# Patient Record
Sex: FEMALE | Race: WHITE | Employment: OTHER | URBAN - METROPOLITAN AREA
[De-identification: names, ages, dates, MRNs, and addresses within clinical notes are randomized per-mention and may not be internally consistent; named-entity substitution may affect disease eponyms.]

---

## 2017-01-06 ENCOUNTER — TRANSFERRED RECORDS (OUTPATIENT)
Dept: HEALTH INFORMATION MANAGEMENT | Facility: CLINIC | Age: 67
End: 2017-01-06
Payer: MEDICARE

## 2019-10-15 ENCOUNTER — TRANSFERRED RECORDS (OUTPATIENT)
Dept: MULTI SPECIALTY CLINIC | Facility: CLINIC | Age: 69
End: 2019-10-15
Payer: MEDICARE

## 2019-12-05 ENCOUNTER — APPOINTMENT (OUTPATIENT)
Dept: LAB | Age: 69
End: 2019-12-05

## 2019-12-12 ENCOUNTER — CONSULT (OUTPATIENT)
Dept: URBAN - METROPOLITAN AREA CLINIC 39 | Facility: CLINIC | Age: 69
End: 2019-12-12

## 2019-12-12 ENCOUNTER — SURGERY/PROCEDURE (OUTPATIENT)
Dept: URBAN - METROPOLITAN AREA SURGERY 14 | Facility: SURGERY | Age: 69
End: 2019-12-12

## 2019-12-12 DIAGNOSIS — H26.493: ICD-10-CM

## 2019-12-12 PROCEDURE — 6682150 YAG CAPSULOTOMY

## 2019-12-12 PROCEDURE — 92004 COMPRE OPH EXAM NEW PT 1/>: CPT

## 2019-12-12 ASSESSMENT — VISUAL ACUITY
OS_BAT: 20/80
OD_BAT: 20/70
OD_SC: 20/40
OS_SC: 20/40

## 2019-12-12 ASSESSMENT — TONOMETRY
OD_IOP_MMHG: 12
OS_IOP_MMHG: 12

## 2020-01-08 ENCOUNTER — TRANSFERRED RECORDS (OUTPATIENT)
Dept: HEALTH INFORMATION MANAGEMENT | Facility: CLINIC | Age: 70
End: 2020-01-08

## 2020-01-28 ENCOUNTER — TRANSFERRED RECORDS (OUTPATIENT)
Dept: HEALTH INFORMATION MANAGEMENT | Facility: CLINIC | Age: 70
End: 2020-01-28

## 2020-01-29 ENCOUNTER — TRANSFERRED RECORDS (OUTPATIENT)
Dept: HEALTH INFORMATION MANAGEMENT | Facility: CLINIC | Age: 70
End: 2020-01-29

## 2020-02-11 ENCOUNTER — TRANSFERRED RECORDS (OUTPATIENT)
Dept: HEALTH INFORMATION MANAGEMENT | Facility: CLINIC | Age: 70
End: 2020-02-11

## 2021-01-26 ENCOUNTER — TRANSFERRED RECORDS (OUTPATIENT)
Dept: HEALTH INFORMATION MANAGEMENT | Facility: CLINIC | Age: 71
End: 2021-01-26

## 2021-02-18 ENCOUNTER — TRANSFERRED RECORDS (OUTPATIENT)
Dept: HEALTH INFORMATION MANAGEMENT | Facility: CLINIC | Age: 71
End: 2021-02-18

## 2021-03-17 ENCOUNTER — TRANSFERRED RECORDS (OUTPATIENT)
Dept: HEALTH INFORMATION MANAGEMENT | Facility: CLINIC | Age: 71
End: 2021-03-17
Payer: MEDICARE

## 2021-04-08 ENCOUNTER — TRANSFERRED RECORDS (OUTPATIENT)
Dept: HEALTH INFORMATION MANAGEMENT | Facility: CLINIC | Age: 71
End: 2021-04-08

## 2021-05-19 ENCOUNTER — TRANSFERRED RECORDS (OUTPATIENT)
Dept: HEALTH INFORMATION MANAGEMENT | Facility: CLINIC | Age: 71
End: 2021-05-19
Payer: MEDICARE

## 2021-07-07 ENCOUNTER — WALK IN (OUTPATIENT)
Dept: URGENT CARE | Age: 71
End: 2021-07-07

## 2021-07-07 VITALS
OXYGEN SATURATION: 98 % | BODY MASS INDEX: 26.09 KG/M2 | HEART RATE: 65 BPM | HEIGHT: 64 IN | DIASTOLIC BLOOD PRESSURE: 74 MMHG | SYSTOLIC BLOOD PRESSURE: 168 MMHG

## 2021-07-07 DIAGNOSIS — T16.2XXA FOREIGN BODY OF LEFT EAR, INITIAL ENCOUNTER: Primary | ICD-10-CM

## 2021-07-07 PROCEDURE — 99212 OFFICE O/P EST SF 10 MIN: CPT | Performed by: EMERGENCY MEDICINE

## 2021-07-07 RX ORDER — METOPROLOL SUCCINATE 25 MG/1
25 TABLET, EXTENDED RELEASE ORAL 2 TIMES DAILY
COMMUNITY

## 2021-09-13 ENCOUNTER — TRANSFERRED RECORDS (OUTPATIENT)
Dept: HEALTH INFORMATION MANAGEMENT | Facility: CLINIC | Age: 71
End: 2021-09-13

## 2021-09-16 ENCOUNTER — TRANSFERRED RECORDS (OUTPATIENT)
Dept: HEALTH INFORMATION MANAGEMENT | Facility: CLINIC | Age: 71
End: 2021-09-16
Payer: MEDICARE

## 2021-09-27 ENCOUNTER — TRANSFERRED RECORDS (OUTPATIENT)
Dept: HEALTH INFORMATION MANAGEMENT | Facility: CLINIC | Age: 71
End: 2021-09-27
Payer: MEDICARE

## 2021-10-14 ENCOUNTER — OFFICE VISIT (OUTPATIENT)
Dept: FAMILY MEDICINE | Facility: CLINIC | Age: 71
End: 2021-10-14
Payer: MEDICARE

## 2021-10-14 VITALS
RESPIRATION RATE: 22 BRPM | OXYGEN SATURATION: 98 % | WEIGHT: 146.8 LBS | DIASTOLIC BLOOD PRESSURE: 74 MMHG | HEIGHT: 62 IN | HEART RATE: 61 BPM | BODY MASS INDEX: 27.02 KG/M2 | TEMPERATURE: 98 F | SYSTOLIC BLOOD PRESSURE: 127 MMHG

## 2021-10-14 DIAGNOSIS — E03.9 HYPOTHYROIDISM, UNSPECIFIED TYPE: ICD-10-CM

## 2021-10-14 DIAGNOSIS — M79.10 MYALGIA DUE TO STATIN: ICD-10-CM

## 2021-10-14 DIAGNOSIS — Z95.2 H/O AORTIC VALVE REPLACEMENT: Primary | ICD-10-CM

## 2021-10-14 DIAGNOSIS — Z98.890 H/O LUMPECTOMY: ICD-10-CM

## 2021-10-14 DIAGNOSIS — I71.20 THORACIC AORTIC ANEURYSM WITHOUT RUPTURE (H): ICD-10-CM

## 2021-10-14 DIAGNOSIS — E78.5 HYPERLIPIDEMIA LDL GOAL <70: ICD-10-CM

## 2021-10-14 DIAGNOSIS — F51.01 PRIMARY INSOMNIA: ICD-10-CM

## 2021-10-14 DIAGNOSIS — F33.41 RECURRENT MAJOR DEPRESSIVE DISORDER, IN PARTIAL REMISSION (H): ICD-10-CM

## 2021-10-14 DIAGNOSIS — Z12.31 ENCOUNTER FOR SCREENING MAMMOGRAM FOR BREAST CANCER: ICD-10-CM

## 2021-10-14 DIAGNOSIS — Z86.0100 HISTORY OF COLONIC POLYPS: ICD-10-CM

## 2021-10-14 DIAGNOSIS — T46.6X5A MYALGIA DUE TO STATIN: ICD-10-CM

## 2021-10-14 DIAGNOSIS — Z87.74 H/O BICUSPID AORTIC VALVE: ICD-10-CM

## 2021-10-14 DIAGNOSIS — Z12.11 SPECIAL SCREENING FOR MALIGNANT NEOPLASMS, COLON: ICD-10-CM

## 2021-10-14 DIAGNOSIS — Z85.3 H/O MALIGNANT NEOPLASM OF BREAST: ICD-10-CM

## 2021-10-14 DIAGNOSIS — E78.5 HYPERLIPIDEMIA LDL GOAL <130: ICD-10-CM

## 2021-10-14 PROCEDURE — 99205 OFFICE O/P NEW HI 60 MIN: CPT | Performed by: FAMILY MEDICINE

## 2021-10-14 RX ORDER — ESCITALOPRAM OXALATE 20 MG/1
20 TABLET ORAL DAILY
Qty: 90 TABLET | Refills: 3 | Status: SHIPPED | OUTPATIENT
Start: 2021-10-14 | End: 2022-01-31

## 2021-10-14 RX ORDER — LORAZEPAM 1 MG/1
1 TABLET ORAL
Qty: 30 TABLET | Refills: 2 | Status: SHIPPED | OUTPATIENT
Start: 2021-10-14 | End: 2022-01-12

## 2021-10-14 RX ORDER — METOPROLOL SUCCINATE 25 MG/1
25 TABLET, EXTENDED RELEASE ORAL DAILY
Qty: 90 TABLET | Refills: 3 | Status: SHIPPED | OUTPATIENT
Start: 2021-10-14 | End: 2021-10-30

## 2021-10-14 RX ORDER — ESCITALOPRAM OXALATE 20 MG/1
TABLET ORAL
COMMUNITY
Start: 2021-07-26 | End: 2021-10-14

## 2021-10-14 RX ORDER — EZETIMIBE 10 MG/1
TABLET ORAL
COMMUNITY
Start: 2021-09-02 | End: 2021-10-14

## 2021-10-14 RX ORDER — LORAZEPAM 1 MG/1
TABLET ORAL
COMMUNITY
Start: 2021-08-05 | End: 2021-10-14

## 2021-10-14 RX ORDER — LEVOTHYROXINE SODIUM 25 UG/1
25 TABLET ORAL DAILY
Qty: 90 TABLET | Refills: 3 | Status: SHIPPED | OUTPATIENT
Start: 2021-10-14 | End: 2022-01-31 | Stop reason: DRUGHIGH

## 2021-10-14 RX ORDER — METOPROLOL TARTRATE 25 MG/1
TABLET, FILM COATED ORAL
COMMUNITY
Start: 2021-09-12 | End: 2021-10-14

## 2021-10-14 RX ORDER — ZINC GLUCONATE 50 MG
50 TABLET ORAL DAILY
COMMUNITY
End: 2023-09-07

## 2021-10-14 RX ORDER — LEVOTHYROXINE SODIUM 25 UG/1
TABLET ORAL
COMMUNITY
Start: 2021-08-22 | End: 2021-10-14

## 2021-10-14 RX ORDER — EZETIMIBE 10 MG/1
10 TABLET ORAL DAILY
Qty: 90 TABLET | Refills: 3 | Status: SHIPPED | OUTPATIENT
Start: 2021-10-14 | End: 2022-01-31

## 2021-10-14 ASSESSMENT — MIFFLIN-ST. JEOR: SCORE: 1143.1

## 2021-10-14 NOTE — PROGRESS NOTES
Assessment & Plan     H/O aortic valve replacement  referal/refills  - Adult Cardiology Eval Referral  - metoprolol succinate ER (TOPROL-XL) 25 MG 24 hr tablet  Dispense: 90 tablet; Refill: 3    H/O bicuspid aortic valve    - metoprolol succinate ER (TOPROL-XL) 25 MG 24 hr tablet  Dispense: 90 tablet; Refill: 3    Thoracic aortic aneurysm without rupture (H)    - metoprolol succinate ER (TOPROL-XL) 25 MG 24 hr tablet  Dispense: 90 tablet; Refill: 3    H/O malignant neoplasm of breast  ordered  - MA Diagnostic Digital Bilateral    H/O lumpectomy    - MA Diagnostic Digital Bilateral    History of colonic polyps  orderd  - Adult Gastro Ref - Procedure Only    Recurrent major depressive disorder, in partial remission (H)  Continue:  - escitalopram (LEXAPRO) 20 MG tablet  Dispense: 90 tablet; Refill: 3    Myalgia due to statin  noted    Hyperlipidemia LDL goal <70      Hyperlipidemia LDL goal <130    - ezetimibe (ZETIA) 10 MG tablet  Dispense: 90 tablet; Refill: 3  - STATIN NOT PRESCRIBED (INTENTIONAL)    Hypothyroidism, unspecified type    - levothyroxine (SYNTHROID/LEVOTHROID) 25 MCG tablet  Dispense: 90 tablet; Refill: 3    Primary insomnia  Discussed trial of trazodone alternating with lorazepam to wean lorazepam due to risks of habituation, decreased effectiveness, falls, hip fracture, death, dementia.   For now no changes.  Follow up three months to continue discussion.     No suspicious activity on [place] rx monitoring database     Reviewed/signed csa today.    Consider utox next visit.     - LORazepam (ATIVAN) 1 MG tablet  Dispense: 30 tablet; Refill: 2    Special screening for malignant neoplasms, colon    - Adult Gastro Ref - Procedure Only    Encounter for screening mammogram for breast cancer    - MA Diagnostic Digital Bilateral    65 minutes spent on the date of the encounter doing chart review, history and exam, negotiating and explaining the plan with the patient, documentation and further activities as  "noted above.                      BMI:   Estimated body mass index is 26.63 kg/m  as calculated from the following:    Height as of this encounter: 1.581 m (5' 2.25\").    Weight as of this encounter: 66.6 kg (146 lb 12.8 oz).           Return in about 3 months (around 1/14/2022) for virtual, in person, sleep/lorazepam.    Joel Daniel Wegener, MD  [facility]    Rita Aguilar is a 71 year old who presents for the following health issues     HPI     Bicuspid aortic valve replacement last march, had aortic aneurysm above valve, s/p pacemaker, needs cardiologist.  Started on metoprolol for \"valve protection.\"  But not high blood pressue.     Left Breast cancer age 39, lumpectomy 6 mo chemo, 6 weeks radiation, yearly mammo, several cosmetic breast surgeries with scar tissue, needs mammo now.  Has been doing yearly.     Colonoscopy needed 6 months from now (h/o polyps) last three years ago    H/o depression pre-dating death of grandchild from cancer but much worse.  Mood doing well but did trial off fluoxetine and felt \"blanket\" of \"downness\" come over about two weeks after stopping so planning to take indefinitely.     Myalgias due to multiple statins so on zetia only.  No ascvd however just the aortic aneurysm thought due to bicuspid aortic valve now fixed.  Was due for repeat imaging, desires cardiology referral.         New Patient / Transfer of Care          Review of Systems         Objective    /74 (Patient Position: Sitting, Cuff Size: Adult Regular)   Pulse 61   Temp 98  F (36.7  C) (Tympanic)   Resp 22   Ht 1.581 m (5' 2.25\")   Wt 66.6 kg (146 lb 12.8 oz)   SpO2 98%   BMI 26.63 kg/m    Body mass index is 26.63 kg/m .  Physical Exam   Clear speech, pleasant                "

## 2021-10-14 NOTE — LETTER
[facility]  10/14/21  Patient: Lauren Nick  YOB: 1950  Medical Record Number: [id number]                                                                                  Non-Opioid Controlled Substance Agreement    This is an agreement between you and your provider regarding safe and appropriate use of controlled substances prescribed by your care team. Controlled substances are?medicines that can cause physical and mental dependence (abuse).     There are strict laws about having and using these medicines. We here at [facility] are  committed to working with you in your efforts to get better. To support you in this work, we'll help you schedule regular office appointments for medicine refills. If we must cancel or change your appointment for any reason, we'll make sure you have enough medicine to last until your next appointment.     As a Provider, I will:     Listen carefully to your concerns while treating you with respect.     Recommend a treatment plan that I believe is in your best interest and may involve therapies other than medicine.      Talk with you often about the possible benefits and the risk of harm of any medicine that we prescribe for you.    Assess the safety of this medicine and check how well it works.      Provide a plan on how to taper (discontinue or go off) using this medicine if the decision is made to stop its use.      ::  As a Patient, I understand controlled substances:       Are prescribed by my care provider to help me function or work and manage my condition(s).?    Are strong medicines and can cause serious side effects.       Need to be taken exactly as prescribed.?Combining controlled substances with certain medicines or chemicals (such as illegal drugs, alcohol, sedatives, sleeping pills, and benzodiazepines) can be dangerous or even fatal.? If I stop taking my medicines suddenly, I may have severe withdrawal symptoms.     The  risks, benefits, and side effects of these medicine(s) were explained to me. I agree that:    1. I will take part in other treatments as advised by my care team. This may be psychiatry or counseling, physical therapy, behavioral therapy, group treatment or a referral to specialist.    2. I will keep all my appointments and understand this is part of the monitoring of controlled substances.?My care team may require an office visit for EVERY controlled substance refill. If I miss appointments or don t follow instructions, my care team may stop my medicine    3. I will take my medicines as prescribed. I will not change the dose or schedule unless my care team tells me to. There will be no refills if I run out early.      4. I may be asked to come to the clinic and complete a urine drug test or complete a pill count. If I don t give a urine sample or participate in a pill count, the care team may stop my medicine.    5. I will only receive controlled substance prescriptions from this clinic. If I am treated by another provider, I will tell them that I am taking controlled substances and that I have a treatment agreement with this provider. I will inform my [facility] care team within one business day if I am given a prescription for any controlled substance by another healthcare provider. My [facility] care team can contact other providers and pharmacists about my use of any medicines.    6. It is up to me to make sure that I don't run out of my medicines on weekends or holidays.?If my care team is willing to refill my prescription without a visit, I must request refills only during office hours. Refills may take up to 3 business days to process. I will use one pharmacy to fill all my controlled substance prescriptions. I will notify the clinic about any changes to my insurance or medicine availability.    7. I am responsible for my prescriptions. If the medicine/prescription is lost, stolen or destroyed,  it will not be replaced.?I also agree not to share controlled substance medicines with anyone.     8. I am aware I should not use any illegal or recreational drugs. I agree not to drink alcohol unless my care team says I can.     9. If I enroll in the [place] Medical Cannabis program, I will tell my care team before my next refill.    10. I will tell my care team right away if I become pregnant, have a new medical problem treated outside of my regular clinic, or have a change in my medicines.     11. I understand that this medicine can affect my thinking, judgment and reaction time.? Alcohol and drugs affect the brain and body, which can affect the safety of my driving. Being under the influence of alcohol or drugs can affect my decision-making, behaviors, personal safety and the safety of others. Driving while impaired (DWI) can occur if a person is driving, operating or in physical control of a car, motorcycle, boat, snowmobile, ATV, motorbike, off-road vehicle or any other motor vehicle ([place] Statute 169A.20). I understand the risk if I choose to drive or operate any vehicle or machinery.    I understand that if I do not follow any of the conditions above, my prescriptions or treatment may be stopped or changed.   I agree that my provider, clinic care team and pharmacy may work with any city, state or federal law enforcement agency that investigates the misuse, sale or other diversion of my controlled medicine. I will allow my provider to discuss my care with, or share a copy of, this agreement with any other treating provider, pharmacy or emergency room where I receive care.     I have read this agreement and have asked questions about anything I did not understand.    ________________________________________________________  Patient Signature - Lauren Nick     ___________________                   Date     ________________________________________________________  Provider Signature - Joel Daniel Wegener,  MD       ___________________                   Date     ________________________________________________________  Witness Signature (required if provider not present while patient signing)          ___________________                   Date

## 2021-10-14 NOTE — PATIENT INSTRUCTIONS
"Bicuspid aortic valve replacement last march, had aortic aneurysm above valve, s/p pacemaker, needs cardiologist    Breast cancer age 39, lumpectomy 6 mo chemo, 6 weeks radiation, yearly mammo, several cosmetic breast surgeries with scar tissue, needs mammo now    Colonoscopy needed 6 months from now (h/o polyps) last three years ago    ASSESSMENT AND PLAN  1. H/O aortic valve replacement    - Adult Cardiology Eval Referral; Future        [facility] S[facility]: [email or web address] , [phone]    E-VISITS CAN BE DONE FOR CARE/PRESCRIPTIONS WHICH MAY NOT NEED AN IN-PERSON ASSESSMENT - click \"on-line care, then request e-visit\".      ONCARE VISIT/PRESCRIPTIONS: [email or web address]  - we treat nearly 50 common conditions with one hour response time     RADIOLOGY SCHEDULING  [facility]:  [phone]   [facility]: [phone]  [facility]: [phone]    Mammogram and Colonoscopy [phone]    Smoking Cessation: www.quitplan.org, 8-282-138-PLAN ([phone])    [facility] Scheduling:  [phone].    CONSUMER PRICE LINE for estimates of test costs:  [phone]       "

## 2021-10-17 ENCOUNTER — HEALTH MAINTENANCE LETTER (OUTPATIENT)
Age: 71
End: 2021-10-17

## 2021-10-25 ENCOUNTER — MYC MEDICAL ADVICE (OUTPATIENT)
Dept: FAMILY MEDICINE | Facility: CLINIC | Age: 71
End: 2021-10-25

## 2021-10-27 ENCOUNTER — ANCILLARY PROCEDURE (OUTPATIENT)
Dept: MAMMOGRAPHY | Facility: CLINIC | Age: 71
End: 2021-10-27
Payer: MEDICARE

## 2021-10-27 DIAGNOSIS — Z12.31 VISIT FOR SCREENING MAMMOGRAM: ICD-10-CM

## 2021-10-27 DIAGNOSIS — Z85.3 H/O MALIGNANT NEOPLASM OF BREAST: ICD-10-CM

## 2021-10-27 DIAGNOSIS — Z98.890 H/O LUMPECTOMY: ICD-10-CM

## 2021-10-27 DIAGNOSIS — Z12.31 ENCOUNTER FOR SCREENING MAMMOGRAM FOR BREAST CANCER: ICD-10-CM

## 2021-10-27 PROCEDURE — 77067 SCR MAMMO BI INCL CAD: CPT | Mod: TC | Performed by: RADIOLOGY

## 2021-10-27 PROCEDURE — 77063 BREAST TOMOSYNTHESIS BI: CPT | Mod: TC | Performed by: RADIOLOGY

## 2021-10-28 ENCOUNTER — MYC MEDICAL ADVICE (OUTPATIENT)
Dept: FAMILY MEDICINE | Facility: CLINIC | Age: 71
End: 2021-10-28

## 2021-10-28 ENCOUNTER — DOCUMENTATION ONLY (OUTPATIENT)
Dept: LAB | Facility: CLINIC | Age: 71
End: 2021-10-28

## 2021-10-28 DIAGNOSIS — Z95.2 H/O AORTIC VALVE REPLACEMENT: ICD-10-CM

## 2021-10-28 DIAGNOSIS — Z87.74 H/O BICUSPID AORTIC VALVE: ICD-10-CM

## 2021-10-28 DIAGNOSIS — E03.9 HYPOTHYROIDISM, UNSPECIFIED TYPE: ICD-10-CM

## 2021-10-28 DIAGNOSIS — F33.41 RECURRENT MAJOR DEPRESSIVE DISORDER, IN PARTIAL REMISSION (H): ICD-10-CM

## 2021-10-28 DIAGNOSIS — I71.20 THORACIC AORTIC ANEURYSM WITHOUT RUPTURE (H): ICD-10-CM

## 2021-10-28 DIAGNOSIS — Z79.899 MEDICATION MANAGEMENT: ICD-10-CM

## 2021-10-28 DIAGNOSIS — Z13.6 CARDIOVASCULAR SCREENING; LDL GOAL LESS THAN 160: ICD-10-CM

## 2021-10-28 DIAGNOSIS — Z95.2 H/O AORTIC VALVE REPLACEMENT: Primary | ICD-10-CM

## 2021-10-28 NOTE — TELEPHONE ENCOUNTER
Writer responded via [employer].    RONAK DuranN, RN  [facility]

## 2021-10-28 NOTE — PROGRESS NOTES
Pt coming in for labs on 11/4/21 ahead of physical w/ Dr. Wegener on 11/11/21. No orders in chart, please place necessary orders.     Alma Munroe on 10/28/2021 at 11:44 AM

## 2021-10-30 RX ORDER — METOPROLOL SUCCINATE 25 MG/1
25 TABLET, EXTENDED RELEASE ORAL 2 TIMES DAILY
Qty: 180 TABLET | Refills: 3 | Status: SHIPPED | OUTPATIENT
Start: 2021-10-30 | End: 2022-01-28

## 2021-11-04 ENCOUNTER — LAB (OUTPATIENT)
Dept: LAB | Facility: CLINIC | Age: 71
End: 2021-11-04
Payer: MEDICARE

## 2021-11-04 ENCOUNTER — MYC MEDICAL ADVICE (OUTPATIENT)
Dept: FAMILY MEDICINE | Facility: CLINIC | Age: 71
End: 2021-11-04

## 2021-11-04 DIAGNOSIS — Z13.6 CARDIOVASCULAR SCREENING; LDL GOAL LESS THAN 160: ICD-10-CM

## 2021-11-04 DIAGNOSIS — E03.9 HYPOTHYROIDISM, UNSPECIFIED TYPE: ICD-10-CM

## 2021-11-04 DIAGNOSIS — Z79.899 MEDICATION MANAGEMENT: ICD-10-CM

## 2021-11-04 LAB
ALBUMIN SERPL-MCNC: 3.5 G/DL (ref 3.4–5)
ALP SERPL-CCNC: 61 U/L (ref 40–150)
ALT SERPL W P-5'-P-CCNC: 24 U/L (ref 0–50)
ANION GAP SERPL CALCULATED.3IONS-SCNC: 5 MMOL/L (ref 3–14)
AST SERPL W P-5'-P-CCNC: 19 U/L (ref 0–45)
BILIRUB SERPL-MCNC: 0.4 MG/DL (ref 0.2–1.3)
BUN SERPL-MCNC: 16 MG/DL (ref 7–30)
CALCIUM SERPL-MCNC: 8.7 MG/DL (ref 8.5–10.1)
CHLORIDE BLD-SCNC: 110 MMOL/L (ref 94–109)
CHOLEST SERPL-MCNC: 221 MG/DL
CO2 SERPL-SCNC: 25 MMOL/L (ref 20–32)
CREAT SERPL-MCNC: 0.88 MG/DL (ref 0.52–1.04)
CREAT UR-MCNC: 225 MG/DL
ERYTHROCYTE [DISTWIDTH] IN BLOOD BY AUTOMATED COUNT: 14.1 % (ref 10–15)
FASTING STATUS PATIENT QL REPORTED: YES
GFR SERPL CREATININE-BSD FRML MDRD: 67 ML/MIN/1.73M2
GLUCOSE BLD-MCNC: 95 MG/DL (ref 70–99)
HCT VFR BLD AUTO: 39.9 % (ref 35–47)
HDLC SERPL-MCNC: 57 MG/DL
HGB BLD-MCNC: 12.4 G/DL (ref 11.7–15.7)
LDLC SERPL CALC-MCNC: 146 MG/DL
MCH RBC QN AUTO: 28.5 PG (ref 26.5–33)
MCHC RBC AUTO-ENTMCNC: 31.1 G/DL (ref 31.5–36.5)
MCV RBC AUTO: 92 FL (ref 78–100)
NONHDLC SERPL-MCNC: 164 MG/DL
PLATELET # BLD AUTO: 181 10E3/UL (ref 150–450)
POTASSIUM BLD-SCNC: 4.2 MMOL/L (ref 3.4–5.3)
PROT SERPL-MCNC: 6.9 G/DL (ref 6.8–8.8)
RBC # BLD AUTO: 4.35 10E6/UL (ref 3.8–5.2)
SODIUM SERPL-SCNC: 140 MMOL/L (ref 133–144)
T4 FREE SERPL-MCNC: 0.91 NG/DL (ref 0.76–1.46)
TRIGL SERPL-MCNC: 91 MG/DL
TSH SERPL DL<=0.005 MIU/L-ACNC: 4.23 MU/L (ref 0.4–4)
WBC # BLD AUTO: 4.9 10E3/UL (ref 4–11)

## 2021-11-04 PROCEDURE — 80061 LIPID PANEL: CPT

## 2021-11-04 PROCEDURE — 84439 ASSAY OF FREE THYROXINE: CPT

## 2021-11-04 PROCEDURE — 80307 DRUG TEST PRSMV CHEM ANLYZR: CPT

## 2021-11-04 PROCEDURE — 85027 COMPLETE CBC AUTOMATED: CPT

## 2021-11-04 PROCEDURE — 80053 COMPREHEN METABOLIC PANEL: CPT

## 2021-11-04 PROCEDURE — 84443 ASSAY THYROID STIM HORMONE: CPT

## 2021-11-04 PROCEDURE — 36415 COLL VENOUS BLD VENIPUNCTURE: CPT

## 2021-11-06 LAB — LORAZEPAM UR QL CFM: PRESENT

## 2021-11-07 ENCOUNTER — MYC MEDICAL ADVICE (OUTPATIENT)
Dept: FAMILY MEDICINE | Facility: CLINIC | Age: 71
End: 2021-11-07
Payer: MEDICARE

## 2021-11-16 ENCOUNTER — E-VISIT (OUTPATIENT)
Dept: FAMILY MEDICINE | Facility: CLINIC | Age: 71
End: 2021-11-16
Payer: MEDICARE

## 2021-11-16 DIAGNOSIS — J01.00 ACUTE NON-RECURRENT MAXILLARY SINUSITIS: ICD-10-CM

## 2021-11-16 DIAGNOSIS — J20.9 ACUTE BRONCHITIS, UNSPECIFIED ORGANISM: ICD-10-CM

## 2021-11-16 PROCEDURE — 99421 OL DIG E/M SVC 5-10 MIN: CPT | Performed by: FAMILY MEDICINE

## 2021-11-16 RX ORDER — AZITHROMYCIN 250 MG/1
TABLET, FILM COATED ORAL
Qty: 6 TABLET | Refills: 0 | Status: SHIPPED | OUTPATIENT
Start: 2021-11-16 | End: 2021-11-30

## 2021-11-16 NOTE — PATIENT INSTRUCTIONS
"    Dear Lauren Nick    After reviewing your responses, I've been able to diagnose you with \"Bronchitis\" which is a common infection of your lungs. While this is most commonly caused by a virus, the symptoms you have given suggest you should be treated with antibiotics.     I have sent azithromycin to your pharmacy to treat this infection.     It is important that you take all of your prescribed medication even if your symptoms are improving after a few doses. Taking all of your medicine helps prevent the symptoms from returning.     If your symptoms worsen, you develop chest pain or shortness of breath, fevers over 101, or are not improving in 5 days, please contact your primary care provider for an appointment or visit any of our convenient Walk-in Care or Urgent Care Centers to be seen which can be found on our website here.    Thanks again for choosing us as your health care partner,    Joel Daniel Wegener, MD    Dear Lauren Nick,    Your symptoms show that you may have coronavirus (COVID-19). This illness can cause fever, cough and trouble breathing. Many people get a mild case and get better on their own. Some people can get very sick.    Will I be tested for COVID-19?  We would like to test you for Covid-19 virus. I have placed orders for this test.     To schedule: go to your [facility] home page and scroll down to the section that says  You have an appointment that needs to be scheduled  and click the large green button that says  Schedule Now  and follow the steps to find the next available openings.    If you are unable to complete these [facility] scheduling steps, please call [phone] to schedule your testing.     Return to work/school/ guidance:  Please let your workplace manager and staffing office know when your quarantine ends     We can t give you an exact date as it depends on the above. You can calculate this on your own or work with your manager/staffing office to calculate " this. (For example if you were exposed on 10/4, you would have to quarantine for 14 full days. That would be through 10/18. You could return on 10/19.)      If you receive a positive COVID-19 test result, follow the guidance of the those who are giving you the results. Usually the return to work is 10 (or in some cases 20 days from symptom onset.) If you work at [facility], you must also be cleared by Employee Occupational Health and Safety to return to work.        If you receive a negative COVID-19 test result and did not have a high risk exposure to someone with a known positive COVID-19 test, you can return to work once you're free of fever for 24 hours without fever-reducing medication and your symptoms are improving or resolved.      If you receive a negative COVID-19 test and If you had a high risk exposure to someone who has tested positive for COVID-19 then you can return to work 14 days after your last contact with the positive individual    Note: If you have ongoing exposure to the covid positive person, this quarantine period may be more than 14 days. (For example, if you are continued to be exposed to your child who tested positive and cannot isolate from them, then the quarantine of 7-14 days can't start until your child is no longer contagious. This is typically 10 days from onset of the child's symptoms. So the total duration may be 17-24 days in this case.)    Sign up for [employer].   We know it's scary to hear that you might have COVID-19. We want to track your symptoms to make sure you're okay over the next 2 weeks. Please look for an email from [employer]--this is a free, online program that we'll use to keep in touch. To sign up, follow the link in the email you will receive. Learn more at [email or web address]    How can I take care of myself?    Get lots of rest. Drink extra fluids (unless a doctor has told you not to)    Take Tylenol (acetaminophen) or ibuprofen for  fever or pain. If you have liver or kidney problems, ask your family doctor if it's okay to take Tylenol o ibuprofen    If you have other health problems (like cancer, heart failure, an organ transplant or severe kidney disease): Call your specialty clinic if you don't feel better in the next 2 days.    Know when to call 911. Emergency warning signs include:  o Trouble breathing or shortness of breath  o Pain or pressure in the chest that doesn't go away  o Feeling confused like you haven't felt before, or not being able to wake up  o Bluish-colored lips or face    Where can I get more information?  [facility] - About COVID-19:   [email or web address]    CDC - What to Do If You're Sick:   www.cdc.gov/coronavirus/2019-ncov/about/steps-when-sick.html

## 2021-11-17 ENCOUNTER — LAB (OUTPATIENT)
Dept: URGENT CARE | Facility: URGENT CARE | Age: 71
End: 2021-11-17
Attending: FAMILY MEDICINE
Payer: MEDICARE

## 2021-11-17 DIAGNOSIS — J20.9 ACUTE BRONCHITIS, UNSPECIFIED ORGANISM: ICD-10-CM

## 2021-11-17 DIAGNOSIS — J01.00 ACUTE NON-RECURRENT MAXILLARY SINUSITIS: ICD-10-CM

## 2021-11-17 LAB
FLUAV AG SPEC QL IA: NEGATIVE
FLUBV AG SPEC QL IA: NEGATIVE

## 2021-11-17 PROCEDURE — 87804 INFLUENZA ASSAY W/OPTIC: CPT | Performed by: FAMILY MEDICINE

## 2021-11-17 PROCEDURE — U0005 INFEC AGEN DETEC AMPLI PROBE: HCPCS

## 2021-11-17 PROCEDURE — U0003 INFECTIOUS AGENT DETECTION BY NUCLEIC ACID (DNA OR RNA); SEVERE ACUTE RESPIRATORY SYNDROME CORONAVIRUS 2 (SARS-COV-2) (CORONAVIRUS DISEASE [COVID-19]), AMPLIFIED PROBE TECHNIQUE, MAKING USE OF HIGH THROUGHPUT TECHNOLOGIES AS DESCRIBED BY CMS-2020-01-R: HCPCS

## 2021-11-18 LAB — SARS-COV-2 RNA RESP QL NAA+PROBE: NEGATIVE

## 2021-11-23 ENCOUNTER — IMMUNIZATION (OUTPATIENT)
Dept: NURSING | Facility: CLINIC | Age: 71
End: 2021-11-23
Payer: MEDICARE

## 2021-11-23 PROCEDURE — 0064A COVID-19,PF,MODERNA (18+ YRS BOOSTER .25ML): CPT

## 2021-11-23 PROCEDURE — 91306 COVID-19,PF,MODERNA (18+ YRS BOOSTER .25ML): CPT

## 2021-11-27 ASSESSMENT — ENCOUNTER SYMPTOMS
COUGH: 1
HEMATOCHEZIA: 0
SHORTNESS OF BREATH: 1
BREAST MASS: 0
PARESTHESIAS: 0
CHILLS: 0
DIZZINESS: 0
HEARTBURN: 0
MYALGIAS: 1
WEAKNESS: 0
NERVOUS/ANXIOUS: 0
ARTHRALGIAS: 1
EYE PAIN: 0
DYSURIA: 0
HEADACHES: 0
FEVER: 0
HEMATURIA: 0
ABDOMINAL PAIN: 0
NAUSEA: 0
FREQUENCY: 0
CONSTIPATION: 0
JOINT SWELLING: 0
SORE THROAT: 0
PALPITATIONS: 0
DIARRHEA: 0

## 2021-11-27 ASSESSMENT — PATIENT HEALTH QUESTIONNAIRE - PHQ9
SUM OF ALL RESPONSES TO PHQ QUESTIONS 1-9: 2
SUM OF ALL RESPONSES TO PHQ QUESTIONS 1-9: 2
10. IF YOU CHECKED OFF ANY PROBLEMS, HOW DIFFICULT HAVE THESE PROBLEMS MADE IT FOR YOU TO DO YOUR WORK, TAKE CARE OF THINGS AT HOME, OR GET ALONG WITH OTHER PEOPLE: NOT DIFFICULT AT ALL

## 2021-11-27 ASSESSMENT — ACTIVITIES OF DAILY LIVING (ADL): CURRENT_FUNCTION: NO ASSISTANCE NEEDED

## 2021-11-29 ASSESSMENT — ENCOUNTER SYMPTOMS
COUGH: 1
HEMATURIA: 0
CONSTIPATION: 0
MYALGIAS: 1
HEMATOCHEZIA: 0
FREQUENCY: 0
CHILLS: 0
HEADACHES: 0
SORE THROAT: 0
EYE PAIN: 0
NERVOUS/ANXIOUS: 0
BREAST MASS: 0
SHORTNESS OF BREATH: 1
NAUSEA: 0
DIZZINESS: 0
DIARRHEA: 0
JOINT SWELLING: 0
HEARTBURN: 0
DYSURIA: 0
ABDOMINAL PAIN: 0
FEVER: 0
PARESTHESIAS: 0
ARTHRALGIAS: 1
WEAKNESS: 0
PALPITATIONS: 0

## 2021-11-29 ASSESSMENT — ACTIVITIES OF DAILY LIVING (ADL): CURRENT_FUNCTION: NO ASSISTANCE NEEDED

## 2021-11-29 NOTE — PROGRESS NOTES
"SUBJECTIVE:   Lauren Nick is a 71 year old female who presents for Preventive Visit.    Patient has been advised of split billing requirements and indicates understanding: Yes   Are you in the first 12 months of your Medicare coverage?  No    Healthy Habits:     In general, how would you rate your overall health?  Good    Frequency of exercise:  2-3 days/week    Duration of exercise:  30-45 minutes    Do you usually eat at least 4 servings of fruit and vegetables a day, include whole grains    & fiber and avoid regularly eating high fat or \"junk\" foods?  No    Taking medications regularly:  Yes    Medication side effects:  Other    Ability to successfully perform activities of daily living:  No assistance needed    Home Safety:  No safety concerns identified    Hearing Impairment:  Need to ask people to speak up or repeat themselves and difficulty understanding soft or whispered speech    In the past 6 months, have you been bothered by leaking of urine?  No    In general, how would you rate your overall mental or emotional health?  Excellent      PHQ-2 Total Score: 0    Additional concerns today:  Yes    Pt still having URI sx's - cough with green mucus. No fevers.  Antibiotics did help substantially but never quite back to normal.     Discuss ezetimibe - not helping sx's, wondering about alternative options.      Review 11/4/21 labs.    Needs referral for dentist.    Spider veins - will medicare pay for these to be removed?  Desires referral.     Do you feel safe in your environment? Yes    Have you ever done Advance Care Planning? (For example, a Health Directive, POLST, or a discussion with a medical provider or your loved ones about your wishes): Yes, patient states has an Advance Care Planning document and will bring a copy to the clinic.       Fall risk  Fallen 2 or more times in the past year?: No  Any fall with injury in the past year?: No    Cognitive Screening   1) Repeat 3 items (Leader, Season, " Table)    2) Clock draw: NORMAL  3) 3 item recall: Recalls 3 objects  Results: 3 items recalled: COGNITIVE IMPAIRMENT LESS LIKELY    Mini-CogTM Copyright S Niranjan. Licensed by the author for use in [facility]; reprinted with permission (jamie@[facility]). All rights reserved.      Do you have sleep apnea, excessive snoring or daytime drowsiness?: no    Reviewed and updated as needed this visit by clinical staff  Tobacco  Allergies  Meds   Med Hx  Surg Hx  Fam Hx  Soc Hx       Reviewed and updated as needed this visit by Provider               Social History     Tobacco Use     Smoking status: Former Smoker     Packs/day: 1.00     Years: 10.00     Pack years: 10.00     Types: Cigarettes     Start date: 1977     Quit date: 10/1/1979     Years since quittin.1     Smokeless tobacco: Never Used   Substance Use Topics     Alcohol use: Yes     If you drink alcohol do you typically have >3 drinks per day or >7 drinks per week? No    No flowsheet data found.            Current providers sharing in care for this patient include:   Patient Care Team:  Wegener, Joel Daniel Irwin, MD as PCP - General (Family Medicine)  Wegener, Joel Daniel Irwin, MD as Assigned PCP    The following health maintenance items are reviewed in Epic and correct as of today:  Health Maintenance Due   Topic Date Due     DEXA  Never done     MICROALBUMIN  Never done     ANNUAL REVIEW OF HM ORDERS  Never done     ADVANCE CARE PLANNING  Never done     DEPRESSION ACTION PLAN  Never done     PHQ-9  Never done     HEPATITIS C SCREENING  Never done     DTAP/TDAP/TD IMMUNIZATION (1 - Tdap) Never done     ZOSTER IMMUNIZATION (1 of 2) Never done     FALL RISK ASSESSMENT  Never done     Pneumococcal Vaccine: 65+ Years (1 of 1 - PPSV23) Never done               Review of Systems   Constitutional: Negative for chills and fever.   HENT: Positive for congestion, ear pain and hearing loss. Negative for sore throat.    Eyes: Negative for pain and  "visual disturbance.   Respiratory: Positive for cough and shortness of breath.    Cardiovascular: Negative for chest pain, palpitations and peripheral edema.   Gastrointestinal: Negative for abdominal pain, constipation, diarrhea, heartburn, hematochezia and nausea.   Breasts:  Negative for tenderness, breast mass and discharge.   Genitourinary: Negative for dysuria, frequency, genital sores, hematuria, pelvic pain, urgency, vaginal bleeding and vaginal discharge.   Musculoskeletal: Positive for arthralgias and myalgias. Negative for joint swelling.   Skin: Negative for rash.   Neurological: Negative for dizziness, weakness, headaches and paresthesias.   Psychiatric/Behavioral: Negative for mood changes. The patient is not nervous/anxious.          OBJECTIVE:   Resp 20   Ht 1.585 m (5' 2.4\")   Wt 66.7 kg (147 lb)   BMI 26.54 kg/m   Estimated body mass index is 26.54 kg/m  as calculated from the following:    Height as of this encounter: 1.585 m (5' 2.4\").    Weight as of this encounter: 66.7 kg (147 lb).  Physical Exam  GENERAL: healthy, alert and no distress  EYES: Eyes grossly normal to inspection, PERRL and conjunctivae and sclerae normal  HENT: ear canals and TM's normal, nose and mouth without ulcers or lesions  NECK: no adenopathy, no asymmetry, masses, or scars and thyroid normal to palpation  RESP: lungs clear to auscultation - no rales, rhonchi or wheezes  CV: regular rate and rhythm, normal S1 S2, no S3 or S4, no murmur, click or rub, no peripheral edema and peripheral pulses strong  ABDOMEN: soft, nontender, no hepatosplenomegaly, no masses and bowel sounds normal  MS: no gross musculoskeletal defects noted, no edema  SKIN: no suspicious lesions or rashes  NEURO: Normal strength and tone, mentation intact and speech normal  PSYCH: mentation appears normal, affect normal/bright        ASSESSMENT / PLAN:   ASSESSMENT AND PLAN  1. Encounter for Medicare annual wellness exam    Reviewed preventive care " (mammogram, colon screening, had dexa scan in past).  Remembers having pneumonia vaccine.  Has not had shingrix vaccine (recommended getting from a pharmacy).      Also knows has not had tdap in last 10 years so will give.     I will review records if/when we get them and let you know if other update to care plan.       2. Spider veins of both lower extremities  Referral requested/given.   - Vascular Surgery Referral; Future    3. Need for tetanus booster    - TDAP VACCINE (Adacel, Boostrix)  [[id number]]    4. Hypothyroidism, unspecified type  Dose has been stable for several years.  Does not wish dose change.  Recommend repeat six months.   - TSH with free T4 reflex; Future    5. Acute non-recurrent maxillary sinusitis  Antibiotics did help substantially but didn't fully clear symptoms including now mild cough and sinus pressure.  Repeat prescription now.   - azithromycin (ZITHROMAX) 250 MG tablet; Take 2 tablets (500 mg) by mouth daily for 1 day, THEN 1 tablet (250 mg) daily for 4 days.  Dispense: 6 tablet; Refill: 0    6. Acute bronchitis, unspecified organism  Same - no wheezes today although has inhaler at home.   - azithromycin (ZITHROMAX) 250 MG tablet; Take 2 tablets (500 mg) by mouth daily for 1 day, THEN 1 tablet (250 mg) daily for 4 days.  Dispense: 6 tablet; Refill: 0    7. Myalgia due to statin  Reviewed several options including no treatment, red  Yeast rice or re-trial statin every other day with coq10.      Elected the latter. Reviewed risk of myopathy in up to date an chose pravastatin which is lower on risk level of myopathy.     Take ever other day to start, re-check lipids 3-6 months.     I recommend discussing with your new cardiologist as well.   - coenzyme Q-10 (CO-Q10) 50 MG capsule; Take 2 capsules (100 mg) by mouth daily    8. Hyperlipidemia LDL goal <70  Low LDL goal due to h/o thoracic aortic aneurysms, aortic valve replacement.     Would also get an opinion on goal from Dr. King your new  "cardiologist.     - pravastatin (PRAVACHOL) 10 MG tablet; Take 1 tablet (10 mg) by mouth every other day  Dispense: 45 tablet; Refill: 3  - Lipid panel reflex to direct LDL Fasting; Future    Follow up one year except labs as above.     Patient has been advised of split billing requirements and indicates understanding: Yes  COUNSELING:  Reviewed preventive health counseling, as reflected in patient instructions       Regular exercise       Healthy diet/nutrition    Estimated body mass index is 26.54 kg/m  as calculated from the following:    Height as of this encounter: 1.585 m (5' 2.4\").    Weight as of this encounter: 66.7 kg (147 lb).        She reports that she quit smoking about 42 years ago. Her smoking use included cigarettes. She started smoking about 44 years ago. She has a 10.00 pack-year smoking history. She has never used smokeless tobacco.      Appropriate preventive services were discussed with this patient, including applicable screening as appropriate for cardiovascular disease, diabetes, osteopenia/osteoporosis, and glaucoma.  As appropriate for age/gender, discussed screening for colorectal cancer, prostate cancer, breast cancer, and cervical cancer. Checklist reviewing preventive services available has been given to the patient.    Reviewed patients plan of care and provided an AVS. The Basic Care Plan (routine screening as documented in Health Maintenance) for Lauren meets the Care Plan requirement. This Care Plan has been established and reviewed with the Patient.    Counseling Resources:  ATP IV Guidelines  Pooled Cohorts Equation Calculator  Breast Cancer Risk Calculator  Breast Cancer: Medication to Reduce Risk  FRAX Risk Assessment  ICSI Preventive Guidelines  Dietary Guidelines for Americans, 2010  [employer]'s MyPlate  ASA Prophylaxis  Lung CA Screening    Joel Daniel Wegener, MD  [facility]    Identified Health Risks:  Answers for HPI/ROS submitted by the patient on " 11/27/2021  If you checked off any problems, how difficult have these problems made it for you to do your work, take care of things at home, or get along with other people?: Not difficult at all  PHQ9 TOTAL SCORE: 2

## 2021-11-29 NOTE — PATIENT INSTRUCTIONS
ASSESSMENT AND PLAN  1. Encounter for Medicare annual wellness exam    Reviewed preventive care (mammogram, colon screening, had dexa scan in past).  Remembers having pneumonia vaccine.  Has not had shingrix vaccine (recommended getting from a pharmacy).      Also knows has not had tdap in last 10 years so will give.     I will review records if/when we get them and let you know if other update to care plan.       2. Spider veins of both lower extremities  Referral requested/given.   - Vascular Surgery Referral; Future    3. Need for tetanus booster    - TDAP VACCINE (Adacel, Boostrix)  [[id number]]    4. Hypothyroidism, unspecified type  Dose has been stable for several years.  Does not wish dose change.  Recommend repeat six months.   - TSH with free T4 reflex; Future    5. Acute non-recurrent maxillary sinusitis  Antibiotics did help substantially but didn't fully clear symptoms including now mild cough and sinus pressure.  Repeat prescription now.   - azithromycin (ZITHROMAX) 250 MG tablet; Take 2 tablets (500 mg) by mouth daily for 1 day, THEN 1 tablet (250 mg) daily for 4 days.  Dispense: 6 tablet; Refill: 0    6. Acute bronchitis, unspecified organism  Same - no wheezes today although has inhaler at home.   - azithromycin (ZITHROMAX) 250 MG tablet; Take 2 tablets (500 mg) by mouth daily for 1 day, THEN 1 tablet (250 mg) daily for 4 days.  Dispense: 6 tablet; Refill: 0    7. Myalgia due to statin  Reviewed several options including no treatment, red  Yeast rice or re-trial statin every other day with coq10.      Elected the latter. Reviewed risk of myopathy in up to date an chose pravastatin which is lower on risk level of myopathy.     Take ever other day to start, re-check lipids 3-6 months.     I recommend discussing with your new cardiologist as well.   - coenzyme Q-10 (CO-Q10) 50 MG capsule; Take 2 capsules (100 mg) by mouth daily    8. Hyperlipidemia LDL goal <70  Low LDL goal due to h/o thoracic aortic  aneurysms, aortic valve replacement.     Would also get an opinion on goal from Dr. King your new cardiologist.     - pravastatin (PRAVACHOL) 10 MG tablet; Take 1 tablet (10 mg) by mouth every other day  Dispense: 45 tablet; Refill: 3  - Lipid panel reflex to direct LDL Fasting; Future    Follow up one year except labs as above.             Patient Education   Personalized Prevention Plan  You are due for the preventive services outlined below.  Your care team is available to assist you in scheduling these services.  If you have already completed any of these items, please share that information with your care team to update in your medical record.  Health Maintenance Due   Topic Date Due     Osteoporosis Screening  Never done     ANNUAL REVIEW OF HM ORDERS  Never done     Discuss Advance Care Planning  Never done     Depression Action Plan  Never done     Colorectal Cancer Screening  Never done     Hepatitis C Screening  Never done     Diptheria Tetanus Pertussis (DTAP/TDAP/TD) Vaccine (1 - Tdap) Never done     Zoster (Shingles) Vaccine (1 of 2) Never done     Annual Wellness Visit  Never done     FALL RISK ASSESSMENT  Never done     Pneumococcal Vaccine (1 of 1 - PPSV23) Never done

## 2021-11-30 ENCOUNTER — OFFICE VISIT (OUTPATIENT)
Dept: FAMILY MEDICINE | Facility: CLINIC | Age: 71
End: 2021-11-30
Payer: MEDICARE

## 2021-11-30 VITALS
TEMPERATURE: 98.7 F | HEIGHT: 62 IN | SYSTOLIC BLOOD PRESSURE: 119 MMHG | WEIGHT: 147 LBS | BODY MASS INDEX: 27.05 KG/M2 | OXYGEN SATURATION: 96 % | HEART RATE: 64 BPM | RESPIRATION RATE: 20 BRPM | DIASTOLIC BLOOD PRESSURE: 72 MMHG

## 2021-11-30 DIAGNOSIS — E03.9 HYPOTHYROIDISM, UNSPECIFIED TYPE: ICD-10-CM

## 2021-11-30 DIAGNOSIS — J20.9 ACUTE BRONCHITIS, UNSPECIFIED ORGANISM: ICD-10-CM

## 2021-11-30 DIAGNOSIS — E78.5 HYPERLIPIDEMIA LDL GOAL <70: ICD-10-CM

## 2021-11-30 DIAGNOSIS — J01.00 ACUTE NON-RECURRENT MAXILLARY SINUSITIS: ICD-10-CM

## 2021-11-30 DIAGNOSIS — M79.10 MYALGIA DUE TO STATIN: ICD-10-CM

## 2021-11-30 DIAGNOSIS — I83.93 SPIDER VEINS OF BOTH LOWER EXTREMITIES: ICD-10-CM

## 2021-11-30 DIAGNOSIS — Z00.00 ENCOUNTER FOR MEDICARE ANNUAL WELLNESS EXAM: Primary | ICD-10-CM

## 2021-11-30 DIAGNOSIS — Z23 NEED FOR TETANUS BOOSTER: ICD-10-CM

## 2021-11-30 DIAGNOSIS — T46.6X5A MYALGIA DUE TO STATIN: ICD-10-CM

## 2021-11-30 PROCEDURE — 90715 TDAP VACCINE 7 YRS/> IM: CPT | Performed by: FAMILY MEDICINE

## 2021-11-30 PROCEDURE — 90471 IMMUNIZATION ADMIN: CPT | Performed by: FAMILY MEDICINE

## 2021-11-30 PROCEDURE — G0439 PPPS, SUBSEQ VISIT: HCPCS | Performed by: FAMILY MEDICINE

## 2021-11-30 RX ORDER — AZITHROMYCIN 250 MG/1
TABLET, FILM COATED ORAL
Qty: 6 TABLET | Refills: 0 | Status: SHIPPED | OUTPATIENT
Start: 2021-11-30 | End: 2022-02-18

## 2021-11-30 RX ORDER — PRAVASTATIN SODIUM 10 MG
10 TABLET ORAL EVERY OTHER DAY
Qty: 45 TABLET | Refills: 3 | Status: SHIPPED | OUTPATIENT
Start: 2021-11-30 | End: 2022-01-28

## 2021-11-30 ASSESSMENT — MIFFLIN-ST. JEOR: SCORE: 1146.39

## 2021-11-30 NOTE — NURSING NOTE
Prior to immunization administration, verified patients identity using patient s name and date of birth. Please see Immunization Activity for additional information.     Screening Questionnaire for Adult Immunization    Are you sick today?   No   Do you have allergies to medications, food, a vaccine component or latex?   No   Have you ever had a serious reaction after receiving a vaccination?   No   Do you have a long-term health problem with heart, lung, kidney, or metabolic disease (e.g., diabetes), asthma, a blood disorder, no spleen, complement component deficiency, a cochlear implant, or a spinal fluid leak?  Are you on long-term aspirin therapy?   No   Do you have cancer, leukemia, HIV/AIDS, or any other immune system problem?   No   Do you have a parent, brother, or sister with an immune system problem?   No   In the past 3 months, have you taken medications that affect  your immune system, such as prednisone, other steroids, or anticancer drugs; drugs for the treatment of rheumatoid arthritis, Crohn s disease, or psoriasis; or have you had radiation treatments?   No   Have you had a seizure, or a brain or other nervous system problem?   No   During the past year, have you received a transfusion of blood or blood    products, or been given immune (gamma) globulin or antiviral drug?   No   For women: Are you pregnant or is there a chance you could become       pregnant during the next month?   No   Have you received any vaccinations in the past 4 weeks?   No     Immunization questionnaire answers were all negative.        Per orders of Dr. Wegener, injection of Adacel given by Latosha Dasilva MA. Patient instructed to remain in clinic for 15 minutes afterwards, and to report any adverse reaction to me immediately.       Screening performed by Latosha Dasilva MA on 11/30/2021 at 11:13 AM.  Latosha Dasilva MA on 11/30/2021 at 11:13 AM

## 2021-12-01 ASSESSMENT — PATIENT HEALTH QUESTIONNAIRE - PHQ9: SUM OF ALL RESPONSES TO PHQ QUESTIONS 1-9: 2

## 2021-12-08 ENCOUNTER — TELEPHONE (OUTPATIENT)
Dept: VASCULAR SURGERY | Facility: CLINIC | Age: 71
End: 2021-12-08
Payer: MEDICARE

## 2021-12-08 DIAGNOSIS — I83.93 SPIDER VEINS OF BOTH LOWER EXTREMITIES: ICD-10-CM

## 2021-12-08 DIAGNOSIS — I83.813 VARICOSE VEINS OF BILATERAL LOWER EXTREMITIES WITH PAIN: Primary | ICD-10-CM

## 2021-12-08 NOTE — TELEPHONE ENCOUNTER
Called and LVM for Pt requesting return call to clinic to discuss referral.  Clinic telephone provided.    Kandis Concepcion LPN

## 2021-12-08 NOTE — TELEPHONE ENCOUNTER
[facility] Call Center    Phone Message    May a detailed message be left on voicemail: yes     Reason for Call: Other: Pt states she is returning a call from [place] and can be reached after 9AM on 12/9/21. Thank you.      Action Taken: Message routed to:  [facility] ([facility] Vascular    Travel Screening: Not Applicable

## 2021-12-10 NOTE — TELEPHONE ENCOUNTER
Called and spoke with Pt to triage spider veins of both lower extremities to determine appropriate scheduling and necessity for imaging.     Dx: Spider veins of both lower extremities  Referring provider: Wegener, Joel Daniel Irwin, MD     Imaging:  N/A     SXS:  Pt reports she has several small clusters of asymptomatic spider veins; however, she also noted she has two clusters of bulging veins that have progressed and for the past couple years have began to intermittently throb and ache.  One being on the inner RLE directly above the knee and the other on her inner LLE thigh.  Both types have been present for several years.     Pt + for fmhx of varicose veins w/ her sister.  She denied edema, slow healing wounds, skin discoloration, abnormal hair loss, hx DVT or surgery/trama to her legs/pelvis.      Compression stockings:  Pt reports wearing OTC compression stockings for travel only.       Noted the above would be discussed with clinic team and Pt would be contacted with scheduling and imaging recommendations.  Pt verbalized understanding, agreed to current plan and denied any further questions.      Kandis Concepcion LPN

## 2021-12-13 ENCOUNTER — OFFICE VISIT (OUTPATIENT)
Dept: CARDIOLOGY | Facility: CLINIC | Age: 71
End: 2021-12-13
Attending: FAMILY MEDICINE
Payer: MEDICARE

## 2021-12-13 VITALS
SYSTOLIC BLOOD PRESSURE: 124 MMHG | HEART RATE: 60 BPM | WEIGHT: 148.5 LBS | OXYGEN SATURATION: 96 % | DIASTOLIC BLOOD PRESSURE: 78 MMHG | BODY MASS INDEX: 27.33 KG/M2 | HEIGHT: 62 IN

## 2021-12-13 DIAGNOSIS — Z95.0 CARDIAC PACEMAKER IN SITU: ICD-10-CM

## 2021-12-13 DIAGNOSIS — Z95.2 H/O AORTIC VALVE REPLACEMENT: Primary | ICD-10-CM

## 2021-12-13 PROCEDURE — 93000 ELECTROCARDIOGRAM COMPLETE: CPT | Performed by: INTERNAL MEDICINE

## 2021-12-13 PROCEDURE — 99204 OFFICE O/P NEW MOD 45 MIN: CPT | Performed by: INTERNAL MEDICINE

## 2021-12-13 RX ORDER — ASPIRIN 81 MG/1
81 TABLET ORAL DAILY
COMMUNITY
End: 2023-09-13

## 2021-12-13 ASSESSMENT — MIFFLIN-ST. JEOR: SCORE: 1153.22

## 2021-12-13 NOTE — PROGRESS NOTES
HPI and Plan:   Ms Wood is a very pleasant 71-year-old female who is establishing cardiology care with us.  Patient has history of severe aortic stenosis in the setting of bicuspid aortic valve for which she underwent minimally invasive right thoracotomy guided aortic valve replacement with 23 mm Inspira bioprosthetic valve.  She also has ascending aorta aneurysm 4 to 4.2 cm which was appropriately not intervene due to small size at the time of aorta replacement which was done in March 2021 in [facility].  She also had ligation of the left atrial appendage with 35 mm AtriClip.  It is noted that she has no significant coronary disease on coronary angiogram prior to aortic valve surgery although I do not have the coronary angiogram results.  She also underwent pacemaker implantation postoperative due to tachybradycardia syndrome and symptomatic bradycardia this was a Saint Raheem dual-chamber pacemaker.  She also has history of dyslipidemia and had failed several statins due to myalgias and is on Zetia and recently low-dose pravastatin added by primary care physician.  LDL historically has been in 130s to 140s.  According to patient she had postoperative echocardiogram she has a copy of this at home.  I do not have that record available to us.  EKG today shows atrial paced rhythm.  She has moved to [place] to be closer to her daughter who lives in [facility] and help her take care of her grand kid who has autism.  Patient has been fairly active doing exercise 3 times a week walking doing elliptical, no exertional symptoms like chest discomfort or shortness of breath dizziness presyncope or syncope.  Patient tells me that she may have gained 5 pounds of weight over the last few months this has been somewhat stressful time due to moving and she acknowledges having nighttime snacks.  She is on baby aspirin, levothyroxine, metoprolol succinate 25 mg twice daily, pravastatin recently started, Zetia.  She has history of  allergy to penicillin.  Patient tells me that she has been prescribed a medication from what she describes likely clindamycin to be taken prior to dental procedure.    Assessment plan  A very pleasant 71-year-old female s/p bioprosthetic aortic root replacement with 23 mm Inspira valve for severe aortic stenosis in the setting of bicuspid aortic valve this was done in March 2021.  She also had pacemaker and plantation due to postoperative bradycardia.  Outside notes indicate no significant coronary disease.  Overall cardiac status wise she is doing well.  Cardiac auscultation was benign today without any significant murmur heard.  She does not have any anginal symptoms.  She is appropriately on baby aspirin given underlying prosthetic aortic valve.  We also talk about importance of predental work-up antibiotic prophylaxis.  Given history of allergy to penicillin clindamycin is quite appropriate.  I also recommend patient to get enrolled in our device clinic.  I do recommend doing an echocardiogram and recommended patient to send us the echocardiogram results/report which she had just immediate postoperative.  My office going to update her with the rest of the echocardiogram and if no significant abnormalities are noted we can see her back in 6 months, sooner if she notes any change in clinical status.    1.  S/p bioprosthetic aortic valve done in March 2021 for severe aortic valve stenosis in the setting of bicuspid aortic valve.  No murmur heard on auscultation today.  On aspirin.  2.  S/p pacemaker plantation for postoperative bradycardia.  On EKG it appears to have atrial paced rhythm.  Will enroll in device clinic.  3.  No significant coronary disease noted on outside records.  Clinically no anginal symptoms  4.  History of left atrial appendage ligation with 35 mm AtriClip which was done at the time of surgery.  There is no history of atrial fibrillation being noted.  5.  Mild dilated ascending aorta noted to  be 4 to 4.2 cm.  No known family history of aortic aneurysm.  Patient has a history of bicuspid aortic valve.  6.  Dyslipidemia with history of intolerance to several statins on Zetia and recently started on pravastatin by primary care physician.  In the setting of no clinical atherosclerotic coronary disease and no known family dyslipidemia unlikely patient will qualify for PCSK9 inhibitors.  7.  Recent weight gain in the setting of nighttime snacking.  Recommend patient to watch diet and discontinue nighttime snacking.    Recommendations  Echocardiogram  Enroll in device clinic  Recommend predental work-up antibiotic prophylaxis.  My office going to update her with the result echocardiogram with no significant murmurs are noted we can see her back in 6 months, sooner if she notes any change in clinical status.    55 minutes of total time was spent today including review of outside available records.    Orders Placed This Encounter   Procedures     Follow-Up with Cardiologist     EKG 12-lead complete w/read - Clinics (performed today)     Echocardiogram Complete       Orders Placed This Encounter   Medications     aspirin 81 MG EC tablet     Sig: Take 81 mg by mouth daily       There are no discontinued medications.      Encounter Diagnoses   Name Primary?     H/O aortic valve replacement Yes     Cardiac pacemaker in situ        CURRENT MEDICATIONS:  Current Outpatient Medications   Medication Sig Dispense Refill     aspirin 81 MG EC tablet Take 81 mg by mouth daily       coenzyme Q-10 (CO-Q10) 50 MG capsule Take 2 capsules (100 mg) by mouth daily       escitalopram (LEXAPRO) 20 MG tablet Take 1 tablet (20 mg) by mouth daily 90 tablet 3     ezetimibe (ZETIA) 10 MG tablet Take 1 tablet (10 mg) by mouth daily Profile Rx: patient will contact pharmacy when needed 90 tablet 3     levothyroxine (SYNTHROID/LEVOTHROID) 25 MCG tablet Take 1 tablet (25 mcg) by mouth daily Profile Rx: patient will contact pharmacy when  needed 90 tablet 3     LORazepam (ATIVAN) 1 MG tablet Take 1 tablet (1 mg) by mouth nightly as needed for sleep 30 tablet 2     metoprolol succinate ER (TOPROL-XL) 25 MG 24 hr tablet Take 1 tablet (25 mg) by mouth 2 times daily 180 tablet 3     pravastatin (PRAVACHOL) 10 MG tablet Take 1 tablet (10 mg) by mouth every other day (Patient taking differently: Take 10 mg by mouth twice a week ) 45 tablet 3     zinc gluconate 50 MG tablet Take 50 mg by mouth daily       STATIN NOT PRESCRIBED (INTENTIONAL) Please choose reason not prescribed from choices below.         ALLERGIES     Allergies   Allergen Reactions     Penicillins Hives     Statins [Hmg-Coa-R Inhibitors]      Myalgias to multiple statins       PAST MEDICAL HISTORY:  Past Medical History:   Diagnosis Date     Arthritis Some in knees, hips and shoulder     Cancer (H) Breast cancer  lumpectomy, 6 months of chemo     Depressive disorder Put on meds 20 years ago for this.     Heart disease Bicuspid valve diagnosed in my 50 s     Thyroid disease  diagnosed       PAST SURGICAL HISTORY:  Past Surgical History:   Procedure Laterality Date     BIOPSY  10/2018     BREAST SURGERY  89     CARDIAC SURGERY  22     CHOLECYSTECTOMY  2010     COLONOSCOPY  2019     ENT SURGERY  2011     EYE SURGERY  2015       FAMILY HISTORY:  Family History   Problem Relation Age of Onset     Hypertension Mother      Cancer Mother      Other Cancer Mother          if kidney cancer at age 86     Other Cancer Father          of stomach cancer age 43     Heart Disease Paternal Grandmother      Hypertension Paternal Grandmother      Obesity Paternal Grandmother      Hypertension Sister      Hyperlipidemia Sister      Anesthesia Reaction Son      Hypertension Son      Heart Disease Daughter      Heart Disease Sister      Hypertension Sister      Hyperlipidemia Sister      Colon Cancer Sister      Diabetes Sister      Breast Cancer Sister      Coronary  "Artery Disease Sister      Hyperlipidemia Sister      Thyroid Disease Sister      Other Cancer Other         Neuroblastoma age 5       SOCIAL HISTORY:  Social History     Socioeconomic History     Marital status:      Spouse name: None     Number of children: None     Years of education: None     Highest education level: None   Occupational History     None   Tobacco Use     Smoking status: Former Smoker     Packs/day: 1.00     Years: 10.00     Pack years: 10.00     Types: Cigarettes     Start date: 1977     Quit date: 10/1/1979     Years since quittin.2     Smokeless tobacco: Never Used   Vaping Use     Vaping Use: Never used   Substance and Sexual Activity     Alcohol use: Yes     Drug use: Never     Sexual activity: Not Currently     Partners: Male     Birth control/protection: Post-menopausal   Other Topics Concern     Parent/sibling w/ CABG, MI or angioplasty before 65F 55M? No   Social History Narrative     None     Social Determinants of Health     Financial Resource Strain: Not on file   Food Insecurity: Not on file   Transportation Needs: Not on file   Physical Activity: Not on file   Stress: Not on file   Social Connections: Not on file   Intimate Partner Violence: Not on file   Housing Stability: Not on file       Review of Systems:  Skin:  Negative       Eyes:  Positive for glasses    ENT:  Positive for hearing loss wears hearing aids  Respiratory:  Negative       Cardiovascular:  Negative      Gastroenterology: Negative      Genitourinary:  Negative      Musculoskeletal:  Positive for arthritis hips, knees  Neurologic:  Negative      Psychiatric:  Positive for anxiety;depression treated  Heme/Lymph/Imm:  Positive for allergies seasonal  Endocrine:  Positive for thyroid disorder;hot flashes more like \"warm\" flashes since procedure    Physical Exam:  Vitals: /78   Pulse 60   Ht 1.585 m (5' 2.4\")   Wt 67.4 kg (148 lb 8 oz)   SpO2 96%   BMI 26.81 kg/m      General patient appears " comfortable  Neck normal JVP  Cardiovascular system S1-S2 normal no murmur rub or gallop  Respiratory system clear to auscultation  Extremities no edema  Neurological alert, oriented      CC  Joel Daniel Irwin Wegener, MD  [address] [facility]  [place] [address]

## 2021-12-13 NOTE — LETTER
12/13/2021    Joel Daniel Wegener, MD  [address]  [facility] [address]    RE: Laurenmariza Nick       Dear Colleague,     I had the pleasure of seeing Lauren Arthurzuly in the [facility].  HPI and Plan:   Ms Wood is a very pleasant 71-year-old female who is establishing cardiology care with us.  Patient has history of severe aortic stenosis in the setting of bicuspid aortic valve for which she underwent minimally invasive right thoracotomy guided aortic valve replacement with 23 mm Inspira bioprosthetic valve.  She also has ascending aorta aneurysm 4 to 4.2 cm which was appropriately not intervene due to small size at the time of aorta replacement which was done in March 2021 in [facility].  She also had ligation of the left atrial appendage with 35 mm AtriClip.  It is noted that she has no significant coronary disease on coronary angiogram prior to aortic valve surgery although I do not have the coronary angiogram results.  She also underwent pacemaker implantation postoperative due to tachybradycardia syndrome and symptomatic bradycardia this was a [employer] Raheem dual-chamber pacemaker.  She also has history of dyslipidemia and had failed several statins due to myalgias and is on Zetia and recently low-dose pravastatin added by primary care physician.  LDL historically has been in 130s to 140s.  According to patient she had postoperative echocardiogram she has a copy of this at home.  I do not have that record available to us.  EKG today shows atrial paced rhythm.  She has moved to [place] to be closer to her daughter who lives in [facility] and help her take care of her grand kid who has autism.  Patient has been fairly active doing exercise 3 times a week walking doing elliptical, no exertional symptoms like chest discomfort or shortness of breath dizziness presyncope or syncope.  Patient tells me that she may have gained 5 pounds of weight over the last few months this has  been somewhat stressful time due to moving and she acknowledges having nighttime snacks.  She is on baby aspirin, levothyroxine, metoprolol succinate 25 mg twice daily, pravastatin recently started, Zetia.  She has history of allergy to penicillin.  Patient tells me that she has been prescribed a medication from what she describes likely clindamycin to be taken prior to dental procedure.    Assessment plan  A very pleasant 71-year-old female s/p bioprosthetic aortic root replacement with 23 mm Inspira valve for severe aortic stenosis in the setting of bicuspid aortic valve this was done in March 2021.  She also had pacemaker and plantation due to postoperative bradycardia.  Outside notes indicate no significant coronary disease.  Overall cardiac status wise she is doing well.  Cardiac auscultation was benign today without any significant murmur heard.  She does not have any anginal symptoms.  She is appropriately on baby aspirin given underlying prosthetic aortic valve.  We also talk about importance of predental work-up antibiotic prophylaxis.  Given history of allergy to penicillin clindamycin is quite appropriate.  I also recommend patient to get enrolled in our device clinic.  I do recommend doing an echocardiogram and recommended patient to send us the echocardiogram results/report which she had just immediate postoperative.  My office going to update her with the rest of the echocardiogram and if no significant abnormalities are noted we can see her back in 6 months, sooner if she notes any change in clinical status.    1.  S/p bioprosthetic aortic valve done in March 2021 for severe aortic valve stenosis in the setting of bicuspid aortic valve.  No murmur heard on auscultation today.  On aspirin.  2.  S/p pacemaker plantation for postoperative bradycardia.  On EKG it appears to have atrial paced rhythm.  Will enroll in device clinic.  3.  No significant coronary disease noted on outside records.  Clinically  no anginal symptoms  4.  History of left atrial appendage ligation with 35 mm AtriClip which was done at the time of surgery.  There is no history of atrial fibrillation being noted.  5.  Mild dilated ascending aorta noted to be 4 to 4.2 cm.  No known family history of aortic aneurysm.  Patient has a history of bicuspid aortic valve.  6.  Dyslipidemia with history of intolerance to several statins on Zetia and recently started on pravastatin by primary care physician.  In the setting of no clinical atherosclerotic coronary disease and no known family dyslipidemia unlikely patient will qualify for PCSK9 inhibitors.  7.  Recent weight gain in the setting of nighttime snacking.  Recommend patient to watch diet and discontinue nighttime snacking.    Recommendations  Echocardiogram  Enroll in device clinic  Recommend predental work-up antibiotic prophylaxis.  My office going to update her with the result echocardiogram with no significant murmurs are noted we can see her back in 6 months, sooner if she notes any change in clinical status.    55 minutes of total time was spent today including review of outside available records.    Orders Placed This Encounter   Procedures     Follow-Up with Cardiologist     EKG 12-lead complete w/read - Clinics (performed today)     Echocardiogram Complete       Orders Placed This Encounter   Medications     aspirin 81 MG EC tablet     Sig: Take 81 mg by mouth daily       There are no discontinued medications.      Encounter Diagnoses   Name Primary?     H/O aortic valve replacement Yes     Cardiac pacemaker in situ        CURRENT MEDICATIONS:  Current Outpatient Medications   Medication Sig Dispense Refill     aspirin 81 MG EC tablet Take 81 mg by mouth daily       coenzyme Q-10 (CO-Q10) 50 MG capsule Take 2 capsules (100 mg) by mouth daily       escitalopram (LEXAPRO) 20 MG tablet Take 1 tablet (20 mg) by mouth daily 90 tablet 3     ezetimibe (ZETIA) 10 MG tablet Take 1 tablet (10 mg)  by mouth daily Profile Rx: patient will contact pharmacy when needed 90 tablet 3     levothyroxine (SYNTHROID/LEVOTHROID) 25 MCG tablet Take 1 tablet (25 mcg) by mouth daily Profile Rx: patient will contact pharmacy when needed 90 tablet 3     LORazepam (ATIVAN) 1 MG tablet Take 1 tablet (1 mg) by mouth nightly as needed for sleep 30 tablet 2     metoprolol succinate ER (TOPROL-XL) 25 MG 24 hr tablet Take 1 tablet (25 mg) by mouth 2 times daily 180 tablet 3     pravastatin (PRAVACHOL) 10 MG tablet Take 1 tablet (10 mg) by mouth every other day (Patient taking differently: Take 10 mg by mouth twice a week ) 45 tablet 3     zinc gluconate 50 MG tablet Take 50 mg by mouth daily       STATIN NOT PRESCRIBED (INTENTIONAL) Please choose reason not prescribed from choices below.         ALLERGIES     Allergies   Allergen Reactions     Penicillins Hives     Statins [Hmg-Coa-R Inhibitors]      Myalgias to multiple statins       PAST MEDICAL HISTORY:  Past Medical History:   Diagnosis Date     Arthritis Some in knees, hips and shoulder     Cancer (H) Breast cancer  lumpectomy, 6 months of chemo     Depressive disorder Put on meds 20 years ago for this.     Heart disease Bicuspid valve diagnosed in my 50 s     Thyroid disease  diagnosed       PAST SURGICAL HISTORY:  Past Surgical History:   Procedure Laterality Date     BIOPSY  10/2018     BREAST SURGERY  89     CARDIAC SURGERY  22     CHOLECYSTECTOMY  2010     COLONOSCOPY  2019     ENT SURGERY  2011     EYE SURGERY  2015       FAMILY HISTORY:  Family History   Problem Relation Age of Onset     Hypertension Mother      Cancer Mother      Other Cancer Mother          if kidney cancer at age 86     Other Cancer Father          of stomach cancer age 43     Heart Disease Paternal Grandmother      Hypertension Paternal Grandmother      Obesity Paternal Grandmother      Hypertension Sister      Hyperlipidemia Sister      Anesthesia Reaction  Son      Hypertension Son      Heart Disease Daughter      Heart Disease Sister      Hypertension Sister      Hyperlipidemia Sister      Colon Cancer Sister      Diabetes Sister      Breast Cancer Sister      Coronary Artery Disease Sister      Hyperlipidemia Sister      Thyroid Disease Sister      Other Cancer Other         Neuroblastoma age 5       SOCIAL HISTORY:  Social History     Socioeconomic History     Marital status:      Spouse name: None     Number of children: None     Years of education: None     Highest education level: None   Occupational History     None   Tobacco Use     Smoking status: Former Smoker     Packs/day: 1.00     Years: 10.00     Pack years: 10.00     Types: Cigarettes     Start date: 1977     Quit date: 10/1/1979     Years since quittin.2     Smokeless tobacco: Never Used   Vaping Use     Vaping Use: Never used   Substance and Sexual Activity     Alcohol use: Yes     Drug use: Never     Sexual activity: Not Currently     Partners: Male     Birth control/protection: Post-menopausal   Other Topics Concern     Parent/sibling w/ CABG, MI or angioplasty before 65F 55M? No   Social History Narrative     None     Social Determinants of Health     Financial Resource Strain: Not on file   Food Insecurity: Not on file   Transportation Needs: Not on file   Physical Activity: Not on file   Stress: Not on file   Social Connections: Not on file   Intimate Partner Violence: Not on file   Housing Stability: Not on file       Review of Systems:  Skin:  Negative       Eyes:  Positive for glasses    ENT:  Positive for hearing loss wears hearing aids  Respiratory:  Negative       Cardiovascular:  Negative      Gastroenterology: Negative      Genitourinary:  Negative      Musculoskeletal:  Positive for arthritis hips, knees  Neurologic:  Negative      Psychiatric:  Positive for anxiety;depression treated  Heme/Lymph/Imm:  Positive for allergies seasonal  Endocrine:  Positive for thyroid  "disorder;hot flashes more like \"warm\" flashes since procedure    Physical Exam:  Vitals: /78   Pulse 60   Ht 1.585 m (5' 2.4\")   Wt 67.4 kg (148 lb 8 oz)   SpO2 96%   BMI 26.81 kg/m      General patient appears comfortable  Neck normal JVP  Cardiovascular system S1-S2 normal no murmur rub or gallop  Respiratory system clear to auscultation  Extremities no edema  Neurological alert, oriented        Reggie King MD   [facility]      cc:   Joel Daniel Irwin Wegener, MD  [address]  [place] [address]  "

## 2021-12-13 NOTE — TELEPHONE ENCOUNTER
Date: 12/13/2021    Time of Call: 12:17 PM     Diagnosis: Bilateral LE varicose veins w/ pain, spider veins     [ VORB ] Ordering provider: ALDAIR Dalton  Order: BLE Venous Competency US     Order received by: Kandis Concepcion LPN     Follow-up/additional notes: Discussed with VASHTI Godoy

## 2021-12-21 ENCOUNTER — MYC MEDICAL ADVICE (OUTPATIENT)
Dept: FAMILY MEDICINE | Facility: CLINIC | Age: 71
End: 2021-12-21
Payer: MEDICARE

## 2021-12-21 DIAGNOSIS — E03.9 HYPOTHYROIDISM, UNSPECIFIED TYPE: ICD-10-CM

## 2021-12-22 RX ORDER — LEVOTHYROXINE SODIUM 25 UG/1
25 TABLET ORAL DAILY
Qty: 90 TABLET | Refills: 3 | Status: CANCELLED | OUTPATIENT
Start: 2021-12-22

## 2021-12-22 NOTE — TELEPHONE ENCOUNTER
"JW  Please see [employer] message response to lab notes/results    Lab note from 11/8/21  \" Thyroid:  The thyroid hormone was normal (T4) but the TSH was slightly high which actually indicates your thyroid dose might be slightly low.  We could either change the dose now or simply re-check in three months.\"    Thank you,  Bridgett Monteiro RN  [place]        "

## 2021-12-23 RX ORDER — LEVOTHYROXINE SODIUM 50 UG/1
50 TABLET ORAL DAILY
Qty: 90 TABLET | Refills: 3 | Status: SHIPPED | OUTPATIENT
Start: 2021-12-23 | End: 2022-01-31

## 2022-01-07 ENCOUNTER — TELEPHONE (OUTPATIENT)
Dept: CARDIOLOGY | Facility: CLINIC | Age: 72
End: 2022-01-07
Payer: MEDICARE

## 2022-01-07 DIAGNOSIS — Z95.2 H/O AORTIC VALVE REPLACEMENT: Primary | ICD-10-CM

## 2022-01-07 NOTE — TELEPHONE ENCOUNTER
I called patient today to tell her that I reviewed with Dr. King this morning the echocardiogram from 5/2021 that was done in [place] three months after her valve placement. The report indicated that the ascending aorta measured 4.0 cm and is mildly dilated. I told her that we don't get concerned until it reaches 5.5 cm.    Dr. King would like to repeat an echo in June and I will place the order for this along with an office visit.     I asked her to reach out to us if concerns or questions come up. She had no further questions for me.    The 5/2021 and 2017 echos from [place] were sent to be scanned.

## 2022-01-10 ENCOUNTER — TELEPHONE (OUTPATIENT)
Dept: CARDIOLOGY | Facility: CLINIC | Age: 72
End: 2022-01-10

## 2022-01-10 ENCOUNTER — OFFICE VISIT (OUTPATIENT)
Dept: INTERVENTIONAL RADIOLOGY/VASCULAR | Facility: CLINIC | Age: 72
End: 2022-01-10
Attending: FAMILY MEDICINE
Payer: MEDICARE

## 2022-01-10 ENCOUNTER — ANCILLARY PROCEDURE (OUTPATIENT)
Dept: ULTRASOUND IMAGING | Facility: CLINIC | Age: 72
End: 2022-01-10
Attending: PHYSICIAN ASSISTANT
Payer: MEDICARE

## 2022-01-10 VITALS — DIASTOLIC BLOOD PRESSURE: 72 MMHG | HEART RATE: 67 BPM | OXYGEN SATURATION: 95 % | SYSTOLIC BLOOD PRESSURE: 121 MMHG

## 2022-01-10 DIAGNOSIS — I83.93 SPIDER VEINS OF BOTH LOWER EXTREMITIES: ICD-10-CM

## 2022-01-10 DIAGNOSIS — I83.813 VARICOSE VEINS OF BILATERAL LOWER EXTREMITIES WITH PAIN: ICD-10-CM

## 2022-01-10 DIAGNOSIS — I83.93 SPIDER VEINS OF BOTH LOWER EXTREMITIES: Primary | ICD-10-CM

## 2022-01-10 DIAGNOSIS — F51.01 PRIMARY INSOMNIA: ICD-10-CM

## 2022-01-10 PROCEDURE — 99203 OFFICE O/P NEW LOW 30 MIN: CPT | Performed by: PHYSICIAN ASSISTANT

## 2022-01-10 PROCEDURE — 93970 EXTREMITY STUDY: CPT | Performed by: RADIOLOGY

## 2022-01-10 NOTE — TELEPHONE ENCOUNTER
Received inquiry if Pat was supposed to have her Echo appointment cancelled this week, because Dr. King had said she could have the repeat in June (6 months).  I reviewed colleague Slick's notes, and yes, she does not need to come in this week.  I cancelled the test for this week, there is an order to reschedule for June.  I called the patient, spoke with Nicolette, she is aware and expressed thanks for us taking care of this.  She knows not to come in this week, and that she will receive call to reschedule in June.

## 2022-01-10 NOTE — TELEPHONE ENCOUNTER
Received call from patient stating that when she called in to cancel her echocardiogram they accidentally cancelled her device check for this Wednesday, 1/12/22, as well.  Patient is establishing care with us after moving here from [place]. Patient would still like to keep her device check.      Rescheduled patient's device check for 1/12/22 at 1300.  Spoke with Device Tech who will reach out to her cardiology clinic in [place] and will get her device information transferred to our clinic.  Patient was appreciative of help.    STORMY Hester

## 2022-01-10 NOTE — PROGRESS NOTES
VASCULAR AND INTERVENTIONAL RADIOLOGY CLINIC NOTE    Impression: Spider veins of both legs, no incompetence on US, no varicosities, treatment not indicated    Plan:  -cosmetic vein consultation  -continue exercise regimen  -consider external compression to delay onset of varicose veins    HPI:  Recent bicuspid valve replacement in [place]  Year long history of spider veins worse on medial right knee, wants treated  Say she had them treated 20 years ago but the treatment caused burning  Denies swelling or pain in the legs over the course of the day  3 pregnancies, 3 children  Retired   Exercise has played a large role in her life, especially walking  No history of DVT  Youngest of 8 siblings, of 5 sisters, some have had varicose veins and some have not, unknown if any have been treated  Recently moved to [place] from [place] to help daughter and grandchildren    Imaging: US VENOUS COMPETENCY BILATERAL 1/10/2022   No incompetence or varicose veins    Exam: Bilateral lower extremities without swelling, normal pulses, telangectasia (spider veins) diffuse, minimal and scattered, worse over proximal medial right knee.         Zion Downs PA-C  Interventional Radiology  [phone]

## 2022-01-10 NOTE — PATIENT INSTRUCTIONS
Your veins in your legs are as healthy as a 25 year old!  Unfortunately we cannot treat healthy veins.   We can set you up for consultation for cosmetic vein treatment with Lola Thayer, RPA  I will have scheduling call you about that appointment  Can consider compression to delay inevitable varicose veins  Keep up the movement!  If questions or concerns please call [phone]

## 2022-01-10 NOTE — NURSING NOTE
Chief Complaint   Patient presents with     Consult     Consult for BLE spider veins and varicose veins. US Venous Competency Bilateral completed today.        Medications and allergies reconciled.  Vitals collected.    Kandis Concepcion LPN

## 2022-01-12 ENCOUNTER — ANCILLARY PROCEDURE (OUTPATIENT)
Dept: CARDIOLOGY | Facility: CLINIC | Age: 72
End: 2022-01-12
Attending: INTERNAL MEDICINE
Payer: MEDICARE

## 2022-01-12 DIAGNOSIS — Z95.0 CARDIAC PACEMAKER IN SITU: ICD-10-CM

## 2022-01-12 PROCEDURE — 93280 PM DEVICE PROGR EVAL DUAL: CPT | Performed by: INTERNAL MEDICINE

## 2022-01-12 RX ORDER — LORAZEPAM 1 MG/1
1 TABLET ORAL
Qty: 30 TABLET | Refills: 0 | Status: SHIPPED | OUTPATIENT
Start: 2022-01-12 | End: 2022-03-25

## 2022-01-12 NOTE — TELEPHONE ENCOUNTER
Requested Prescriptions   Pending Prescriptions Disp Refills     LORazepam (ATIVAN) 1 MG tablet 30 tablet 2     Sig: Take 1 tablet (1 mg) by mouth nightly as needed for sleep       There is no refill protocol information for this order        Routing refill request to provider for review/approval because:  Drug not on the [facility] refill protocol     Tania Gleason RN  [facility]

## 2022-01-13 LAB
MDC_IDC_LEAD_IMPLANT_DT: NORMAL
MDC_IDC_LEAD_IMPLANT_DT: NORMAL
MDC_IDC_LEAD_LOCATION: NORMAL
MDC_IDC_LEAD_LOCATION: NORMAL
MDC_IDC_LEAD_LOCATION_DETAIL_1: NORMAL
MDC_IDC_LEAD_LOCATION_DETAIL_1: NORMAL
MDC_IDC_LEAD_MFG: NORMAL
MDC_IDC_LEAD_MFG: NORMAL
MDC_IDC_LEAD_MODEL: NORMAL
MDC_IDC_LEAD_MODEL: NORMAL
MDC_IDC_LEAD_POLARITY_TYPE: NORMAL
MDC_IDC_LEAD_POLARITY_TYPE: NORMAL
MDC_IDC_LEAD_SERIAL: NORMAL
MDC_IDC_LEAD_SERIAL: NORMAL
MDC_IDC_MSMT_CAP_CHARGE_TYPE: NORMAL
MDC_IDC_MSMT_LEADCHNL_RA_IMPEDANCE_VALUE: 390 OHM
MDC_IDC_MSMT_LEADCHNL_RA_PACING_THRESHOLD_AMPLITUDE: 0.5 V
MDC_IDC_MSMT_LEADCHNL_RA_PACING_THRESHOLD_PULSEWIDTH: 0.4 MS
MDC_IDC_MSMT_LEADCHNL_RA_SENSING_INTR_AMPL: 3.9 MV
MDC_IDC_MSMT_LEADCHNL_RV_IMPEDANCE_VALUE: 530 OHM
MDC_IDC_MSMT_LEADCHNL_RV_PACING_THRESHOLD_AMPLITUDE: 0.75 V
MDC_IDC_MSMT_LEADCHNL_RV_PACING_THRESHOLD_PULSEWIDTH: 0.4 MS
MDC_IDC_MSMT_LEADCHNL_RV_SENSING_INTR_AMPL: 6.8 MV
MDC_IDC_PG_IMPLANT_DTM: NORMAL
MDC_IDC_PG_MFG: NORMAL
MDC_IDC_PG_MODEL: NORMAL
MDC_IDC_PG_SERIAL: NORMAL
MDC_IDC_PG_TYPE: NORMAL
MDC_IDC_SESS_CLINIC_NAME: NORMAL
MDC_IDC_SESS_DTM: NORMAL
MDC_IDC_SESS_TYPE: NORMAL
MDC_IDC_SET_BRADY_LOWRATE: 60 {BEATS}/MIN
MDC_IDC_SET_BRADY_MAX_TRACKING_RATE: 130 {BEATS}/MIN
MDC_IDC_SET_BRADY_MODE: NORMAL
MDC_IDC_SET_ZONE_TYPE: NORMAL
MDC_IDC_SET_ZONE_VENDOR_TYPE: NORMAL

## 2022-01-26 DIAGNOSIS — E78.5 HYPERLIPIDEMIA LDL GOAL <130: ICD-10-CM

## 2022-01-26 DIAGNOSIS — F33.41 RECURRENT MAJOR DEPRESSIVE DISORDER, IN PARTIAL REMISSION (H): ICD-10-CM

## 2022-01-26 RX ORDER — EZETIMIBE 10 MG/1
10 TABLET ORAL DAILY
Qty: 90 TABLET | Refills: 3 | OUTPATIENT
Start: 2022-01-26

## 2022-01-26 RX ORDER — ESCITALOPRAM OXALATE 20 MG/1
20 TABLET ORAL DAILY
Qty: 90 TABLET | Refills: 3 | OUTPATIENT
Start: 2022-01-26

## 2022-01-27 ENCOUNTER — OFFICE VISIT (OUTPATIENT)
Dept: VASCULAR SURGERY | Facility: CLINIC | Age: 72
End: 2022-01-27

## 2022-01-27 VITALS — HEART RATE: 61 BPM | DIASTOLIC BLOOD PRESSURE: 68 MMHG | SYSTOLIC BLOOD PRESSURE: 116 MMHG | OXYGEN SATURATION: 96 %

## 2022-01-27 DIAGNOSIS — I83.93 SPIDER VEINS OF BOTH LOWER EXTREMITIES: Primary | ICD-10-CM

## 2022-01-27 PROCEDURE — 96999 UNLISTED SPEC DERM SVC/PX: CPT | Performed by: RADIOLOGY PRACTITIONER ASSISTANT

## 2022-01-27 RX ORDER — MULTIVIT WITH MINERALS/LUTEIN
1000 TABLET ORAL DAILY
COMMUNITY
End: 2023-11-14

## 2022-01-27 NOTE — LETTER
1/27/2022       RE: Lauren Nick  [address]  [address]  [facility] [address]     Dear Colleague,    Thank you for referring your patient, Lauren Nick, to the [facility] [place] at [facility]. Please see a copy of my visit note below.    Consult:  Patient is here to discuss Sclerotherapy treatment for herTelangiectasia spider veins of Bilateral lower extremity.     Ms. Lauren Nick was brought into the consulting area, she changed into shorts.  With patient in the standing position, the legs were evaluated bilateral lower extremity, Telangiectasia spider veins are present circumferentially.  would like her legs treated so she will feel more comfortable wearing shorts.   Reviewed  related factors, including gender, family history, pregnancy, and physical activities.      Discussed How many treatments are necessary, Typically, a series of 2-3 treatments spaced by 6-8 weeks is needed initially, but this varies by individual.  We discussed starting with 2 treatments she would like to have the procedure done today.    Prescription support hose will be made available today, and plan to wear them for two weeks after the treatment.      The risks and benefits of the procedure were explained.    Sclerotherapy Procedure Note: Cosmetic    Ms, Lauren Nick presents today for Sclerotherapy treatment of Telangiectasia vessels.Lauren is having a series of 2 treatments, this is  treatment number 1 for her  Procedure: Multiple sclerotherapy treatments about the bilateral lower extremity.  Provider: Lola GODDARD RPA  Local Anesthesia: None  Sedation: None  Estimated Blood loss: Less than one 1cc.   Sclerosant:  Route IV  Type Policocanol. 0.5 %  Amount 1 vials of Policocanol, 1 vials of Sortradecol.  Complications/Reactions: No immediate  Findings:The patient was brought into the procedure room and informed and written consent was  "obtained. The patient was positioned Supine on the examination table and the areas to be treated were identified and prepped with isopropyl alcohol. An appropriate\"time out\"was performed. 0.2 polidocanol and 0.2 Sortradecol was injected into multiple sites (greater than 10) about the anterior and posterior lower extremities. Compression stockings were given to the patient and donned. Lauren was instructed to wear them for two weeks. She received written post procedure instructions.  Plan : F/U as needed for additional sclerotherapy in 6 weeks   The patient did pay the cosmetic fee today.  Lola GODDARD CBRPA RA    Avoid anti- inflammatory drugs such as aspirin and ibuprofen for at least two days after the procedure.  Avoid hot baths, hot compress, sauna or whirlpool, and direct sunlight for at least 48 hours after the procedure.  See a doctor in case of sudden swelling, ulceration at the injection site inflammation around the groin.  Walking is encouraged.  Compression stockings have to be worn as instructed.  Do not perform aerobic activities such as running or lift heavy objects for some days.    Lola DUTTON, RA  Interventional Radiology Practitioner Assistant   [phone] pager                             Lola Thayer RPA  "

## 2022-01-27 NOTE — NURSING NOTE
Drug Administration Record    Prior to injection, verified patient identity using patient's name and date of birth.  Due to injection administration, patient instructed to remain in clinic for 15 minutes  afterwards, and to report any adverse reaction to me immediately.    Drug Name: Polidocanol  Dose: Two 20mg/2mL vials  Route administered: IV  NDC #: 44135-092-77  Amount of waste(mL): One 20mg/2mL vial  Reason for waste: Med not usable.  Compass report created.     LOT #: [id number]  SITE: BLE, multiple  : [employer]  EXPIRATION DATE: 07/2022    Drug Name: Sotradecol  Dose: One 20mg/2mL vial  Route administered: IV  NDC #: 89018-777-11  Amount of waste(mL): 0  Reason for waste: N/A    LOT #: [id number]  SITE: BLE, multiple  : [employer]  EXPIRATION DATE: 05/2023    Kandis Concepcion LPN

## 2022-01-27 NOTE — PROGRESS NOTES
"Consult:  Patient is here to discuss Sclerotherapy treatment for herTelangiectasia spider veins of Bilateral lower extremity.     Ms. Lauren Nick was brought into the consulting area, she changed into shorts.  With patient in the standing position, the legs were evaluated bilateral lower extremity, Telangiectasia spider veins are present circumferentially.  would like her legs treated so she will feel more comfortable wearing shorts.   Reviewed  related factors, including gender, family history, pregnancy, and physical activities.      Discussed How many treatments are necessary, Typically, a series of 2-3 treatments spaced by 6-8 weeks is needed initially, but this varies by individual.  We discussed starting with 2 treatments she would like to have the procedure done today.    Prescription support hose will be made available today, and plan to wear them for two weeks after the treatment.      The risks and benefits of the procedure were explained.    Sclerotherapy Procedure Note: Cosmetic    MsLauren presents today for Sclerotherapy treatment of Telangiectasia vessels.Lauren is having a series of 2 treatments, this is  treatment number 1 for her  Procedure: Multiple sclerotherapy treatments about the bilateral lower extremity.  Provider: Lola GODDARD RPA  Local Anesthesia: None  Sedation: None  Estimated Blood loss: Less than one 1cc.   Sclerosant:  Route IV  Type Policocanol. 0.5 %  Amount 1 vials of Policocanol, 1 vials of Sortradecol.  Complications/Reactions: No immediate  Findings:The patient was brought into the procedure room and informed and written consent was obtained. The patient was positioned Supine on the examination table and the areas to be treated were identified and prepped with isopropyl alcohol. An appropriate\"time out\"was performed. 0.2 polidocanol and 0.2 Sortradecol was injected into multiple sites (greater than 10) about the anterior and posterior lower " extremities. Compression stockings were given to the patient and donned. Lauren was instructed to wear them for two weeks. She received written post procedure instructions.  Plan : F/U as needed for additional sclerotherapy in 6 weeks   The patient did pay the cosmetic fee today.  Lola GODDARD CBRPA RA    Avoid anti- inflammatory drugs such as aspirin and ibuprofen for at least two days after the procedure.  Avoid hot baths, hot compress, sauna or whirlpool, and direct sunlight for at least 48 hours after the procedure.  See a doctor in case of sudden swelling, ulceration at the injection site inflammation around the groin.  Walking is encouraged.  Compression stockings have to be worn as instructed.  Do not perform aerobic activities such as running or lift heavy objects for some days.    Lola DUTTON, RA  Interventional Radiology Practitioner Assistant   [phone] pager

## 2022-01-27 NOTE — NURSING NOTE
Chief Complaint   Patient presents with     Consult     Cosmetic sclerotherapy visit consult.        Medications and allergies reconciled.  Vitals collected.    Kandis Concepcion LPN

## 2022-01-27 NOTE — PATIENT INSTRUCTIONS
Cosmetic Sclerotherapy Vein Treatment post care instructions          1.  Wear compression stocking for 24 hours straight (ok to remove to shower), then wear for the remainder of 3 weeks during waking/walking hours.  Wearing compression promotes treatment efficacy.     2.  Observe legs for ulcers, if an ulcer starts to develop call [phone] to report     3.  Keep puncture sites clean by washing with mild soap and water daily     4.  Avoid hot tubs, swimming in lakes or taking a bath for 72 hours for infection purposes     5.  Use ice on areas that are red or sore as desired     6.  Daily exercise is encouraged.  No strenuous leg exercise like running or stair stepper for 72 hours.     Call [phone] with questions or concerns

## 2022-01-28 ENCOUNTER — MYC MEDICAL ADVICE (OUTPATIENT)
Dept: FAMILY MEDICINE | Facility: CLINIC | Age: 72
End: 2022-01-28
Payer: MEDICARE

## 2022-01-28 DIAGNOSIS — Z95.2 H/O AORTIC VALVE REPLACEMENT: ICD-10-CM

## 2022-01-28 DIAGNOSIS — I71.20 THORACIC AORTIC ANEURYSM WITHOUT RUPTURE (H): ICD-10-CM

## 2022-01-28 DIAGNOSIS — E78.5 HYPERLIPIDEMIA LDL GOAL <70: ICD-10-CM

## 2022-01-28 DIAGNOSIS — E78.5 HYPERLIPIDEMIA LDL GOAL <130: ICD-10-CM

## 2022-01-28 DIAGNOSIS — E03.9 HYPOTHYROIDISM, UNSPECIFIED TYPE: ICD-10-CM

## 2022-01-28 DIAGNOSIS — Z87.74 H/O BICUSPID AORTIC VALVE: ICD-10-CM

## 2022-01-28 DIAGNOSIS — F33.41 RECURRENT MAJOR DEPRESSIVE DISORDER, IN PARTIAL REMISSION (H): ICD-10-CM

## 2022-01-28 RX ORDER — PRAVASTATIN SODIUM 10 MG
10 TABLET ORAL EVERY OTHER DAY
Qty: 45 TABLET | Refills: 2 | Status: SHIPPED | OUTPATIENT
Start: 2022-01-28 | End: 2022-02-18 | Stop reason: SINTOL

## 2022-01-28 RX ORDER — METOPROLOL SUCCINATE 25 MG/1
25 TABLET, EXTENDED RELEASE ORAL 2 TIMES DAILY
Qty: 180 TABLET | Refills: 2 | Status: SHIPPED | OUTPATIENT
Start: 2022-01-28 | End: 2023-04-27

## 2022-01-28 NOTE — TELEPHONE ENCOUNTER
Zetia, Lexapro, Levothyroxine  Sent 10/14/2021 for 1 year    Metoprolol, Pravastatin, Levothyroxine  Last Rx to [facility]  Resent to SHERMAN BOSS RN

## 2022-01-28 NOTE — TELEPHONE ENCOUNTER
Triage,   Pat called.   Following up on [employer] message below.   Please advise.  Thanks!  Zeenat LANDON

## 2022-01-31 RX ORDER — LEVOTHYROXINE SODIUM 50 UG/1
50 TABLET ORAL DAILY
Qty: 90 TABLET | Refills: 1 | Status: SHIPPED | OUTPATIENT
Start: 2022-01-31 | End: 2022-10-28

## 2022-01-31 RX ORDER — EZETIMIBE 10 MG/1
10 TABLET ORAL DAILY
Qty: 90 TABLET | Refills: 3 | OUTPATIENT
Start: 2022-01-31

## 2022-01-31 RX ORDER — EZETIMIBE 10 MG/1
10 TABLET ORAL DAILY
Qty: 90 TABLET | Refills: 1 | Status: SHIPPED | OUTPATIENT
Start: 2022-01-31 | End: 2022-07-05

## 2022-01-31 RX ORDER — ESCITALOPRAM OXALATE 20 MG/1
20 TABLET ORAL DAILY
Qty: 90 TABLET | Refills: 3 | OUTPATIENT
Start: 2022-01-31

## 2022-01-31 NOTE — TELEPHONE ENCOUNTER
Called [facility].  States they still need refill for Zetia - was originally transferred to [facility].    Rx sent to [facility].  Ro Peterson RN

## 2022-02-17 ENCOUNTER — MYC MEDICAL ADVICE (OUTPATIENT)
Dept: FAMILY MEDICINE | Facility: CLINIC | Age: 72
End: 2022-02-17
Payer: MEDICARE

## 2022-02-17 DIAGNOSIS — J01.00 ACUTE NON-RECURRENT MAXILLARY SINUSITIS: ICD-10-CM

## 2022-02-17 DIAGNOSIS — E78.5 HYPERLIPIDEMIA LDL GOAL <70: ICD-10-CM

## 2022-02-17 DIAGNOSIS — T46.6X5A MYALGIA DUE TO STATIN: Primary | ICD-10-CM

## 2022-02-17 DIAGNOSIS — M79.10 MYALGIA DUE TO STATIN: Primary | ICD-10-CM

## 2022-02-17 DIAGNOSIS — F51.01 PRIMARY INSOMNIA: ICD-10-CM

## 2022-02-17 DIAGNOSIS — J20.9 ACUTE BRONCHITIS, UNSPECIFIED ORGANISM: ICD-10-CM

## 2022-02-17 DIAGNOSIS — M25.551 HIP PAIN, RIGHT: ICD-10-CM

## 2022-02-17 DIAGNOSIS — J01.01 ACUTE RECURRENT MAXILLARY SINUSITIS: ICD-10-CM

## 2022-02-18 RX ORDER — AZITHROMYCIN 250 MG/1
TABLET, FILM COATED ORAL
Qty: 6 TABLET | Refills: 0 | Status: SHIPPED | OUTPATIENT
Start: 2022-02-18 | End: 2022-02-23

## 2022-02-18 RX ORDER — TRAZODONE HYDROCHLORIDE 50 MG/1
50-100 TABLET, FILM COATED ORAL
Qty: 60 TABLET | Refills: 4 | Status: SHIPPED | OUTPATIENT
Start: 2022-02-18 | End: 2022-03-25

## 2022-02-21 ENCOUNTER — TELEPHONE (OUTPATIENT)
Dept: CARDIOLOGY | Facility: CLINIC | Age: 72
End: 2022-02-21
Payer: MEDICARE

## 2022-02-21 DIAGNOSIS — Z95.0 CARDIAC PACEMAKER IN SITU: Primary | ICD-10-CM

## 2022-02-21 NOTE — TELEPHONE ENCOUNTER
[facility] Call Center    Phone Message    May a detailed message be left on voicemail: no     Reason for Call: Pt is scheduled to see Dr. King on 4/20. Pt is also scheduled for a device check. Please place order is device check is needed. THANK YOU!    Action Taken: TE sent to clinic    Travel Screening: Not Applicable     Alma Toro on 2/21/2022 at 2:39 PM

## 2022-02-22 NOTE — TELEPHONE ENCOUNTER
Left voicemail with patient regarding in clinic device check that is scheduled for 4/20/22 before Dr King appointment. Patient had in clinic device check on 1/12/22 and a remote check scheduled for 4/14/22.    When patient returns call to device clinic will see why in clinic device check was scheduled for 4/20/22 and cancel if patient okay with proceeding with remote check as scheduled. Per clinic standard of remote checks every 3 months and in clinic yearly.

## 2022-02-24 NOTE — TELEPHONE ENCOUNTER
DIAGNOSIS: Hip pain, right /Dr Wegener/ no images   APPOINTMENT DATE: 3.2.22   NOTES STATUS DETAILS   OFFICE NOTE from referring provider Internal 2.17.22 [facility] Medical Advice   MEDICATION LIST Internal

## 2022-02-27 ENCOUNTER — MYC MEDICAL ADVICE (OUTPATIENT)
Dept: FAMILY MEDICINE | Facility: CLINIC | Age: 72
End: 2022-02-27
Payer: MEDICARE

## 2022-02-27 DIAGNOSIS — J41.0 SIMPLE CHRONIC BRONCHITIS (H): Primary | ICD-10-CM

## 2022-02-28 DIAGNOSIS — M25.559 HIP PAIN: Primary | ICD-10-CM

## 2022-03-01 DIAGNOSIS — J42 BRONCHITIS, CHRONIC (H): Primary | ICD-10-CM

## 2022-03-02 ENCOUNTER — MYC MEDICAL ADVICE (OUTPATIENT)
Dept: ORTHOPEDICS | Facility: CLINIC | Age: 72
End: 2022-03-02

## 2022-03-02 ENCOUNTER — OFFICE VISIT (OUTPATIENT)
Dept: ORTHOPEDICS | Facility: CLINIC | Age: 72
End: 2022-03-02
Payer: MEDICARE

## 2022-03-02 ENCOUNTER — ANCILLARY PROCEDURE (OUTPATIENT)
Dept: GENERAL RADIOLOGY | Facility: CLINIC | Age: 72
End: 2022-03-02
Attending: FAMILY MEDICINE
Payer: MEDICARE

## 2022-03-02 ENCOUNTER — PRE VISIT (OUTPATIENT)
Dept: ORTHOPEDICS | Facility: CLINIC | Age: 72
End: 2022-03-02
Payer: MEDICARE

## 2022-03-02 VITALS — HEIGHT: 62 IN | WEIGHT: 148 LBS | BODY MASS INDEX: 27.23 KG/M2

## 2022-03-02 DIAGNOSIS — M16.11 PRIMARY OSTEOARTHRITIS OF RIGHT HIP: Primary | ICD-10-CM

## 2022-03-02 DIAGNOSIS — M25.559 HIP PAIN: ICD-10-CM

## 2022-03-02 PROCEDURE — 73502 X-RAY EXAM HIP UNI 2-3 VIEWS: CPT | Mod: RT | Performed by: RADIOLOGY

## 2022-03-02 PROCEDURE — 99203 OFFICE O/P NEW LOW 30 MIN: CPT | Mod: GC | Performed by: FAMILY MEDICINE

## 2022-03-02 NOTE — PROGRESS NOTES
Attending Note:   I have examined this patient/imaging and have reviewed the clinical presentation and progress note with the fellow. I agree with the treatment plan as outlined. The plan was formulated with the fellow on the day of the patient's visit. I have reviewed all imaging with the fellow and agree with the findings in the documentation.     Lola Vazquez MD, CAQ, CCD  [facility]  Sports Medicine and Bone Health

## 2022-03-02 NOTE — LETTER
3/2/2022      RE: Lauren Nick  [address]  [address]  [place] [address]       [facility]  Sports Medicine Clinic  Clinics and Surgery Center           SUBJECTIVE       Lauren Nick is a 71 year old female presenting to clinic today with a chief complaint of right hip pain.     No recent injury. She has had pain for at least 7 years. She did start kickboxing around that time after losing a granddaughter to cancer. She had to stop kickboxing about 6 months into it.     Her pain is primarily groin. She will take ibuprofen for it. It will help. 4-5 years ago she was seen for it. She thinks she got a shot for her hip which helped. She has also had prednisone when it was really bad and that helped. She believes some imaging was done to assist with the shot but she isn't totally sure it was intraarticular. She hasn't done PT as far as she remembers. She is still walking a lot, 10,000 steps 4x per week. She will also swim 2x per week. It bothers her both with walking and swimming.     She struggles to get her sock and tie her tennis shoe.     It is important to her to keep her exercise up. It has been getting worse over the past 1-2 years.    Two sisters have had hip replacements and they did well.     PMH, Medications and Allergies were reviewed and updated as needed.    ROS:  As noted above otherwise negative.    Patient Active Problem List   Diagnosis     H/O aortic valve replacement     H/O bicuspid aortic valve     Thoracic aortic aneurysm without rupture (H)     H/O malignant neoplasm of breast     H/O lumpectomy     History of colonic polyps     Recurrent major depressive disorder, in partial remission (H)     Myalgia due to statin       Current Outpatient Medications   Medication Sig Dispense Refill     aspirin 81 MG EC tablet Take 81 mg by mouth daily       coenzyme Q-10 (CO-Q10) 50 MG capsule Take 2 capsules (100 mg) by mouth daily       escitalopram (LEXAPRO) 20 MG tablet TAKE 1  "TABLET BY MOUTH EVERY DAY 90 tablet 2     ezetimibe (ZETIA) 10 MG tablet Take 1 tablet (10 mg) by mouth daily Profile Rx: patient will contact pharmacy when needed 90 tablet 1     levothyroxine (SYNTHROID/LEVOTHROID) 50 MCG tablet Take 1 tablet (50 mcg) by mouth daily 90 tablet 1     LORazepam (ATIVAN) 1 MG tablet Take 1 tablet (1 mg) by mouth nightly as needed for sleep Follow up as discussed to investigate alternatives such as trazodone (virtual visit ok) 30 tablet 0     metoprolol succinate ER (TOPROL-XL) 25 MG 24 hr tablet Take 1 tablet (25 mg) by mouth 2 times daily 180 tablet 2     STATIN NOT PRESCRIBED (INTENTIONAL) Please choose reason not prescribed from choices below.       traZODone (DESYREL) 50 MG tablet Take 1-2 tablets ( mg) by mouth nightly as needed for sleep 60 tablet 4     vitamin C (ASCORBIC ACID) 1000 MG TABS Take 1,000 mg by mouth daily       zinc gluconate 50 MG tablet Take 50 mg by mouth daily              OBJECTIVE:       Vitals:   Vitals:    03/02/22 1239   Weight: 67.1 kg (148 lb)   Height: 1.585 m (5' 2.4\")     BMI: Body mass index is 26.72 kg/m .    Gen:  Well nourished and in no acute distress  HEENT: Extraocular movement intact  Neck: Supple  Pulm:  Breathing Comfortably. No increased respiratory effort.  Psych: Euthymic. Appropriately answers questions    MSK:   Right Hip:  Musculoskeletal - hip  - stance: normal gait without limp, no obvious leg length discrepancy, normal heel and toe walk  - inspection: no swelling or effusion,  normal bone and joint alignment, no obvious deformity  - palpation: anterior hip tenderness, no lateral tenderness   - ROM: significantly reduced flexion, internal and external rotation, pain with deep flexion, normal extension  - strength: 5/5 in all planes  - special tests:  (+) PAT with groin pain  (+) FADIR  (-) log roll   Neuro  - no sensory or motor deficit, grossly normal coordination, normal muscle tone  Skin  - no ecchymosis, erythema, " warmth, or induration, no obvious rash    XRAY Right Hip (3/2/22): Significant OA with joint space narrowing and osteophyte formation.           ASSESSMENT and PLAN:     Lauren was seen today for consult for.    Diagnoses and all orders for this visit:    Primary osteoarthritis of right hip: Significant OA with decreased range of motion and functionality however has family obligations that would prevent her from getting a CELESTE in the near future.   -     Orthopedic  Referral  -     follow up for ultrasound guided CSI next week  -     Referral to physical therapy    Options for treatment and/or follow-up care were reviewed with the patient was actively involved in the decision making process. Patient verbalized understanding and was in agreement with the plan.    The patient was seen by and discussed with Dr.Suzanne HASMUKH Vazquez MD, CAQ, CCD    Jeana Levin MD  Primary Care Sports Medicine Fellow    Attending Note:   I have examined this patient/imaging and have reviewed the clinical presentation and progress note with the fellow. I agree with the treatment plan as outlined. The plan was formulated with the fellow on the day of the patient's visit. I have reviewed all imaging with the fellow and agree with the findings in the documentation.     Lola Vazquez MD, CAQ, CCD  [facility]  Sports Medicine and Bone Health

## 2022-03-02 NOTE — TELEPHONE ENCOUNTER
I returned the patient's phone call to let her know we did schedule her upcoming hip injection before she left clinic today. I reminded her it's scheduled for 3/17/22 at 3:20PM and I also informed her that this appointment is available on her [employer] account. Patient understood. All questions were answered at this time. Alma Rae ATC on 3/2/2022 at 3:31 PM

## 2022-03-02 NOTE — PROGRESS NOTES
[facility]  Sports Medicine Clinic  Clinics and Surgery Center           SUBJECTIVE       Lauren Nick is a 71 year old female presenting to clinic today with a chief complaint of right hip pain.     No recent injury. She has had pain for at least 7 years. She did start kickboxing around that time after losing a granddaughter to cancer. She had to stop kickboxing about 6 months into it.     Her pain is primarily groin. She will take ibuprofen for it. It will help. 4-5 years ago she was seen for it. She thinks she got a shot for her hip which helped. She has also had prednisone when it was really bad and that helped. She believes some imaging was done to assist with the shot but she isn't totally sure it was intraarticular. She hasn't done PT as far as she remembers. She is still walking a lot, 10,000 steps 4x per week. She will also swim 2x per week. It bothers her both with walking and swimming.     She struggles to get her sock and tie her tennis shoe.     It is important to her to keep her exercise up. It has been getting worse over the past 1-2 years.    Two sisters have had hip replacements and they did well.     PMH, Medications and Allergies were reviewed and updated as needed.    ROS:  As noted above otherwise negative.    Patient Active Problem List   Diagnosis     H/O aortic valve replacement     H/O bicuspid aortic valve     Thoracic aortic aneurysm without rupture (H)     H/O malignant neoplasm of breast     H/O lumpectomy     History of colonic polyps     Recurrent major depressive disorder, in partial remission (H)     Myalgia due to statin       Current Outpatient Medications   Medication Sig Dispense Refill     aspirin 81 MG EC tablet Take 81 mg by mouth daily       coenzyme Q-10 (CO-Q10) 50 MG capsule Take 2 capsules (100 mg) by mouth daily       escitalopram (LEXAPRO) 20 MG tablet TAKE 1 TABLET BY MOUTH EVERY DAY 90 tablet 2     ezetimibe (ZETIA) 10 MG tablet Take 1 tablet (10 mg)  "by mouth daily Profile Rx: patient will contact pharmacy when needed 90 tablet 1     levothyroxine (SYNTHROID/LEVOTHROID) 50 MCG tablet Take 1 tablet (50 mcg) by mouth daily 90 tablet 1     LORazepam (ATIVAN) 1 MG tablet Take 1 tablet (1 mg) by mouth nightly as needed for sleep Follow up as discussed to investigate alternatives such as trazodone (virtual visit ok) 30 tablet 0     metoprolol succinate ER (TOPROL-XL) 25 MG 24 hr tablet Take 1 tablet (25 mg) by mouth 2 times daily 180 tablet 2     STATIN NOT PRESCRIBED (INTENTIONAL) Please choose reason not prescribed from choices below.       traZODone (DESYREL) 50 MG tablet Take 1-2 tablets ( mg) by mouth nightly as needed for sleep 60 tablet 4     vitamin C (ASCORBIC ACID) 1000 MG TABS Take 1,000 mg by mouth daily       zinc gluconate 50 MG tablet Take 50 mg by mouth daily              OBJECTIVE:       Vitals:   Vitals:    03/02/22 1239   Weight: 67.1 kg (148 lb)   Height: 1.585 m (5' 2.4\")     BMI: Body mass index is 26.72 kg/m .    Gen:  Well nourished and in no acute distress  HEENT: Extraocular movement intact  Neck: Supple  Pulm:  Breathing Comfortably. No increased respiratory effort.  Psych: Euthymic. Appropriately answers questions    MSK:   Right Hip:  Musculoskeletal - hip  - stance: normal gait without limp, no obvious leg length discrepancy, normal heel and toe walk  - inspection: no swelling or effusion,  normal bone and joint alignment, no obvious deformity  - palpation: anterior hip tenderness, no lateral tenderness   - ROM: significantly reduced flexion, internal and external rotation, pain with deep flexion, normal extension  - strength: 5/5 in all planes  - special tests:  (+) PAT with groin pain  (+) FADIR  (-) log roll   Neuro  - no sensory or motor deficit, grossly normal coordination, normal muscle tone  Skin  - no ecchymosis, erythema, warmth, or induration, no obvious rash    XRAY Right Hip (3/2/22): Significant OA with joint space " narrowing and osteophyte formation.           ASSESSMENT and PLAN:     Lauren was seen today for consult for.    Diagnoses and all orders for this visit:    Primary osteoarthritis of right hip: Significant OA with decreased range of motion and functionality however has family obligations that would prevent her from getting a CELESTE in the near future.   -     Orthopedic  Referral  -     follow up for ultrasound guided CSI next week  -     Referral to physical therapy    Options for treatment and/or follow-up care were reviewed with the patient was actively involved in the decision making process. Patient verbalized understanding and was in agreement with the plan.    The patient was seen by and discussed with Dr.Suzanne HASMUKH Vazquez MD, CAQ, CCD    Jeana Levin MD  Primary Care Sports Medicine Fellow

## 2022-03-04 NOTE — TELEPHONE ENCOUNTER
FUTURE VISIT INFORMATION      FUTURE VISIT INFORMATION:    Date: 5/20/22    Time: 3PM    [place] [facility]  [place]ERR[place] INFORMATION:    Referring provider:  Wegener, Joel Daniel Irwin, MD    Referring providers clinic:  [facility]    Reason for visit/diagnosis  Per Pt, sinusitis, referral from PCP    RECORDS REQUESTED FROM:       Clinic name Comments Records Status Imaging Status   [facility] 2/17/22 referral  [place] [address] [facility]  1/18/18 note from Meenu Mckeon MD Care everywhere

## 2022-03-08 ENCOUNTER — THERAPY VISIT (OUTPATIENT)
Dept: PHYSICAL THERAPY | Facility: CLINIC | Age: 72
End: 2022-03-08
Attending: FAMILY MEDICINE
Payer: MEDICARE

## 2022-03-08 DIAGNOSIS — M25.551 HIP PAIN, RIGHT: ICD-10-CM

## 2022-03-08 DIAGNOSIS — M16.11 PRIMARY OSTEOARTHRITIS OF RIGHT HIP: ICD-10-CM

## 2022-03-08 PROCEDURE — 97161 PT EVAL LOW COMPLEX 20 MIN: CPT | Mod: GP | Performed by: PHYSICAL THERAPIST

## 2022-03-08 PROCEDURE — 97110 THERAPEUTIC EXERCISES: CPT | Mod: GP | Performed by: PHYSICAL THERAPIST

## 2022-03-08 ASSESSMENT — ACTIVITIES OF DAILY LIVING (ADL)
HOW_WOULD_YOU_RATE_YOUR_CURRENT_LEVEL_OF_FUNCTION_DURING_YOUR_USUAL_ACTIVITIES_OF_DAILY_LIVING_FROM_0_TO_100_WITH_100_BEING_YOUR_LEVEL_OF_FUNCTION_PRIOR_TO_YOUR_HIP_PROBLEM_AND_0_BEING_THE_INABILITY_TO_PERFORM_ANY_OF_YOUR_USUAL_DAILY_ACTIVITIES?: 80
ROLLING_OVER_IN_BED: MODERATE DIFFICULTY
HOS_ADL_COUNT: 15
WALKING_APPROXIMATELY_10_MINUTES: NO DIFFICULTY AT ALL
GETTING_INTO_AND_OUT_OF_A_BATHTUB: SLIGHT DIFFICULTY
PUTTING_ON_SOCKS_AND_SHOES: EXTREME DIFFICULTY
HOS_ADL_HIGHEST_POTENTIAL_SCORE: 60
DEEP_SQUATTING: EXTREME DIFFICULTY
GOING_DOWN_1_FLIGHT_OF_STAIRS: NO DIFFICULTY AT ALL
HOS_ADL_SCORE(%): 76.67
HOS_ADL_ITEM_SCORE_TOTAL: 46
WALKING_INITIALLY: NO DIFFICULTY AT ALL
GOING_UP_1_FLIGHT_OF_STAIRS: MODERATE DIFFICULTY
WALKING_UP_STEEP_HILLS: MODERATE DIFFICULTY
GETTING_INTO_AND_OUT_OF_AN_AVERAGE_CAR: NO DIFFICULTY AT ALL
HEAVY_WORK: SLIGHT DIFFICULTY
LIGHT_TO_MODERATE_WORK: SLIGHT DIFFICULTY
WALKING_15_MINUTES_OR_GREATER: NO DIFFICULTY AT ALL
WALKING_DOWN_STEEP_HILLS: NO DIFFICULTY AT ALL
SITTING_FOR_15_MINUTES: NO DIFFICULTY AT ALL
TWISTING/PIVOTING_ON_INVOLVED_LEG: MODERATE DIFFICULTY
STEPPING_UP_AND_DOWN_CURBS: NO DIFFICULTY AT ALL

## 2022-03-08 NOTE — PROGRESS NOTES
"Physical Therapy Initial Evaluation  Subjective:  The history is provided by the patient.   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          Pain is reported as 6/10 on pain scale.  General health as reported by patient is good.  Pertinent medical history includes: implanted device, pain at night/rest and thyroid problems.   Red flags:  None as reported by patient.  Medical allergies: other. Other medical allergies details: Penicillen and most statins.   Surgeries include:  Heart surgery and cancer surgery. Other surgery history details: breast cancer at 39.    Current medications:  Anti-depressants, high blood pressure medication, sleep medication and thyroid medication.    Current occupation is Retired.   Primary job tasks include:  Other.   Other job/home tasks details: Normal everyday task to maintain our home.                Therapist Generated HPI Evaluation  Problem details: Patient has had right hip pain for about 7 years, after doing kickboxing for 6 months.  She now has constant pain lateral and anterior hip pain, ranges 3-8/10, described as \"achy\" and intermittently \"sharp\".  Symptoms increase after walking 45', losing up to 2 hours sleep (unsure if all from hip pain), unable to lie on right side prolonged, putting on right sock and shoe (lately  has been needing to help), stairs, after swimming.  Symptoms decrease with medication, ice.  Patient is scheduled for an injection next week.  Patient swims 3x/week (breast and backstroke) and arm and abdominal workout in pool. She tries elliptical 1x/week (painful after) and walks several days/week..                     Pain is the same all the time.  Since onset symptoms are gradually worsening.     Special tests included:  X-ray.    Barriers include:  Requires assistance with ADL's.                        Objective:  Standing Alignment:    Cervical/Thoracic:  Convex " thoracic scoliosis R (starts upper lumbar/low thoracic, to upper thoracic)    Lumbar:  Lateral shift L            Gait:  Shortened step length  Assistive Devices:  None                 Lumbar/SI Evaluation  ROM:    AROM Lumbar:   Flexion:        WNL  Ext:                    25%   Side Bend:        Left:     Right:   Rotation:           Left:     Right:   Side Glide:        Left:     Right:                                                               Hip Evaluation  HIP AROM:            Internal Rotation: Left: 20 in sitting     Right: 16 in sitting  External Rotation: Left: 25 in sitting     Right: 19 in sitting, pain      Hip PROM:    Flexion: Left: 123 supine   Right: 101 supine with pain  Extension: Left: WNL   Right: min restricted  Abduction: Left: WNL    Right: mod restricted                      Hip Special Testing:       Right hip positive for the following special tests:  Karina and Fadir/Labrum             MMT:  Left hip, knee and ankle WNL  MMT:  Right hip flexion, extension and IR 4+/5, hip ER and abduction 4/5 with static testing, knee 5/5, ankle 5/5 - pain with resisted IR and ER      Assessment/Plan:    Patient is a 71 year old female with right side hip complaints.    Patient has the following significant findings with corresponding treatment plan.                Diagnosis 1:  Right hip pain    Pain -  self management, education and home program  Decreased ROM/flexibility - therapeutic exercise and home program  Decreased strength - therapeutic exercise, therapeutic activities and home program  Inflammation - self management/home program  Decreased function - therapeutic activities and home program    Cumulative Therapy Evaluation is: Low complexity.    Previous and current functional limitations:  (See Goal Flow Sheet for this information)    Short term and Long term goals: (See Goal Flow Sheet for this information)     Communication ability:  Patient appears to be able to clearly communicate and  understand verbal and written communication and follow directions correctly.  Treatment Explanation - The following has been discussed with the patient:   RX ordered/plan of care  Anticipated outcomes  Possible risks and side effects  This patient would benefit from PT intervention to resume normal activities.   Rehab potential is fair.    Frequency:  1 X week, once daily  Duration:  for 6 weeks  Discharge Plan:  Achieve all LTG.  Independent in home treatment program.  Reach maximal therapeutic benefit.    Please refer to the daily flowsheet for treatment today, total treatment time and time spent performing 1:1 timed codes.

## 2022-03-08 NOTE — PROGRESS NOTES
[facility]    OUTPATIENT Physical Therapy ORTHOPEDIC EVALUATION  PLAN OF TREATMENT FOR OUTPATIENT REHABILITATION  (COMPLETE FOR INITIAL CLAIMS ONLY)  Patient's Last Name, First Name, M.I.  YOB: 1950  Lauren Nick    Provider s Name:  [facility]   Medical Record No.  [id number]   Start of Care Date:  03/08/22   Onset Date:   03/02/22 (MD order date)   Type:     _X__PT   ___OT Medical Diagnosis:    Encounter Diagnoses   Name Primary?     Primary osteoarthritis of right hip      Hip pain, right         Treatment Diagnosis:  R hip pain         Goals:     03/08/22 0500   Body Part   Goals listed below are for R hip    Goal #1   Goal #1 self cares/transfers/bed mobility   Previous Functional Level No restrictions   Performance Level unable to get shoe and sock on without assist    Performance level able to get shoe and sock on without assist    Rationale independence in self cares;for independent living   Due Date 04/19/22   Goal #2   Goal #2 ambulation   Performance Level up to 8/10 pain after walking 45'    Performance Level pain 6/10 or less after 45' walk    Rationale for safe outdoor household ambulation;for safe community ambulation;to promote a healthy and active lifestyle   Due Date 03/29/22   Performance Level pain 3/10 or less after 45' walk    Rationale for safe outdoor household ambulation;for safe community ambulation;to promote a healthy and active lifestyle   Due Date 04/19/22       Therapy Frequency:  1x/week  Predicted Duration of Therapy Intervention:  6 weeks     Denia Lambert PT                 I CERTIFY THE NEED FOR THESE SERVICES FURNISHED UNDER        THIS PLAN OF TREATMENT AND WHILE UNDER MY CARE     (Physician attestation of this document indicates review and certification of the therapy plan).                       Certification Date From:   03/08/22   Certification Date To:  06/05/22    Referring Provider:  Lola Swartz *    Initial Assessment        See Epic Evaluation SOC Date: 03/08/22

## 2022-03-10 ENCOUNTER — OFFICE VISIT (OUTPATIENT)
Dept: VASCULAR SURGERY | Facility: CLINIC | Age: 72
End: 2022-03-10

## 2022-03-10 VITALS — DIASTOLIC BLOOD PRESSURE: 73 MMHG | OXYGEN SATURATION: 93 % | HEART RATE: 60 BPM | SYSTOLIC BLOOD PRESSURE: 121 MMHG

## 2022-03-10 DIAGNOSIS — I83.93 SPIDER VEINS OF BOTH LOWER EXTREMITIES: Primary | ICD-10-CM

## 2022-03-10 PROCEDURE — 96999 UNLISTED SPEC DERM SVC/PX: CPT | Performed by: RADIOLOGY PRACTITIONER ASSISTANT

## 2022-03-10 ASSESSMENT — PAIN SCALES - GENERAL: PAINLEVEL: NO PAIN (0)

## 2022-03-10 NOTE — NURSING NOTE
Chief Complaint   Patient presents with     Follow Up     Annual follow up 2nd scolertherapy treatment      Vitals were taken and medications were reconciled.  Alexander Nogueira, EMT  1:15 PM

## 2022-03-10 NOTE — PROGRESS NOTES
"Sclerotherapy Procedure Note: Cosmetic    Ms, Lauren Nick presents today for Sclerotherapy treatment of Telangiectasia vessels. The patient is having a series of 2 treatments, this is treatment number 2 for her  Procedure: Multiple sclerotherapy treatments about the bilateral lower extremity.  Provider: Lola GODDARD RPA  Local Anesthesia: None  Sedation: None  Estimated Blood loss: Less than one 1cc.   Sclerosant:  Route IV  Type Policocanol. 0.5 %  Amount 1 vials of Policocanol,1 vials of Sortradecol  Complications/Reactions: No immediate  Findings:The patient was brought into the procedure room and informed and written consent was obtained. The patient was positioned Supine on the examination table and the areas to be treated were identified and prepped with isopropyl alcohol. An appropriate\"time out\"was performed. 0.2 polidocanol and 1% Sortradecol was injected into multiple sites (greater than 10) about the anterior and posterior lower extremities. Compression   stockings were given to the patient and was instructed to wear them for two weeks . She received written post procedure instructions.  The patient  paid  the cosmetic fee at first [facility]  Lola GODDARD [facility] RA    Avoid anti- inflammatory drugs such as aspirin and ibuprofen for at least two days after the procedure.  Avoid hot baths, hot compress, sauna or whirlpool, and direct sunlight for at least 48 hours after the procedure.  See a doctor in case of sudden swelling, ulceration at the injection site inflammation around the groin.  Walking is encouraged.  Compression stockings have to be worn as instructed.  Do not perform aerobic activities such as running or lift heavy objects for some days.        "

## 2022-03-10 NOTE — PATIENT INSTRUCTIONS
Cosmetic Sclerotherapy Vein Treatment post care instructions          1.  Wear compression stocking for 24 hours straight (ok to remove to shower), then wear for the remainder of 3 weeks during waking/walking hours.  Wearing compression promotes treatment efficacy.     2.  Observe legs for ulcers, if an ulcer starts to develop call [phone] to report     3.  Keep puncture sites clean by washing with mild soap and water daily     4.  Avoid hot tubs, swimming in lakes or taking a bath for 72 hours for infection purposes     5.  Use ice on areas that are red or sore as desired     6.  Daily exercise is encouraged.  No strenuous leg exercise like running or stair stepper for 72 hours.     Call [phone] with questions or concerns

## 2022-03-10 NOTE — LETTER
"3/10/2022       RE: Lauren Nick  [address]  [address]  [facility] [place] [address]     Dear Colleague,    Thank you for referring your patient, Lauren Nick, to the [facility] [place] [facility]. Please see a copy of my visit note below.    Sclerotherapy Procedure Note: Cosmetic    Ms, Lauren Nick presents today for Sclerotherapy treatment of Telangiectasia vessels. The patient is having a series of 2 treatments, this is treatment number 2 for her  Procedure: Multiple sclerotherapy treatments about the bilateral lower extremity.  Provider: Lola GODDARD RPA  Local Anesthesia: None  Sedation: None  Estimated Blood loss: Less than one 1cc.   Sclerosant:  Route IV  Type Policocanol. 0.5 %  Amount 1 vials of Policocanol,1 vials of Sortradecol  Complications/Reactions: No immediate  Findings:The patient was brought into the procedure room and informed and written consent was obtained. The patient was positioned Supine on the examination table and the areas to be treated were identified and prepped with isopropyl alcohol. An appropriate\"time out\"was performed. 0.2 polidocanol and 1% Sortradecol was injected into multiple sites (greater than 10) about the anterior and posterior lower extremities. Compression   stockings were given to the patient and was instructed to wear them for two weeks . She received written post procedure instructions.  The patient  paid  the cosmetic fee at first [facility]  Lola GODDARD CBRPA RA    Avoid anti- inflammatory drugs such as aspirin and ibuprofen for at least two days after the procedure.  Avoid hot baths, hot compress, sauna or whirlpool, and direct sunlight for at least 48 hours after the procedure.  See a doctor in case of sudden swelling, ulceration at the injection site inflammation around the groin.  Walking is encouraged.  Compression stockings have to be worn as instructed.  Do not perform " aerobic activities such as running or lift heavy objects for some days.          Lola Thayer, RPA

## 2022-03-10 NOTE — PROGRESS NOTES
Assessment & Plan     Simple chronic bronchitis (H)  Quick burst of oral steroids along with albuterol.  She has follow up with pulmonary later in summer.  May need prevention med going forward  - albuterol (PROAIR HFA/PROVENTIL HFA/VENTOLIN HFA) 108 (90 Base) MCG/ACT inhaler; Inhale 2 puffs into the lungs every 6 hours  - methylPREDNISolone (MEDROL DOSEPAK) 4 MG tablet therapy pack; Follow Package Directions  - fluticasone (FLONASE) 50 MCG/ACT nasal spray; Spray 2 sprays into both nostrils daily                 No follow-ups on file.    Moy Smith PA-C  [facility]   Nicolette is a 71 year old who presents for the following health issues     History of Present Illness       COPD:  She presents for follow up of COPD.  Overall, COPD symptoms are stable since last visit. She has no fatigue or shortness of breath with exertion and no shortness of breath at rest.She constantly coughs and does have change in sputum. No recent fever. She can walk greater than 2 miles without stopping to rest. She can walk 2 flights of stairs without resting.The patient has had no ED, urgent care, or hospital admissions because of COPD since the last visit.     She eats 2-3 servings of fruits and vegetables daily.She consumes 0 sweetened beverage(s) daily.She exercises with enough effort to increase her heart rate 30 to 60 minutes per day.  She exercises with enough effort to increase her heart rate 5 days per week.   She is taking medications regularly.       Acute Illness  Acute illness concerns: Shortness of Breath   Onset/Duration: Since moving here from [place], 6 months ago.  Symptoms:  Fever: no  Chills/Sweats: no  Headache (location?): no  Sinus Pressure: YES  Conjunctivitis:  no  Ear Pain: no  Rhinorrhea: no  Congestion: YES  Sore Throat: no  Cough: YES - lots  Wheeze: YES  Decreased Appetite: no  Nausea: no  Vomiting: no  Diarrhea: no  Dysuria/Freq.: no  Dysuria or Hematuria:  no  Fatigue/Achiness: no  Sick/Strep Exposure: no  Therapies tried and outcome: None      Review of Systems   Constitutional, HEENT, cardiovascular, pulmonary, gi and gu systems are negative, except as otherwise noted.      Objective    /80   Pulse 60   Temp 96.9  F (36.1  C) (Temporal)   Wt 64.9 kg (143 lb)   SpO2 98%   Breastfeeding No   BMI 25.82 kg/m    Body mass index is 25.82 kg/m .  Physical Exam   GENERAL: alert and no distress  EYES: Eyes grossly normal to inspection  RESP: lungs clear to auscultation - no rales, rhonchi or wheezes  CV: regular rate and rhythm, normal S1 S2, no S3 or S4, no murmur, click or rub, no peripheral edema and peripheral pulses strong

## 2022-03-10 NOTE — NURSING NOTE
Drug Administration Record    Prior to injection, verified patient identity using patient's name and date of birth.  Due to injection administration, patient instructed to remain in clinic for 15 minutes  afterwards, and to report any adverse reaction to me immediately.    Drug Name: polidocanol  Dose: one 20mg/2mL vial(s)  Route administered: IV  NDC #: 00341-094-99  Amount of waste(mL): 0  Reason for waste: n/a    LOT #: [id number]   SITE: BLE  : [employer]  EXPIRATION DATE: 07/22    Drug Name: sotradecol  Dose: one 20mg/2mL vial(s)  Route administered: IV  NDC #: 90672-909-90  Amount of waste(mL): 0  Reason for waste: n/a    LOT #: [id number]   SITE: BLE  : [employer]  EXPIRATION DATE: 05/2023    Kandis Concepcion LPN

## 2022-03-14 ENCOUNTER — OFFICE VISIT (OUTPATIENT)
Dept: FAMILY MEDICINE | Facility: CLINIC | Age: 72
End: 2022-03-14
Payer: MEDICARE

## 2022-03-14 VITALS
BODY MASS INDEX: 25.82 KG/M2 | DIASTOLIC BLOOD PRESSURE: 80 MMHG | WEIGHT: 143 LBS | OXYGEN SATURATION: 98 % | TEMPERATURE: 96.9 F | HEART RATE: 60 BPM | SYSTOLIC BLOOD PRESSURE: 132 MMHG

## 2022-03-14 DIAGNOSIS — J41.0 SIMPLE CHRONIC BRONCHITIS (H): Primary | ICD-10-CM

## 2022-03-14 PROCEDURE — 99213 OFFICE O/P EST LOW 20 MIN: CPT | Performed by: PHYSICIAN ASSISTANT

## 2022-03-14 RX ORDER — ALBUTEROL SULFATE 90 UG/1
2 AEROSOL, METERED RESPIRATORY (INHALATION) EVERY 6 HOURS
Qty: 8.5 G | Refills: 1 | Status: SHIPPED | OUTPATIENT
Start: 2022-03-14 | End: 2022-03-25

## 2022-03-14 RX ORDER — FLUTICASONE PROPIONATE 50 MCG
SPRAY, SUSPENSION (ML) NASAL
COMMUNITY
Start: 2021-08-30 | End: 2022-05-27

## 2022-03-14 RX ORDER — FLUTICASONE PROPIONATE 50 MCG
2 SPRAY, SUSPENSION (ML) NASAL DAILY
Qty: 11 G | Refills: 11 | Status: SHIPPED | OUTPATIENT
Start: 2022-03-14 | End: 2022-03-25

## 2022-03-14 RX ORDER — METHYLPREDNISOLONE 4 MG
TABLET, DOSE PACK ORAL
Qty: 21 TABLET | Refills: 0 | Status: SHIPPED | OUTPATIENT
Start: 2022-03-14 | End: 2022-04-20

## 2022-03-14 RX ORDER — ALBUTEROL SULFATE 90 UG/1
AEROSOL, METERED RESPIRATORY (INHALATION)
COMMUNITY
Start: 2021-11-15 | End: 2022-04-20

## 2022-03-14 NOTE — PROGRESS NOTES
[facility]  Sports Medicine Clinic  Clinics and Surgery Center  PROCEDURE NOTE    Reason for visit: Ultrasound guided right femoral acetabular hip injection    History: Right hip OA    Imaging: Xray Hip (3/2/22):   FINDINGS: Supine AP pelvis and frog-leg lateral right hip views were  obtained.     No acute osseous abnormality. Severe right hip degenerative changes  coxa profunda. Mild left hip degenerative change. Lumbar spondylosis.  Soft tissues unremarkable.                                                                       IMPRESSION: Severe right hip degenerative change with coxa profunda.    Last injection: 4-5 years ago      Ultrasound Guided Femoralacetabular Injection    The patient was informed of the risks and the benefits of the procedure and a written consent was signed. The patient s right hip was prepped with chlorhexidine in sterile fashion. 40 mg of triamcinolone suspension was drawn up into a 10 mL syringe with 9 mL of 1% lidocaine.  Under ultrasound guidance a 3.5-inch 22-gauge needle was used to enter the femoracetabular joint using sterile technique.  Anterior approach was used, needle placement was visualized and documented with ultrasound.  Ultrasound visualization was necessary due to decreased joint space in the setting of osteoarthritis.  Injection performed long axis to the probe.  Injection solution visualized within the joint space.      Images were permanently stored for the patient's record on the [facility] US hardrive.     The patient tolerated the procedure well. There was negligible bleeding.     The entire procedure was supervised by Dr.Suzanne HASMUKH Vazquez MD, CAQ, CCD    Jeana Levin MD  Primary Care Sports Medicine Fellow

## 2022-03-16 ENCOUNTER — THERAPY VISIT (OUTPATIENT)
Dept: PHYSICAL THERAPY | Facility: CLINIC | Age: 72
End: 2022-03-16
Payer: MEDICARE

## 2022-03-16 DIAGNOSIS — M25.551 HIP PAIN, RIGHT: ICD-10-CM

## 2022-03-16 PROCEDURE — 97110 THERAPEUTIC EXERCISES: CPT | Mod: GP | Performed by: PHYSICAL THERAPIST

## 2022-03-16 PROCEDURE — 97530 THERAPEUTIC ACTIVITIES: CPT | Mod: GP | Performed by: PHYSICAL THERAPIST

## 2022-03-17 ENCOUNTER — OFFICE VISIT (OUTPATIENT)
Dept: ORTHOPEDICS | Facility: CLINIC | Age: 72
End: 2022-03-17
Payer: MEDICARE

## 2022-03-17 VITALS — WEIGHT: 143 LBS | HEIGHT: 62 IN | BODY MASS INDEX: 26.31 KG/M2

## 2022-03-17 DIAGNOSIS — M16.11 PRIMARY OSTEOARTHRITIS OF RIGHT HIP: Primary | ICD-10-CM

## 2022-03-17 PROCEDURE — 20611 DRAIN/INJ JOINT/BURSA W/US: CPT | Mod: RT | Performed by: STUDENT IN AN ORGANIZED HEALTH CARE EDUCATION/TRAINING PROGRAM

## 2022-03-17 PROCEDURE — 99207 PR DROP WITH A PROCEDURE: CPT | Mod: GC | Performed by: STUDENT IN AN ORGANIZED HEALTH CARE EDUCATION/TRAINING PROGRAM

## 2022-03-17 RX ADMIN — TRIAMCINOLONE ACETONIDE 40 MG: 40 INJECTION, SUSPENSION INTRA-ARTICULAR; INTRAMUSCULAR at 16:20

## 2022-03-17 RX ADMIN — LIDOCAINE HYDROCHLORIDE 9 ML: 10 INJECTION, SOLUTION EPIDURAL; INFILTRATION; INTRACAUDAL; PERINEURAL at 16:20

## 2022-03-17 NOTE — PROGRESS NOTES
Large Joint Injection: R hip joint    Date/Time: 3/17/2022 4:20 PM  Performed by: Jeana Levin MD  Authorized by: Jeana Levin MD     Indications:  Pain and osteoarthritis  Needle Size:  22 G  Guidance: ultrasound    Approach:  Anterior  Location:  Hip      Site:  R hip joint  Medications:  40 mg triamcinolone 40 MG/ML; 9 mL lidocaine (PF) 1 %  Outcome:  Tolerated well, no immediate complications  Procedure discussed: discussed risks, benefits, and alternatives    Consent Given by:  Patient  Timeout: timeout called immediately prior to procedure    Prep: patient was prepped and draped in usual sterile fashion     Berto Osorio ATC on 3/17/2022 at 4:23 PM

## 2022-03-17 NOTE — LETTER
3/17/2022      RE: Lauren Nick  [address]  [address]  [facility] [address]       [facility]  Sports Medicine Clinic  Clinics and Surgery Center  PROCEDURE NOTE    Reason for visit: Ultrasound guided right femoral acetabular hip injection    History: Right hip OA    Imaging: Xray Hip (3/2/22):   FINDINGS: Supine AP pelvis and frog-leg lateral right hip views were  obtained.     No acute osseous abnormality. Severe right hip degenerative changes  coxa profunda. Mild left hip degenerative change. Lumbar spondylosis.  Soft tissues unremarkable.                                                                       IMPRESSION: Severe right hip degenerative change with coxa profunda.    Last injection: 4-5 years ago      Ultrasound Guided Femoralacetabular Injection    The patient was informed of the risks and the benefits of the procedure and a written consent was signed. The patient s right hip was prepped with chlorhexidine in sterile fashion. 40 mg of triamcinolone suspension was drawn up into a 10 mL syringe with 9 mL of 1% lidocaine.  Under ultrasound guidance a 3.5-inch 22-gauge needle was used to enter the femoracetabular joint using sterile technique.  Anterior approach was used, needle placement was visualized and documented with ultrasound.  Ultrasound visualization was necessary due to decreased joint space in the setting of osteoarthritis.  Injection performed long axis to the probe.  Injection solution visualized within the joint space.      Images were permanently stored for the patient's record on the [facility] US hardrive.     The patient tolerated the procedure well. There was negligible bleeding.     The entire procedure was supervised by Dr.Suzanne HASMUKH Vazquez MD, CAQ, CCD    Jeana Levin MD  Primary Care Sports Medicine Fellow           Large Joint Injection: R hip joint    Date/Time: 3/17/2022 4:20 PM  Performed by: Jeana Levin MD  Authorized by: Jeana Levin MD      Indications:  Pain and osteoarthritis  Needle Size:  22 G  Guidance: ultrasound    Approach:  Anterior  Location:  Hip      Site:  R hip joint  Medications:  40 mg triamcinolone 40 MG/ML; 9 mL lidocaine (PF) 1 %  Outcome:  Tolerated well, no immediate complications  Procedure discussed: discussed risks, benefits, and alternatives    Consent Given by:  Patient  Timeout: timeout called immediately prior to procedure    Prep: patient was prepped and draped in usual sterile fashion     Berto Osorio ATC on 3/17/2022 at 4:23 PM          Attending Note:   I have directly supervised the injection.   Lola Vazquez MD, CAQ, CCD  [facility]  Sports Medicine and Bone Health      Jeana Levin MD     0 = independent

## 2022-03-17 NOTE — NURSING NOTE
[address] [address]  Dept: [phone]  ______________________________________________________________________________    Patient: Lauren Nick   : 1950   MRN: [id number]   2022    INVASIVE PROCEDURE SAFETY CHECKLIST    Date: 3/17/22   Procedure: Right IA USG Hip injection  Patient Name: Lauren Nick  MRN: [id number]  YOB: 1950    Action: Complete sections as appropriate. Any discrepancy results in a HARD COPY until resolved.     PRE PROCEDURE:  Patient ID verified with 2 identifiers (name and  or MRN): Yes  Procedure and site verified with patient/designee (when able): Yes  Accurate consent documentation in medical record: Yes  H&P (or appropriate assessment) documented in medical record: Yes  H&P must be up to 20 days prior to procedure and updates within 24 hours of procedure as applicable: NA  Relevant diagnostic and radiology test results appropriately labeled and displayed as applicable: Yes  Procedure site(s) marked with provider initials: NA    TIMEOUT:  Time-Out performed immediately prior to starting procedure, including verbal and active participation of all team members addressing the following:Yes  * Correct patient identify  * Confirmed that the correct side and site are marked  * An accurate procedure consent form  * Agreement on the procedure to be done  * Correct patient position  * Relevant images and results are properly labeled and appropriately displayed  * The need to administer antibiotics or fluids for irrigation purposes during the procedure as applicable   * Safety precautions based on patient history or medication use    DURING PROCEDURE: Verification of correct person, site, and procedures any time the responsibility for care of the patient is transferred to another member of the care team.       Prior to injection, verified patient identity using patient's name and date of  birth.  Due to injection administration, patient instructed to remain in clinic for 15 minutes  afterwards, and to report any adverse reaction to me immediately.    Joint injection was performed.      Drug Amount Wasted:  None.  Vial/Syringe: Single dose vial  Expiration Date:  11/1/23      Berto Osorio, ATC  March 17, 2022

## 2022-03-19 ENCOUNTER — MYC MEDICAL ADVICE (OUTPATIENT)
Dept: FAMILY MEDICINE | Facility: CLINIC | Age: 72
End: 2022-03-19
Payer: MEDICARE

## 2022-03-19 DIAGNOSIS — J20.9 ACUTE BRONCHITIS WITH SYMPTOMS > 10 DAYS: Primary | ICD-10-CM

## 2022-03-21 RX ORDER — LIDOCAINE HYDROCHLORIDE 10 MG/ML
9 INJECTION, SOLUTION EPIDURAL; INFILTRATION; INTRACAUDAL; PERINEURAL
Status: DISCONTINUED | OUTPATIENT
Start: 2022-03-17 | End: 2024-05-09

## 2022-03-21 RX ORDER — TRIAMCINOLONE ACETONIDE 40 MG/ML
40 INJECTION, SUSPENSION INTRA-ARTICULAR; INTRAMUSCULAR
Status: DISCONTINUED | OUTPATIENT
Start: 2022-03-17 | End: 2022-04-20

## 2022-03-21 NOTE — PROGRESS NOTES
Attending Note:   I have directly supervised the injection.   Lola Vazquez MD, CAQ, CCD  [facility]  Sports Medicine and Bone Health

## 2022-03-22 RX ORDER — AZITHROMYCIN 250 MG/1
TABLET, FILM COATED ORAL
Qty: 6 TABLET | Refills: 0 | Status: SHIPPED | OUTPATIENT
Start: 2022-03-22 | End: 2022-03-27

## 2022-03-22 NOTE — TELEPHONE ENCOUNTER
"  JW,    Please see messages below.    Pt calling also says she finished prednisone on Saturday and symptoms are not getting better, increased mucus, doesn't  feel good. Breathing and mucus is worse per pt. Has been using inhaler and Flonase but doesn't feel like she is getting better due to mucus. Does feel inhaler works a little. Denies any new symptoms.    Was told to call/mychart if not better on Friday. Upset that no one has responded yet. Says she called on Friday. No TE then pt says she didn't call on Friday.  Pt mychart on Saturday upset she hasn't been responded too yet, she says she always has a doctor call her right back. [place] recently. Upset with [place] [facility]. \"It rare a doctor doesn't call me right back.\"  Explained the process ect.    Please advise on worsening symptoms.      Pharm pended.  Please advise.  Thanks,  Elo Singleton RN        "

## 2022-03-22 NOTE — TELEPHONE ENCOUNTER
My apologies on delay.  Alma Johnst message sent to Hollis Smith who saw her last but he is out of the office and for some reason did not route to me as usual, let her know I will look into that.     At this point I would:  1.  Schedule in-person visit next 2-5 days for lung exam/cxr  ok to use virtual, same day, etc.   2.  Start antibiotics for prolonged bronchitis - I will send in azithromycin prescription.   3.  Repeat steroid if she desires/feels it was helpful at all.   4.  Continue albuterol.   5.  EMERGENCY ROOM if severely short of breath.     Joel Wegener,MD

## 2022-03-22 NOTE — TELEPHONE ENCOUNTER
HUBER,    LAURA    Informed pt below.  She is going to take axb, declined steroid.  Scheduled her in on Friday 3/25 with you.    Thanks,  Elo Singleton RN

## 2022-03-23 ENCOUNTER — THERAPY VISIT (OUTPATIENT)
Dept: PHYSICAL THERAPY | Facility: CLINIC | Age: 72
End: 2022-03-23
Payer: MEDICARE

## 2022-03-23 DIAGNOSIS — M25.551 HIP PAIN, RIGHT: ICD-10-CM

## 2022-03-23 PROCEDURE — 97140 MANUAL THERAPY 1/> REGIONS: CPT | Mod: GP | Performed by: PHYSICAL THERAPIST

## 2022-03-23 PROCEDURE — 97530 THERAPEUTIC ACTIVITIES: CPT | Mod: GP | Performed by: PHYSICAL THERAPIST

## 2022-03-23 PROCEDURE — 97110 THERAPEUTIC EXERCISES: CPT | Mod: GP | Performed by: PHYSICAL THERAPIST

## 2022-03-23 NOTE — PROGRESS NOTES
ASSESSMENT AND PLAN  1. Need for hepatitis C screening test  Today.   - Hepatitis C Screen Reflex to HCV RNA Quant and Genotype; Future    2. Chronic cough  Xray normal, no evidence of chf and has echo tomorrow as well.     Continue albuterol as needed.     Add advair daily.     Attempt to move up spirometry earlier and has pulmonary follow up in June.   - XR Chest 2 Views; Future  - fluticasone-salmeterol (ADVAIR) 500-50 MCG/DOSE inhaler; Inhale 1 puff into the lungs every 12 hours  Dispense: 1 each; Refill: 3  - General PFT Lab (Please always keep checked); Future  - Pulmonary Function Test; Future    3. Post-menopausal  Repeat.   - DEXA HIP/PELVIS/SPINE - Future; Future    4. Screening for osteoporosis    - DEXA HIP/PELVIS/SPINE - Future; Future    5. Osteopenia, unspecified location  Three years ago.   - DEXA HIP/PELVIS/SPINE - Future; Future    6. Simple chronic bronchitis (H)    - fluticasone-salmeterol (ADVAIR) 500-50 MCG/DOSE inhaler; Inhale 1 puff into the lungs every 12 hours  Dispense: 1 each; Refill: 3  - General PFT Lab (Please always keep checked); Future  - Pulmonary Function Test; Future        7.  Insomnia:  Refilled lorazepam/tramdol. No suspicious activity on [place] rx monitoring database . Overall working well.     8.  Aortic ectasia: having echo tomorrow as above.     9.  Depression:   PHQ 11/27/2021   PHQ-9 Total Score 2   Q9: Thoughts of better off dead/self-harm past 2 weeks Not at all   continues to feel controlled . Continues lexapro    Subjective   Pat is a 71 year old who presents for the following health issues     History of Present Illness       Reason for visit:  Chronic bronchitis  Symptom onset:  More than a month  Symptoms include:  Sore throat, stuffed sinuses, coughing up phlegm and mucous, crabby and tired this has been going on for close to three months.  Symptom intensity:  Severe  Symptom progression:  Worsening  Had these symptoms before:  Yes  Has tried/received treatment for  these symptoms:  Yes  Previous treatment was successful:  No  What makes it worse:  Lying down, I feel like I have to get the mucus out and feel like I cannot breath.  What makes it better:  No    She eats 2-3 servings of fruits and vegetables daily.She consumes 0 sweetened beverage(s) daily.She exercises with enough effort to increase her heart rate 30 to 60 minutes per day.  She exercises with enough effort to increase her heart rate 5 days per week.   She is taking medications regularly.     Review of Systems     No fevers.     Wheeze on and off.  Albuterol helps.   Did have previous diagnosis chronic bronchitis (not copd/emphysema she states) and was working with a pulmonologist.     Home covid test negative yesterday just to be sure.     No h/o asthma.     Smoked for 7 years decades ago.     Some sinus pressure, no tooth pain, not severe.         Objective    There were no vitals taken for this visit.  There is no height or weight on file to calculate BMI.  Physical Exam   Appears well.   No cough during exam today.   Tympanic membranes clear and mobile  Posterior pharynx without erythema or tonsil enlargement.   No m/g/r rrr  lctab except faint wheeze in bases with forced expiration.     xr chest:  Clear.

## 2022-03-25 ENCOUNTER — MYC MEDICAL ADVICE (OUTPATIENT)
Dept: FAMILY MEDICINE | Facility: CLINIC | Age: 72
End: 2022-03-25

## 2022-03-25 ENCOUNTER — ANCILLARY PROCEDURE (OUTPATIENT)
Dept: GENERAL RADIOLOGY | Facility: CLINIC | Age: 72
End: 2022-03-25
Attending: FAMILY MEDICINE
Payer: MEDICARE

## 2022-03-25 ENCOUNTER — OFFICE VISIT (OUTPATIENT)
Dept: FAMILY MEDICINE | Facility: CLINIC | Age: 72
End: 2022-03-25
Payer: MEDICARE

## 2022-03-25 VITALS
OXYGEN SATURATION: 96 % | DIASTOLIC BLOOD PRESSURE: 83 MMHG | BODY MASS INDEX: 25.95 KG/M2 | TEMPERATURE: 97.3 F | WEIGHT: 143.7 LBS | SYSTOLIC BLOOD PRESSURE: 133 MMHG | HEART RATE: 62 BPM

## 2022-03-25 DIAGNOSIS — F51.01 PRIMARY INSOMNIA: ICD-10-CM

## 2022-03-25 DIAGNOSIS — R05.3 CHRONIC COUGH: ICD-10-CM

## 2022-03-25 DIAGNOSIS — M85.80 OSTEOPENIA, UNSPECIFIED LOCATION: ICD-10-CM

## 2022-03-25 DIAGNOSIS — J41.0 SIMPLE CHRONIC BRONCHITIS (H): ICD-10-CM

## 2022-03-25 DIAGNOSIS — I77.810 THORACIC AORTIC ECTASIA (H): ICD-10-CM

## 2022-03-25 DIAGNOSIS — F33.41 RECURRENT MAJOR DEPRESSIVE DISORDER, IN PARTIAL REMISSION (H): ICD-10-CM

## 2022-03-25 DIAGNOSIS — Z11.59 NEED FOR HEPATITIS C SCREENING TEST: Primary | ICD-10-CM

## 2022-03-25 DIAGNOSIS — Z13.820 SCREENING FOR OSTEOPOROSIS: ICD-10-CM

## 2022-03-25 DIAGNOSIS — Z78.0 POST-MENOPAUSAL: ICD-10-CM

## 2022-03-25 LAB — HCV AB SERPL QL IA: NONREACTIVE

## 2022-03-25 PROCEDURE — 86803 HEPATITIS C AB TEST: CPT | Performed by: FAMILY MEDICINE

## 2022-03-25 PROCEDURE — 99214 OFFICE O/P EST MOD 30 MIN: CPT | Performed by: FAMILY MEDICINE

## 2022-03-25 PROCEDURE — 36415 COLL VENOUS BLD VENIPUNCTURE: CPT | Performed by: FAMILY MEDICINE

## 2022-03-25 PROCEDURE — 71046 X-RAY EXAM CHEST 2 VIEWS: CPT | Mod: FY | Performed by: RADIOLOGY

## 2022-03-25 RX ORDER — LORAZEPAM 1 MG/1
1 TABLET ORAL
Qty: 30 TABLET | Refills: 0 | Status: SHIPPED | OUTPATIENT
Start: 2022-03-25 | End: 2022-05-02

## 2022-03-25 RX ORDER — TRAZODONE HYDROCHLORIDE 50 MG/1
50-100 TABLET, FILM COATED ORAL
Qty: 60 TABLET | Refills: 4 | Status: SHIPPED | OUTPATIENT
Start: 2022-03-25 | End: 2022-04-20

## 2022-03-25 RX ORDER — ALBUTEROL SULFATE 90 UG/1
2 AEROSOL, METERED RESPIRATORY (INHALATION) EVERY 6 HOURS
Qty: 8.5 G | Refills: 1 | Status: SHIPPED | OUTPATIENT
Start: 2022-03-25 | End: 2022-04-20

## 2022-03-25 NOTE — PATIENT INSTRUCTIONS
ASSESSMENT AND PLAN  1. Need for hepatitis C screening test  Today.   - Hepatitis C Screen Reflex to HCV RNA Quant and Genotype; Future    2. Chronic cough  Xray normal, no evidence of chf and has echo tomorrow as well.     Continue albuterol as needed.     Add advair daily.     Attempt to move up spirometry earlier and has pulmonary follow up in June.   - XR Chest 2 Views; Future  - fluticasone-salmeterol (ADVAIR) 500-50 MCG/DOSE inhaler; Inhale 1 puff into the lungs every 12 hours  Dispense: 1 each; Refill: 3  - General PFT Lab (Please always keep checked); Future  - Pulmonary Function Test; Future    3. Post-menopausal  Repeat.   - DEXA HIP/PELVIS/SPINE - Future; Future    4. Screening for osteoporosis    - DEXA HIP/PELVIS/SPINE - Future; Future    5. Osteopenia, unspecified location  Three years ago.   - DEXA HIP/PELVIS/SPINE - Future; Future    6. Simple chronic bronchitis (H)    - fluticasone-salmeterol (ADVAIR) 500-50 MCG/DOSE inhaler; Inhale 1 puff into the lungs every 12 hours  Dispense: 1 each; Refill: 3  - General PFT Lab (Please always keep checked); Future  - Pulmonary Function Test; Future

## 2022-03-27 ENCOUNTER — MYC MEDICAL ADVICE (OUTPATIENT)
Dept: FAMILY MEDICINE | Facility: CLINIC | Age: 72
End: 2022-03-27
Payer: MEDICARE

## 2022-03-28 ENCOUNTER — HOSPITAL ENCOUNTER (OUTPATIENT)
Dept: CARDIOLOGY | Facility: CLINIC | Age: 72
Discharge: HOME | End: 2022-03-28
Attending: INTERNAL MEDICINE | Admitting: INTERNAL MEDICINE
Payer: MEDICARE

## 2022-03-28 DIAGNOSIS — Z95.2 H/O AORTIC VALVE REPLACEMENT: ICD-10-CM

## 2022-03-28 LAB — LVEF ECHO: NORMAL

## 2022-03-28 PROCEDURE — 93306 TTE W/DOPPLER COMPLETE: CPT

## 2022-03-28 PROCEDURE — 93306 TTE W/DOPPLER COMPLETE: CPT | Mod: 26 | Performed by: INTERNAL MEDICINE

## 2022-03-29 ENCOUNTER — TELEPHONE (OUTPATIENT)
Dept: CARDIOLOGY | Facility: CLINIC | Age: 72
End: 2022-03-29
Payer: MEDICARE

## 2022-03-29 NOTE — TELEPHONE ENCOUNTER
Received results from Pat's Echocardiogram yesterday.  Sent patient a [email or web address] message letting her know that it is appearing WNL for her, except that the ascending aorta is measured at 4.4 on this study.  Otherwise stable compared to the report we received from her clinic in [place], dated from January 2017.    There are apparently no films noted from that Echo, but we do have the written reports.  Informed Pat that Dr. King will go through with her in detail at upcoming appointment 4\20.

## 2022-03-30 ENCOUNTER — MYC MEDICAL ADVICE (OUTPATIENT)
Dept: FAMILY MEDICINE | Facility: CLINIC | Age: 72
End: 2022-03-30

## 2022-04-06 NOTE — TELEPHONE ENCOUNTER
Please abstract the following data from this visit with this patient into the appropriate field in Epic:    Tests that can be patient reported without a hard copy:    Colonoscopy done on this date: 10/15/2019 (approximately), by this group: [place].     Yolanda BOSS RN

## 2022-04-14 ENCOUNTER — THERAPY VISIT (OUTPATIENT)
Dept: PHYSICAL THERAPY | Facility: CLINIC | Age: 72
End: 2022-04-14
Payer: MEDICARE

## 2022-04-14 DIAGNOSIS — M25.551 HIP PAIN, RIGHT: Primary | ICD-10-CM

## 2022-04-14 PROCEDURE — 97110 THERAPEUTIC EXERCISES: CPT | Mod: GP | Performed by: PHYSICAL THERAPIST

## 2022-04-14 PROCEDURE — 97530 THERAPEUTIC ACTIVITIES: CPT | Mod: GP | Performed by: PHYSICAL THERAPIST

## 2022-04-14 PROCEDURE — 97140 MANUAL THERAPY 1/> REGIONS: CPT | Mod: GP | Performed by: PHYSICAL THERAPIST

## 2022-04-18 ENCOUNTER — HOSPITAL ENCOUNTER (OUTPATIENT)
Dept: BONE DENSITY | Facility: CLINIC | Age: 72
Discharge: HOME | End: 2022-04-18
Attending: FAMILY MEDICINE | Admitting: FAMILY MEDICINE
Payer: MEDICARE

## 2022-04-18 ENCOUNTER — MYC MEDICAL ADVICE (OUTPATIENT)
Dept: FAMILY MEDICINE | Facility: CLINIC | Age: 72
End: 2022-04-18
Payer: MEDICARE

## 2022-04-18 DIAGNOSIS — M85.80 OSTEOPENIA, UNSPECIFIED LOCATION: ICD-10-CM

## 2022-04-18 DIAGNOSIS — Z78.0 POST-MENOPAUSAL: ICD-10-CM

## 2022-04-18 DIAGNOSIS — Z13.820 SCREENING FOR OSTEOPOROSIS: ICD-10-CM

## 2022-04-18 PROCEDURE — 77080 DXA BONE DENSITY AXIAL: CPT

## 2022-04-20 ENCOUNTER — OFFICE VISIT (OUTPATIENT)
Dept: CARDIOLOGY | Facility: CLINIC | Age: 72
End: 2022-04-20
Attending: FAMILY MEDICINE
Payer: MEDICARE

## 2022-04-20 ENCOUNTER — ANCILLARY PROCEDURE (OUTPATIENT)
Dept: CARDIOLOGY | Facility: CLINIC | Age: 72
End: 2022-04-20
Attending: INTERNAL MEDICINE
Payer: MEDICARE

## 2022-04-20 VITALS
HEART RATE: 60 BPM | WEIGHT: 146 LBS | DIASTOLIC BLOOD PRESSURE: 88 MMHG | SYSTOLIC BLOOD PRESSURE: 138 MMHG | BODY MASS INDEX: 26.87 KG/M2 | OXYGEN SATURATION: 96 % | HEIGHT: 62 IN

## 2022-04-20 DIAGNOSIS — T46.6X5A MYALGIA DUE TO STATIN: ICD-10-CM

## 2022-04-20 DIAGNOSIS — M79.10 MYALGIA DUE TO STATIN: ICD-10-CM

## 2022-04-20 DIAGNOSIS — I77.810 ASCENDING AORTA DILATATION (H): Primary | ICD-10-CM

## 2022-04-20 DIAGNOSIS — I49.5 TACHY-BRADY SYNDROME (H): Primary | ICD-10-CM

## 2022-04-20 DIAGNOSIS — Z95.0 CARDIAC PACEMAKER IN SITU: ICD-10-CM

## 2022-04-20 DIAGNOSIS — E78.5 HYPERLIPIDEMIA LDL GOAL <70: ICD-10-CM

## 2022-04-20 LAB
MDC_IDC_LEAD_IMPLANT_DT: NORMAL
MDC_IDC_LEAD_IMPLANT_DT: NORMAL
MDC_IDC_LEAD_LOCATION: NORMAL
MDC_IDC_LEAD_LOCATION: NORMAL
MDC_IDC_LEAD_LOCATION_DETAIL_1: NORMAL
MDC_IDC_LEAD_LOCATION_DETAIL_1: NORMAL
MDC_IDC_LEAD_MFG: NORMAL
MDC_IDC_LEAD_MFG: NORMAL
MDC_IDC_LEAD_MODEL: NORMAL
MDC_IDC_LEAD_MODEL: NORMAL
MDC_IDC_LEAD_POLARITY_TYPE: NORMAL
MDC_IDC_LEAD_POLARITY_TYPE: NORMAL
MDC_IDC_LEAD_SERIAL: NORMAL
MDC_IDC_LEAD_SERIAL: NORMAL
MDC_IDC_MSMT_BATTERY_REMAINING_LONGEVITY: 135 MO
MDC_IDC_MSMT_BATTERY_STATUS: NORMAL
MDC_IDC_MSMT_BATTERY_VOLTAGE: 3.01 V
MDC_IDC_MSMT_LEADCHNL_RA_IMPEDANCE_VALUE: 387.5 OHM
MDC_IDC_MSMT_LEADCHNL_RA_PACING_THRESHOLD_AMPLITUDE: 0.38 V
MDC_IDC_MSMT_LEADCHNL_RA_PACING_THRESHOLD_AMPLITUDE: 0.5 V
MDC_IDC_MSMT_LEADCHNL_RA_PACING_THRESHOLD_PULSEWIDTH: 0.4 MS
MDC_IDC_MSMT_LEADCHNL_RA_PACING_THRESHOLD_PULSEWIDTH: 0.4 MS
MDC_IDC_MSMT_LEADCHNL_RA_SENSING_INTR_AMPL: 3.9 MV
MDC_IDC_MSMT_LEADCHNL_RV_IMPEDANCE_VALUE: 475 OHM
MDC_IDC_MSMT_LEADCHNL_RV_PACING_THRESHOLD_AMPLITUDE: 0.75 V
MDC_IDC_MSMT_LEADCHNL_RV_PACING_THRESHOLD_AMPLITUDE: 0.75 V
MDC_IDC_MSMT_LEADCHNL_RV_PACING_THRESHOLD_PULSEWIDTH: 0.4 MS
MDC_IDC_MSMT_LEADCHNL_RV_PACING_THRESHOLD_PULSEWIDTH: 0.4 MS
MDC_IDC_MSMT_LEADCHNL_RV_SENSING_INTR_AMPL: 7.2 MV
MDC_IDC_PG_IMPLANT_DTM: NORMAL
MDC_IDC_PG_MFG: NORMAL
MDC_IDC_PG_MODEL: NORMAL
MDC_IDC_PG_SERIAL: NORMAL
MDC_IDC_PG_TYPE: NORMAL
MDC_IDC_SESS_CLINIC_NAME: NORMAL
MDC_IDC_SESS_DTM: NORMAL
MDC_IDC_SESS_TYPE: NORMAL
MDC_IDC_SET_BRADY_AT_MODE_SWITCH_MODE: NORMAL
MDC_IDC_SET_BRADY_AT_MODE_SWITCH_RATE: 180 {BEATS}/MIN
MDC_IDC_SET_BRADY_HYSTRATE: NORMAL
MDC_IDC_SET_BRADY_LOWRATE: 60 {BEATS}/MIN
MDC_IDC_SET_BRADY_MAX_SENSOR_RATE: 130 {BEATS}/MIN
MDC_IDC_SET_BRADY_MAX_TRACKING_RATE: 130 {BEATS}/MIN
MDC_IDC_SET_BRADY_MODE: NORMAL
MDC_IDC_SET_BRADY_NIGHT_RATE: NORMAL
MDC_IDC_SET_BRADY_PAV_DELAY_LOW: 200 MS
MDC_IDC_SET_BRADY_SAV_DELAY_LOW: 150 MS
MDC_IDC_SET_LEADCHNL_RA_PACING_AMPLITUDE: 1.38
MDC_IDC_SET_LEADCHNL_RA_PACING_CAPTURE_MODE: NORMAL
MDC_IDC_SET_LEADCHNL_RA_PACING_POLARITY: NORMAL
MDC_IDC_SET_LEADCHNL_RA_PACING_PULSEWIDTH: 0.4 MS
MDC_IDC_SET_LEADCHNL_RA_SENSING_ADAPTATION_MODE: NORMAL
MDC_IDC_SET_LEADCHNL_RA_SENSING_POLARITY: NORMAL
MDC_IDC_SET_LEADCHNL_RV_PACING_AMPLITUDE: 1 V
MDC_IDC_SET_LEADCHNL_RV_PACING_CAPTURE_MODE: NORMAL
MDC_IDC_SET_LEADCHNL_RV_PACING_POLARITY: NORMAL
MDC_IDC_SET_LEADCHNL_RV_PACING_PULSEWIDTH: 0.4 MS
MDC_IDC_SET_LEADCHNL_RV_SENSING_POLARITY: NORMAL
MDC_IDC_SET_LEADCHNL_RV_SENSING_SENSITIVITY: 2 MV
MDC_IDC_STAT_AT_MODE_SW_COUNT: 0
MDC_IDC_STAT_BRADY_RA_PERCENT_PACED: 11 %
MDC_IDC_STAT_BRADY_RV_PERCENT_PACED: 1.2 %

## 2022-04-20 PROCEDURE — 99214 OFFICE O/P EST MOD 30 MIN: CPT | Mod: 25 | Performed by: INTERNAL MEDICINE

## 2022-04-20 PROCEDURE — 93280 PM DEVICE PROGR EVAL DUAL: CPT | Performed by: INTERNAL MEDICINE

## 2022-04-20 RX ORDER — LOSARTAN POTASSIUM 25 MG/1
25 TABLET ORAL DAILY
Qty: 90 TABLET | Refills: 3 | Status: SHIPPED | OUTPATIENT
Start: 2022-04-20 | End: 2023-04-03

## 2022-04-20 NOTE — LETTER
4/20/2022    Joel Daniel Wegener, MD  [address]  [facility] [address]    RE: Lauren Nick       Dear Colleague,     I had the pleasure of seeing Lauren A Park in the [facility].  HPI and Plan:   A very pleasant 71-year-old female s/p bioprosthetic aortic root replacement with 23 mm Inspira valve for severe aortic stenosis in the setting of bicuspid aortic valve this was done in March 2021.  She also had pacemaker and plantation due to postoperative bradycardia.  Outside notes indicate no significant coronary disease.  She also has history of dyslipidemia with history of intolerance to several statins and is on Zetia.  I saw the patient in December 2021 when she established care with us after moving from [place] to [place].  She got enrolled in our device clinic.  She also had an echocardiogram done recently.  Echocardiogram showed normal LV function with well-seated normal functioning bioprosthetic valve with mean gradient of 13 mg okay without any aortic regurgitation.  Ascending aorta was noted to be 4.4 cm.  To be noted last year in May 2020 when it was noted to be 4.2 cm.  Device check today shows 11% atrial pacing and 1.3% ventricular pacing with normal pacing and sensing thresholds with stable impedance with 9.1 to 11.1 years of battery longevity remaining.  7 beats run of PAT noted.  Today patient is coming for routine follow-up.  She is accompanied by her .  She has been struggling with bronchitis-like symptoms with chronic productive cough for about 3 months or so.  She will be seeing a pulmonologist soon.  She used to swim on a regular basis but because of bronchitis and chronic cough is not able to do that.  Otherwise she feels well without any particular exertion related symptoms like chest discomfort or shortness of breath.  We discussed in detail about the echocardiographic findings as well as slight increase in ascending aorta.  I also checked  with our device clinic that the pacemaker is MRI conditional.  Her blood pressure does run on the higher side sometimes on recheck it was 138/88.  She is on metoprolol.    Assessment plan  A pleasant 71-year-old female with history of bicuspid aortic valve with severe aortic valve stenosis s/p bioprosthetic aortic valve replacement done in 2021 with post operative bradycardia s/p pacemaker implantation with mild dilated ascending aorta, no significant coronary disease reported on core angiogram prior to the aortic valve replacement with dyslipidemia with history of intolerance to several statins including pravastatin but on Zetia now.  Overall cardiac status wise she is doing well with recent echocardiogram showing normal functioning bioprosthetic valve.  Cardiac auscultation reveals faint ejection systolic murmur over the right upper sternal border.  We did talk at length about natural history of aortic aneurysm and indication for elective aortic aneurysm surgery especially in the setting of bicuspid aortic valve.  We also discussed about medications like beta-blocker therapy which she is already on an angiotensin beta-blockers which can decrease the rate of increase in size of ascending aorta.  I discussed option of using losartan.  Common side effects were discussed with patient.  She is agreeable.  We will start 25 mg daily of losartan.  I recommend checking BMP in 1 week time.  She should continue metoprolol.  I also discussed option of more comprehensive evaluation of aorta with either a CT aortogram or MR aortogram.  Anticipating that she may need repeat studies in future and to avoid cumulative radiation exposure we both made a mutually shared decision to do MRA aorta.  As noted above I did go over to the device clinic and they confirmed that patient has MRI conditional pacemaker.  I recommend doing an MRI aorta sometime this summer in next 3 months and depending upon the size of ascending aorta we can  decide whether to do future imaging with echocardiogram or MRI aorta.  I am also setting up a follow-up around the same time.  We also discussed about avoiding heavy weightlifting and strong Valsalva maneuvers.  We also discussed about importance of predental antibiotic prophylaxis and she is aware of that.  We also talk about screening for first-degree relatives.    1.  History of bicuspid aortic valve with severe tubular stenosis s/p bioprosthetic aortic valve replacement 2021.  Recent echocardiogram showing normal functioning prosthetic valve.  Normal LV function.  On aspirin.  2.  History of bradycardia post AVR s/p pacemaker implantation.  Dual-chamber pacemaker plantation.  Normal functioning pacemaker on recent device check.  3.  No significant carotid disease reported on coronary angiogram prior to AVR.  4.  Dyslipidemia with a history of significant intolerance to several statins including low potency statins.  On Zetia.  5.  Mild dilated ascending aorta 4.4 cm on recent echocardiogram, was 4.2 cm.    Recommendations  Start losartan 25 mg daily  Check BMP in 1 week  Continue aspirin, beta-blocker  Continue regular device checkup  MR aorta with follow-up in 3 months.    Orders Placed This Encounter   Procedures     MRI Angiogram chest w & w/o contrast     Basic metabolic panel     Follow-Up with Cardiology       Orders Placed This Encounter   Medications     losartan (COZAAR) 25 MG tablet     Sig: Take 1 tablet (25 mg) by mouth daily     Dispense:  90 tablet     Refill:  3       Medications Discontinued During This Encounter   Medication Reason     triamcinolone (KENALOG-40) injection 40 mg Therapy completed     albuterol (PROAIR HFA/PROVENTIL HFA/VENTOLIN HFA) 108 (90 Base) MCG/ACT inhaler Therapy completed     methylPREDNISolone (MEDROL DOSEPAK) 4 MG tablet therapy pack Therapy completed     traZODone (DESYREL) 50 MG tablet Therapy completed     VENTOLIN  (90 Base) MCG/ACT inhaler Therapy completed          Encounter Diagnoses   Name Primary?     Myalgia due to statin      Hyperlipidemia LDL goal <70      Ascending aorta dilatation (H) Yes       CURRENT MEDICATIONS:  Current Outpatient Medications   Medication Sig Dispense Refill     aspirin 81 MG EC tablet Take 81 mg by mouth daily       coenzyme Q-10 (CO-Q10) 50 MG capsule Take 2 capsules (100 mg) by mouth daily       escitalopram (LEXAPRO) 20 MG tablet TAKE 1 TABLET BY MOUTH EVERY DAY 90 tablet 2     ezetimibe (ZETIA) 10 MG tablet Take 1 tablet (10 mg) by mouth daily Profile Rx: patient will contact pharmacy when needed 90 tablet 1     fluticasone (FLONASE) 50 MCG/ACT nasal spray        fluticasone-salmeterol (ADVAIR) 500-50 MCG/DOSE inhaler Inhale 1 puff into the lungs every 12 hours 1 each 3     levothyroxine (SYNTHROID/LEVOTHROID) 50 MCG tablet Take 1 tablet (50 mcg) by mouth daily 90 tablet 1     LORazepam (ATIVAN) 1 MG tablet Take 1 tablet (1 mg) by mouth nightly as needed for sleep Follow up as discussed to investigate alternatives such as trazodone (virtual visit ok) 30 tablet 0     losartan (COZAAR) 25 MG tablet Take 1 tablet (25 mg) by mouth daily 90 tablet 3     metoprolol succinate ER (TOPROL-XL) 25 MG 24 hr tablet Take 1 tablet (25 mg) by mouth 2 times daily 180 tablet 2     vitamin C (ASCORBIC ACID) 1000 MG TABS Take 1,000 mg by mouth daily       zinc gluconate 50 MG tablet Take 50 mg by mouth daily       STATIN NOT PRESCRIBED (INTENTIONAL) Please choose reason not prescribed from choices below. (Patient not taking: Reported on 4/20/2022)         ALLERGIES     Allergies   Allergen Reactions     Penicillins Hives     Statins [Hmg-Coa-R Inhibitors]      Myalgias to multiple statins       PAST MEDICAL HISTORY:  Past Medical History:   Diagnosis Date     Arthritis Some in knees, hips and shoulder     Cancer (H) Breast cancer 9/89 lumpectomy, 6 months of chemo     Depressive disorder Put on meds 20 years ago for this.     Heart disease Bicuspid valve  diagnosed in my 50 s     Thyroid disease  diagnosed       PAST SURGICAL HISTORY:  Past Surgical History:   Procedure Laterality Date     BIOPSY  10/2018     BREAST SURGERY  89     CARDIAC SURGERY  22     CHOLECYSTECTOMY  2010     COLONOSCOPY  2019     ENT SURGERY  2011     EYE SURGERY  2015       FAMILY HISTORY:  Family History   Problem Relation Age of Onset     Hypertension Mother      Cancer Mother      Other Cancer Mother          if kidney cancer at age 86     Other Cancer Father          of stomach cancer age 43     Heart Disease Paternal Grandmother      Hypertension Paternal Grandmother      Obesity Paternal Grandmother      Hypertension Sister      Hyperlipidemia Sister      Anesthesia Reaction Son      Hypertension Son      Heart Disease Daughter      Heart Disease Sister      Hypertension Sister      Hyperlipidemia Sister      Colon Cancer Sister      Diabetes Sister      Breast Cancer Sister      Coronary Artery Disease Sister      Hyperlipidemia Sister      Thyroid Disease Sister      Other Cancer Other         Neuroblastoma age 5       SOCIAL HISTORY:  Social History     Socioeconomic History     Marital status:      Spouse name: None     Number of children: None     Years of education: None     Highest education level: None   Tobacco Use     Smoking status: Former Smoker     Packs/day: 1.00     Years: 10.00     Pack years: 10.00     Types: Cigarettes     Start date: 1977     Quit date: 10/1/1979     Years since quittin.5     Smokeless tobacco: Never Used   Vaping Use     Vaping Use: Never used   Substance and Sexual Activity     Alcohol use: Yes     Comment: occ     Drug use: Never     Sexual activity: Not Currently     Partners: Male     Birth control/protection: Post-menopausal   Other Topics Concern     Parent/sibling w/ CABG, MI or angioplasty before 65F 55M? No       Review of Systems:  Skin:  Negative       Eyes:  Positive for glasses    ENT:   "Positive for hearing loss wears hearing aids  Respiratory:  Positive for dyspnea on exertion has had bronchitis since 1/2022 and has appt. with pulmonologist in 6/2022.  Has been treated with numerous medications but is not improving   Cardiovascular:    fatigue;Positive for    Gastroenterology: Positive for heartburn managed with Famotadine  Genitourinary:  Positive for nocturia    Musculoskeletal:  Positive for arthritis hips, knees  Neurologic:  Negative      Psychiatric:  Positive for anxiety;depression treated  Heme/Lymph/Imm:  Positive for allergies seasonal  Endocrine:  Positive for thyroid disorder;hot flashes      Physical Exam:  Vitals: /88   Pulse 60   Ht 1.585 m (5' 2.4\")   Wt 66.2 kg (146 lb)   SpO2 96%   BMI 26.36 kg/m      General patient appears comfortable  Neck normal JVP  Cardiovascular system faint grade 1 x 6 ejection systolic murmur heard over the retrosternal border no S3-S4 rub or gallop  Respiratory system clear to auscultation  Extremities no edema  Neurological alert, oriented      CC  Joel Daniel Irwin Wegener, MD  [address] [facility] [address] [address]  [place] [address]    Thank you for allowing me to participate in the care of your patient.      Sincerely,     Reggie King MD     [facility]  "

## 2022-04-20 NOTE — PROGRESS NOTES
HPI and Plan:   A very pleasant 71-year-old female s/p bioprosthetic aortic root replacement with 23 mm Inspira valve for severe aortic stenosis in the setting of bicuspid aortic valve this was done in March 2021.  She also had pacemaker and plantation due to postoperative bradycardia.  Outside notes indicate no significant coronary disease.  She also has history of dyslipidemia with history of intolerance to several statins and is on Zetia.  I saw the patient in December 2021 when she established care with us after moving from [place] to [place].  She got enrolled in our device clinic.  She also had an echocardiogram done recently.  Echocardiogram showed normal LV function with well-seated normal functioning bioprosthetic valve with mean gradient of 13 mg okay without any aortic regurgitation.  Ascending aorta was noted to be 4.4 cm.  To be noted last year in May 2020 when it was noted to be 4.2 cm.  Device check today shows 11% atrial pacing and 1.3% ventricular pacing with normal pacing and sensing thresholds with stable impedance with 9.1 to 11.1 years of battery longevity remaining.  7 beats run of PAT noted.  Today patient is coming for routine follow-up.  She is accompanied by her .  She has been struggling with bronchitis-like symptoms with chronic productive cough for about 3 months or so.  She will be seeing a pulmonologist soon.  She used to swim on a regular basis but because of bronchitis and chronic cough is not able to do that.  Otherwise she feels well without any particular exertion related symptoms like chest discomfort or shortness of breath.  We discussed in detail about the echocardiographic findings as well as slight increase in ascending aorta.  I also checked with our device clinic that the pacemaker is MRI conditional.  Her blood pressure does run on the higher side sometimes on recheck it was 138/88.  She is on metoprolol.    Assessment plan  A pleasant 71-year-old female with  history of bicuspid aortic valve with severe aortic valve stenosis s/p bioprosthetic aortic valve replacement done in 2021 with post operative bradycardia s/p pacemaker implantation with mild dilated ascending aorta, no significant coronary disease reported on core angiogram prior to the aortic valve replacement with dyslipidemia with history of intolerance to several statins including pravastatin but on Zetia now.  Overall cardiac status wise she is doing well with recent echocardiogram showing normal functioning bioprosthetic valve.  Cardiac auscultation reveals faint ejection systolic murmur over the right upper sternal border.  We did talk at length about natural history of aortic aneurysm and indication for elective aortic aneurysm surgery especially in the setting of bicuspid aortic valve.  We also discussed about medications like beta-blocker therapy which she is already on an angiotensin beta-blockers which can decrease the rate of increase in size of ascending aorta.  I discussed option of using losartan.  Common side effects were discussed with patient.  She is agreeable.  We will start 25 mg daily of losartan.  I recommend checking BMP in 1 week time.  She should continue metoprolol.  I also discussed option of more comprehensive evaluation of aorta with either a CT aortogram or MR aortogram.  Anticipating that she may need repeat studies in future and to avoid cumulative radiation exposure we both made a mutually shared decision to do MRA aorta.  As noted above I did go over to the device clinic and they confirmed that patient has MRI conditional pacemaker.  I recommend doing an MRI aorta sometime this summer in next 3 months and depending upon the size of ascending aorta we can decide whether to do future imaging with echocardiogram or MRI aorta.  I am also setting up a follow-up around the same time.  We also discussed about avoiding heavy weightlifting and strong Valsalva maneuvers.  We also discussed  about importance of predental antibiotic prophylaxis and she is aware of that.  We also talk about screening for first-degree relatives.    1.  History of bicuspid aortic valve with severe tubular stenosis s/p bioprosthetic aortic valve replacement 2021.  Recent echocardiogram showing normal functioning prosthetic valve.  Normal LV function.  On aspirin.  2.  History of bradycardia post AVR s/p pacemaker implantation.  Dual-chamber pacemaker plantation.  Normal functioning pacemaker on recent device check.  3.  No significant carotid disease reported on coronary angiogram prior to AVR.  4.  Dyslipidemia with a history of significant intolerance to several statins including low potency statins.  On Zetia.  5.  Mild dilated ascending aorta 4.4 cm on recent echocardiogram, was 4.2 cm.    Recommendations  Start losartan 25 mg daily  Check BMP in 1 week  Continue aspirin, beta-blocker  Continue regular device checkup  MR aorta with follow-up in 3 months.    Orders Placed This Encounter   Procedures     MRI Angiogram chest w & w/o contrast     Basic metabolic panel     Follow-Up with Cardiology       Orders Placed This Encounter   Medications     losartan (COZAAR) 25 MG tablet     Sig: Take 1 tablet (25 mg) by mouth daily     Dispense:  90 tablet     Refill:  3       Medications Discontinued During This Encounter   Medication Reason     triamcinolone (KENALOG-40) injection 40 mg Therapy completed     albuterol (PROAIR HFA/PROVENTIL HFA/VENTOLIN HFA) 108 (90 Base) MCG/ACT inhaler Therapy completed     methylPREDNISolone (MEDROL DOSEPAK) 4 MG tablet therapy pack Therapy completed     traZODone (DESYREL) 50 MG tablet Therapy completed     VENTOLIN  (90 Base) MCG/ACT inhaler Therapy completed         Encounter Diagnoses   Name Primary?     Myalgia due to statin      Hyperlipidemia LDL goal <70      Ascending aorta dilatation (H) Yes       CURRENT MEDICATIONS:  Current Outpatient Medications   Medication Sig Dispense  Refill     aspirin 81 MG EC tablet Take 81 mg by mouth daily       coenzyme Q-10 (CO-Q10) 50 MG capsule Take 2 capsules (100 mg) by mouth daily       escitalopram (LEXAPRO) 20 MG tablet TAKE 1 TABLET BY MOUTH EVERY DAY 90 tablet 2     ezetimibe (ZETIA) 10 MG tablet Take 1 tablet (10 mg) by mouth daily Profile Rx: patient will contact pharmacy when needed 90 tablet 1     fluticasone (FLONASE) 50 MCG/ACT nasal spray        fluticasone-salmeterol (ADVAIR) 500-50 MCG/DOSE inhaler Inhale 1 puff into the lungs every 12 hours 1 each 3     levothyroxine (SYNTHROID/LEVOTHROID) 50 MCG tablet Take 1 tablet (50 mcg) by mouth daily 90 tablet 1     LORazepam (ATIVAN) 1 MG tablet Take 1 tablet (1 mg) by mouth nightly as needed for sleep Follow up as discussed to investigate alternatives such as trazodone (virtual visit ok) 30 tablet 0     losartan (COZAAR) 25 MG tablet Take 1 tablet (25 mg) by mouth daily 90 tablet 3     metoprolol succinate ER (TOPROL-XL) 25 MG 24 hr tablet Take 1 tablet (25 mg) by mouth 2 times daily 180 tablet 2     vitamin C (ASCORBIC ACID) 1000 MG TABS Take 1,000 mg by mouth daily       zinc gluconate 50 MG tablet Take 50 mg by mouth daily       STATIN NOT PRESCRIBED (INTENTIONAL) Please choose reason not prescribed from choices below. (Patient not taking: Reported on 4/20/2022)         ALLERGIES     Allergies   Allergen Reactions     Penicillins Hives     Statins [Hmg-Coa-R Inhibitors]      Myalgias to multiple statins       PAST MEDICAL HISTORY:  Past Medical History:   Diagnosis Date     Arthritis Some in knees, hips and shoulder     Cancer (H) Breast cancer 9/89 lumpectomy, 6 months of chemo     Depressive disorder Put on meds 20 years ago for this.     Heart disease Bicuspid valve diagnosed in my 50 s     Thyroid disease 02/21 diagnosed       PAST SURGICAL HISTORY:  Past Surgical History:   Procedure Laterality Date     BIOPSY  10/2018     BREAST SURGERY  09/25/89     CARDIAC SURGERY  03/22/21      CHOLECYSTECTOMY  2010     COLONOSCOPY  2019     ENT SURGERY  2011     EYE SURGERY  2015       FAMILY HISTORY:  Family History   Problem Relation Age of Onset     Hypertension Mother      Cancer Mother      Other Cancer Mother          if kidney cancer at age 86     Other Cancer Father          of stomach cancer age 43     Heart Disease Paternal Grandmother      Hypertension Paternal Grandmother      Obesity Paternal Grandmother      Hypertension Sister      Hyperlipidemia Sister      Anesthesia Reaction Son      Hypertension Son      Heart Disease Daughter      Heart Disease Sister      Hypertension Sister      Hyperlipidemia Sister      Colon Cancer Sister      Diabetes Sister      Breast Cancer Sister      Coronary Artery Disease Sister      Hyperlipidemia Sister      Thyroid Disease Sister      Other Cancer Other         Neuroblastoma age 5       SOCIAL HISTORY:  Social History     Socioeconomic History     Marital status:      Spouse name: None     Number of children: None     Years of education: None     Highest education level: None   Tobacco Use     Smoking status: Former Smoker     Packs/day: 1.00     Years: 10.00     Pack years: 10.00     Types: Cigarettes     Start date: 1977     Quit date: 10/1/1979     Years since quittin.5     Smokeless tobacco: Never Used   Vaping Use     Vaping Use: Never used   Substance and Sexual Activity     Alcohol use: Yes     Comment: occ     Drug use: Never     Sexual activity: Not Currently     Partners: Male     Birth control/protection: Post-menopausal   Other Topics Concern     Parent/sibling w/ CABG, MI or angioplasty before 65F 55M? No       Review of Systems:  Skin:  Negative       Eyes:  Positive for glasses    ENT:  Positive for hearing loss wears hearing aids  Respiratory:  Positive for dyspnea on exertion has had bronchitis since 2022 and has appt. with pulmonologist in 2022.  Has been treated with numerous medications but is  "not improving   Cardiovascular:    fatigue;Positive for    Gastroenterology: Positive for heartburn managed with Famotadine  Genitourinary:  Positive for nocturia    Musculoskeletal:  Positive for arthritis hips, knees  Neurologic:  Negative      Psychiatric:  Positive for anxiety;depression treated  Heme/Lymph/Imm:  Positive for allergies seasonal  Endocrine:  Positive for thyroid disorder;hot flashes      Physical Exam:  Vitals: /88   Pulse 60   Ht 1.585 m (5' 2.4\")   Wt 66.2 kg (146 lb)   SpO2 96%   BMI 26.36 kg/m      General patient appears comfortable  Neck normal JVP  Cardiovascular system faint grade 1 x 6 ejection systolic murmur heard over the retrosternal border no S3-S4 rub or gallop  Respiratory system clear to auscultation  Extremities no edema  Neurological alert, oriented      CC  Joel Daniel Irwin Wegener, MD  [address] [facility] [address]  [place] [address]                  "

## 2022-04-25 ENCOUNTER — TELEPHONE (OUTPATIENT)
Dept: FAMILY MEDICINE | Facility: CLINIC | Age: 72
End: 2022-04-25
Payer: MEDICARE

## 2022-04-25 DIAGNOSIS — J41.0 SIMPLE CHRONIC BRONCHITIS (H): Primary | ICD-10-CM

## 2022-04-25 RX ORDER — FLUTICASONE PROPIONATE AND SALMETEROL 500; 50 UG/1; UG/1
1 POWDER RESPIRATORY (INHALATION) EVERY 12 HOURS
Qty: 3 EACH | Refills: 3 | Status: SHIPPED | OUTPATIENT
Start: 2022-04-25 | End: 2023-02-20

## 2022-04-25 NOTE — TELEPHONE ENCOUNTER
HUBER,  Pharm requesting brand name Advair for insurance coverage.  Please authorize if appropriate.  Thanks,  Elo Singleton RN

## 2022-04-27 ENCOUNTER — LAB (OUTPATIENT)
Dept: LAB | Facility: CLINIC | Age: 72
End: 2022-04-27
Payer: MEDICARE

## 2022-04-27 DIAGNOSIS — I77.810 ASCENDING AORTA DILATATION (H): ICD-10-CM

## 2022-04-27 LAB
ANION GAP SERPL CALCULATED.3IONS-SCNC: 1 MMOL/L (ref 3–14)
BUN SERPL-MCNC: 17 MG/DL (ref 7–30)
CALCIUM SERPL-MCNC: 8.4 MG/DL (ref 8.5–10.1)
CHLORIDE BLD-SCNC: 109 MMOL/L (ref 94–109)
CO2 SERPL-SCNC: 31 MMOL/L (ref 20–32)
CREAT SERPL-MCNC: 0.86 MG/DL (ref 0.52–1.04)
GFR SERPL CREATININE-BSD FRML MDRD: 72 ML/MIN/1.73M2
GLUCOSE BLD-MCNC: 100 MG/DL (ref 70–99)
POTASSIUM BLD-SCNC: 4.2 MMOL/L (ref 3.4–5.3)
SODIUM SERPL-SCNC: 141 MMOL/L (ref 133–144)

## 2022-04-27 PROCEDURE — 80048 BASIC METABOLIC PNL TOTAL CA: CPT | Performed by: INTERNAL MEDICINE

## 2022-04-27 PROCEDURE — 36415 COLL VENOUS BLD VENIPUNCTURE: CPT | Performed by: INTERNAL MEDICINE

## 2022-05-02 ENCOUNTER — MYC REFILL (OUTPATIENT)
Dept: FAMILY MEDICINE | Facility: CLINIC | Age: 72
End: 2022-05-02
Payer: MEDICARE

## 2022-05-02 DIAGNOSIS — F51.01 PRIMARY INSOMNIA: ICD-10-CM

## 2022-05-03 RX ORDER — LORAZEPAM 1 MG/1
1 TABLET ORAL
Qty: 30 TABLET | Refills: 5 | Status: SHIPPED | OUTPATIENT
Start: 2022-05-03 | End: 2022-11-08

## 2022-05-03 NOTE — TELEPHONE ENCOUNTER
JW,    Medication is being filled for 1 time refill only due to:  Not on refill protocol    Note on Rx from 3/25/22:  JW would like VV with patient to discuss medication alternative for sleep such as Trazodone (note with last refill).    FlipKey message sent to patient.  Do you want a VV?    Ro Peterson RN

## 2022-05-09 ENCOUNTER — MYC MEDICAL ADVICE (OUTPATIENT)
Dept: PULMONOLOGY | Facility: CLINIC | Age: 72
End: 2022-05-09
Payer: MEDICARE

## 2022-05-09 ENCOUNTER — MYC MEDICAL ADVICE (OUTPATIENT)
Dept: CARDIOLOGY | Facility: CLINIC | Age: 72
End: 2022-05-09
Payer: MEDICARE

## 2022-05-09 ENCOUNTER — HOSPITAL ENCOUNTER (OUTPATIENT)
Dept: CARDIAC REHAB | Facility: CLINIC | Age: 72
Discharge: HOME | End: 2022-05-09
Attending: FAMILY MEDICINE
Payer: MEDICARE

## 2022-05-09 DIAGNOSIS — J41.0 SIMPLE CHRONIC BRONCHITIS (H): ICD-10-CM

## 2022-05-09 DIAGNOSIS — R05.3 CHRONIC COUGH: ICD-10-CM

## 2022-05-09 DIAGNOSIS — J42 BRONCHITIS, CHRONIC (H): ICD-10-CM

## 2022-05-09 LAB
DLCOUNC-%PRED-PRE: 94 %
DLCOUNC-PRE: 17.45 ML/MIN/MMHG
DLCOUNC-PRED: 18.49 ML/MIN/MMHG
ERV-%PRED-PRE: 55 %
ERV-PRE: 0.34 L
ERV-PRED: 0.61 L
EXPTIME-PRE: 7.34 SEC
FEF2575-%PRED-POST: 92 %
FEF2575-%PRED-PRE: 53 %
FEF2575-POST: 1.68 L/SEC
FEF2575-PRE: 0.96 L/SEC
FEF2575-PRED: 1.81 L/SEC
FEFMAX-%PRED-PRE: 75 %
FEFMAX-PRE: 4.11 L/SEC
FEFMAX-PRED: 5.42 L/SEC
FEV1-%PRED-PRE: 72 %
FEV1-PRE: 1.52 L
FEV1FEV6-PRE: 71 %
FEV1FEV6-PRED: 79 %
FEV1FVC-PRE: 72 %
FEV1FVC-PRED: 78 %
FEV1SVC-PRE: 68 %
FEV1SVC-PRED: 73 %
FIFMAX-PRE: 3.22 L/SEC
FRCPLETH-%PRED-PRE: 108 %
FRCPLETH-PRE: 2.85 L
FRCPLETH-PRED: 2.63 L
FVC-%PRED-PRE: 77 %
FVC-PRE: 2.1 L
FVC-PRED: 2.7 L
IC-%PRED-PRE: 81 %
IC-PRE: 1.85 L
IC-PRED: 2.26 L
RVPLETH-%PRED-PRE: 124 %
RVPLETH-PRE: 2.49 L
RVPLETH-PRED: 1.99 L
TLCPLETH-%PRED-PRE: 100 %
TLCPLETH-PRE: 4.71 L
TLCPLETH-PRED: 4.69 L
VA-%PRED-PRE: 87 %
VA-PRE: 3.88 L
VC-%PRED-PRE: 77 %
VC-PRE: 2.22 L
VC-PRED: 2.87 L

## 2022-05-09 PROCEDURE — 94729 DIFFUSING CAPACITY: CPT

## 2022-05-09 PROCEDURE — 94060 EVALUATION OF WHEEZING: CPT

## 2022-05-09 PROCEDURE — 94726 PLETHYSMOGRAPHY LUNG VOLUMES: CPT

## 2022-05-19 NOTE — PATIENT INSTRUCTIONS
"You were seen in the clinic today by Dr. Lara. If you have any questions or concerns after your appointment, please call the clinic at [phone]. Press \"1\" for scheduling, press \"3\" for nurse advice.    2.   The following has been recommended for you based upon your appointment today:   -    3.   Plan to return the clinic in       VASHTI Cabrera  [facility]  Department of Otolaryngology  [phone]   "

## 2022-05-20 ENCOUNTER — OFFICE VISIT (OUTPATIENT)
Dept: OTOLARYNGOLOGY | Facility: CLINIC | Age: 72
End: 2022-05-20
Attending: FAMILY MEDICINE
Payer: MEDICARE

## 2022-05-20 ENCOUNTER — PRE VISIT (OUTPATIENT)
Dept: OTOLARYNGOLOGY | Facility: CLINIC | Age: 72
End: 2022-05-20

## 2022-05-20 VITALS
SYSTOLIC BLOOD PRESSURE: 130 MMHG | TEMPERATURE: 97 F | WEIGHT: 143 LBS | OXYGEN SATURATION: 97 % | HEART RATE: 67 BPM | BODY MASS INDEX: 25.34 KG/M2 | HEIGHT: 63 IN | DIASTOLIC BLOOD PRESSURE: 67 MMHG

## 2022-05-20 DIAGNOSIS — J01.00 ACUTE NON-RECURRENT MAXILLARY SINUSITIS: Primary | ICD-10-CM

## 2022-05-20 DIAGNOSIS — M26.609 TMJ (TEMPOROMANDIBULAR JOINT SYNDROME): ICD-10-CM

## 2022-05-20 DIAGNOSIS — R05.3 CHRONIC COUGH: ICD-10-CM

## 2022-05-20 PROCEDURE — 31231 NASAL ENDOSCOPY DX: CPT | Performed by: OTOLARYNGOLOGY

## 2022-05-20 PROCEDURE — 99203 OFFICE O/P NEW LOW 30 MIN: CPT | Mod: 25 | Performed by: OTOLARYNGOLOGY

## 2022-05-20 ASSESSMENT — PAIN SCALES - GENERAL: PAINLEVEL: NO PAIN (0)

## 2022-05-20 NOTE — PROGRESS NOTES
[facility]  New Patient Visit      Encounter date:   May 20, 2022    Referring Provider:   Joel Daniel Irwin Wegener, MD  [address]  [place] [address]    Chief Complaint: sinusitis    History of Present Illness:   Lauren Nick is a 71 year old female who presents for consultation regarding sinusitis. The patient has been following with her PCP, Dr. Joel Wegener, for the last few months for chronic cough. She has received a normal chest x-ray, and she was ordered Advair and pulmonary function testing. The patient noted some sinus pressure at his last visit with Dr. Wegener 2 months ago (03/25/2022), and he was prompted to refer the patient to my team for further evaluation of his case.    Today, the patient reports that she has had may years of sinus issues, with recurrent sinus infections throughout the year. Nicolette explains that she has had sinus surgery in [place] many years ago, and was evaluated by a rhinology team in [place] while she lived there for 5 years. Nicolette feels that her chronic cough is slowly improving.     Nicolette explains that heard her jaw popping and cracking while eating 2 months ago. Since this happened, she tried massaging the outside area of her jaw. It is not debilitating, but wanted this to be checked out.    Sino-Nasal Outcome Test (SNOT - 22)  1. Need to Blow Nose: (P) Moderate  2. Nasal Blockage: (P) Moderate  3. Sneezing: (P) Mild or slight  4. Runny Nose: (P) Mild or slight  5. Cough: (P) Moderate  6. Post-nasal discharge: (P) Moderate  7. Thick nasal discharge: (P) Mild or slight  8. Ear fullness: (P) None  9. Dizziness: (P) None  10. Ear Pain: (P) None  11. Facial pain/pressure: (P) Mild or slight  12. Decreased Sense of Smell/Taste: (P) Moderate  13. Difficulty falling asleep: (P) Moderate  14. Wake up at night: (P) Mild or slight  15. Lack of a good night's sleep: (P) Mild or slight  16. Wake up tired: (P) Very mild  17. Fatigue: (P) Mild or  slight  18. Reduced Productivity: (P) Very mild  19. Reduced Concentration: (P) None  20. Frustrated/restless/irritable: (P) Mild or slight  21. Sad: (P) None  22. Embarrassed: (P) None  Total Score: (P) 36    [place] Operative History:  The patient notes history of sinus surgery in [place] in .    Review of systems: A 14-point review of systems has been conducted and was negative for any notable symptoms, except as dictated in the history of present illness.     Medical History:  Past Medical History:   Diagnosis Date     Arthritis Some in knees, hips and shoulder     Cancer (H) Breast cancer  lumpectomy, 6 months of chemo     Depressive disorder Put on meds 20 years ago for this.     Heart disease Bicuspid valve diagnosed in my 50 s     Thyroid disease  diagnosed      Surgical History:   Past Surgical History:   Procedure Laterality Date     BIOPSY  10/2018     BREAST SURGERY  89     CARDIAC SURGERY  21     CHOLECYSTECTOMY  2010     COLONOSCOPY  2019     ENT SURGERY  2011     EYE SURGERY  2015      Family History:  Family History   Problem Relation Age of Onset     Hypertension Mother      Cancer Mother      Other Cancer Mother          if kidney cancer at age 86     Other Cancer Father          of stomach cancer age 43     Heart Disease Paternal Grandmother      Hypertension Paternal Grandmother      Obesity Paternal Grandmother      Hypertension Sister      Hyperlipidemia Sister      Anesthesia Reaction Son      Hypertension Son      Heart Disease Daughter      Heart Disease Sister      Hypertension Sister      Hyperlipidemia Sister      Colon Cancer Sister      Diabetes Sister      Breast Cancer Sister      Coronary Artery Disease Sister      Hyperlipidemia Sister      Thyroid Disease Sister      Other Cancer Other         Neuroblastoma age 5      Social History:   Social History     Socioeconomic History     Marital status:    Tobacco Use     Smoking  "status: Former Smoker     Packs/day: 1.00     Years: 10.00     Pack years: 10.00     Types: Cigarettes     Start date: 1977     Quit date: 10/1/1979     Years since quittin.6     Smokeless tobacco: Never Used   Vaping Use     Vaping Use: Never used   Substance and Sexual Activity     Alcohol use: Yes     Comment: occ     Drug use: Never     Sexual activity: Not Currently     Partners: Male     Birth control/protection: Post-menopausal   Other Topics Concern     Parent/sibling w/ CABG, MI or angioplasty before 65F 55M? No      Physical Exam:  Vital signs: /67 (BP Location: Right arm, Patient Position: Sitting, Cuff Size: Adult Regular)   Pulse 67   Temp 97  F (36.1  C) (Temporal)   Ht 1.594 m (5' 2.75\")   Wt 64.9 kg (143 lb)   SpO2 97%   BMI 25.53 kg/m     General Appearance: No acute distress, appropriate demeanor, conversant  Eyes: moist conjunctivae; EOMI; pupils symmetric; visual acuity grossly intact; no proptosis  Head: normocephalic; overall symmetric appearance without deformity  Face: overall symmetric without deformity; HB I/VI  Ears: Normal appearance of external ear; external meatus normal in appearance; TMs intact without perforation bilaterally   Nose: No external deformity; see endoscopy exam below   Oral Cavity/oropharynx: Normal appearance of mucosa; tongue midline; no mass or lesions; oropharynx without obvious mucosal abnormality  Neck: no palpable lymphadenopathy; thyroid without palpable nodules  Lungs: symmetric chest rise; no wheezing  CV: Good distal perfusion; normal hear rate  Extremities: No deformity  Neurologic Exam: Cranial nerves II-XII are grossly intact; no focal deficit      Procedure Note  Procedure performed: Rigid nasal endoscopy  Indication: To evaluate for sinonasal pathology not visualized on routine anterior rhinoscopy  Anesthesia: 4% topical lidocaine with 0.05% oxymetazoline  Description of procedure: A 30 degree, 3 mm rigid endoscope was inserted into " bilateral nasal cavities and the nasal valves, nasal cavity, middle meatus, sphenoethmoid recess, and nasopharynx were thoroughly evaluated for evidence of obstruction, edema, purulence, polyps and/or mass/lesion.     Damián-Quentin Endoscopic Scoring System  Endoscopic observation Right Left   Polyps in middle meatus (0 = absent, 1 = restricted to middle meatus, 2 = Beyond middle meatus) 0 0   Discharge (0 = absent, 1 = thin and clear, 2 = thick, purulent) 0 0   Edema (0 = absent, 1 = mild-moderate, 2 = moderate-severe) 1 0   Crusting (0 = absent, 1 = mild-moderate, 2 = moderate-severe) 0 0   Scarring (0= absent, 1 = mild-moderate, 2 = moderate-severe) 0 0   Total 1 0     Findings  RT: edema of the ethmoid chola  LT: evidence of prior septoplasty and prior maxillary antrostomy    The patient tolerated the procedure well without complication.     Laboratory Review:  n/a    Imaging Review:  XR Sinuses 1-2 Views (01/18/2018)  FINDINGS:    No fracture or other osseous abnormality of the facial bones is identified. The paranasal sinuses appear clear with no significant mucosal thickening or air-fluid levels on this single view.    *I have personally reviewed these images and agree with the radiologist's impression*     Pathology Review:  n/a      Assessment/Medical Decision Making:  Chronic cough  History of prior sinus surgery  TMJ    Nasal endoscopy today shows evidence of prior sinus surgeries, but no evidence of an infection, inflammation or other concerning finding that might be responsible for her chronic cough.     I recommended continuing Flonase.     Discussed using Flonase, symptomatic care for TMJ symptoms, and returning to my clinic as needed.    Plan:  Cont Flonase  RTC as needed      Neil Lara MD    [facility]  Rhinology  Endoscopic Skull Base Surgery  [facility]  Department of Otolaryngology - Head & Neck Surgery    Scribe Disclosure:  I, Daisy Corrigan,  am serving as a scribe to document services personally performed by Neil Lara MD at this visit, based upon the provider's statements to me. All documentation has been reviewed by the aforementioned provider prior to being entered into the official medical record.

## 2022-05-20 NOTE — NURSING NOTE
"Chief Complaint   Patient presents with     Consult     sinusitis    Blood pressure 130/67, pulse 67, temperature 97  F (36.1  C), temperature source Temporal, height 1.594 m (5' 2.75\"), weight 64.9 kg (143 lb), SpO2 97 %, not currently breastfeeding. Cortney Torre, EMT  "

## 2022-05-20 NOTE — LETTER
5/20/2022       RE: Lauren Nick  [address]  [address]  [facility] [address]     Dear Colleague,    Thank you for referring your patient, Lauren Nick, to the [facility] EAR NOSE AND THROAT CLINIC [place] at [facility]. Please see a copy of my visit note below.      [facility]  New Patient Visit      Encounter date:   May 20, 2022    Referring Provider:   Joel Daniel Irwin Wegener, MD  [address] [address] [facility] [address]  [place] [address]    Chief Complaint: sinusitis    History of Present Illness:   Lauren Nick is a 71 year old female who presents for consultation regarding sinusitis. The patient has been following with her PCP, Dr. Joel Wegener, for the last few months for chronic cough. She has received a normal chest x-ray, and she was ordered Advair and pulmonary function testing. The patient noted some sinus pressure at his last visit with Dr. Wegener 2 months ago (03/25/2022), and he was prompted to refer the patient to my team for further evaluation of his case.    Today, the patient reports that she has had may years of sinus issues, with recurrent sinus infections throughout the year. Pat explains that she has had sinus surgery in [place] many years ago, and was evaluated by a rhinology team in [place] while she lived there for 5 years. Nicolette feels that her chronic cough is slowly improving.     Nicolette explains that heard her jaw popping and cracking while eating 2 months ago. Since this happened, she tried massaging the outside area of her jaw. It is not debilitating, but wanted this to be checked out.    Sino-Nasal Outcome Test (SNOT - 22)  1. Need to Blow Nose: (P) Moderate  2. Nasal Blockage: (P) Moderate  3. Sneezing: (P) Mild or slight  4. Runny Nose: (P) Mild or slight  5. Cough: (P) Moderate  6. Post-nasal discharge: (P) Moderate  7. Thick nasal discharge: (P) Mild or slight  8. Ear fullness: (P) None  9. Dizziness:  (P) None  10. Ear Pain: (P) None  11. Facial pain/pressure: (P) Mild or slight  12. Decreased Sense of Smell/Taste: (P) Moderate  13. Difficulty falling asleep: (P) Moderate  14. Wake up at night: (P) Mild or slight  15. Lack of a good night's sleep: (P) Mild or slight  16. Wake up tired: (P) Very mild  17. Fatigue: (P) Mild or slight  18. Reduced Productivity: (P) Very mild  19. Reduced Concentration: (P) None  20. Frustrated/restless/irritable: (P) Mild or slight  21. Sad: (P) None  22. Embarrassed: (P) None  Total Score: (P) 36    [place] Operative History:  The patient notes history of sinus surgery in [place] in .    Review of systems: A 14-point review of systems has been conducted and was negative for any notable symptoms, except as dictated in the history of present illness.     Medical History:  Past Medical History:   Diagnosis Date     Arthritis Some in knees, hips and shoulder     Cancer (H) Breast cancer  lumpectomy, 6 months of chemo     Depressive disorder Put on meds 20 years ago for this.     Heart disease Bicuspid valve diagnosed in my 50 s     Thyroid disease  diagnosed      Surgical History:   Past Surgical History:   Procedure Laterality Date     BIOPSY  10/2018     BREAST SURGERY  89     CARDIAC SURGERY  22     CHOLECYSTECTOMY  2010     COLONOSCOPY  2019     ENT SURGERY  2011     EYE SURGERY  2015      Family History:  Family History   Problem Relation Age of Onset     Hypertension Mother      Cancer Mother      Other Cancer Mother          if kidney cancer at age 86     Other Cancer Father          of stomach cancer age 43     Heart Disease Paternal Grandmother      Hypertension Paternal Grandmother      Obesity Paternal Grandmother      Hypertension Sister      Hyperlipidemia Sister      Anesthesia Reaction Son      Hypertension Son      Heart Disease Daughter      Heart Disease Sister      Hypertension Sister      Hyperlipidemia Sister       "Colon Cancer Sister      Diabetes Sister      Breast Cancer Sister      Coronary Artery Disease Sister      Hyperlipidemia Sister      Thyroid Disease Sister      Other Cancer Other         Neuroblastoma age 5      Social History:   Social History     Socioeconomic History     Marital status:    Tobacco Use     Smoking status: Former Smoker     Packs/day: 1.00     Years: 10.00     Pack years: 10.00     Types: Cigarettes     Start date: 1977     Quit date: 10/1/1979     Years since quittin.6     Smokeless tobacco: Never Used   Vaping Use     Vaping Use: Never used   Substance and Sexual Activity     Alcohol use: Yes     Comment: occ     Drug use: Never     Sexual activity: Not Currently     Partners: Male     Birth control/protection: Post-menopausal   Other Topics Concern     Parent/sibling w/ CABG, MI or angioplasty before 65F 55M? No      Physical Exam:  Vital signs: /67 (BP Location: Right arm, Patient Position: Sitting, Cuff Size: Adult Regular)   Pulse 67   Temp 97  F (36.1  C) (Temporal)   Ht 1.594 m (5' 2.75\")   Wt 64.9 kg (143 lb)   SpO2 97%   BMI 25.53 kg/m     General Appearance: No acute distress, appropriate demeanor, conversant  Eyes: moist conjunctivae; EOMI; pupils symmetric; visual acuity grossly intact; no proptosis  Head: normocephalic; overall symmetric appearance without deformity  Face: overall symmetric without deformity; HB I/VI  Ears: Normal appearance of external ear; external meatus normal in appearance; TMs intact without perforation bilaterally   Nose: No external deformity; see endoscopy exam below   Oral Cavity/oropharynx: Normal appearance of mucosa; tongue midline; no mass or lesions; oropharynx without obvious mucosal abnormality  Neck: no palpable lymphadenopathy; thyroid without palpable nodules  Lungs: symmetric chest rise; no wheezing  CV: Good distal perfusion; normal hear rate  Extremities: No deformity  Neurologic Exam: Cranial nerves II-XII are " grossly intact; no focal deficit      Procedure Note  Procedure performed: Rigid nasal endoscopy  Indication: To evaluate for sinonasal pathology not visualized on routine anterior rhinoscopy  Anesthesia: 4% topical lidocaine with 0.05% oxymetazoline  Description of procedure: A 30 degree, 3 mm rigid endoscope was inserted into bilateral nasal cavities and the nasal valves, nasal cavity, middle meatus, sphenoethmoid recess, and nasopharynx were thoroughly evaluated for evidence of obstruction, edema, purulence, polyps and/or mass/lesion.     Damián-Quentin Endoscopic Scoring System  Endoscopic observation Right Left   Polyps in middle meatus (0 = absent, 1 = restricted to middle meatus, 2 = Beyond middle meatus) 0 0   Discharge (0 = absent, 1 = thin and clear, 2 = thick, purulent) 0 0   Edema (0 = absent, 1 = mild-moderate, 2 = moderate-severe) 1 0   Crusting (0 = absent, 1 = mild-moderate, 2 = moderate-severe) 0 0   Scarring (0= absent, 1 = mild-moderate, 2 = moderate-severe) 0 0   Total 1 0     Findings  RT: edema of the ethmoid chola  LT: evidence of prior septoplasty and prior maxillary antrostomy    The patient tolerated the procedure well without complication.     Laboratory Review:  n/a    Imaging Review:  XR Sinuses 1-2 Views (01/18/2018)  FINDINGS:    No fracture or other osseous abnormality of the facial bones is identified. The paranasal sinuses appear clear with no significant mucosal thickening or air-fluid levels on this single view.    *I have personally reviewed these images and agree with the radiologist's impression*     Pathology Review:  n/a      Assessment/Medical Decision Making:  Chronic cough  History of prior sinus surgery  TMJ    Nasal endoscopy today shows evidence of prior sinus surgeries, but no evidence of an infection, inflammation or other concerning finding that might be responsible for her chronic cough.     I recommended continuing Flonase.     Discussed using Flonase, symptomatic  care for TMJ symptoms, and returning to my clinic as needed.    Plan:  Cont Flonase  RTC as needed      Neil Lara MD    [facility]  Rhinology  Endoscopic Skull Base Surgery  [facility]  Department of Otolaryngology - Head & Neck Surgery      Scribe Disclosure:  I, Daisy Meenu, am serving as a scribe to document services personally performed by Neil Lara MD at this visit, based upon the provider's statements to me. All documentation has been reviewed by the aforementioned provider prior to being entered into the official medical record.

## 2022-05-20 NOTE — NURSING NOTE
"Chief Complaint   Patient presents with     Consult     sinusitis    Height 1.594 m (5' 2.75\"), weight 64.9 kg (143 lb), not currently breastfeeding. Cortney Torre, EMT  "

## 2022-05-26 ENCOUNTER — MYC MEDICAL ADVICE (OUTPATIENT)
Dept: FAMILY MEDICINE | Facility: CLINIC | Age: 72
End: 2022-05-26
Payer: MEDICARE

## 2022-05-26 DIAGNOSIS — J30.2 SEASONAL ALLERGIC RHINITIS, UNSPECIFIED TRIGGER: Primary | ICD-10-CM

## 2022-05-27 RX ORDER — FLUTICASONE PROPIONATE 50 MCG
2 SPRAY, SUSPENSION (ML) NASAL DAILY
Qty: 16 G | Refills: 11 | Status: SHIPPED | OUTPATIENT
Start: 2022-05-27 | End: 2023-08-08

## 2022-05-27 NOTE — TELEPHONE ENCOUNTER
JW,    Routing refill request to provider for review/approval because:  Medication is reported/historical    Please authorize if appropriate.  Thanks,  Elo Singleton RN

## 2022-06-01 ENCOUNTER — E-VISIT (OUTPATIENT)
Dept: FAMILY MEDICINE | Facility: CLINIC | Age: 72
End: 2022-06-01
Payer: MEDICARE

## 2022-06-01 DIAGNOSIS — N39.0 ACUTE UTI (URINARY TRACT INFECTION): Primary | ICD-10-CM

## 2022-06-01 PROCEDURE — 99421 OL DIG E/M SVC 5-10 MIN: CPT | Performed by: FAMILY MEDICINE

## 2022-06-01 RX ORDER — SULFAMETHOXAZOLE/TRIMETHOPRIM 800-160 MG
1 TABLET ORAL 2 TIMES DAILY
Qty: 10 TABLET | Refills: 0 | Status: SHIPPED | OUTPATIENT
Start: 2022-06-01 | End: 2022-06-06

## 2022-06-01 NOTE — PATIENT INSTRUCTIONS
Dear Lauren Nick    After reviewing your responses, I've been able to diagnose you with a urinary tract infection, which is a common infection of the bladder with bacteria.  This is not a sexually transmitted infection, though urinating immediately after intercourse can help prevent infections.  Drinking lots of fluids is also helpful to clear your current infection and prevent the next one.      I have sent a prescription for antibiotics to your pharmacy to treat this infection.    It is important that you take all of your prescribed medication even if your symptoms are improving after a few doses.  Taking all of your medicine helps prevent the symptoms from returning.     If your symptoms worsen, you develop pain in your back or stomach, develop fevers, or are not improving in 5 days, please contact your primary care provider for an appointment or visit any of our convenient Walk-in or Urgent Care Centers to be seen, which can be found on our website here.    Thanks again for choosing us as your health care partner,    Joel Daniel Wegener, MD    Urinary Tract Infections in Women  Urinary tract infections (UTIs) are most often caused by bacteria. These bacteria enter the urinary tract. The bacteria may come from inside the body. Or they may travel from the skin outside the rectum or vagina into the urethra. Female anatomy makes it easy for bacteria from the bowel to enter a woman s urinary tract, which is the most common source of UTI. This means women develop UTIs more often than men. Pain in or around the urinary tract is a common UTI symptom. But the only way to know for sure if you have a UTI for the healthcare provider to test your urine. The two tests that may be done are the urinalysis and urine culture.     Types of UTIs    Cystitis. A bladder infection (cystitis) is the most common UTI in women. You may have urgent or frequent need to pee. You may also have pain, burning when you pee, and bloody  urine.    Urethritis. This is an inflamed urethra, which is the tube that carries urine from the bladder to outside the body. You may have lower stomach or back pain. You may also have urgent or frequent need to pee.    Pyelonephritis. This is a kidney infection. If not treated, it can be serious and damage your kidneys. In severe cases, you may need to stay in the hospital. You may have a fever and lower back pain.    Medicines to treat a UTI  Most UTIs are treated with antibiotics. These kill the bacteria. The length of time you need to take them depends on the type of infection. It may be as short as 3 days. If you have repeated UTIs, you may need a low-dose antibiotic for several months. Take antibiotics exactly as directed. Don t stop taking them until all of the medicine is gone. If you stop taking the antibiotic too soon, the infection may not go away. You may also develop a resistance to the antibiotic. This can make it much harder to treat.   Lifestyle changes to treat and prevent UTIs   The lifestyle changes below will help get rid of your UTI. They may also help prevent future UTIs.     Drink plenty of fluids. This includes water, juice, or other caffeine-free drinks. Fluids help flush bacteria out of your body.    Empty your bladder. Always empty your bladder when you feel the urge to pee. And always pee before going to sleep. Urine that stays in your bladder can lead to infection. Try to pee before and after sex as well.    Practice good personal hygiene. Wipe yourself from front to back after using the toilet. This helps keep bacteria from getting into the urethra.    Use condoms during sex. These help prevent UTIs caused by sexually transmitted bacteria. Also don't use spermicides during sex. These can increase the risk for UTIs. Choose other forms of birth control instead. For women who tend to get UTIs after sex, a low-dose of a preventive antibiotic may be used. Be sure to discuss this option with  your healthcare provider.    Follow up with your healthcare provider as directed. He or she may test to make sure the infection has cleared. If needed, more treatment may be started.  George last reviewed this educational content on 7/1/2019 2000-2021 [employer]. All rights reserved. This information is not intended as a substitute for professional medical care. Always follow your healthcare professional's instructions.

## 2022-06-03 ENCOUNTER — TELEPHONE (OUTPATIENT)
Dept: CARDIOLOGY | Facility: CLINIC | Age: 72
End: 2022-06-03
Payer: MEDICARE

## 2022-06-03 NOTE — TELEPHONE ENCOUNTER
Nicolette Nick Shashank, MD    PC      6/2/22 5:00 PM  Good evening, I have been monitoring my blood pressure for several weeks now since we added a second medication for my blood pressure, I am taking 1 tablet of 25 mg of metoprolol  twice a day and at the end of April added Losartan 25 mg once a day. I usually take my blood pressure once a day and it has   gone down since I started Losarten. Since about 05/10 it has been around 102-120 for the top # and the bottom # 56-70, then since 05/17 top # s 88/102   and bottom # s 44-70.  The last 3 days I stopped taking the Losarten to see if the # s would go back up but they haven t. I am leaving tomorrow afternoon to go out of town for a family wedding. Should I change anything?      I phoned patient in response to the above My chart message she sent to our team.    She says that this morning her BP was 117/73 and this was on Losartan 25 MG that she resumed taking yesterday.    I asked if she has had any symptoms like light headedness and she denies having any.    When she checks BP's I told her to check it twice a day ( get HR's too) morning and evening.    I advised her to maintain proper hydration and to avoid dehydrating beverages.    She will report back to us sometime next week to let us know how things are going..

## 2022-06-10 ENCOUNTER — OFFICE VISIT (OUTPATIENT)
Dept: PULMONOLOGY | Facility: CLINIC | Age: 72
End: 2022-06-10
Attending: FAMILY MEDICINE
Payer: MEDICARE

## 2022-06-10 VITALS
OXYGEN SATURATION: 98 % | HEIGHT: 62 IN | DIASTOLIC BLOOD PRESSURE: 72 MMHG | SYSTOLIC BLOOD PRESSURE: 123 MMHG | HEART RATE: 60 BPM | BODY MASS INDEX: 27.23 KG/M2 | WEIGHT: 148 LBS

## 2022-06-10 DIAGNOSIS — J38.3 VOCAL CORD DYSFUNCTION: ICD-10-CM

## 2022-06-10 DIAGNOSIS — J45.40 MODERATE PERSISTENT ASTHMA WITHOUT COMPLICATION: Primary | ICD-10-CM

## 2022-06-10 DIAGNOSIS — J41.0 SIMPLE CHRONIC BRONCHITIS (H): ICD-10-CM

## 2022-06-10 PROCEDURE — 99204 OFFICE O/P NEW MOD 45 MIN: CPT | Performed by: STUDENT IN AN ORGANIZED HEALTH CARE EDUCATION/TRAINING PROGRAM

## 2022-06-10 RX ORDER — FLUTICASONE PROPIONATE 220 UG/1
1 AEROSOL, METERED RESPIRATORY (INHALATION) 2 TIMES DAILY
Qty: 12 G | Refills: 3 | Status: SHIPPED | OUTPATIENT
Start: 2022-06-10 | End: 2022-11-21

## 2022-06-10 ASSESSMENT — PAIN SCALES - GENERAL: PAINLEVEL: NO PAIN (0)

## 2022-06-10 NOTE — LETTER
6/10/2022         RE: Lauren Nick  [address]  [address]  [facility] [address]        Dear Colleague,    Thank you for referring your patient, Lauren Nick, to the [facility] SPECIALTY CLINIC [place]. Please see a copy of my visit note below.    Pulmonary Clinic Note    Date of Service: 6/10/2022    Chief Complaint   Patient presents with     New Patient     Chronic Bronchitis       A/P:  71F being seen for JEAN. PFTs w/ early mild obstruction w/ normal lung volumes and DLCO. CXR w/ no acute abnormalities, mild elevation of R hemidiaphragm, which has been present since at least 2018. I believe her symptoms are related to mild persistent asthma and vocal cord dysfunction. Less likely COPD given more limited smoking history, but possible. I would like to start her on fluticasone maintenance inhaler and continue prn albuterol. Referral to speech therapy placed.     In regards to her elevated hemidiaphragm, unclear etiology. May be viral. Has been present since before thoracic surgery. May be anatomical variant. Not definitively paralyzed. I do not think this needs to be further evaluated, at this time, but if symptoms change, may need sniff test.     History:  71F being seen for JEAN. Given methylprednisolone course 3/2022 by PCP. She is prescribed ICS-LABA and albuterol. Not using ICS-LABA 2/2 cost.    Prior to steroid course, felt SOB at all times. Steroids did not help much then, but breathing somewhat improved today. Swam yesterday and felt no issues. No chest tightness. Coughs often, typically dry. No nocturnal cough. No orthopnea or PND. No LE edema. When she is dyspneic, half a lorazepam helps and albuterol helps some too. No F/C, sick contacts. No throat tightness. Has noticed hoarseness. Hears wheezing sometimes. Symptoms can be triggered by strong odors. Harder to get air in. No personal hx lung dz. No FHx lung dz. Has had AV replacement.     Swims 3x/week. Walks often for exercise, as  "well. Previously lived in [place], moved here 10/2021 to help her daughter.      Smoking: former smoker, quit , 1-2PPD x 7 years                            Bird exposure: no             Animal exposure: no        Inhalation exposure: no    Occupation: former mortgage banking                          10 point review of systems negative, aside from that mentioned in HPI.    /72 (BP Location: Left arm, Patient Position: Sitting, Cuff Size: Adult Regular)   Pulse 60   Ht 1.575 m (5' 2\")   Wt 67.1 kg (148 lb)   SpO2 98%   BMI 27.07 kg/m    Gen: well-appearing  HEENT: Mallampati IV  Card: RRR  Pulm: clear bilaterally   Abd: soft  MSK: no edema  Skin: no obvious rash  Psych: normal affect  Neuro: alert and oriented     Labs:  Personally reviewed    Imaging/Studies: Personally reviewed  PFTs (2022) - mild obstruction, normal lung volumes, and DLCO  CXR (3/2022) - elevated R hemidiaphragm, no acute process     TTE (3/2022) - normal biventricular function, EF 60-65%    Past Medical History:   Diagnosis Date     Arthritis Some in knees, hips and shoulder     Cancer (H) Breast cancer  lumpectomy, 6 months of chemo     Depressive disorder Put on meds 20 years ago for this.     Heart disease Bicuspid valve diagnosed in my 50 s     Thyroid disease  diagnosed     Past Surgical History:   Procedure Laterality Date     BIOPSY  10/2018     BREAST SURGERY  89     CARDIAC SURGERY  22     CHOLECYSTECTOMY  2010     COLONOSCOPY  2019     ENT SURGERY  2011     EYE SURGERY  2015     Family History   Problem Relation Age of Onset     Hypertension Mother      Cancer Mother      Other Cancer Mother          if kidney cancer at age 86     Other Cancer Father          of stomach cancer age 43     Heart Disease Paternal Grandmother      Hypertension Paternal Grandmother      Obesity Paternal Grandmother      Hypertension Sister      Hyperlipidemia Sister      Anesthesia Reaction Son      " Hypertension Son      Heart Disease Daughter      Heart Disease Sister      Hypertension Sister      Hyperlipidemia Sister      Colon Cancer Sister      Diabetes Sister      Breast Cancer Sister      Coronary Artery Disease Sister      Hyperlipidemia Sister      Thyroid Disease Sister      Other Cancer Other         Neuroblastoma age 5     Social History     Socioeconomic History     Marital status:      Spouse name: Not on file     Number of children: Not on file     Years of education: Not on file     Highest education level: Not on file   Occupational History     Not on file   Tobacco Use     Smoking status: Former Smoker     Packs/day: 1.00     Years: 10.00     Pack years: 10.00     Types: Cigarettes     Start date: 1977     Quit date: 10/1/1979     Years since quittin.7     Smokeless tobacco: Never Used   Vaping Use     Vaping Use: Never used   Substance and Sexual Activity     Alcohol use: Yes     Comment: occ     Drug use: Never     Sexual activity: Not Currently     Partners: Male     Birth control/protection: Post-menopausal   Other Topics Concern     Parent/sibling w/ CABG, MI or angioplasty before 65F 55M? No   Social History Narrative     Not on file     Social Determinants of Health     Financial Resource Strain: Not on file   Food Insecurity: Not on file   Transportation Needs: Not on file   Physical Activity: Not on file   Stress: Not on file   Social Connections: Not on file   Intimate Partner Violence: Not on file   Housing Stability: Not on file       50 minutes spent reviewing chart, reviewing test results, talking with and examining patient, formulating plan, and documentation on the day of the encounter.    Rohit Thao MD  Pulmonary and Critical Care Medicine  [facility]

## 2022-06-10 NOTE — PROGRESS NOTES
Pulmonary Clinic Note    Date of Service: 6/10/2022    Chief Complaint   Patient presents with     New Patient     Chronic Bronchitis       A/P:  71F being seen for JEAN. PFTs w/ early mild obstruction w/ normal lung volumes and DLCO. CXR w/ no acute abnormalities, mild elevation of R hemidiaphragm, which has been present since at least 2018. I believe her symptoms are related to mild persistent asthma and vocal cord dysfunction. Less likely COPD given more limited smoking history, but possible. I would like to start her on fluticasone maintenance inhaler and continue prn albuterol. Referral to speech therapy placed.     In regards to her elevated hemidiaphragm, unclear etiology. May be viral. Has been present since before thoracic surgery. May be anatomical variant. Not definitively paralyzed. I do not think this needs to be further evaluated, at this time, but if symptoms change, may need sniff test.     History:  71F being seen for JEAN. Given methylprednisolone course 3/2022 by PCP. She is prescribed ICS-LABA and albuterol. Not using ICS-LABA 2/2 cost.    Prior to steroid course, felt SOB at all times. Steroids did not help much then, but breathing somewhat improved today. Swam yesterday and felt no issues. No chest tightness. Coughs often, typically dry. No nocturnal cough. No orthopnea or PND. No LE edema. When she is dyspneic, half a lorazepam helps and albuterol helps some too. No F/C, sick contacts. No throat tightness. Has noticed hoarseness. Hears wheezing sometimes. Symptoms can be triggered by strong odors. Harder to get air in. No personal hx lung dz. No FHx lung dz. Has had AV replacement.     Swims 3x/week. Walks often for exercise, as well. Previously lived in [place], moved here 10/2021 to help her daughter.      Smoking: former smoker, quit 1977, 1-2PPD x 7 years                            Bird exposure: no             Animal exposure: no        Inhalation exposure: no    Occupation: former mortgage  "banking                          10 point review of systems negative, aside from that mentioned in HPI.    /72 (BP Location: Left arm, Patient Position: Sitting, Cuff Size: Adult Regular)   Pulse 60   Ht 1.575 m (5' 2\")   Wt 67.1 kg (148 lb)   SpO2 98%   BMI 27.07 kg/m    Gen: well-appearing  HEENT: Mallampati IV  Card: RRR  Pulm: clear bilaterally   Abd: soft  MSK: no edema  Skin: no obvious rash  Psych: normal affect  Neuro: alert and oriented     Labs:  Personally reviewed    Imaging/Studies: Personally reviewed  PFTs (2022) - mild obstruction, normal lung volumes, and DLCO  CXR (3/2022) - elevated R hemidiaphragm, no acute process     TTE (3/2022) - normal biventricular function, EF 60-65%    Past Medical History:   Diagnosis Date     Arthritis Some in knees, hips and shoulder     Cancer (H) Breast cancer  lumpectomy, 6 months of chemo     Depressive disorder Put on meds 20 years ago for this.     Heart disease Bicuspid valve diagnosed in my 50 s     Thyroid disease  diagnosed     Past Surgical History:   Procedure Laterality Date     BIOPSY  10/2018     BREAST SURGERY  89     CARDIAC SURGERY  22     CHOLECYSTECTOMY  2010     COLONOSCOPY  2019     ENT SURGERY  2011     EYE SURGERY  2015     Family History   Problem Relation Age of Onset     Hypertension Mother      Cancer Mother      Other Cancer Mother          if kidney cancer at age 86     Other Cancer Father          of stomach cancer age 43     Heart Disease Paternal Grandmother      Hypertension Paternal Grandmother      Obesity Paternal Grandmother      Hypertension Sister      Hyperlipidemia Sister      Anesthesia Reaction Son      Hypertension Son      Heart Disease Daughter      Heart Disease Sister      Hypertension Sister      Hyperlipidemia Sister      Colon Cancer Sister      Diabetes Sister      Breast Cancer Sister      Coronary Artery Disease Sister      Hyperlipidemia Sister      Thyroid " Disease Sister      Other Cancer Other         Neuroblastoma age 5     Social History     Socioeconomic History     Marital status:      Spouse name: Not on file     Number of children: Not on file     Years of education: Not on file     Highest education level: Not on file   Occupational History     Not on file   Tobacco Use     Smoking status: Former Smoker     Packs/day: 1.00     Years: 10.00     Pack years: 10.00     Types: Cigarettes     Start date: 1977     Quit date: 10/1/1979     Years since quittin.7     Smokeless tobacco: Never Used   Vaping Use     Vaping Use: Never used   Substance and Sexual Activity     Alcohol use: Yes     Comment: occ     Drug use: Never     Sexual activity: Not Currently     Partners: Male     Birth control/protection: Post-menopausal   Other Topics Concern     Parent/sibling w/ CABG, MI or angioplasty before 65F 55M? No   Social History Narrative     Not on file     Social Determinants of Health     Financial Resource Strain: Not on file   Food Insecurity: Not on file   Transportation Needs: Not on file   Physical Activity: Not on file   Stress: Not on file   Social Connections: Not on file   Intimate Partner Violence: Not on file   Housing Stability: Not on file       50 minutes spent reviewing chart, reviewing test results, talking with and examining patient, formulating plan, and documentation on the day of the encounter.    Rohit Thao MD  Pulmonary and Critical Care Medicine  [facility]

## 2022-06-10 NOTE — PATIENT INSTRUCTIONS
Thank you for coming to pulmonary clinic. Your pulmonary function tests show early mild obstruction which I think is related to asthma. Your chest imaging shows a slightly elevated right diaphragm, no further work-up is needed, at this time. I would like you to start Flovent inhaler, twice daily, regardless of how you feel, rinse your mouth out after. I will see you back in 6 months.

## 2022-06-10 NOTE — NURSING NOTE
"Chief Complaint   Patient presents with     New Patient     Chronic Bronchitis       Vitals:    06/10/22 0820   BP: 123/72   BP Location: Left arm   Patient Position: Sitting   Cuff Size: Adult Regular   Pulse: 60   SpO2: 98%   Weight: 67.1 kg (148 lb)   Height: 1.575 m (5' 2\")       Body mass index is 27.07 kg/m .      ROSETTA Gaviria    "

## 2022-06-13 ENCOUNTER — MYC MEDICAL ADVICE (OUTPATIENT)
Dept: PULMONOLOGY | Facility: CLINIC | Age: 72
End: 2022-06-13
Payer: MEDICARE

## 2022-06-13 ENCOUNTER — TELEPHONE (OUTPATIENT)
Dept: PULMONOLOGY | Facility: CLINIC | Age: 72
End: 2022-06-13
Payer: MEDICARE

## 2022-06-13 DIAGNOSIS — J45.40 MODERATE PERSISTENT ASTHMA WITHOUT COMPLICATION: Primary | ICD-10-CM

## 2022-06-16 ENCOUNTER — HOSPITAL ENCOUNTER (OUTPATIENT)
Dept: SPEECH THERAPY | Facility: CLINIC | Age: 72
Discharge: HOME | End: 2022-06-16
Attending: STUDENT IN AN ORGANIZED HEALTH CARE EDUCATION/TRAINING PROGRAM
Payer: MEDICARE

## 2022-06-16 DIAGNOSIS — J38.3 VOCAL CORD DYSFUNCTION: Primary | ICD-10-CM

## 2022-06-16 DIAGNOSIS — R49.0 DYSPHONIA: ICD-10-CM

## 2022-06-16 PROCEDURE — 92524 BEHAVRAL QUALIT ANALYS VOICE: CPT | Mod: GN | Performed by: STUDENT IN AN ORGANIZED HEALTH CARE EDUCATION/TRAINING PROGRAM

## 2022-06-16 PROCEDURE — 92507 TX SP LANG VOICE COMM INDIV: CPT | Mod: GN | Performed by: STUDENT IN AN ORGANIZED HEALTH CARE EDUCATION/TRAINING PROGRAM

## 2022-06-16 RX ORDER — INHALER, ASSIST DEVICES
SPACER (EA) MISCELLANEOUS
Qty: 1 EACH | Refills: 1 | Status: SHIPPED | OUTPATIENT
Start: 2022-06-16 | End: 2023-07-17

## 2022-06-17 NOTE — PROGRESS NOTES
"     Speech-Language Pathology Department   VOICE AND BREATHING EVALUATION  [facility]    06/16/22 2983   General Information   Type Of Visit Initial   Start Of Care Date 06/16/22   Referring Physician Rohit Thao MD  (Pulmonology)   Orders Evaluate And Treat   Medical Diagnosis Vocal cord dysfunction   Onset Of Illness/injury Or Date Of Surgery 06/10/22  (order date)   Precautions/Limitations  no known precautions/limitations   Hearing WFL for 1:1 conversation in session   Surgical/Medical history reviewed Yes   Pertinent History Of Current Problem 70yo female presenting with dyspnea and dysphonia following bronchitis with frequent coughing in January.  Pt reports that she was sick for 4 months and was treated with a variety of medications.  She notes she had lots of chest congestion and associated coughing when she was sick, but she is no longer coughing regularly.  She continues to experience SOB with associated anxiety, however.  She notes a sensation of difficulty with inhalation during breathing episodes.  PFTs showed early mild obstruction with normal lung volumes and DLCO.  CXR showed mild elevation of R hemidiaphragm which has been present since at least 2018.  Per Pulmonology: \"I believe her symptoms are related to mild persistent asthma and vocal cord dysfunction.\"  Pt reports that the most recent inhaler she has tried has been helping her breathing, but is making her voice hoarse.  Taking half a lorazepam tablet also helps to calm her breathing during episodes.  Episodes can be triggered by strong smells and talking, but otherwise pt has not noticed any consistent triggers.  Sometimes she has SOB when she wakes up that will linger all day.  Exercise is not a trigger.  Pt reports having good days and bad days, and is no longer experiencing consistent breathing difficulty as she did when she was sick.  She used to have wheezing on inhalation and exhalation, but " "has not experienced these symptoms recently.  She also had dysphagia during the acute stages of her illness, but is no longer experiencing swallowing difficulty.  PMH significant for former smoker, arthritis, Hx breast cancer, depression, heart disease, HTN, thyroid disease.   Prior Level of Functioning No previous problems.   Patient Role/employment History Retired   Patient/family Goals To have comfortable breathing, to improve her voice quality as much as possible   Evaluation Results   Voice Observations PALPATION OF THYROHYOID REGION: Firm musculature, with pt reporting that massage of the region \"feels good.\"   Voice Profile during conversation, 1 min monologue and paragraph reading   Voice Quality Hoarse;Breathy;Raspy   Voice quality comments SPEECH: Consistent moderate strain, consistent mild breathiness, and frequent intermittent moderate roughness and pitch breaks.  THERAPY PROBES: Improved voice quality with flow mode, forward resonance, semi-occluded vocal tract, and diaphragmatic engagement probes.   Voice quality severity rating continuum (1=Severe, 7=WNL) 4   Breath control Tight;Tense   Breath Control comments REST: Appropriate abdominal/thoracic muscle use pattern with shoulder elevation on cued deep inspiration.  Pt reporting that this breath feels comfortable.  SPEECH: Inspirations for speech are shallow and mildly audible, with neck involvement during inspiration.  Poor respiratory/phonatory coordination.  PROVOCATION: Pt demonstrating slow rate with mild incoordination but no inspiratory stridor in a task rapidly alternating phonation with inhalation (/hi/-sniff).   Voice Use / Effort Throat push;Pinched / squeezed larynx   Voice use / Effort severity rating continuum (1=Severe, 7=WNL) 5   Pitch /Frequency Description Pitch breaks  (Intermittent moderate)   Pitch / Frequency severity rating continuum (1=Severe, 7=WNL) 5   Comments Mild-moderately poor laryngeal respiratory mechanics observed " during speech.  Moderate dysphonia also observed characterized by roughness, breathiness, strain, pitch breaks, poor respiratory/phonatory coordination, and tight laryngeal musculature.   Function of Lengtheners / Shorteners (CT and TA Muscles)   Pitch glides Upper pitches more dysphonic  (Lowest: F3; Highest: F5 with pitch breaks on glissade)   Videostroboscopy / Endoscopy   Other observations Has not been completed.  Pt has seen ENT for nasal endoscopy, but not laryngeal endoscopy.   General Therapy Interventions   Planned Therapy Interventions Voice   Voice Relaxed breath sequence techniques;Breath flow to sound flow;Voice quality/pitch or volume tasks;Resonant voice techniques;Larynx movement/coordination   Impressions and Recommendations   Communication Diagnosis Vocal cord dysfunction, Dysphonia   Summary Ms. Nick presents with mild-moderately poor laryngeal respiratory mechanics and moderate dysphonia characterized by roughness, breathiness, strain, pitch breaks, poor respiratory/phonatory coordination, and tight laryngeal musculature.  Based on today's evaluation, there is likely a component of vocal cord dysfunction and laryngeal hypersensitivity contributing to her breathing symptoms in the context of laryngeal irritation from inhaler use and coughing and ongoing lower respiratory system changes from recent bronchitis.  Dysphonia is likely accounted for by a combination of laryngeal irritation from inhaler use, as well as poor respiratory/phonatory coordination resulting in hyperfunction of the intrinsic and extrinsic laryngeal musculature, but laryngoscopy is needed for accurate differential diagnosis.   Recommendations A course of skilled speech therapy is recommended to promote improved laryngeal respiratory mechanics, optimize vocal technique, improve voice quality, and promote reduced laryngeal effort, discomfort, irritation, and hypersensitivity, to reduce vocal cord dysfunction and to improve pt's  ability to meet her vocal demands.  Laryngoscopy with ENT will be recommended if pt does not demonstrate adequate improvement with a trial course of skilled speech therapy.   Frequency and Duration 1x/week for 4 sessions, with 1-2 monthly follow-ups   Prognosis  Good with intervention   Risks and Benefits of Treatment have been explained. Yes   Patient & /or Caregiver  in agreement with plan of care Yes   Patient Education SLP provided education regarding evaluation findings and proposed POC.  Therapy initiated today.   Educational Assessment   Barriers to Learning No barriers   Preferred Learning Style Listening;Reading;Demonstration;Pictures / Video   Voice Goals   Voice Goals 1;2;3   Voice Goal 1   Goal Identifier Breathing   Goal Description Patient will learn, implement and demonstrate 2-3 breathing strategies (e.g. diaphragmatic breathing, nasal inhalation) at rest and in a 20 minute conversation task with 90% accy given min cues from SLP, in order to promote improved breathing comfort and reduced vocal cord dysfunction.   Target Date 09/14/22   Voice Goal 2   Goal Identifier Voice quality   Goal Description In a 20-minute speech task, patient will demonstrate roughness, breathiness, strain, and pitch breaks that do not exceed a level of 3 out of 10, 80% of the time by SLP judgment, so that patient is able to meet her voice quality demands.   Target Date 09/14/22   Voice Goal 3   Goal Identifier Massage   Goal Description Patient will learn, demonstrate, and implement use of circumlaryngeal massage exercises independently 1-2x per day, every other day, in order to promote reduced laryngeal discomfort and tension.   Target Date 09/14/22   Total Session Time   Voice Minutes (14882) 25   Total Evaluation Time 35   Therapy Certification   Certification date from 06/16/22   Certification date to 09/14/22   Medical Diagnosis Vocal cord dysfunction     Thank you for the referral of this patient.    Allison Alpers  VLAD Soriano (music), M.A., CCC-SLP  Speech-Language Pathologist  [facility] Certificate of Vocology  [facility]  [phone]

## 2022-06-17 NOTE — PROGRESS NOTES
[facility]      OUTPATIENT SPEECH LANGUAGE PATHOLOGY VOICE EVALUATION  PLAN OF TREATMENT FOR OUTPATIENT REHABILITATION  (COMPLETE FOR INITIAL CLAIMS ONLY)    Patient's Last Name, First Name, M.I.  YOB: 1950  AngusaileenzulyLauren  MARITO                        Provider s Name: [facility] Medical Record No.  [id number]     Onset Date:  6/10/2022 (order date)    Start of Care Date:  6/16/2022   Type:     ___PT  __OT   _X_SLP    Medical Diagnosis: Vocal cord dysfunction   Speech Language Pathology Diagnosis:  Vocal cord dysfunction, Dysphonia    Visits from SOC: 1      _________________________________________________________________________________  Plan of Treatment/Functional Goals:  Voice         Goals     1. Goal Identifier: Breathing       Goal Description: Patient will learn, implement and demonstrate 2-3 breathing strategies (e.g. diaphragmatic breathing, nasal inhalation) at rest and in a 20 minute conversation task with 90% accy given min cues from SLP, in order to promote improved breathing comfort and reduced vocal cord dysfunction.       Target Date: 09/14/22   2. Goal Identifier: Voice quality       Goal Description: In a 20-minute speech task, patient will demonstrate roughness, breathiness, strain, and pitch breaks that do not exceed a level of 3 out of 10, 80% of the time by SLP judgment, so that patient is able to meet her voice quality demands.       Target Date: 09/14/22   3. Goal Identifier: Massage       Goal Description: Patient will learn, demonstrate, and implement use of circumlaryngeal massage exercises independently 1-2x per day, every other day, in order to promote reduced laryngeal discomfort and tension.       Target Date: 09/14/22                     Frequency and Duration: 1x/week for 4 sessions, with 1-2 monthly follow-ups  Rach Soriano,  SLP       I CERTIFY THE NEED FOR THESE SERVICES FURNISHED UNDER        THIS PLAN OF TREATMENT AND WHILE UNDER MY CARE     (Physician co-signature of this document indicates review and certification of the therapy plan).                Certification Date From:  06/16/22  Certification Date To:  09/14/22  Referring Provider: Rohit Thao MD                 Initial Assessment        See Epic Evaluation Start of Care

## 2022-06-20 ENCOUNTER — TELEPHONE (OUTPATIENT)
Dept: CARDIOLOGY | Facility: CLINIC | Age: 72
End: 2022-06-20
Payer: MEDICARE

## 2022-06-20 NOTE — TELEPHONE ENCOUNTER
Writer has sent on the response from Dr. King, so that Pat can make a decision about trial of stopping Losartan to see if it helps with the loss of her hair.    AppCard message sent to patient.  Lucia Mcdonnell RN on 6/20/2022 at 2:08 PM

## 2022-06-20 NOTE — TELEPHONE ENCOUNTER
Pat has sent a Vonjour message regarding her concerning hair loss:        Writer has sent a brief response in Vonjour, letting Pat know that I will collaborate with Dr. King.  Explained that there is no strong evidence proving that Losartan causes hair loss.       Last TSH level was November 2021 and result:  4.23  She takes Levothyroxine  50mcg  Daily    Explained that I would get back to her as soon as Dr. King has had a chance to review, sometime early this week.    Routing to Dr. Fernando Mcdonnell RN on 6/20/2022 at 8:39 AM

## 2022-06-20 NOTE — TELEPHONE ENCOUNTER
Agree that the hair loss or alopecia is not a common side effect of losartan.  This was started because of mildly dilated aorta.  Patient can give a trial of staying off from losartan and see if this helps with hair loss.    Thank you  Reggie

## 2022-06-21 DIAGNOSIS — J30.2 SEASONAL ALLERGIC RHINITIS, UNSPECIFIED TRIGGER: ICD-10-CM

## 2022-06-22 RX ORDER — FLUTICASONE PROPIONATE 50 MCG
SPRAY, SUSPENSION (ML) NASAL
Qty: 16 ML | Refills: 11 | OUTPATIENT
Start: 2022-06-22

## 2022-06-22 NOTE — TELEPHONE ENCOUNTER
"Refill requested too soon     Last Written Prescription Date:  5/27/22  Last Fill Quantity: 16 g,  # refills: 11   Last office visit provider:  3/25/22 Wegener     Requested Prescriptions   Pending Prescriptions Disp Refills     fluticasone (FLONASE) 50 MCG/ACT nasal spray [Pharmacy Med Name: FLUTICASONE PROP 50 MCG SPRAY] 16 mL 11     Sig: INSTILL 2 SPRAYS INTO BOTH NOSTRILS DAILY       Nasal Allergy Protocol Passed - 6/21/2022 12:31 PM        Passed - Patient is age 12 or older        Passed - Recent (12 mo) or future (30 days) visit within the authorizing provider's specialty     Patient has had an office visit with the authorizing provider or a provider within the authorizing providers department within the previous 12 mos or has a future within next 30 days. See \"Patient Info\" tab in inbasket, or \"Choose Columns\" in Meds & Orders section of the refill encounter.              Passed - Medication is active on med list             Uzma Temple RN 06/22/22 2:26 PM  "

## 2022-07-01 ENCOUNTER — MYC MEDICAL ADVICE (OUTPATIENT)
Dept: FAMILY MEDICINE | Facility: CLINIC | Age: 72
End: 2022-07-01

## 2022-07-01 DIAGNOSIS — Z86.0100 HISTORY OF COLONIC POLYPS: Primary | ICD-10-CM

## 2022-07-01 DIAGNOSIS — Z12.11 SPECIAL SCREENING FOR MALIGNANT NEOPLASMS, COLON: ICD-10-CM

## 2022-07-11 ENCOUNTER — TELEPHONE (OUTPATIENT)
Dept: GASTROENTEROLOGY | Facility: CLINIC | Age: 72
End: 2022-07-11

## 2022-07-11 NOTE — TELEPHONE ENCOUNTER
Screening Questions    BlueKIND OF PREP RedLOCATION [review exclusion criteria] GreenSEDATION TYPE    Have you had a positive covid test in the last 90 days? N  If yes, what date?     Do you have a legal guardian or medical Power of ?  Are you able to give consent for your medical care?Y (Sedation review/consideration needed)    1. Are you active on mychart? Y    2. What insurance is in the chart? MEDICARE  [employer]    3.   Ordering/Referring Provider: Wegener, Joel Daniel Irwin    4. BMI 26.2 [BMI OVER 40-EXTENDED PREP]  If greater than 40 review exclusion criteria [PAC APPT IF (MAC) @ [facility]]      5.  Respiratory Screening:  [If yes to any of the following HOSPITAL setting only]     Do you use daily home oxygen? N    Do you have mod to severe Obstructive Sleep Apnea? N  [OKAY @ [facility] SH PH RI]   Do you have Pulmonary Hypertension? N     Do you have UNCONTROLLED asthma? N        6.   Have you had a heart or lung transplant? N      7.   Are you currently on dialysis? N [ If yes, G-PREP & HOSPITAL setting only]     8.   Do you have chronic kidney disease? N [ If yes, G-PREP ]    9.   Have you had a stroke or Transient ischemic attack (TIA - aka  mini stroke ) within 6 months?  N (If yes, please review exclusion criteria)         In the past 6 months, have you had any heart related issues including cardiomyopathy or heart attack? N           If yes, did it require cardiac stenting or other implantable device? N      10   Do you have any implantable devices in your body (pacemaker, defib, LVAD)? Y (If yes, please review exclusion criteria)    11.   Do you take nitroglycerin? N            If yes, how often? N  (if yes, HOSPITAL setting ONLY)    12.   Are you currently taking any blood thinners? N           [IF YES, INFORM PATIENT TO FOLLOW UP W/ ORDERING PROVIDER FOR BRIDGING INSTRUCTIONS]     13.   Do you have a diagnosis of diabetes? N   [ If yes, G-PREP ]    14.   [FEMALES] Are you currently pregnant? N     If yes, how many weeks? N    15.   Are you taking any prescription pain medications on a routine schedule?  N  [ If yes, EXTENDED PREP.] [If yes, MAC]    16.   Do you have any chemical dependencies such as alcohol, street drugs, or methadone?  N [If yes, MAC]    17.   Do you have any history of post-traumatic stress syndrome, severe anxiety or history of psychosis?  N  [If yes, MAC]    18.   Do you transfer independently? (If NO, please HOSPITAL setting  only)  Y    19.  On a regular basis do you go 3-5 days between bowel movements? N   [ If yes, EXTENDED PREP.]    20.   Preferred LOCAL Pharmacy for Pre Prescription:      [id number] [place], [place] - [address]    Scheduling Details      Caller: Lauren Nick  (Please ask for phone number if not scheduled by patient)    Type of Procedure Scheduled: Lower Endoscopy [Colonoscopy]    Which Colonoscopy Prep was Sent?: MPREP  AN CF PATIENTS & GROEN'S PATIENTS NEEDS EXTENDED PREP    Surgeon: LASHAUN  Date of Procedure: 10/27  Location: [facility]    Sedation Type: CS    Conscious Sedation- Needs  for 6 hours after the procedure  MAC/General-Needs  for 24 hours after procedure    Pre-op Required at [facility] [place], [place] and OR for MAC sedation: N  (advise patient they will need a pre-op WITH IN 30 DAYS prior to procedure -)      Informed patient they will need an adult  Y  Cannot take any type of public or medical transportation alone    Pre-Procedure Covid test to be completed at [facility] or Externally: HOME    Confirmed Nurse will call to complete assessment Y    Additional comments:

## 2022-07-21 ENCOUNTER — ANCILLARY PROCEDURE (OUTPATIENT)
Dept: CARDIOLOGY | Facility: CLINIC | Age: 72
End: 2022-07-21
Attending: INTERNAL MEDICINE
Payer: MEDICARE

## 2022-07-21 DIAGNOSIS — I49.5 TACHY-BRADY SYNDROME (H): ICD-10-CM

## 2022-07-21 DIAGNOSIS — Z95.0 CARDIAC PACEMAKER IN SITU: ICD-10-CM

## 2022-07-21 PROCEDURE — 93296 REM INTERROG EVL PM/IDS: CPT | Performed by: INTERNAL MEDICINE

## 2022-07-21 PROCEDURE — 93294 REM INTERROG EVL PM/LDLS PM: CPT | Performed by: INTERNAL MEDICINE

## 2022-07-26 DIAGNOSIS — F33.41 RECURRENT MAJOR DEPRESSIVE DISORDER, IN PARTIAL REMISSION (H): ICD-10-CM

## 2022-07-27 ENCOUNTER — MYC MEDICAL ADVICE (OUTPATIENT)
Dept: FAMILY MEDICINE | Facility: CLINIC | Age: 72
End: 2022-07-27

## 2022-07-27 ENCOUNTER — HOSPITAL ENCOUNTER (OUTPATIENT)
Dept: SPEECH THERAPY | Facility: CLINIC | Age: 72
Discharge: HOME | End: 2022-07-27
Payer: MEDICARE

## 2022-07-27 DIAGNOSIS — J38.3 VOCAL CORD DYSFUNCTION: Primary | ICD-10-CM

## 2022-07-27 DIAGNOSIS — R49.0 DYSPHONIA: ICD-10-CM

## 2022-07-27 PROCEDURE — 92507 TX SP LANG VOICE COMM INDIV: CPT | Mod: GN | Performed by: STUDENT IN AN ORGANIZED HEALTH CARE EDUCATION/TRAINING PROGRAM

## 2022-07-27 ASSESSMENT — PATIENT HEALTH QUESTIONNAIRE - PHQ9
10. IF YOU CHECKED OFF ANY PROBLEMS, HOW DIFFICULT HAVE THESE PROBLEMS MADE IT FOR YOU TO DO YOUR WORK, TAKE CARE OF THINGS AT HOME, OR GET ALONG WITH OTHER PEOPLE: NOT DIFFICULT AT ALL
SUM OF ALL RESPONSES TO PHQ QUESTIONS 1-9: 0
SUM OF ALL RESPONSES TO PHQ QUESTIONS 1-9: 0

## 2022-07-28 ENCOUNTER — HOSPITAL ENCOUNTER (OUTPATIENT)
Dept: CARDIOLOGY | Facility: CLINIC | Age: 72
Discharge: HOME | End: 2022-07-28
Attending: INTERNAL MEDICINE | Admitting: INTERNAL MEDICINE
Payer: MEDICARE

## 2022-07-28 VITALS — HEART RATE: 60 BPM | DIASTOLIC BLOOD PRESSURE: 64 MMHG | SYSTOLIC BLOOD PRESSURE: 137 MMHG

## 2022-07-28 DIAGNOSIS — I77.810 ASCENDING AORTA DILATATION (H): ICD-10-CM

## 2022-07-28 PROCEDURE — 255N000002 HC RX 255 OP 636: Performed by: INTERNAL MEDICINE

## 2022-07-28 PROCEDURE — 71555 MRI ANGIO CHEST W OR W/O DYE: CPT | Mod: 26 | Performed by: INTERNAL MEDICINE

## 2022-07-28 PROCEDURE — 71555 MRI ANGIO CHEST W OR W/O DYE: CPT | Mod: MG

## 2022-07-28 PROCEDURE — G1004 CDSM NDSC: HCPCS | Performed by: INTERNAL MEDICINE

## 2022-07-28 PROCEDURE — A9585 GADOBUTROL INJECTION: HCPCS | Performed by: INTERNAL MEDICINE

## 2022-07-28 RX ORDER — DIAZEPAM 5 MG
5 TABLET ORAL EVERY 30 MIN PRN
Status: DISCONTINUED | OUTPATIENT
Start: 2022-07-28 | End: 2022-07-29 | Stop reason: HOSPADM

## 2022-07-28 RX ORDER — METHYLPREDNISOLONE SODIUM SUCCINATE 125 MG/2ML
125 INJECTION, POWDER, LYOPHILIZED, FOR SOLUTION INTRAMUSCULAR; INTRAVENOUS
Status: DISCONTINUED | OUTPATIENT
Start: 2022-07-28 | End: 2022-07-29 | Stop reason: HOSPADM

## 2022-07-28 RX ORDER — DIPHENHYDRAMINE HCL 25 MG
25 CAPSULE ORAL
Status: DISCONTINUED | OUTPATIENT
Start: 2022-07-28 | End: 2022-07-29 | Stop reason: HOSPADM

## 2022-07-28 RX ORDER — DIPHENHYDRAMINE HYDROCHLORIDE 50 MG/ML
25-50 INJECTION INTRAMUSCULAR; INTRAVENOUS
Status: DISCONTINUED | OUTPATIENT
Start: 2022-07-28 | End: 2022-07-29 | Stop reason: HOSPADM

## 2022-07-28 RX ORDER — ONDANSETRON 2 MG/ML
4 INJECTION INTRAMUSCULAR; INTRAVENOUS
Status: DISCONTINUED | OUTPATIENT
Start: 2022-07-28 | End: 2022-07-29 | Stop reason: HOSPADM

## 2022-07-28 RX ORDER — ACYCLOVIR 200 MG/1
0-1 CAPSULE ORAL
Status: DISCONTINUED | OUTPATIENT
Start: 2022-07-28 | End: 2022-07-29 | Stop reason: HOSPADM

## 2022-07-28 RX ORDER — GADOBUTROL 604.72 MG/ML
13 INJECTION INTRAVENOUS ONCE
Status: COMPLETED | OUTPATIENT
Start: 2022-07-28 | End: 2022-07-28

## 2022-07-28 RX ADMIN — GADOBUTROL 13 ML: 604.72 INJECTION INTRAVENOUS at 09:47

## 2022-07-29 LAB
MDC_IDC_LEAD_IMPLANT_DT: NORMAL
MDC_IDC_LEAD_IMPLANT_DT: NORMAL
MDC_IDC_LEAD_LOCATION: NORMAL
MDC_IDC_LEAD_LOCATION: NORMAL
MDC_IDC_LEAD_LOCATION_DETAIL_1: NORMAL
MDC_IDC_LEAD_LOCATION_DETAIL_1: NORMAL
MDC_IDC_LEAD_MFG: NORMAL
MDC_IDC_LEAD_MFG: NORMAL
MDC_IDC_LEAD_MODEL: NORMAL
MDC_IDC_LEAD_MODEL: NORMAL
MDC_IDC_LEAD_POLARITY_TYPE: NORMAL
MDC_IDC_LEAD_POLARITY_TYPE: NORMAL
MDC_IDC_LEAD_SERIAL: NORMAL
MDC_IDC_LEAD_SERIAL: NORMAL
MDC_IDC_MSMT_BATTERY_DTM: NORMAL
MDC_IDC_MSMT_BATTERY_REMAINING_LONGEVITY: 78 MO
MDC_IDC_MSMT_BATTERY_REMAINING_PERCENTAGE: 91 %
MDC_IDC_MSMT_BATTERY_RRT_TRIGGER: NORMAL
MDC_IDC_MSMT_BATTERY_STATUS: NORMAL
MDC_IDC_MSMT_BATTERY_VOLTAGE: 3.01 V
MDC_IDC_MSMT_LEADCHNL_RA_IMPEDANCE_VALUE: 390 OHM
MDC_IDC_MSMT_LEADCHNL_RA_LEAD_CHANNEL_STATUS: NORMAL
MDC_IDC_MSMT_LEADCHNL_RA_PACING_THRESHOLD_AMPLITUDE: 0.38 V
MDC_IDC_MSMT_LEADCHNL_RA_PACING_THRESHOLD_PULSEWIDTH: 0.4 MS
MDC_IDC_MSMT_LEADCHNL_RA_SENSING_INTR_AMPL: 4 MV
MDC_IDC_MSMT_LEADCHNL_RV_IMPEDANCE_VALUE: 480 OHM
MDC_IDC_MSMT_LEADCHNL_RV_LEAD_CHANNEL_STATUS: NORMAL
MDC_IDC_MSMT_LEADCHNL_RV_PACING_THRESHOLD_AMPLITUDE: 0.62 V
MDC_IDC_MSMT_LEADCHNL_RV_PACING_THRESHOLD_PULSEWIDTH: 0.4 MS
MDC_IDC_MSMT_LEADCHNL_RV_SENSING_INTR_AMPL: 8.6 MV
MDC_IDC_PG_IMPLANT_DTM: NORMAL
MDC_IDC_PG_MFG: NORMAL
MDC_IDC_PG_MODEL: NORMAL
MDC_IDC_PG_SERIAL: NORMAL
MDC_IDC_PG_TYPE: NORMAL
MDC_IDC_SESS_CLINIC_NAME: NORMAL
MDC_IDC_SESS_DTM: NORMAL
MDC_IDC_SESS_REPROGRAMMED: NO
MDC_IDC_SESS_TYPE: NORMAL
MDC_IDC_SET_BRADY_AT_MODE_SWITCH_MODE: NORMAL
MDC_IDC_SET_BRADY_AT_MODE_SWITCH_RATE: 180 {BEATS}/MIN
MDC_IDC_SET_BRADY_LOWRATE: 60 {BEATS}/MIN
MDC_IDC_SET_BRADY_MAX_SENSOR_RATE: 130 {BEATS}/MIN
MDC_IDC_SET_BRADY_MAX_TRACKING_RATE: 130 {BEATS}/MIN
MDC_IDC_SET_BRADY_MODE: NORMAL
MDC_IDC_SET_BRADY_PAV_DELAY_LOW: 200 MS
MDC_IDC_SET_BRADY_SAV_DELAY_LOW: 150 MS
MDC_IDC_SET_LEADCHNL_RA_PACING_AMPLITUDE: 5 V
MDC_IDC_SET_LEADCHNL_RA_PACING_ANODE_ELECTRODE_1: NORMAL
MDC_IDC_SET_LEADCHNL_RA_PACING_ANODE_LOCATION_1: NORMAL
MDC_IDC_SET_LEADCHNL_RA_PACING_CAPTURE_MODE: NORMAL
MDC_IDC_SET_LEADCHNL_RA_PACING_CATHODE_ELECTRODE_1: NORMAL
MDC_IDC_SET_LEADCHNL_RA_PACING_CATHODE_LOCATION_1: NORMAL
MDC_IDC_SET_LEADCHNL_RA_PACING_POLARITY: NORMAL
MDC_IDC_SET_LEADCHNL_RA_PACING_PULSEWIDTH: 0.4 MS
MDC_IDC_SET_LEADCHNL_RA_SENSING_ADAPTATION_MODE: NORMAL
MDC_IDC_SET_LEADCHNL_RA_SENSING_ANODE_ELECTRODE_1: NORMAL
MDC_IDC_SET_LEADCHNL_RA_SENSING_ANODE_LOCATION_1: NORMAL
MDC_IDC_SET_LEADCHNL_RA_SENSING_CATHODE_ELECTRODE_1: NORMAL
MDC_IDC_SET_LEADCHNL_RA_SENSING_CATHODE_LOCATION_1: NORMAL
MDC_IDC_SET_LEADCHNL_RA_SENSING_POLARITY: NORMAL
MDC_IDC_SET_LEADCHNL_RA_SENSING_SENSITIVITY: 0.5 MV
MDC_IDC_SET_LEADCHNL_RV_PACING_AMPLITUDE: 0.88
MDC_IDC_SET_LEADCHNL_RV_PACING_ANODE_ELECTRODE_1: NORMAL
MDC_IDC_SET_LEADCHNL_RV_PACING_ANODE_LOCATION_1: NORMAL
MDC_IDC_SET_LEADCHNL_RV_PACING_CAPTURE_MODE: NORMAL
MDC_IDC_SET_LEADCHNL_RV_PACING_CATHODE_ELECTRODE_1: NORMAL
MDC_IDC_SET_LEADCHNL_RV_PACING_CATHODE_LOCATION_1: NORMAL
MDC_IDC_SET_LEADCHNL_RV_PACING_POLARITY: NORMAL
MDC_IDC_SET_LEADCHNL_RV_PACING_PULSEWIDTH: 0.4 MS
MDC_IDC_SET_LEADCHNL_RV_SENSING_ADAPTATION_MODE: NORMAL
MDC_IDC_SET_LEADCHNL_RV_SENSING_ANODE_ELECTRODE_1: NORMAL
MDC_IDC_SET_LEADCHNL_RV_SENSING_ANODE_LOCATION_1: NORMAL
MDC_IDC_SET_LEADCHNL_RV_SENSING_CATHODE_ELECTRODE_1: NORMAL
MDC_IDC_SET_LEADCHNL_RV_SENSING_CATHODE_LOCATION_1: NORMAL
MDC_IDC_SET_LEADCHNL_RV_SENSING_POLARITY: NORMAL
MDC_IDC_SET_LEADCHNL_RV_SENSING_SENSITIVITY: 2 MV
MDC_IDC_STAT_AT_BURDEN_PERCENT: 0 %
MDC_IDC_STAT_AT_DTM_END: NORMAL
MDC_IDC_STAT_AT_DTM_START: NORMAL
MDC_IDC_STAT_AT_MODE_SW_COUNT: 0
MDC_IDC_STAT_AT_MODE_SW_COUNT_PER_DAY: 0
MDC_IDC_STAT_AT_MODE_SW_PERCENT_TIME: 0 %
MDC_IDC_STAT_BRADY_AP_VP_PERCENT: 1 %
MDC_IDC_STAT_BRADY_AP_VS_PERCENT: 25 %
MDC_IDC_STAT_BRADY_AS_VP_PERCENT: 1 %
MDC_IDC_STAT_BRADY_AS_VS_PERCENT: 74 %
MDC_IDC_STAT_BRADY_DTM_END: NORMAL
MDC_IDC_STAT_BRADY_DTM_START: NORMAL
MDC_IDC_STAT_BRADY_RA_PERCENT_PACED: 25 %
MDC_IDC_STAT_BRADY_RV_PERCENT_PACED: 1 %
MDC_IDC_STAT_CRT_DTM_END: NORMAL
MDC_IDC_STAT_CRT_DTM_START: NORMAL
MDC_IDC_STAT_HEART_RATE_ATRIAL_MAX: 200 {BEATS}/MIN
MDC_IDC_STAT_HEART_RATE_ATRIAL_MEAN: 71 {BEATS}/MIN
MDC_IDC_STAT_HEART_RATE_ATRIAL_MIN: 50 {BEATS}/MIN
MDC_IDC_STAT_HEART_RATE_DTM_END: NORMAL
MDC_IDC_STAT_HEART_RATE_DTM_START: NORMAL
MDC_IDC_STAT_HEART_RATE_VENTRICULAR_MAX: 210 {BEATS}/MIN
MDC_IDC_STAT_HEART_RATE_VENTRICULAR_MEAN: 71 {BEATS}/MIN
MDC_IDC_STAT_HEART_RATE_VENTRICULAR_MIN: 50 {BEATS}/MIN

## 2022-07-29 RX ORDER — ESCITALOPRAM OXALATE 20 MG/1
20 TABLET ORAL DAILY
Qty: 90 TABLET | Refills: 0 | Status: SHIPPED | OUTPATIENT
Start: 2022-07-29 | End: 2022-10-03

## 2022-08-01 ENCOUNTER — MYC MEDICAL ADVICE (OUTPATIENT)
Dept: FAMILY MEDICINE | Facility: CLINIC | Age: 72
End: 2022-08-01

## 2022-08-02 NOTE — TELEPHONE ENCOUNTER
Please request and instruct how to submit an e-visit for evaluation and treatment recommendations.     Joel Wegener,MD

## 2022-08-04 ENCOUNTER — E-VISIT (OUTPATIENT)
Dept: FAMILY MEDICINE | Facility: CLINIC | Age: 72
End: 2022-08-04
Payer: MEDICARE

## 2022-08-04 DIAGNOSIS — L65.0 TELOGEN EFFLUVIUM: Primary | ICD-10-CM

## 2022-08-04 DIAGNOSIS — E55.9 HYPOVITAMINOSIS D: ICD-10-CM

## 2022-08-04 PROCEDURE — 99422 OL DIG E/M SVC 11-20 MIN: CPT | Performed by: FAMILY MEDICINE

## 2022-08-23 ENCOUNTER — OFFICE VISIT (OUTPATIENT)
Dept: CARDIOLOGY | Facility: CLINIC | Age: 72
End: 2022-08-23
Attending: INTERNAL MEDICINE
Payer: MEDICARE

## 2022-08-23 ENCOUNTER — LAB (OUTPATIENT)
Dept: LAB | Facility: CLINIC | Age: 72
End: 2022-08-23

## 2022-08-23 VITALS
HEIGHT: 62 IN | SYSTOLIC BLOOD PRESSURE: 134 MMHG | HEART RATE: 56 BPM | DIASTOLIC BLOOD PRESSURE: 83 MMHG | BODY MASS INDEX: 26.79 KG/M2 | WEIGHT: 145.6 LBS | OXYGEN SATURATION: 95 %

## 2022-08-23 DIAGNOSIS — E55.9 HYPOVITAMINOSIS D: ICD-10-CM

## 2022-08-23 DIAGNOSIS — L65.0 TELOGEN EFFLUVIUM: ICD-10-CM

## 2022-08-23 DIAGNOSIS — I77.810 ASCENDING AORTA DILATATION (H): ICD-10-CM

## 2022-08-23 DIAGNOSIS — Z95.2 H/O AORTIC VALVE REPLACEMENT: ICD-10-CM

## 2022-08-23 LAB
ERYTHROCYTE [DISTWIDTH] IN BLOOD BY AUTOMATED COUNT: 13 % (ref 10–15)
HCT VFR BLD AUTO: 40 % (ref 35–47)
HGB BLD-MCNC: 12.4 G/DL (ref 11.7–15.7)
MCH RBC QN AUTO: 27.9 PG (ref 26.5–33)
MCHC RBC AUTO-ENTMCNC: 31 G/DL (ref 31.5–36.5)
MCV RBC AUTO: 90 FL (ref 78–100)
PLATELET # BLD AUTO: 169 10E3/UL (ref 150–450)
RBC # BLD AUTO: 4.45 10E6/UL (ref 3.8–5.2)
WBC # BLD AUTO: 5.6 10E3/UL (ref 4–11)

## 2022-08-23 PROCEDURE — 80053 COMPREHEN METABOLIC PANEL: CPT

## 2022-08-23 PROCEDURE — 36415 COLL VENOUS BLD VENIPUNCTURE: CPT

## 2022-08-23 PROCEDURE — 84443 ASSAY THYROID STIM HORMONE: CPT | Performed by: FAMILY MEDICINE

## 2022-08-23 PROCEDURE — 82728 ASSAY OF FERRITIN: CPT

## 2022-08-23 PROCEDURE — 82306 VITAMIN D 25 HYDROXY: CPT | Performed by: FAMILY MEDICINE

## 2022-08-23 PROCEDURE — 85027 COMPLETE CBC AUTOMATED: CPT

## 2022-08-23 PROCEDURE — 99214 OFFICE O/P EST MOD 30 MIN: CPT | Performed by: INTERNAL MEDICINE

## 2022-08-23 NOTE — PROGRESS NOTES
HPI and Plan:   A very pleasant 72-year-old female s/p bioprosthetic aortic valve replacement with 23 mm Inspira valve for severe aortic stenosis in the setting of bicuspid aortic valve this was done in March 2021.  She also had pacemaker and plantation due to postoperative bradycardia.  Outside notes indicate no significant coronary disease.  She also has history of dyslipidemia with history of intolerance to several statins and is on Zetia.  I saw the patient in December 2021 when she established care with us after moving from [place] to [place].  She got enrolled in our device clinic.  She also had an echocardiogram in March 2022 that showed normal LV function with well-seated normal functioning bioprosthetic valve with mean gradient of 13 mg okay without any aortic regurgitation.  Ascending aorta was noted to be 4.4 cm.  To be noted  in May 2020 when it was noted to be 4.2 cm.  She had an MRI aorta done in July 2022 that showed borderline dilated ascending order measuring 3.9 x 3.8 cm.  Today she is coming for routine follow-up.  Overall health wise she feels well.  No cardiac complaints.  Device interrogation has shown 25% atrial pacing less than 1% ventricular pacing stable sensing and pacing threshold stable impedance 4.9 to 8.1 years of battery longevity remaining without any atrial or ventricular arrhythmias.    Assessment and plan  A pleasant 72-year-old female s/p bioprosthetic aortic valve replacement for severe aortic valve stenosis in the setting of bicuspid aortic valve this was done in March 2021.  Echocardiogram earlier this year showed normal functioning bioprosthetic valve.  Cardiac auscultation was benign today without any significant murmur.  MR aorta shows ascending aorta to be only borderline dilated.  I recommended we can repeat an MRI of the aorta in 2 years time.  She will continue predental work-up antibiotic prophylaxis and we can see her back in 1 year and she will continue regular  device checkup through device clinic    1.  History of bicuspid aortic valve with severe aortic valve stenosis s/p bioprosthetic aortic valve replacement 2021.  Echocardiogram showing normal functioning prosthetic valve.  Normal LV function.  On aspirin.  2.  History of bradycardia post AVR s/p pacemaker implantation.  Dual-chamber pacemaker plantation.  Normal functioning pacemaker on recent device check.  3.  No significant coronary artery disease reported on coronary angiogram prior to AVR.  4.  Dyslipidemia with a history of significant intolerance to several statins including low potency statins.  On Zetia.  5.  Mild dilated ascending, echocardiogram overestimating the size on recent MRI mildly dilated ascending aorta measuring 3.9 cm.  On losartan and metoprolol    Recommendations  Continue aspirin  Continue predental work-up antibiotic prophylaxis  Continue regular device checkup  Follow-up in 1 year in cardiology clinic  Regarding follow-up of borderline dilated ascending aorta I recommend repeat MRA in 2 years time and we can discuss this again when we see her back next year    Orders Placed This Encounter   Procedures     Follow-Up with Cardiology       No orders of the defined types were placed in this encounter.      There are no discontinued medications.      Encounter Diagnoses   Name Primary?     H/O aortic valve replacement      Ascending aorta dilatation (H)        CURRENT MEDICATIONS:  Current Outpatient Medications   Medication Sig Dispense Refill     aspirin 81 MG EC tablet Take 81 mg by mouth daily       coenzyme Q-10 (CO-Q10) 50 MG capsule Take 2 capsules (100 mg) by mouth daily       escitalopram (LEXAPRO) 20 MG tablet Take 1 tablet (20 mg) by mouth daily 90 tablet 0     ezetimibe (ZETIA) 10 MG tablet TAKE 1 TABLET (10 MG) BY MOUTH DAILY 90 tablet 0     fluticasone (FLONASE) 50 MCG/ACT nasal spray Spray 2 sprays into both nostrils daily 16 g 11     fluticasone (FLOVENT HFA) 220 MCG/ACT inhaler  Inhale 1 puff into the lungs 2 times daily 12 g 3     fluticasone-salmeterol (ADVAIR) 500-50 MCG/DOSE inhaler Inhale 1 puff into the lungs every 12 hours 3 each 3     levothyroxine (SYNTHROID/LEVOTHROID) 50 MCG tablet Take 1 tablet (50 mcg) by mouth daily 90 tablet 1     LORazepam (ATIVAN) 1 MG tablet Take 1 tablet (1 mg) by mouth nightly as needed for sleep 30 tablet 5     losartan (COZAAR) 25 MG tablet Take 1 tablet (25 mg) by mouth daily 90 tablet 3     metoprolol succinate ER (TOPROL-XL) 25 MG 24 hr tablet Take 1 tablet (25 mg) by mouth 2 times daily 180 tablet 2     spacer (OPTICHAMBER MURALI) holding chamber Use with inhaler 1 each 1     STATIN NOT PRESCRIBED (INTENTIONAL) Please choose reason not prescribed from choices below.       vitamin C (ASCORBIC ACID) 1000 MG TABS Take 1,000 mg by mouth daily       zinc gluconate 50 MG tablet Take 50 mg by mouth daily         ALLERGIES     Allergies   Allergen Reactions     Penicillins Hives     Statins [Hmg-Coa-R Inhibitors]      Myalgias to multiple statins       PAST MEDICAL HISTORY:  Past Medical History:   Diagnosis Date     Arthritis Some in knees, hips and shoulder     Cancer (H) Breast cancer  lumpectomy, 6 months of chemo     Depressive disorder Put on meds 20 years ago for this.     Heart disease Bicuspid valve diagnosed in my 50 s     Thyroid disease  diagnosed       PAST SURGICAL HISTORY:  Past Surgical History:   Procedure Laterality Date     BIOPSY  10/2018     BREAST SURGERY  89     CARDIAC SURGERY  22     CHOLECYSTECTOMY  2010     COLONOSCOPY  2019     ENT SURGERY  2011     EYE SURGERY  2015       FAMILY HISTORY:  Family History   Problem Relation Age of Onset     Hypertension Mother      Cancer Mother      Other Cancer Mother          if kidney cancer at age 86     Other Cancer Father          of stomach cancer age 43     Heart Disease Paternal Grandmother      Hypertension Paternal Grandmother      Obesity  "Paternal Grandmother      Hypertension Sister      Hyperlipidemia Sister      Anesthesia Reaction Son      Hypertension Son      Heart Disease Daughter      Heart Disease Sister      Hypertension Sister      Hyperlipidemia Sister      Colon Cancer Sister      Diabetes Sister      Breast Cancer Sister      Coronary Artery Disease Sister      Hyperlipidemia Sister      Thyroid Disease Sister      Other Cancer Other         Neuroblastoma age 5       SOCIAL HISTORY:  Social History     Socioeconomic History     Marital status:      Spouse name: None     Number of children: None     Years of education: None     Highest education level: None   Tobacco Use     Smoking status: Former Smoker     Packs/day: 1.00     Years: 10.00     Pack years: 10.00     Types: Cigarettes     Start date: 1977     Quit date: 10/1/1979     Years since quittin.9     Smokeless tobacco: Never Used   Vaping Use     Vaping Use: Never used   Substance and Sexual Activity     Alcohol use: Yes     Comment: occ     Drug use: Never     Sexual activity: Not Currently     Partners: Male     Birth control/protection: Post-menopausal   Other Topics Concern     Parent/sibling w/ CABG, MI or angioplasty before 65F 55M? No       Review of Systems:  Skin:  Negative bruising     Eyes:  Positive for glasses    ENT:  Positive for hearing loss wears hearing aids  Respiratory: No particular shortness of breath  Cardiovascular:  Negative      Gastroenterology: Positive for reflux    Genitourinary:  Positive for nocturia    Musculoskeletal:  not assessed      Neurologic:  Negative      Psychiatric:  Positive for anxiety;depression treated  Heme/Lymph/Imm:  Positive for allergies seasonal  Endocrine:  Positive for thyroid disorder      Physical Exam:  Vitals: /83   Pulse 56   Ht 1.585 m (5' 2.4\")   Wt 66 kg (145 lb 9.6 oz)   SpO2 95%   BMI 26.29 kg/m      General patient appears comfortable  Neck normal JVP  Cardiovascular system S1-S2 normal " no murmur rub or gallop  Respiratory system clear to auscultation  Extremities no edema  Neurological alert, oriented      CC  Reggie King MD  [address]  [place] [id number]

## 2022-08-24 LAB
ALBUMIN SERPL-MCNC: 3.6 G/DL (ref 3.4–5)
ALP SERPL-CCNC: 58 U/L (ref 40–150)
ALT SERPL W P-5'-P-CCNC: 19 U/L (ref 0–50)
ANION GAP SERPL CALCULATED.3IONS-SCNC: 8 MMOL/L (ref 3–14)
AST SERPL W P-5'-P-CCNC: 18 U/L (ref 0–45)
BILIRUB SERPL-MCNC: 0.4 MG/DL (ref 0.2–1.3)
BUN SERPL-MCNC: 24 MG/DL (ref 7–30)
CALCIUM SERPL-MCNC: 9.1 MG/DL (ref 8.5–10.1)
CHLORIDE BLD-SCNC: 107 MMOL/L (ref 94–109)
CO2 SERPL-SCNC: 24 MMOL/L (ref 20–32)
CREAT SERPL-MCNC: 0.79 MG/DL (ref 0.52–1.04)
DEPRECATED CALCIDIOL+CALCIFEROL SERPL-MC: 36 UG/L (ref 20–75)
FERRITIN SERPL-MCNC: 59 NG/ML (ref 8–252)
GFR SERPL CREATININE-BSD FRML MDRD: 80 ML/MIN/1.73M2
GLUCOSE BLD-MCNC: 90 MG/DL (ref 70–99)
POTASSIUM BLD-SCNC: 4.3 MMOL/L (ref 3.4–5.3)
PROT SERPL-MCNC: 6.7 G/DL (ref 6.8–8.8)
SODIUM SERPL-SCNC: 139 MMOL/L (ref 133–144)
TSH SERPL DL<=0.005 MIU/L-ACNC: 1.55 MU/L (ref 0.4–4)

## 2022-09-04 ENCOUNTER — E-VISIT (OUTPATIENT)
Dept: FAMILY MEDICINE | Facility: CLINIC | Age: 72
End: 2022-09-04
Payer: MEDICARE

## 2022-09-04 DIAGNOSIS — J45.41 MODERATE PERSISTENT ASTHMA WITH EXACERBATION: Primary | ICD-10-CM

## 2022-09-04 PROCEDURE — 99421 OL DIG E/M SVC 5-10 MIN: CPT | Performed by: FAMILY MEDICINE

## 2022-09-05 RX ORDER — PREDNISONE 20 MG/1
40 TABLET ORAL DAILY
Qty: 10 TABLET | Refills: 0 | Status: SHIPPED | OUTPATIENT
Start: 2022-09-05 | End: 2022-09-10

## 2022-09-30 ENCOUNTER — TELEPHONE (OUTPATIENT)
Dept: GASTROENTEROLOGY | Facility: CLINIC | Age: 72
End: 2022-09-30

## 2022-09-30 NOTE — TELEPHONE ENCOUNTER
Caller: JHOANA    Procedure: COLON    Date, Location, and Surgeon of Procedure Cancelled: 10/27 [facility]    Ordering Provider:WEGENER, JOEL DANIEL IRWIN    Reason for cancel (please be detailed, any staff messages or encounters to note?): DOC ACADEMIC        Rescheduled: Y     If rescheduled:    Date: 11/10   Location: [facility]   Prep Resent: N(changes to prep?)   Covid Test Rescheduled: HOME   Note any change or update to original order/sedation:

## 2022-10-03 ENCOUNTER — HEALTH MAINTENANCE LETTER (OUTPATIENT)
Age: 72
End: 2022-10-03

## 2022-10-27 ENCOUNTER — ANCILLARY PROCEDURE (OUTPATIENT)
Dept: CARDIOLOGY | Facility: CLINIC | Age: 72
End: 2022-10-27
Attending: INTERNAL MEDICINE
Payer: MEDICARE

## 2022-10-27 DIAGNOSIS — Z95.0 CARDIAC PACEMAKER IN SITU: ICD-10-CM

## 2022-10-27 PROCEDURE — 93296 REM INTERROG EVL PM/IDS: CPT | Performed by: INTERNAL MEDICINE

## 2022-10-27 PROCEDURE — 93294 REM INTERROG EVL PM/LDLS PM: CPT | Performed by: INTERNAL MEDICINE

## 2022-10-28 ENCOUNTER — TELEPHONE (OUTPATIENT)
Dept: FAMILY MEDICINE | Facility: CLINIC | Age: 72
End: 2022-10-28

## 2022-10-31 LAB
MDC_IDC_LEAD_IMPLANT_DT: NORMAL
MDC_IDC_LEAD_IMPLANT_DT: NORMAL
MDC_IDC_LEAD_LOCATION: NORMAL
MDC_IDC_LEAD_LOCATION: NORMAL
MDC_IDC_LEAD_LOCATION_DETAIL_1: NORMAL
MDC_IDC_LEAD_LOCATION_DETAIL_1: NORMAL
MDC_IDC_LEAD_MFG: NORMAL
MDC_IDC_LEAD_MFG: NORMAL
MDC_IDC_LEAD_MODEL: NORMAL
MDC_IDC_LEAD_MODEL: NORMAL
MDC_IDC_LEAD_POLARITY_TYPE: NORMAL
MDC_IDC_LEAD_POLARITY_TYPE: NORMAL
MDC_IDC_LEAD_SERIAL: NORMAL
MDC_IDC_LEAD_SERIAL: NORMAL
MDC_IDC_MSMT_BATTERY_DTM: NORMAL
MDC_IDC_MSMT_BATTERY_REMAINING_LONGEVITY: 113 MO
MDC_IDC_MSMT_BATTERY_REMAINING_PERCENTAGE: 88 %
MDC_IDC_MSMT_BATTERY_RRT_TRIGGER: NORMAL
MDC_IDC_MSMT_BATTERY_STATUS: NORMAL
MDC_IDC_MSMT_BATTERY_VOLTAGE: 3.01 V
MDC_IDC_MSMT_LEADCHNL_RA_IMPEDANCE_VALUE: 410 OHM
MDC_IDC_MSMT_LEADCHNL_RA_LEAD_CHANNEL_STATUS: NORMAL
MDC_IDC_MSMT_LEADCHNL_RA_PACING_THRESHOLD_AMPLITUDE: 0.5 V
MDC_IDC_MSMT_LEADCHNL_RA_PACING_THRESHOLD_PULSEWIDTH: 0.4 MS
MDC_IDC_MSMT_LEADCHNL_RA_SENSING_INTR_AMPL: 4 MV
MDC_IDC_MSMT_LEADCHNL_RV_IMPEDANCE_VALUE: 460 OHM
MDC_IDC_MSMT_LEADCHNL_RV_LEAD_CHANNEL_STATUS: NORMAL
MDC_IDC_MSMT_LEADCHNL_RV_PACING_THRESHOLD_AMPLITUDE: 0.88 V
MDC_IDC_MSMT_LEADCHNL_RV_PACING_THRESHOLD_PULSEWIDTH: 0.4 MS
MDC_IDC_MSMT_LEADCHNL_RV_SENSING_INTR_AMPL: 5.8 MV
MDC_IDC_PG_IMPLANT_DTM: NORMAL
MDC_IDC_PG_MFG: NORMAL
MDC_IDC_PG_MODEL: NORMAL
MDC_IDC_PG_SERIAL: NORMAL
MDC_IDC_PG_TYPE: NORMAL
MDC_IDC_SESS_CLINIC_NAME: NORMAL
MDC_IDC_SESS_DTM: NORMAL
MDC_IDC_SESS_REPROGRAMMED: NO
MDC_IDC_SESS_TYPE: NORMAL
MDC_IDC_SET_BRADY_AT_MODE_SWITCH_MODE: NORMAL
MDC_IDC_SET_BRADY_AT_MODE_SWITCH_RATE: 180 {BEATS}/MIN
MDC_IDC_SET_BRADY_LOWRATE: 60 {BEATS}/MIN
MDC_IDC_SET_BRADY_MAX_SENSOR_RATE: 130 {BEATS}/MIN
MDC_IDC_SET_BRADY_MAX_TRACKING_RATE: 130 {BEATS}/MIN
MDC_IDC_SET_BRADY_MODE: NORMAL
MDC_IDC_SET_BRADY_PAV_DELAY_LOW: 200 MS
MDC_IDC_SET_BRADY_SAV_DELAY_LOW: 150 MS
MDC_IDC_SET_LEADCHNL_RA_PACING_AMPLITUDE: 2 V
MDC_IDC_SET_LEADCHNL_RA_PACING_ANODE_ELECTRODE_1: NORMAL
MDC_IDC_SET_LEADCHNL_RA_PACING_ANODE_LOCATION_1: NORMAL
MDC_IDC_SET_LEADCHNL_RA_PACING_CAPTURE_MODE: NORMAL
MDC_IDC_SET_LEADCHNL_RA_PACING_CATHODE_ELECTRODE_1: NORMAL
MDC_IDC_SET_LEADCHNL_RA_PACING_CATHODE_LOCATION_1: NORMAL
MDC_IDC_SET_LEADCHNL_RA_PACING_POLARITY: NORMAL
MDC_IDC_SET_LEADCHNL_RA_PACING_PULSEWIDTH: 0.4 MS
MDC_IDC_SET_LEADCHNL_RA_SENSING_ADAPTATION_MODE: NORMAL
MDC_IDC_SET_LEADCHNL_RA_SENSING_ANODE_ELECTRODE_1: NORMAL
MDC_IDC_SET_LEADCHNL_RA_SENSING_ANODE_LOCATION_1: NORMAL
MDC_IDC_SET_LEADCHNL_RA_SENSING_CATHODE_ELECTRODE_1: NORMAL
MDC_IDC_SET_LEADCHNL_RA_SENSING_CATHODE_LOCATION_1: NORMAL
MDC_IDC_SET_LEADCHNL_RA_SENSING_POLARITY: NORMAL
MDC_IDC_SET_LEADCHNL_RA_SENSING_SENSITIVITY: 0.5 MV
MDC_IDC_SET_LEADCHNL_RV_PACING_AMPLITUDE: 1.12
MDC_IDC_SET_LEADCHNL_RV_PACING_ANODE_ELECTRODE_1: NORMAL
MDC_IDC_SET_LEADCHNL_RV_PACING_ANODE_LOCATION_1: NORMAL
MDC_IDC_SET_LEADCHNL_RV_PACING_CAPTURE_MODE: NORMAL
MDC_IDC_SET_LEADCHNL_RV_PACING_CATHODE_ELECTRODE_1: NORMAL
MDC_IDC_SET_LEADCHNL_RV_PACING_CATHODE_LOCATION_1: NORMAL
MDC_IDC_SET_LEADCHNL_RV_PACING_POLARITY: NORMAL
MDC_IDC_SET_LEADCHNL_RV_PACING_PULSEWIDTH: 0.4 MS
MDC_IDC_SET_LEADCHNL_RV_SENSING_ADAPTATION_MODE: NORMAL
MDC_IDC_SET_LEADCHNL_RV_SENSING_ANODE_ELECTRODE_1: NORMAL
MDC_IDC_SET_LEADCHNL_RV_SENSING_ANODE_LOCATION_1: NORMAL
MDC_IDC_SET_LEADCHNL_RV_SENSING_CATHODE_ELECTRODE_1: NORMAL
MDC_IDC_SET_LEADCHNL_RV_SENSING_CATHODE_LOCATION_1: NORMAL
MDC_IDC_SET_LEADCHNL_RV_SENSING_POLARITY: NORMAL
MDC_IDC_SET_LEADCHNL_RV_SENSING_SENSITIVITY: 2 MV
MDC_IDC_STAT_AT_BURDEN_PERCENT: 0 %
MDC_IDC_STAT_AT_DTM_END: NORMAL
MDC_IDC_STAT_AT_DTM_START: NORMAL
MDC_IDC_STAT_AT_MODE_SW_COUNT: 0
MDC_IDC_STAT_AT_MODE_SW_COUNT_PER_DAY: 0
MDC_IDC_STAT_AT_MODE_SW_PERCENT_TIME: 0 %
MDC_IDC_STAT_BRADY_AP_VP_PERCENT: 1 %
MDC_IDC_STAT_BRADY_AP_VS_PERCENT: 27 %
MDC_IDC_STAT_BRADY_AS_VP_PERCENT: 1 %
MDC_IDC_STAT_BRADY_AS_VS_PERCENT: 73 %
MDC_IDC_STAT_BRADY_DTM_END: NORMAL
MDC_IDC_STAT_BRADY_DTM_START: NORMAL
MDC_IDC_STAT_BRADY_RA_PERCENT_PACED: 27 %
MDC_IDC_STAT_BRADY_RV_PERCENT_PACED: 1 %
MDC_IDC_STAT_CRT_DTM_END: NORMAL
MDC_IDC_STAT_CRT_DTM_START: NORMAL
MDC_IDC_STAT_HEART_RATE_ATRIAL_MAX: 240 {BEATS}/MIN
MDC_IDC_STAT_HEART_RATE_ATRIAL_MEAN: 71 {BEATS}/MIN
MDC_IDC_STAT_HEART_RATE_ATRIAL_MIN: 50 {BEATS}/MIN
MDC_IDC_STAT_HEART_RATE_DTM_END: NORMAL
MDC_IDC_STAT_HEART_RATE_DTM_START: NORMAL
MDC_IDC_STAT_HEART_RATE_VENTRICULAR_MAX: 240 {BEATS}/MIN
MDC_IDC_STAT_HEART_RATE_VENTRICULAR_MEAN: 71 {BEATS}/MIN
MDC_IDC_STAT_HEART_RATE_VENTRICULAR_MIN: 50 {BEATS}/MIN

## 2022-11-03 ENCOUNTER — TELEPHONE (OUTPATIENT)
Dept: GASTROENTEROLOGY | Facility: CLINIC | Age: 72
End: 2022-11-03

## 2022-11-03 ENCOUNTER — HOSPITAL ENCOUNTER (OUTPATIENT)
Dept: MAMMOGRAPHY | Facility: CLINIC | Age: 72
Discharge: HOME | End: 2022-11-03
Attending: FAMILY MEDICINE | Admitting: FAMILY MEDICINE
Payer: MEDICARE

## 2022-11-03 DIAGNOSIS — Z12.11 ENCOUNTER FOR SCREENING COLONOSCOPY: Primary | ICD-10-CM

## 2022-11-03 DIAGNOSIS — Z12.31 VISIT FOR SCREENING MAMMOGRAM: ICD-10-CM

## 2022-11-03 PROCEDURE — 77067 SCR MAMMO BI INCL CAD: CPT

## 2022-11-03 RX ORDER — BISACODYL 5 MG
TABLET, DELAYED RELEASE (ENTERIC COATED) ORAL
Qty: 4 TABLET | Refills: 0 | Status: SHIPPED | OUTPATIENT
Start: 2022-11-03 | End: 2022-11-21

## 2022-11-03 NOTE — TELEPHONE ENCOUNTER
Pre assessment call placed to patient to go over upcoming colonoscopy prep instructions.     Patient states cannot talk at this time and requests a call back tomorrow.     Will call tomorrow 11.4.22 per patient request.

## 2022-11-03 NOTE — TELEPHONE ENCOUNTER
Patient scheduled for Colonoscopy on 11/10/22.     Discuss at home test option.     Arrival time: 1000    Facility location: [facility]    Sedation type: CS    Indication for procedure: Screening, previous polyps     Anticoagulations? None     Bowel prep recommendation: GoLytely    GoLytely prep sent to [facility] [address] [place] [address] pharmacy. Prep instructions sent via [employer]    Pre visit planning completed.        Lovely Mcgrath RN

## 2022-11-08 DIAGNOSIS — F51.01 PRIMARY INSOMNIA: ICD-10-CM

## 2022-11-08 RX ORDER — LORAZEPAM 1 MG/1
1 TABLET ORAL
Qty: 30 TABLET | Refills: 5 | Status: SHIPPED | OUTPATIENT
Start: 2022-11-08 | End: 2023-05-08

## 2022-11-10 ENCOUNTER — HOSPITAL ENCOUNTER (OUTPATIENT)
Facility: CLINIC | Age: 72
Discharge: HOME | End: 2022-11-10
Attending: INTERNAL MEDICINE | Admitting: INTERNAL MEDICINE
Payer: MEDICARE

## 2022-11-10 VITALS
RESPIRATION RATE: 15 BRPM | DIASTOLIC BLOOD PRESSURE: 58 MMHG | HEART RATE: 68 BPM | SYSTOLIC BLOOD PRESSURE: 91 MMHG | OXYGEN SATURATION: 100 %

## 2022-11-10 LAB — COLONOSCOPY: NORMAL

## 2022-11-10 PROCEDURE — G0500 MOD SEDAT ENDO SERVICE >5YRS: HCPCS | Performed by: INTERNAL MEDICINE

## 2022-11-10 PROCEDURE — 45380 COLONOSCOPY AND BIOPSY: CPT | Performed by: INTERNAL MEDICINE

## 2022-11-10 PROCEDURE — 45385 COLONOSCOPY W/LESION REMOVAL: CPT | Mod: PT | Performed by: INTERNAL MEDICINE

## 2022-11-10 PROCEDURE — 88305 TISSUE EXAM BY PATHOLOGIST: CPT | Mod: TC | Performed by: INTERNAL MEDICINE

## 2022-11-10 PROCEDURE — 250N000011 HC RX IP 250 OP 636: Performed by: INTERNAL MEDICINE

## 2022-11-10 RX ORDER — LIDOCAINE 40 MG/G
CREAM TOPICAL
Status: DISCONTINUED | OUTPATIENT
Start: 2022-11-10 | End: 2022-11-10 | Stop reason: HOSPADM

## 2022-11-10 RX ORDER — ONDANSETRON 2 MG/ML
4 INJECTION INTRAMUSCULAR; INTRAVENOUS
Status: DISCONTINUED | OUTPATIENT
Start: 2022-11-10 | End: 2022-11-10 | Stop reason: HOSPADM

## 2022-11-10 RX ORDER — FENTANYL CITRATE 50 UG/ML
INJECTION, SOLUTION INTRAMUSCULAR; INTRAVENOUS PRN
Status: DISCONTINUED | OUTPATIENT
Start: 2022-11-10 | End: 2022-11-10 | Stop reason: HOSPADM

## 2022-11-10 ASSESSMENT — ACTIVITIES OF DAILY LIVING (ADL)
ADLS_ACUITY_SCORE: 35
ADLS_ACUITY_SCORE: 35

## 2022-11-10 NOTE — OR NURSING
Pt underwent colonoscopy with polypectomy under conscious sedation. Pt transferred to recovery and report given to shakir BURROWS. Specimens sent to lab.      Priscila Price RN

## 2022-11-10 NOTE — H&P
Lauren Nick  [id number]  female  72 year old      Reason for procedure/surgery: colonoscopy      Patient Active Problem List   Diagnosis     H/O aortic valve replacement     H/O bicuspid aortic valve     Thoracic aortic aneurysm without rupture     H/O malignant neoplasm of breast     H/O lumpectomy     History of colonic polyps     Recurrent major depressive disorder, in partial remission (H)     Myalgia due to statin     Hip pain, right       Past Surgical History:    Past Surgical History:   Procedure Laterality Date     BIOPSY  10/2018     BREAST SURGERY  89     CARDIAC SURGERY  22     CHOLECYSTECTOMY  2010     COLONOSCOPY  2019     ENT SURGERY  2011     EYE SURGERY  2015       Past Medical History:   Past Medical History:   Diagnosis Date     Arthritis Some in knees, hips and shoulder     Cancer (H) Breast cancer  lumpectomy, 6 months of chemo     Depressive disorder Put on meds 20 years ago for this.     Heart disease Bicuspid valve diagnosed in my 50 s     Thyroid disease  diagnosed       Social History:   Social History     Tobacco Use     Smoking status: Former     Packs/day: 1.00     Years: 10.00     Pack years: 10.00     Types: Cigarettes     Start date: 1977     Quit date: 10/1/1979     Years since quittin.1     Smokeless tobacco: Never   Substance Use Topics     Alcohol use: Yes     Comment: occ       Family History:   Family History   Problem Relation Age of Onset     Hypertension Mother      Cancer Mother      Other Cancer Mother          if kidney cancer at age 86     Other Cancer Father          of stomach cancer age 43     Heart Disease Paternal Grandmother      Hypertension Paternal Grandmother      Obesity Paternal Grandmother      Hypertension Sister      Hyperlipidemia Sister      Anesthesia Reaction Son      Hypertension Son      Heart Disease Daughter      Heart Disease Sister      Hypertension Sister      Hyperlipidemia Sister       Colon Cancer Sister      Diabetes Sister      Breast Cancer Sister      Coronary Artery Disease Sister      Hyperlipidemia Sister      Thyroid Disease Sister      Other Cancer Other         Neuroblastoma age 5       Allergies:   Allergies   Allergen Reactions     Penicillins Hives     Statins [Hmg-Coa-R Inhibitors]      Myalgias to multiple statins       Active Medications:   No current outpatient medications on file.       Systemic Review:   CONSTITUTIONAL: NEGATIVE for fever, chills, change in weight  ENT/MOUTH: NEGATIVE for ear, mouth and throat problems  RESP: NEGATIVE for significant cough or SOB  CV: NEGATIVE for chest pain, palpitations or peripheral edema    Physical Examination:   Vital Signs: /69   Pulse 73   Resp 16   SpO2 98%   GENERAL: healthy, alert and no distress  NECK: no adenopathy, no asymmetry, masses, or scars  RESP: lungs clear to auscultation - no rales, rhonchi or wheezes  CV: regular rate and rhythm, normal S1 S2, no S3 or S4, no murmur, click or rub, no peripheral edema and peripheral pulses strong  ABDOMEN: soft, nontender, no hepatosplenomegaly, no masses and bowel sounds normal  MS: no gross musculoskeletal defects noted, no edema    ASA: 2    Mallampati Score: 2    Plan: Appropriate to proceed as scheduled.      Rafa Parks MD  11/10/2022    PCP:  Wegener, Joel Daniel Irwin

## 2022-11-10 NOTE — H&P
Lauren Nick  [id number]  female  72 year old      Reason for procedure/surgery: surveillance colonscopy     Patient Active Problem List   Diagnosis     H/O aortic valve replacement     H/O bicuspid aortic valve     Thoracic aortic aneurysm without rupture     H/O malignant neoplasm of breast     H/O lumpectomy     History of colonic polyps     Recurrent major depressive disorder, in partial remission (H)     Myalgia due to statin     Hip pain, right       Past Surgical History:    Past Surgical History:   Procedure Laterality Date     BIOPSY  10/2018     BREAST SURGERY  89     CARDIAC SURGERY  22     CHOLECYSTECTOMY  2010     COLONOSCOPY  2019     ENT SURGERY  2011     EYE SURGERY  2015       Past Medical History:   Past Medical History:   Diagnosis Date     Arthritis Some in knees, hips and shoulder     Cancer (H) Breast cancer  lumpectomy, 6 months of chemo     Depressive disorder Put on meds 20 years ago for this.     Heart disease Bicuspid valve diagnosed in my 50 s     Thyroid disease  diagnosed       Social History:   Social History     Tobacco Use     Smoking status: Former     Packs/day: 1.00     Years: 10.00     Pack years: 10.00     Types: Cigarettes     Start date: 1977     Quit date: 10/1/1979     Years since quittin.1     Smokeless tobacco: Never   Substance Use Topics     Alcohol use: Yes     Comment: occ       Family History:   Family History   Problem Relation Age of Onset     Hypertension Mother      Cancer Mother      Other Cancer Mother          if kidney cancer at age 86     Other Cancer Father          of stomach cancer age 43     Heart Disease Paternal Grandmother      Hypertension Paternal Grandmother      Obesity Paternal Grandmother      Hypertension Sister      Hyperlipidemia Sister      Anesthesia Reaction Son      Hypertension Son      Heart Disease Daughter      Heart Disease Sister      Hypertension Sister      Hyperlipidemia  Sister      Colon Cancer Sister      Diabetes Sister      Breast Cancer Sister      Coronary Artery Disease Sister      Hyperlipidemia Sister      Thyroid Disease Sister      Other Cancer Other         Neuroblastoma age 5       Allergies:   Allergies   Allergen Reactions     Penicillins Hives     Statins [Hmg-Coa-R Inhibitors]      Myalgias to multiple statins       Active Medications:   No current outpatient medications on file.       Systemic Review:   CONSTITUTIONAL: NEGATIVE for fever, chills, change in weight  ENT/MOUTH: NEGATIVE for ear, mouth and throat problems  RESP: NEGATIVE for significant cough or SOB  CV: NEGATIVE for chest pain, palpitations or peripheral edema    Physical Examination:   Vital Signs: /69   Pulse 73   Resp 16   SpO2 98%   GENERAL: healthy, alert and no distress  NECK: no adenopathy, no asymmetry, masses, or scars  RESP: lungs clear to auscultation - no rales, rhonchi or wheezes  CV: regular rate and rhythm, normal S1 S2, no S3 or S4, no murmur, click or rub, no peripheral edema and peripheral pulses strong  ABDOMEN: soft, nontender, no hepatosplenomegaly, no masses and bowel sounds normal  MS: no gross musculoskeletal defects noted, no edema    ASA: 2    Mallampati Score: 2      Plan: Appropriate to proceed as scheduled.      Rafa Parks MD  11/10/2022    PCP:  Wegener, Joel Daniel Irwin

## 2022-11-11 LAB
PATH REPORT.COMMENTS IMP SPEC: NORMAL
PATH REPORT.COMMENTS IMP SPEC: NORMAL
PATH REPORT.FINAL DX SPEC: NORMAL
PATH REPORT.GROSS SPEC: NORMAL
PATH REPORT.MICROSCOPIC SPEC OTHER STN: NORMAL
PATH REPORT.RELEVANT HX SPEC: NORMAL
PHOTO IMAGE: NORMAL

## 2022-11-11 PROCEDURE — 88305 TISSUE EXAM BY PATHOLOGIST: CPT | Mod: 26 | Performed by: PATHOLOGY

## 2022-11-19 ASSESSMENT — ENCOUNTER SYMPTOMS
DIZZINESS: 0
HEADACHES: 0
CONSTIPATION: 0
ABDOMINAL PAIN: 0
JOINT SWELLING: 0
SORE THROAT: 0
PALPITATIONS: 0
MYALGIAS: 1
CHILLS: 0
ARTHRALGIAS: 0
NERVOUS/ANXIOUS: 0
DIARRHEA: 0
PARESTHESIAS: 0
DYSURIA: 0
NAUSEA: 0
SHORTNESS OF BREATH: 0
WEAKNESS: 0
HEARTBURN: 0
FEVER: 0
HEMATURIA: 0
HEMATOCHEZIA: 0
COUGH: 0
FREQUENCY: 0
BREAST MASS: 0
EYE PAIN: 0

## 2022-11-19 ASSESSMENT — ACTIVITIES OF DAILY LIVING (ADL): CURRENT_FUNCTION: NO ASSISTANCE NEEDED

## 2022-11-21 ENCOUNTER — MYC MEDICAL ADVICE (OUTPATIENT)
Dept: FAMILY MEDICINE | Facility: CLINIC | Age: 72
End: 2022-11-21

## 2022-11-21 ENCOUNTER — OFFICE VISIT (OUTPATIENT)
Dept: FAMILY MEDICINE | Facility: CLINIC | Age: 72
End: 2022-11-21
Payer: MEDICARE

## 2022-11-21 VITALS
HEIGHT: 63 IN | WEIGHT: 143 LBS | SYSTOLIC BLOOD PRESSURE: 110 MMHG | TEMPERATURE: 97.2 F | DIASTOLIC BLOOD PRESSURE: 64 MMHG | HEART RATE: 64 BPM | OXYGEN SATURATION: 96 % | BODY MASS INDEX: 25.34 KG/M2 | RESPIRATION RATE: 16 BRPM

## 2022-11-21 DIAGNOSIS — J41.0 SIMPLE CHRONIC BRONCHITIS (H): ICD-10-CM

## 2022-11-21 DIAGNOSIS — Z13.220 SCREENING FOR HYPERLIPIDEMIA: ICD-10-CM

## 2022-11-21 DIAGNOSIS — E03.9 HYPOTHYROIDISM, UNSPECIFIED TYPE: ICD-10-CM

## 2022-11-21 DIAGNOSIS — E78.5 HYPERLIPIDEMIA LDL GOAL <130: ICD-10-CM

## 2022-11-21 DIAGNOSIS — Z00.00 ENCOUNTER FOR MEDICARE ANNUAL WELLNESS EXAM: Primary | ICD-10-CM

## 2022-11-21 DIAGNOSIS — Z23 HIGH PRIORITY FOR 2019-NCOV VACCINE: ICD-10-CM

## 2022-11-21 DIAGNOSIS — M85.80 OSTEOPENIA, UNSPECIFIED LOCATION: ICD-10-CM

## 2022-11-21 DIAGNOSIS — E28.319 ASYMPTOMATIC PREMATURE MENOPAUSE: ICD-10-CM

## 2022-11-21 LAB
ALBUMIN SERPL BCG-MCNC: 3.9 G/DL (ref 3.5–5.2)
ALP SERPL-CCNC: 68 U/L (ref 35–104)
ALT SERPL W P-5'-P-CCNC: 17 U/L (ref 10–35)
ANION GAP SERPL CALCULATED.3IONS-SCNC: 9 MMOL/L (ref 7–15)
AST SERPL W P-5'-P-CCNC: 25 U/L (ref 10–35)
BILIRUB SERPL-MCNC: 0.2 MG/DL
BUN SERPL-MCNC: 17.4 MG/DL (ref 8–23)
CALCIUM SERPL-MCNC: 8.7 MG/DL (ref 8.8–10.2)
CHLORIDE SERPL-SCNC: 105 MMOL/L (ref 98–107)
CHOLEST SERPL-MCNC: 207 MG/DL
CREAT SERPL-MCNC: 0.81 MG/DL (ref 0.51–0.95)
CREAT UR-MCNC: 125 MG/DL
DEPRECATED HCO3 PLAS-SCNC: 26 MMOL/L (ref 22–29)
ERYTHROCYTE [DISTWIDTH] IN BLOOD BY AUTOMATED COUNT: 13.8 % (ref 10–15)
GFR SERPL CREATININE-BSD FRML MDRD: 77 ML/MIN/1.73M2
GLUCOSE SERPL-MCNC: 111 MG/DL (ref 70–99)
HBA1C MFR BLD: 6 % (ref 0–5.6)
HCT VFR BLD AUTO: 39 % (ref 35–47)
HDLC SERPL-MCNC: 48 MG/DL
HGB BLD-MCNC: 12 G/DL (ref 11.7–15.7)
LDLC SERPL CALC-MCNC: 128 MG/DL
MCH RBC QN AUTO: 27.6 PG (ref 26.5–33)
MCHC RBC AUTO-ENTMCNC: 30.8 G/DL (ref 31.5–36.5)
MCV RBC AUTO: 90 FL (ref 78–100)
MICROALBUMIN UR-MCNC: <12 MG/L
MICROALBUMIN/CREAT UR: NORMAL MG/G{CREAT}
NONHDLC SERPL-MCNC: 159 MG/DL
PLATELET # BLD AUTO: 176 10E3/UL (ref 150–450)
POTASSIUM SERPL-SCNC: 4 MMOL/L (ref 3.4–5.3)
PROT SERPL-MCNC: 6.4 G/DL (ref 6.4–8.3)
RBC # BLD AUTO: 4.34 10E6/UL (ref 3.8–5.2)
SODIUM SERPL-SCNC: 140 MMOL/L (ref 136–145)
TRIGL SERPL-MCNC: 153 MG/DL
TSH SERPL DL<=0.005 MIU/L-ACNC: 1.95 UIU/ML (ref 0.3–4.2)
WBC # BLD AUTO: 5.4 10E3/UL (ref 4–11)

## 2022-11-21 PROCEDURE — 82043 UR ALBUMIN QUANTITATIVE: CPT | Performed by: FAMILY MEDICINE

## 2022-11-21 PROCEDURE — 36415 COLL VENOUS BLD VENIPUNCTURE: CPT | Performed by: FAMILY MEDICINE

## 2022-11-21 PROCEDURE — 83036 HEMOGLOBIN GLYCOSYLATED A1C: CPT | Performed by: FAMILY MEDICINE

## 2022-11-21 PROCEDURE — G0438 PPPS, INITIAL VISIT: HCPCS | Performed by: FAMILY MEDICINE

## 2022-11-21 PROCEDURE — 84443 ASSAY THYROID STIM HORMONE: CPT | Performed by: FAMILY MEDICINE

## 2022-11-21 PROCEDURE — 80053 COMPREHEN METABOLIC PANEL: CPT | Performed by: FAMILY MEDICINE

## 2022-11-21 PROCEDURE — 85027 COMPLETE CBC AUTOMATED: CPT | Performed by: FAMILY MEDICINE

## 2022-11-21 PROCEDURE — 80061 LIPID PANEL: CPT | Performed by: FAMILY MEDICINE

## 2022-11-21 RX ORDER — FLUTICASONE PROPIONATE AND SALMETEROL 500; 50 UG/1; UG/1
1 POWDER RESPIRATORY (INHALATION) EVERY 12 HOURS
Qty: 3 EACH | Refills: 3 | Status: CANCELLED | OUTPATIENT
Start: 2022-11-21

## 2022-11-21 ASSESSMENT — ENCOUNTER SYMPTOMS
FEVER: 0
CONSTIPATION: 0
JOINT SWELLING: 0
MYALGIAS: 1
BREAST MASS: 0
COUGH: 0
PARESTHESIAS: 0
DIZZINESS: 0
CHILLS: 0
HEMATURIA: 0
ARTHRALGIAS: 0
HEMATOCHEZIA: 0
SHORTNESS OF BREATH: 0
ABDOMINAL PAIN: 0
EYE PAIN: 0
DIARRHEA: 0
HEARTBURN: 0
NAUSEA: 0
PALPITATIONS: 0
DYSURIA: 0
SORE THROAT: 0
HEADACHES: 0
FREQUENCY: 0
WEAKNESS: 0
NERVOUS/ANXIOUS: 0

## 2022-11-21 ASSESSMENT — ANXIETY QUESTIONNAIRES
IF YOU CHECKED OFF ANY PROBLEMS ON THIS QUESTIONNAIRE, HOW DIFFICULT HAVE THESE PROBLEMS MADE IT FOR YOU TO DO YOUR WORK, TAKE CARE OF THINGS AT HOME, OR GET ALONG WITH OTHER PEOPLE: NOT DIFFICULT AT ALL
1. FEELING NERVOUS, ANXIOUS, OR ON EDGE: NOT AT ALL
6. BECOMING EASILY ANNOYED OR IRRITABLE: NOT AT ALL
7. FEELING AFRAID AS IF SOMETHING AWFUL MIGHT HAPPEN: NOT AT ALL
GAD7 TOTAL SCORE: 1
GAD7 TOTAL SCORE: 1
7. FEELING AFRAID AS IF SOMETHING AWFUL MIGHT HAPPEN: NOT AT ALL
3. WORRYING TOO MUCH ABOUT DIFFERENT THINGS: NOT AT ALL
2. NOT BEING ABLE TO STOP OR CONTROL WORRYING: NOT AT ALL
GAD7 TOTAL SCORE: 1
4. TROUBLE RELAXING: SEVERAL DAYS
8. IF YOU CHECKED OFF ANY PROBLEMS, HOW DIFFICULT HAVE THESE MADE IT FOR YOU TO DO YOUR WORK, TAKE CARE OF THINGS AT HOME, OR GET ALONG WITH OTHER PEOPLE?: NOT DIFFICULT AT ALL
5. BEING SO RESTLESS THAT IT IS HARD TO SIT STILL: NOT AT ALL

## 2022-11-21 ASSESSMENT — PATIENT HEALTH QUESTIONNAIRE - PHQ9
10. IF YOU CHECKED OFF ANY PROBLEMS, HOW DIFFICULT HAVE THESE PROBLEMS MADE IT FOR YOU TO DO YOUR WORK, TAKE CARE OF THINGS AT HOME, OR GET ALONG WITH OTHER PEOPLE: NOT DIFFICULT AT ALL
SUM OF ALL RESPONSES TO PHQ QUESTIONS 1-9: 1
SUM OF ALL RESPONSES TO PHQ QUESTIONS 1-9: 1

## 2022-11-21 ASSESSMENT — ACTIVITIES OF DAILY LIVING (ADL): CURRENT_FUNCTION: NO ASSISTANCE NEEDED

## 2022-11-21 ASSESSMENT — PAIN SCALES - GENERAL: PAINLEVEL: NO PAIN (0)

## 2022-11-21 ASSESSMENT — ASTHMA QUESTIONNAIRES: ACT_TOTALSCORE: 21

## 2022-11-21 NOTE — PATIENT INSTRUCTIONS
Patient Education   Personalized Prevention Plan  You are due for the preventive services outlined below.  Your care team is available to assist you in scheduling these services.  If you have already completed any of these items, please share that information with your care team to update in your medical record.  Health Maintenance Due   Topic Date Due     Kidney Microalbumin Urine Test  Never done     COPD Action Plan  Never done     Depression Action Plan  Never done     Pneumococcal Vaccine (1 - PCV) Never done     COVID-19 Vaccine (4 - Booster for Moderna series) 01/18/2022     Cholesterol Lab  11/04/2022     Zoster (Shingles) Vaccine (2 of 2) 11/28/2022     Annual Wellness Visit  11/30/2022     ANNUAL REVIEW OF HM ORDERS  11/30/2022

## 2022-11-21 NOTE — PROGRESS NOTES
"SUBJECTIVE:   Pat is a 72 year old who presents for Preventive Visit.    Patient has been advised of split billing requirements and indicates understanding: Yes  Are you in the first 12 months of your Medicare coverage?  No    Healthy Habits:     In general, how would you rate your overall health?  Good    Frequency of exercise:  4-5 days/week    Duration of exercise:  30-45 minutes    Do you usually eat at least 4 servings of fruit and vegetables a day, include whole grains    & fiber and avoid regularly eating high fat or \"junk\" foods?  No    Taking medications regularly:  Yes    Medication side effects:  None    Ability to successfully perform activities of daily living:  No assistance needed    Home Safety:  No safety concerns identified    Hearing Impairment:  No hearing concerns    In the past 6 months, have you been bothered by leaking of urine?  No    In general, how would you rate your overall mental or emotional health?  Excellent      PHQ-2 Total Score: 0    Additional concerns today:  No      Have you ever done Advance Care Planning? (For example, a Health Directive, POLST, or a discussion with a medical provider or your loved ones about your wishes): Yes, patient states has an Advance Care Planning document and will bring a copy to the clinic.      Fall risk  Fallen 2 or more times in the past year?: No  Any fall with injury in the past year?: No    Cognitive Screening   1) Repeat 3 items (Leader, Season, Table)    2) Clock draw: NORMAL  3) 3 item recall: Recalls 3 objects  Results: 3 items recalled: COGNITIVE IMPAIRMENT LESS LIKELY    Mini-CogTM Copyright VALERIE Ureña. Licensed by the author for use in [facility]; reprinted with permission (jamie@.[facility]). All rights reserved.      Do you have sleep apnea, excessive snoring or daytime drowsiness?: no    Reviewed and updated as needed this visit by clinical staff   Tobacco  Allergies  Meds  Problems  Med Hx  Surg Hx  Fam Hx      "     Reviewed and updated as needed this visit by Provider   Tobacco  Allergies  Meds  Problems  Med Hx  Surg Hx  Fam Hx         Social History     Tobacco Use     Smoking status: Former     Packs/day: 1.00     Years: 10.00     Pack years: 10.00     Types: Cigarettes     Start date: 1969     Quit date: 10/1/1979     Years since quittin.1     Smokeless tobacco: Never   Substance Use Topics     Alcohol use: Yes     Comment: occ     If you drink alcohol do you typically have >3 drinks per day or >7 drinks per week? No    Current providers sharing in care for this patient include:   Patient Care Team:  Wegener, Joel Daniel Irwin, MD as PCP - General (Family Medicine)  Wegener, Joel Daniel Irwin, MD as Assigned PCP  Reggie King MD as Assigned Heart and Vascular Provider  Rohit Thao MD as MD (Critical Care)  Neil Lara MD as Assigned Surgical Provider  Rohit Thao MD as Assigned Pulmonology Provider  Keo Lee MD as MD (Dermatology)    The following health maintenance items are reviewed in Epic and correct as of today:  Health Maintenance   Topic Date Due     COPD ACTION PLAN  Never done     COVID-19 Vaccine (4 - Booster for Moderna series) 2022     ZOSTER IMMUNIZATION (2 of 2) 2022     MEDICARE ANNUAL WELLNESS VISIT  2022     PHQ-9  2023     CMP  2023     LIPID  2023     MICROALBUMIN  2023     TSH W/FREE T4 REFLEX  2023     ANNUAL REVIEW OF HM ORDERS  2023     FALL RISK ASSESSMENT  2023     MAMMO SCREENING  2024     COLORECTAL CANCER SCREENING  11/10/2027     ADVANCE CARE PLANNING  2027     DTAP/TDAP/TD IMMUNIZATION (2 - Td or Tdap) 2031     DEXA  2037     SPIROMETRY  Completed     HEPATITIS C SCREENING  Completed     DEPRESSION ACTION PLAN  Completed     INFLUENZA VACCINE  Completed     IPV IMMUNIZATION  Aged Out     MENINGITIS IMMUNIZATION  Aged Out     Pneumococcal Vaccine: 65+  "Years  Discontinued     Lab work is in process    Review of Systems   Constitutional: Negative for chills and fever.   HENT: Positive for congestion and hearing loss. Negative for ear pain and sore throat.    Eyes: Negative for pain and visual disturbance.   Respiratory: Negative for cough and shortness of breath.    Cardiovascular: Negative for chest pain, palpitations and peripheral edema.   Gastrointestinal: Negative for abdominal pain, constipation, diarrhea, heartburn, hematochezia and nausea.   Breasts:  Negative for tenderness, breast mass and discharge.   Genitourinary: Negative for dysuria, frequency, genital sores, hematuria, pelvic pain, urgency, vaginal bleeding and vaginal discharge.   Musculoskeletal: Positive for myalgias. Negative for arthralgias and joint swelling.   Skin: Negative for rash.   Neurological: Negative for dizziness, weakness, headaches and paresthesias.   Psychiatric/Behavioral: Negative for mood changes. The patient is not nervous/anxious.      OBJECTIVE:   /64 (BP Location: Right arm, Patient Position: Sitting, Cuff Size: Adult Regular)   Pulse 64   Temp 97.2  F (36.2  C) (Temporal)   Resp 16   Ht 1.588 m (5' 2.5\")   Wt 64.9 kg (143 lb)   SpO2 96%   BMI 25.74 kg/m   Estimated body mass index is 25.74 kg/m  as calculated from the following:    Height as of this encounter: 1.588 m (5' 2.5\").    Weight as of this encounter: 64.9 kg (143 lb).  Physical Exam  GENERAL: healthy, alert and no distress  EYES: Eyes grossly normal to inspection, PERRL and conjunctivae and sclerae normal  HENT: ear canals and TM's normal, nose and mouth without ulcers or lesions  NECK: no adenopathy, no asymmetry, masses, or scars and thyroid normal to palpation  RESP: lungs clear to auscultation - no rales, rhonchi or wheezes  BREAST: normal without masses, tenderness or nipple discharge and no palpable axillary masses or adenopathy  CV: regular rate and rhythm, normal S1 S2, no S3 or S4, no " murmur, click or rub, no peripheral edema and peripheral pulses strong  ABDOMEN: soft, nontender, no hepatosplenomegaly, no masses and bowel sounds normal  MS: no gross musculoskeletal defects noted, no edema  SKIN: no suspicious lesions or rashes  NEURO: Normal strength and tone, mentation intact and speech normal  PSYCH: mentation appears normal, affect normal/bright    Diagnostic Test Results:  Labs reviewed in Epic  No results found for this or any previous visit (from the past 24 hour(s)).    ASSESSMENT / PLAN:       ICD-10-CM    1. Encounter for Medicare annual wellness exam  Z00.00 Albumin Random Urine Quantitative with Creat Ratio     Lipid panel reflex to direct LDL Fasting     Comprehensive metabolic panel     CBC with platelets     Hemoglobin A1c     Albumin Random Urine Quantitative with Creat Ratio     Lipid panel reflex to direct LDL Fasting     Comprehensive metabolic panel     CBC with platelets     Hemoglobin A1c      2. Screening for hyperlipidemia  Z13.220       3. Hyperlipidemia LDL goal <130  E78.5 Lipid panel reflex to direct LDL Fasting     Lipid panel reflex to direct LDL Fasting      4. Simple chronic bronchitis (H)  J41.0  chronic. No acute exacerbations.       5. High priority for 2019-nCoV vaccine  Z23 CANCELED: COVID-19,PF,MODERNA BIVALENT 18+Yrs      6. Hypothyroidism, unspecified type  E03.9 TSH with free T4 reflex     TSH with free T4 reflex      7. Osteopenia, unspecified location  M85.80 DX Hip/Pelvis/Spine      8. Asymptomatic premature menopause  E28.319 DX Hip/Pelvis/Spine          Patient has been advised of split billing requirements and indicates understanding: Yes      COUNSELING:  Reviewed preventive health counseling, as reflected in patient instructions       Regular exercise       Healthy diet/nutrition       Vision screening       Hearing screening      BMI:   Estimated body mass index is 25.74 kg/m  as calculated from the following:    Height as of this encounter: 1.588  "m (5' 2.5\").    Weight as of this encounter: 64.9 kg (143 lb).         She reports that she quit smoking about 43 years ago. Her smoking use included cigarettes. She started smoking about 53 years ago. She has a 10.00 pack-year smoking history. She has never used smokeless tobacco.      Appropriate preventive services were discussed with this patient, including applicable screening as appropriate for cardiovascular disease, diabetes, osteopenia/osteoporosis, and glaucoma.  As appropriate for age/gender, discussed screening for colorectal cancer, prostate cancer, breast cancer, and cervical cancer. Checklist reviewing preventive services available has been given to the patient.    Reviewed patients plan of care and provided an AVS. The Basic Care Plan (routine screening as documented in Health Maintenance) for Lauren meets the Care Plan requirement. This Care Plan has been established and reviewed with the Patient.      Lacho Larry MD  [facility]    Identified Health Risks:  Answers for HPI/ROS submitted by the patient on 11/21/2022  If you checked off any problems, how difficult have these problems made it for you to do your work, take care of things at home, or get along with other people?: Not difficult at all  PHQ9 TOTAL SCORE: 1  CATARINA 7 TOTAL SCORE: 1      "

## 2022-11-25 PROBLEM — E03.9 HYPOTHYROIDISM, UNSPECIFIED TYPE: Status: ACTIVE | Noted: 2022-11-25

## 2022-11-25 PROBLEM — M85.80 OSTEOPENIA, UNSPECIFIED LOCATION: Status: ACTIVE | Noted: 2022-11-25

## 2022-11-30 ENCOUNTER — OFFICE VISIT (OUTPATIENT)
Dept: DERMATOLOGY | Facility: CLINIC | Age: 72
End: 2022-11-30
Attending: FAMILY MEDICINE
Payer: MEDICARE

## 2022-11-30 DIAGNOSIS — L57.8 ACTINIC SKIN DAMAGE: ICD-10-CM

## 2022-11-30 DIAGNOSIS — L65.0 TELOGEN EFFLUVIUM: Primary | ICD-10-CM

## 2022-11-30 DIAGNOSIS — D22.9 MULTIPLE BENIGN NEVI: ICD-10-CM

## 2022-11-30 PROCEDURE — 99203 OFFICE O/P NEW LOW 30 MIN: CPT | Performed by: STUDENT IN AN ORGANIZED HEALTH CARE EDUCATION/TRAINING PROGRAM

## 2022-11-30 NOTE — LETTER
11/30/2022         RE: Lauren Nick  [address]  [address]  [place] [address]        Dear Colleague,    Thank you for referring your patient, Lauren Nick, to the [facility]. Please see a copy of my visit note below.    [facility] Dermatology Note    Encounter Date: Nov 30, 2022    Dermatology Problem List:  -   ______________________________________    Impression/Plan:  Lauren was seen today for derm problem.    Diagnoses and all orders for this visit:    Telogen effluvium  -     Adult Dermatology Referral  - resolved  - rec OTC 5% minoxidil if recurs    Actinic skin damage  Multiple benign nevi  - discussed sunprotective behaviors, clothing, and the use of sunscreen    Follow-up in 1 year.       Staff Involved:  Staff Only    Keo Lee MD   of Dermatology  Department of Dermatology  [facility]      CC:   Chief Complaint   Patient presents with     Derm Problem     Fbscs        History of Present Illness:  Ms. Lauren Nick is a 72 year old female who presents as a new patient.    Made appt for hair falling out more often that started when she started thyroid supplementation. Resolved. Recurred 5 mo ago but has resolved again.     Labs:      Physical exam:  Vitals: There were no vitals taken for this visit.  GEN: well developed, well-nourished, in no acute distress, in a pleasant mood.     SKIN: Ragsdale phototype 2  - Full skin, which includes the head/face, both arms, chest, back, abdomen,both legs, genitalia and/or groin buttocks, digits and/or nails, was examined.  - scattered brown papules on trunk and extremities   - Flat brown macules and patches in a sun exposed areas on face and extremities  - negative hair pull test  - No other lesions of concern on areas examined.     Past Medical History:   Past Medical History:   Diagnosis Date     Arthritis Some in knees, hips and  shoulder     Cancer (H) Breast cancer  lumpectomy, 6 months of chemo     Depressive disorder Put on meds 20 years ago for this.     Heart disease Bicuspid valve diagnosed in my 50 s     Thyroid disease  diagnosed     Uncomplicated asthma A few years ago     Past Surgical History:   Procedure Laterality Date     ABDOMEN SURGERY  Gallbladder    removed in      BIOPSY  10/2018     BREAST SURGERY  1989     CARDIAC SURGERY  2021     CHOLECYSTECTOMY  2010     COLONOSCOPY  2019     COLONOSCOPY N/A 11/10/2022    Procedure: COLONOSCOPY, WITH POLYPECTOMY AND BIOPSY;  Surgeon: Rafa Parks MD;  Location:  GI     ENT SURGERY  2011     EYE SURGERY  2015     THORACIC SURGERY      valve replacement       Social History:   reports that she quit smoking about 43 years ago. Her smoking use included cigarettes. She started smoking about 53 years ago. She has a 10.00 pack-year smoking history. She has never used smokeless tobacco. She reports current alcohol use. She reports that she does not use drugs.    Family History:  Family History   Problem Relation Age of Onset     Hypertension Mother      Cancer Mother      Other Cancer Mother          if kidney cancer at age 86     Other Cancer Father          of stomach cancer age 43     Heart Disease Paternal Grandmother      Hypertension Paternal Grandmother      Obesity Paternal Grandmother      Hypertension Sister      Hyperlipidemia Sister      Anesthesia Reaction Son      Hypertension Son      Heart Disease Daughter      Heart Disease Sister      Hypertension Sister      Hyperlipidemia Sister      Colon Cancer Sister      Diabetes Sister      Breast Cancer Sister      Coronary Artery Disease Sister      Hyperlipidemia Sister      Thyroid Disease Sister      Other Cancer Other         Neuroblastoma age 5       Medications:  Current Outpatient Medications   Medication Sig Dispense Refill     aspirin 81 MG EC tablet Take 81 mg by mouth  daily       coenzyme Q-10 (CO-Q10) 50 MG capsule Take 2 capsules (100 mg) by mouth daily       escitalopram (LEXAPRO) 20 MG tablet TAKE 1 TABLET BY MOUTH EVERY DAY 90 tablet 0     ezetimibe (ZETIA) 10 MG tablet TAKE 1 TABLET (10 MG) BY MOUTH DAILY. 90 tablet 0     fluticasone (FLONASE) 50 MCG/ACT nasal spray Spray 2 sprays into both nostrils daily 16 g 11     fluticasone-salmeterol (ADVAIR) 500-50 MCG/DOSE inhaler Inhale 1 puff into the lungs every 12 hours 3 each 3     levothyroxine (SYNTHROID/LEVOTHROID) 50 MCG tablet Take 1 tablet (50 mcg) by mouth daily 90 tablet 0     LORazepam (ATIVAN) 1 MG tablet TAKE 1 TABLET (1 MG) BY MOUTH NIGHTLY AS NEEDED FOR SLEEP 30 tablet 5     losartan (COZAAR) 25 MG tablet Take 1 tablet (25 mg) by mouth daily 90 tablet 3     metoprolol succinate ER (TOPROL-XL) 25 MG 24 hr tablet Take 1 tablet (25 mg) by mouth 2 times daily 180 tablet 2     spacer (OPTICHAMBER MURALI) holding chamber Use with inhaler 1 each 1     STATIN NOT PRESCRIBED (INTENTIONAL) Please choose reason not prescribed from choices below.       vitamin C (ASCORBIC ACID) 1000 MG TABS Take 1,000 mg by mouth daily       zinc gluconate 50 MG tablet Take 50 mg by mouth daily       Allergies   Allergen Reactions     Penicillins Hives     Statins [Hmg-Coa-R Inhibitors]      Myalgias to multiple statins                   Again, thank you for allowing me to participate in the care of your patient.        Sincerely,        Keo Lee MD

## 2022-11-30 NOTE — PROGRESS NOTES
[facility] Dermatology Note    Encounter Date: Nov 30, 2022    Dermatology Problem List:  -   ______________________________________    Impression/Plan:  Lauren was seen today for derm problem.    Diagnoses and all orders for this visit:    Telogen effluvium  -     Adult Dermatology Referral  - resolved  - rec OTC 5% minoxidil if recurs    Actinic skin damage  Multiple benign nevi  - discussed sunprotective behaviors, clothing, and the use of sunscreen    Follow-up in 1 year.       Staff Involved:  Staff Only    Keo Lee MD   of Dermatology  Department of Dermatology  [facility]      CC:   Chief Complaint   Patient presents with     Derm Problem     Fbscs        History of Present Illness:  Ms. Lauren Nick is a 72 year old female who presents as a new patient.    Made appt for hair falling out more often that started when she started thyroid supplementation. Resolved. Recurred 5 mo ago but has resolved again.     Labs:      Physical exam:  Vitals: There were no vitals taken for this visit.  GEN: well developed, well-nourished, in no acute distress, in a pleasant mood.     SKIN: Ragsdale phototype 2  - Full skin, which includes the head/face, both arms, chest, back, abdomen,both legs, genitalia and/or groin buttocks, digits and/or nails, was examined.  - scattered brown papules on trunk and extremities   - Flat brown macules and patches in a sun exposed areas on face and extremities  - negative hair pull test  - No other lesions of concern on areas examined.     Past Medical History:   Past Medical History:   Diagnosis Date     Arthritis Some in knees, hips and shoulder     Cancer (H) Breast cancer 9/89 lumpectomy, 6 months of chemo     Depressive disorder Put on meds 20 years ago for this.     Heart disease Bicuspid valve diagnosed in my 50 s     Thyroid disease 02/21 diagnosed     Uncomplicated asthma A few years ago     Past  Surgical History:   Procedure Laterality Date     ABDOMEN SURGERY  Gallbladder    removed in      BIOPSY  10/2018     BREAST SURGERY  1989     CARDIAC SURGERY  2021     CHOLECYSTECTOMY  2010     COLONOSCOPY  2019     COLONOSCOPY N/A 11/10/2022    Procedure: COLONOSCOPY, WITH POLYPECTOMY AND BIOPSY;  Surgeon: Rafa Parks MD;  Location:  GI     ENT SURGERY  2011     EYE SURGERY  2015     THORACIC SURGERY      valve replacement       Social History:   reports that she quit smoking about 43 years ago. Her smoking use included cigarettes. She started smoking about 53 years ago. She has a 10.00 pack-year smoking history. She has never used smokeless tobacco. She reports current alcohol use. She reports that she does not use drugs.    Family History:  Family History   Problem Relation Age of Onset     Hypertension Mother      Cancer Mother      Other Cancer Mother          if kidney cancer at age 86     Other Cancer Father          of stomach cancer age 43     Heart Disease Paternal Grandmother      Hypertension Paternal Grandmother      Obesity Paternal Grandmother      Hypertension Sister      Hyperlipidemia Sister      Anesthesia Reaction Son      Hypertension Son      Heart Disease Daughter      Heart Disease Sister      Hypertension Sister      Hyperlipidemia Sister      Colon Cancer Sister      Diabetes Sister      Breast Cancer Sister      Coronary Artery Disease Sister      Hyperlipidemia Sister      Thyroid Disease Sister      Other Cancer Other         Neuroblastoma age 5       Medications:  Current Outpatient Medications   Medication Sig Dispense Refill     aspirin 81 MG EC tablet Take 81 mg by mouth daily       coenzyme Q-10 (CO-Q10) 50 MG capsule Take 2 capsules (100 mg) by mouth daily       escitalopram (LEXAPRO) 20 MG tablet TAKE 1 TABLET BY MOUTH EVERY DAY 90 tablet 0     ezetimibe (ZETIA) 10 MG tablet TAKE 1 TABLET (10 MG) BY MOUTH DAILY. 90 tablet 0      fluticasone (FLONASE) 50 MCG/ACT nasal spray Spray 2 sprays into both nostrils daily 16 g 11     fluticasone-salmeterol (ADVAIR) 500-50 MCG/DOSE inhaler Inhale 1 puff into the lungs every 12 hours 3 each 3     levothyroxine (SYNTHROID/LEVOTHROID) 50 MCG tablet Take 1 tablet (50 mcg) by mouth daily 90 tablet 0     LORazepam (ATIVAN) 1 MG tablet TAKE 1 TABLET (1 MG) BY MOUTH NIGHTLY AS NEEDED FOR SLEEP 30 tablet 5     losartan (COZAAR) 25 MG tablet Take 1 tablet (25 mg) by mouth daily 90 tablet 3     metoprolol succinate ER (TOPROL-XL) 25 MG 24 hr tablet Take 1 tablet (25 mg) by mouth 2 times daily 180 tablet 2     spacer (OPTICHAMBER MURALI) holding chamber Use with inhaler 1 each 1     STATIN NOT PRESCRIBED (INTENTIONAL) Please choose reason not prescribed from choices below.       vitamin C (ASCORBIC ACID) 1000 MG TABS Take 1,000 mg by mouth daily       zinc gluconate 50 MG tablet Take 50 mg by mouth daily       Allergies   Allergen Reactions     Penicillins Hives     Statins [Hmg-Coa-R Inhibitors]      Myalgias to multiple statins

## 2022-12-06 NOTE — PROGRESS NOTES
Pulmonary Clinic Note    Date of Service: 12/7/2022    Chief Complaint   Patient presents with     Follow Up       A/P:  72F being seen for f/u asthma and VCD. Based on history today would classify her as mild intermittent asthma. She is well-controlled without maintenance inhaler therapy. She also has a diagnosis of VCD, however, no overt symptoms today, advised to contact speech therapy if she has recurrence of symptoms.     - continue prn albuterol  - OK to substitute medications based on formulary     Discharge to primary care.     History:  72F being seen for f/u asthma and VCD. Last seen 6/2022. She is prescribed fluticasone and prn albuterol. She was referred to speech therapy. Breathing is improved. Has not been on ICS, she ran out. Some SOB at night, no PND. No SOB at rest. Able to go up stairs. Rare wheezing. Coughs intermittently throughout the day. Productive of green sputum. No chest tightness. No LE edema. Went to one SLP visit, did not follow-up. She feels she is improving with increasing her exercise regimen.        Smoking: former smoker, quit 1977, 1-2PPD x 7 years                            Bird exposure: no             Animal exposure: no        Inhalation exposure: no    Occupation: former mortgage banking                          10 point review of systems negative, aside from that mentioned in HPI.    /74 (BP Location: Left arm, Patient Position: Sitting, Cuff Size: Adult Regular)   Pulse 66   Wt 64 kg (141 lb)   SpO2 97%   BMI 25.38 kg/m    Gen: well-appearing  HEENT: Mallampati IV  Card: RRR  Pulm: clear bilaterally   Abd: soft  MSK: no edema, no acute joint abnormality   Skin: no obvious rash  Psych: normal affect  Neuro: alert and oriented     Labs:  Personally reviewed    Imaging/Studies: Personally reviewed  PFTs (5/2022) - mild obstruction, normal lung volumes, and DLCO  CXR (3/2022) - elevated R hemidiaphragm, no acute process      TTE (3/2022) - normal biventricular  function, EF 60-65%    Past Medical History:   Diagnosis Date     Arthritis Some in knees, hips and shoulder     Cancer (H) Breast cancer  lumpectomy, 6 months of chemo     Depressive disorder Put on meds 20 years ago for this.     Heart disease Bicuspid valve diagnosed in my 50 s     Thyroid disease  diagnosed     Uncomplicated asthma A few years ago     Past Surgical History:   Procedure Laterality Date     ABDOMEN SURGERY  Gallbladder    removed in      BIOPSY  10/2018     BREAST SURGERY  1989     CARDIAC SURGERY  2021     CHOLECYSTECTOMY  2010     COLONOSCOPY  2019     COLONOSCOPY N/A 11/10/2022    Procedure: COLONOSCOPY, WITH POLYPECTOMY AND BIOPSY;  Surgeon: Rafa Parks MD;  Location:  GI     ENT SURGERY  2011     EYE SURGERY  2015     THORACIC SURGERY      valve replacement     Family History   Problem Relation Age of Onset     Hypertension Mother      Cancer Mother      Other Cancer Mother          if kidney cancer at age 86     Other Cancer Father          of stomach cancer age 43     Heart Disease Paternal Grandmother      Hypertension Paternal Grandmother      Obesity Paternal Grandmother      Hypertension Sister      Hyperlipidemia Sister      Anesthesia Reaction Son      Hypertension Son      Heart Disease Daughter      Heart Disease Sister      Hypertension Sister      Hyperlipidemia Sister      Colon Cancer Sister      Diabetes Sister      Breast Cancer Sister      Coronary Artery Disease Sister      Hyperlipidemia Sister      Thyroid Disease Sister      Other Cancer Other         Neuroblastoma age 5     Social History     Socioeconomic History     Marital status:      Spouse name: Not on file     Number of children: Not on file     Years of education: Not on file     Highest education level: Not on file   Occupational History     Not on file   Tobacco Use     Smoking status: Former     Packs/day: 1.00     Years: 10.00     Pack years: 10.00      Types: Cigarettes     Start date: 1969     Quit date: 10/1/1979     Years since quittin.2     Smokeless tobacco: Never   Vaping Use     Vaping Use: Never used   Substance and Sexual Activity     Alcohol use: Yes     Comment: occ     Drug use: Never     Sexual activity: Not Currently     Partners: Male     Birth control/protection: Post-menopausal   Other Topics Concern     Parent/sibling w/ CABG, MI or angioplasty before 65F 55M? No   Social History Narrative     Not on file     Social Determinants of Health     Financial Resource Strain: Not on file   Food Insecurity: Not on file   Transportation Needs: Not on file   Physical Activity: Not on file   Stress: Not on file   Social Connections: Not on file   Intimate Partner Violence: Not on file   Housing Stability: Not on file       25 minutes spent reviewing chart, reviewing test results, talking with and examining patient, formulating plan, and documentation on the day of the encounter.    Rohit Thao MD  Pulmonary and Critical Care Medicine  [facility]

## 2022-12-07 ENCOUNTER — OFFICE VISIT (OUTPATIENT)
Dept: PULMONOLOGY | Facility: CLINIC | Age: 72
End: 2022-12-07
Payer: MEDICARE

## 2022-12-07 VITALS
DIASTOLIC BLOOD PRESSURE: 74 MMHG | BODY MASS INDEX: 25.38 KG/M2 | SYSTOLIC BLOOD PRESSURE: 137 MMHG | WEIGHT: 141 LBS | OXYGEN SATURATION: 97 % | HEART RATE: 66 BPM

## 2022-12-07 DIAGNOSIS — J38.3 VOCAL CORD DYSFUNCTION: ICD-10-CM

## 2022-12-07 DIAGNOSIS — J45.20 MILD INTERMITTENT ASTHMA WITHOUT COMPLICATION: Primary | ICD-10-CM

## 2022-12-07 PROCEDURE — 99213 OFFICE O/P EST LOW 20 MIN: CPT | Performed by: STUDENT IN AN ORGANIZED HEALTH CARE EDUCATION/TRAINING PROGRAM

## 2022-12-07 NOTE — LETTER
12/7/2022         RE: Lauren iNck  [address]  [address]  [place] [id number]        Dear Colleague,    Thank you for referring your patient, Lauren Nick, to the [facility] [place]. Please see a copy of my visit note below.    Pulmonary Clinic Note    Date of Service: 12/7/2022    Chief Complaint   Patient presents with     Follow Up       A/P:  72F being seen for f/u asthma and VCD. Based on history today would classify her as mild intermittent asthma. She is well-controlled without maintenance inhaler therapy. She also has a diagnosis of VCD, however, no overt symptoms today, advised to contact speech therapy if she has recurrence of symptoms.     - continue prn albuterol  - OK to substitute medications based on formulary     Discharge to primary care.     History:  72F being seen for f/u asthma and VCD. Last seen 6/2022. She is prescribed fluticasone and prn albuterol. She was referred to speech therapy. Breathing is improved. Has not been on ICS, she ran out. Some SOB at night, no PND. No SOB at rest. Able to go up stairs. Rare wheezing. Coughs intermittently throughout the day. Productive of green sputum. No chest tightness. No LE edema. Went to one SLP visit, did not follow-up. She feels she is improving with increasing her exercise regimen.        Smoking: former smoker, quit 1977, 1-2PPD x 7 years                            Bird exposure: no             Animal exposure: no        Inhalation exposure: no    Occupation: former mortgage banking                          10 point review of systems negative, aside from that mentioned in HPI.    /74 (BP Location: Left arm, Patient Position: Sitting, Cuff Size: Adult Regular)   Pulse 66   Wt 64 kg (141 lb)   SpO2 97%   BMI 25.38 kg/m    Gen: well-appearing  HEENT: Mallampati IV  Card: RRR  Pulm: clear bilaterally   Abd: soft  MSK: no edema, no acute joint abnormality   Skin: no obvious rash  Psych: normal  affect  Neuro: alert and oriented     Labs:  Personally reviewed    Imaging/Studies: Personally reviewed  PFTs (2022) - mild obstruction, normal lung volumes, and DLCO  CXR (3/2022) - elevated R hemidiaphragm, no acute process      TTE (3/2022) - normal biventricular function, EF 60-65%    Past Medical History:   Diagnosis Date     Arthritis Some in knees, hips and shoulder     Cancer (H) Breast cancer  lumpectomy, 6 months of chemo     Depressive disorder Put on meds 20 years ago for this.     Heart disease Bicuspid valve diagnosed in my 50 s     Thyroid disease  diagnosed     Uncomplicated asthma A few years ago     Past Surgical History:   Procedure Laterality Date     ABDOMEN SURGERY  Gallbladder    removed in      BIOPSY  10/2018     BREAST SURGERY  1989     CARDIAC SURGERY  2021     CHOLECYSTECTOMY  2010     COLONOSCOPY  2019     COLONOSCOPY N/A 11/10/2022    Procedure: COLONOSCOPY, WITH POLYPECTOMY AND BIOPSY;  Surgeon: Rafa Parks MD;  Location:  GI     ENT SURGERY  2011     EYE SURGERY  2015     THORACIC SURGERY      valve replacement     Family History   Problem Relation Age of Onset     Hypertension Mother      Cancer Mother      Other Cancer Mother          if kidney cancer at age 86     Other Cancer Father          of stomach cancer age 43     Heart Disease Paternal Grandmother      Hypertension Paternal Grandmother      Obesity Paternal Grandmother      Hypertension Sister      Hyperlipidemia Sister      Anesthesia Reaction Son      Hypertension Son      Heart Disease Daughter      Heart Disease Sister      Hypertension Sister      Hyperlipidemia Sister      Colon Cancer Sister      Diabetes Sister      Breast Cancer Sister      Coronary Artery Disease Sister      Hyperlipidemia Sister      Thyroid Disease Sister      Other Cancer Other         Neuroblastoma age 5     Social History     Socioeconomic History     Marital status:      Spouse  name: Not on file     Number of children: Not on file     Years of education: Not on file     Highest education level: Not on file   Occupational History     Not on file   Tobacco Use     Smoking status: Former     Packs/day: 1.00     Years: 10.00     Pack years: 10.00     Types: Cigarettes     Start date: 1969     Quit date: 10/1/1979     Years since quittin.2     Smokeless tobacco: Never   Vaping Use     Vaping Use: Never used   Substance and Sexual Activity     Alcohol use: Yes     Comment: occ     Drug use: Never     Sexual activity: Not Currently     Partners: Male     Birth control/protection: Post-menopausal   Other Topics Concern     Parent/sibling w/ CABG, MI or angioplasty before 65F 55M? No   Social History Narrative     Not on file     Social Determinants of Health     Financial Resource Strain: Not on file   Food Insecurity: Not on file   Transportation Needs: Not on file   Physical Activity: Not on file   Stress: Not on file   Social Connections: Not on file   Intimate Partner Violence: Not on file   Housing Stability: Not on file       25 minutes spent reviewing chart, reviewing test results, talking with and examining patient, formulating plan, and documentation on the day of the encounter.    Rohit Thao MD  Pulmonary and Critical Care Medicine  [facility]

## 2022-12-07 NOTE — PATIENT INSTRUCTIONS
Your asthma appears well controlled. You may continue to use as needed albuterol. No pulmonary follow up needed.

## 2022-12-07 NOTE — NURSING NOTE
Chief Complaint   Patient presents with     Follow Up       Vitals:    12/07/22 1305   BP: 137/74   BP Location: Left arm   Patient Position: Sitting   Cuff Size: Adult Regular   Pulse: 66   SpO2: 97%   Weight: 64 kg (141 lb)       Body mass index is 25.38 kg/m .      Violet Cooley, ROSETTA

## 2022-12-20 NOTE — PROGRESS NOTES
Subjective:  HPI  Physical Exam                    Objective:  System    Physical Exam    General     ROS    Assessment/Plan:    DISCHARGE REPORT    Progress reporting period is from 3-8-22 to 4-14-22, 4 visits.       SUBJECTIVE  Improving.  Pain 0-3/10 anterior and lateral right hip.  Can walk >45' without discomfort, no longer losing sleep due to pain, no pain/difficulty with stairs.  Has been putting shoes/socks on independently, minimal pain/difficulty with the right.  Pleased with progress.     Current Pain level: 1/10 (anterior right hip).      Initial Pain level:  (3-8/10).   Changes in function:  Yes (See Goal flowsheet attached for changes in current functional level)  Adverse reaction to treatment or activity: None    OBJECTIVE  Lumbar extension AROM 75%, flexion WNL, left SG min limited with slight pain anterior right hip and right SG minimally limited.  No trunk shift.    AROM right hip IR 21 degrees, ER 30 degrees, no pain with either.     ASSESSMENT/PLAN  Updated problem list and treatment plan: Diagnosis 1:  R hip pain    Pain -  self management, education and home program  STG/LTGs have been met or progress has been made towards goals:  Yes (See Goal flow sheet completed today.)  Assessment of Progress: The patient's condition is improving.  Self Management Plans:  Patient is independent in a home treatment program.  I have re-evaluated this patient and find that the nature, scope, duration and intensity of the therapy is appropriate for the medical condition of the patient.  Lauren continues to require the following intervention to meet STG and LTG's:  PT intervention is no longer required to meet STG/LTG.    Recommendations:  This patient is ready to be discharged from therapy and continue their home treatment program.    Please refer to the daily flowsheet for treatment today, total treatment time and time spent performing 1:1 timed codes.

## 2022-12-21 DIAGNOSIS — J41.0 SIMPLE CHRONIC BRONCHITIS (H): Primary | ICD-10-CM

## 2022-12-21 RX ORDER — ALBUTEROL SULFATE 90 UG/1
AEROSOL, METERED RESPIRATORY (INHALATION)
Qty: 8.5 G | Refills: 3 | Status: SHIPPED | OUTPATIENT
Start: 2022-12-21 | End: 2023-08-08

## 2022-12-21 NOTE — TELEPHONE ENCOUNTER
"Requested Prescriptions   Pending Prescriptions Disp Refills     albuterol (PROAIR HFA/PROVENTIL HFA/VENTOLIN HFA) 108 (90 Base) MCG/ACT inhaler [Pharmacy Med Name: ALBUTEROL HFA (PROAIR) INHALER]       Sig: INHALE 2 PUFFS INTO THE LUNGS EVERY 6 HOURS       Asthma Maintenance Inhalers - Anticholinergics Failed - 12/21/2022 12:27 AM        Failed - Medication is active on med list        Passed - Patient is age 12 years or older        Passed - Asthma control assessment score within normal limits in last 6 months     Please review ACT score.           Passed - Recent (6 mo) or future (30 days) visit within the authorizing provider's specialty     Patient had office visit in the last 6 months or has a visit in the next 30 days with authorizing provider or within the authorizing provider's specialty.  See \"Patient Info\" tab in inbasket, or \"Choose Columns\" in Meds & Orders section of the refill encounter.           Short-Acting Beta Agonist Inhalers Protocol  Failed - 12/21/2022 12:27 AM        Failed - Medication is active on med list        Passed - Patient is age 12 or older        Passed - Asthma control assessment score within normal limits in last 6 months     Please review ACT score.           Passed - Recent (6 mo) or future (30 days) visit within the authorizing provider's specialty     Patient had office visit in the last 6 months or has a visit in the next 30 days with authorizing provider or within the authorizing provider's specialty.  See \"Patient Info\" tab in inbasket, or \"Choose Columns\" in Meds & Orders section of the refill encounter.           Routing refill request to provider for review/approval because medication did not pass protocol.    11/21/22    Catherine Smith RN  [facility]       "

## 2023-01-31 ENCOUNTER — E-VISIT (OUTPATIENT)
Dept: FAMILY MEDICINE | Facility: CLINIC | Age: 73
End: 2023-01-31
Payer: MEDICARE

## 2023-01-31 DIAGNOSIS — J45.41 MODERATE PERSISTENT ASTHMA WITH EXACERBATION: Primary | ICD-10-CM

## 2023-01-31 DIAGNOSIS — J20.9 ACUTE BRONCHITIS WITH SYMPTOMS > 10 DAYS: ICD-10-CM

## 2023-01-31 PROCEDURE — 99422 OL DIG E/M SVC 11-20 MIN: CPT | Performed by: FAMILY MEDICINE

## 2023-02-01 RX ORDER — PREDNISONE 20 MG/1
40 TABLET ORAL DAILY
Qty: 10 TABLET | Refills: 0 | Status: SHIPPED | OUTPATIENT
Start: 2023-02-01 | End: 2023-02-06

## 2023-02-01 RX ORDER — AZITHROMYCIN 250 MG/1
TABLET, FILM COATED ORAL
Qty: 6 TABLET | Refills: 0 | Status: SHIPPED | OUTPATIENT
Start: 2023-02-01 | End: 2023-02-06

## 2023-02-01 NOTE — TELEPHONE ENCOUNTER
Provider E-Visit time total (minutes): 12    High risk due to valve replacement, asthma.     Joel Wegener,MD

## 2023-02-08 ENCOUNTER — ANCILLARY PROCEDURE (OUTPATIENT)
Dept: CARDIOLOGY | Facility: CLINIC | Age: 73
End: 2023-02-08
Attending: INTERNAL MEDICINE
Payer: MEDICARE

## 2023-02-08 DIAGNOSIS — Z95.0 CARDIAC PACEMAKER IN SITU: ICD-10-CM

## 2023-02-08 DIAGNOSIS — I49.5 TACHY-BRADY SYNDROME (H): ICD-10-CM

## 2023-02-08 PROCEDURE — 93294 REM INTERROG EVL PM/LDLS PM: CPT | Performed by: INTERNAL MEDICINE

## 2023-02-08 PROCEDURE — 93296 REM INTERROG EVL PM/IDS: CPT | Performed by: INTERNAL MEDICINE

## 2023-02-09 ENCOUNTER — VIRTUAL VISIT (OUTPATIENT)
Dept: UROLOGY | Facility: CLINIC | Age: 73
End: 2023-02-09
Attending: FAMILY MEDICINE
Payer: MEDICARE

## 2023-02-09 VITALS — WEIGHT: 138 LBS | BODY MASS INDEX: 24.45 KG/M2 | HEIGHT: 63 IN

## 2023-02-09 DIAGNOSIS — N39.44 NOCTURNAL ENURESIS: Primary | ICD-10-CM

## 2023-02-09 DIAGNOSIS — G47.9 TROUBLE IN SLEEPING: ICD-10-CM

## 2023-02-09 DIAGNOSIS — Z85.3 H/O MALIGNANT NEOPLASM OF BREAST: ICD-10-CM

## 2023-02-09 DIAGNOSIS — R35.1 NOCTURIA: ICD-10-CM

## 2023-02-09 DIAGNOSIS — Z87.440 PERSONAL HISTORY OF URINARY TRACT INFECTION: ICD-10-CM

## 2023-02-09 DIAGNOSIS — R61 NIGHT SWEATS: ICD-10-CM

## 2023-02-09 LAB
MDC_IDC_LEAD_IMPLANT_DT: NORMAL
MDC_IDC_LEAD_IMPLANT_DT: NORMAL
MDC_IDC_LEAD_LOCATION: NORMAL
MDC_IDC_LEAD_LOCATION: NORMAL
MDC_IDC_LEAD_LOCATION_DETAIL_1: NORMAL
MDC_IDC_LEAD_LOCATION_DETAIL_1: NORMAL
MDC_IDC_LEAD_MFG: NORMAL
MDC_IDC_LEAD_MFG: NORMAL
MDC_IDC_LEAD_MODEL: NORMAL
MDC_IDC_LEAD_MODEL: NORMAL
MDC_IDC_LEAD_POLARITY_TYPE: NORMAL
MDC_IDC_LEAD_POLARITY_TYPE: NORMAL
MDC_IDC_LEAD_SERIAL: NORMAL
MDC_IDC_LEAD_SERIAL: NORMAL
MDC_IDC_MSMT_BATTERY_DTM: NORMAL
MDC_IDC_MSMT_BATTERY_REMAINING_LONGEVITY: 110 MO
MDC_IDC_MSMT_BATTERY_REMAINING_PERCENTAGE: 86 %
MDC_IDC_MSMT_BATTERY_RRT_TRIGGER: NORMAL
MDC_IDC_MSMT_BATTERY_STATUS: NORMAL
MDC_IDC_MSMT_BATTERY_VOLTAGE: 3.01 V
MDC_IDC_MSMT_LEADCHNL_RA_IMPEDANCE_VALUE: 430 OHM
MDC_IDC_MSMT_LEADCHNL_RA_LEAD_CHANNEL_STATUS: NORMAL
MDC_IDC_MSMT_LEADCHNL_RA_PACING_THRESHOLD_AMPLITUDE: 0.5 V
MDC_IDC_MSMT_LEADCHNL_RA_PACING_THRESHOLD_PULSEWIDTH: 0.4 MS
MDC_IDC_MSMT_LEADCHNL_RA_SENSING_INTR_AMPL: 5 MV
MDC_IDC_MSMT_LEADCHNL_RV_IMPEDANCE_VALUE: 460 OHM
MDC_IDC_MSMT_LEADCHNL_RV_LEAD_CHANNEL_STATUS: NORMAL
MDC_IDC_MSMT_LEADCHNL_RV_PACING_THRESHOLD_AMPLITUDE: 0.88 V
MDC_IDC_MSMT_LEADCHNL_RV_PACING_THRESHOLD_PULSEWIDTH: 0.4 MS
MDC_IDC_MSMT_LEADCHNL_RV_SENSING_INTR_AMPL: 8.3 MV
MDC_IDC_PG_IMPLANT_DTM: NORMAL
MDC_IDC_PG_MFG: NORMAL
MDC_IDC_PG_MODEL: NORMAL
MDC_IDC_PG_SERIAL: NORMAL
MDC_IDC_PG_TYPE: NORMAL
MDC_IDC_SESS_CLINIC_NAME: NORMAL
MDC_IDC_SESS_DTM: NORMAL
MDC_IDC_SESS_REPROGRAMMED: NO
MDC_IDC_SESS_TYPE: NORMAL
MDC_IDC_SET_BRADY_AT_MODE_SWITCH_MODE: NORMAL
MDC_IDC_SET_BRADY_AT_MODE_SWITCH_RATE: 180 {BEATS}/MIN
MDC_IDC_SET_BRADY_LOWRATE: 60 {BEATS}/MIN
MDC_IDC_SET_BRADY_MAX_SENSOR_RATE: 130 {BEATS}/MIN
MDC_IDC_SET_BRADY_MAX_TRACKING_RATE: 130 {BEATS}/MIN
MDC_IDC_SET_BRADY_MODE: NORMAL
MDC_IDC_SET_BRADY_PAV_DELAY_LOW: 200 MS
MDC_IDC_SET_BRADY_SAV_DELAY_LOW: 150 MS
MDC_IDC_SET_LEADCHNL_RA_PACING_AMPLITUDE: 2 V
MDC_IDC_SET_LEADCHNL_RA_PACING_ANODE_ELECTRODE_1: NORMAL
MDC_IDC_SET_LEADCHNL_RA_PACING_ANODE_LOCATION_1: NORMAL
MDC_IDC_SET_LEADCHNL_RA_PACING_CAPTURE_MODE: NORMAL
MDC_IDC_SET_LEADCHNL_RA_PACING_CATHODE_ELECTRODE_1: NORMAL
MDC_IDC_SET_LEADCHNL_RA_PACING_CATHODE_LOCATION_1: NORMAL
MDC_IDC_SET_LEADCHNL_RA_PACING_POLARITY: NORMAL
MDC_IDC_SET_LEADCHNL_RA_PACING_PULSEWIDTH: 0.4 MS
MDC_IDC_SET_LEADCHNL_RA_SENSING_ADAPTATION_MODE: NORMAL
MDC_IDC_SET_LEADCHNL_RA_SENSING_ANODE_ELECTRODE_1: NORMAL
MDC_IDC_SET_LEADCHNL_RA_SENSING_ANODE_LOCATION_1: NORMAL
MDC_IDC_SET_LEADCHNL_RA_SENSING_CATHODE_ELECTRODE_1: NORMAL
MDC_IDC_SET_LEADCHNL_RA_SENSING_CATHODE_LOCATION_1: NORMAL
MDC_IDC_SET_LEADCHNL_RA_SENSING_POLARITY: NORMAL
MDC_IDC_SET_LEADCHNL_RA_SENSING_SENSITIVITY: 0.5 MV
MDC_IDC_SET_LEADCHNL_RV_PACING_AMPLITUDE: 1.12
MDC_IDC_SET_LEADCHNL_RV_PACING_ANODE_ELECTRODE_1: NORMAL
MDC_IDC_SET_LEADCHNL_RV_PACING_ANODE_LOCATION_1: NORMAL
MDC_IDC_SET_LEADCHNL_RV_PACING_CAPTURE_MODE: NORMAL
MDC_IDC_SET_LEADCHNL_RV_PACING_CATHODE_ELECTRODE_1: NORMAL
MDC_IDC_SET_LEADCHNL_RV_PACING_CATHODE_LOCATION_1: NORMAL
MDC_IDC_SET_LEADCHNL_RV_PACING_POLARITY: NORMAL
MDC_IDC_SET_LEADCHNL_RV_PACING_PULSEWIDTH: 0.4 MS
MDC_IDC_SET_LEADCHNL_RV_SENSING_ADAPTATION_MODE: NORMAL
MDC_IDC_SET_LEADCHNL_RV_SENSING_ANODE_ELECTRODE_1: NORMAL
MDC_IDC_SET_LEADCHNL_RV_SENSING_ANODE_LOCATION_1: NORMAL
MDC_IDC_SET_LEADCHNL_RV_SENSING_CATHODE_ELECTRODE_1: NORMAL
MDC_IDC_SET_LEADCHNL_RV_SENSING_CATHODE_LOCATION_1: NORMAL
MDC_IDC_SET_LEADCHNL_RV_SENSING_POLARITY: NORMAL
MDC_IDC_SET_LEADCHNL_RV_SENSING_SENSITIVITY: 2 MV
MDC_IDC_STAT_AT_BURDEN_PERCENT: 0 %
MDC_IDC_STAT_AT_DTM_END: NORMAL
MDC_IDC_STAT_AT_DTM_START: NORMAL
MDC_IDC_STAT_AT_MODE_SW_COUNT: 0
MDC_IDC_STAT_AT_MODE_SW_COUNT_PER_DAY: 0
MDC_IDC_STAT_AT_MODE_SW_PERCENT_TIME: 0 %
MDC_IDC_STAT_BRADY_AP_VP_PERCENT: 1 %
MDC_IDC_STAT_BRADY_AP_VS_PERCENT: 33 %
MDC_IDC_STAT_BRADY_AS_VP_PERCENT: 1 %
MDC_IDC_STAT_BRADY_AS_VS_PERCENT: 67 %
MDC_IDC_STAT_BRADY_DTM_END: NORMAL
MDC_IDC_STAT_BRADY_DTM_START: NORMAL
MDC_IDC_STAT_BRADY_RA_PERCENT_PACED: 33 %
MDC_IDC_STAT_BRADY_RV_PERCENT_PACED: 1 %
MDC_IDC_STAT_CRT_DTM_END: NORMAL
MDC_IDC_STAT_CRT_DTM_START: NORMAL
MDC_IDC_STAT_HEART_RATE_ATRIAL_MAX: 180 {BEATS}/MIN
MDC_IDC_STAT_HEART_RATE_ATRIAL_MEAN: 71 {BEATS}/MIN
MDC_IDC_STAT_HEART_RATE_ATRIAL_MIN: 50 {BEATS}/MIN
MDC_IDC_STAT_HEART_RATE_DTM_END: NORMAL
MDC_IDC_STAT_HEART_RATE_DTM_START: NORMAL
MDC_IDC_STAT_HEART_RATE_VENTRICULAR_MAX: 190 {BEATS}/MIN
MDC_IDC_STAT_HEART_RATE_VENTRICULAR_MEAN: 71 {BEATS}/MIN
MDC_IDC_STAT_HEART_RATE_VENTRICULAR_MIN: 50 {BEATS}/MIN

## 2023-02-09 PROCEDURE — 99204 OFFICE O/P NEW MOD 45 MIN: CPT | Mod: VID | Performed by: PHYSICIAN ASSISTANT

## 2023-02-09 ASSESSMENT — PAIN SCALES - GENERAL: PAINLEVEL: NO PAIN (0)

## 2023-02-09 NOTE — PATIENT INSTRUCTIONS
- Recommend avoiding fluids 3 hours before bed.  - Recommend voiding right before bed.   - Will refer you for possible sleep study; they will call you to schedule.  - Follow-up with me in clinic for exam and bladder scan next available time slot.   - Will try to get your records from [place].  - Notify your primary care provider about your night sweats.   - Below is a list of things that can irritate the bladder and should be eliminated:  Caffeinated soft drinks.  Coffee.  Tea.  Chocolate.  Tomato-based foods.  Acidic juices and fruits. (includes cranberry juice)  Alcohol.  Nicotine  Carbonated drinks.  Aspartame/Nutrasweet.  __________________________  UTI Prevention:    - Recommend cranberry supplement 1gm twice-daily or Vitamin C 1 gm twice daily.   - AZO as needed; if symptoms do not improve after 24-48 hours after taking AZO as needed advise contacting clinic.   - Probiotic daily; recommend for Florajen 3 or Florajen Women's  - Make sure you stay hydrated with about 60-80oz of water per day.   - Recommend good vulvar hygiene such as wearing loose cotton underwear and avoiding scented hygenic products/wipes/soaps or detergents. Wipe front to back after voiding/defecation. Avoid sitting in soiled clothing and keeping vulva dry.   - If you develop symptoms of UTI, please contact clinic. However, if you develop fevers  greater than 100.4 degrees fahrenheit, flank pain or blood in your urine, recommend going to urgent care or ER.   - Make sure to drink plenty of water each day and to really push fluids when have symptoms of a UTI.  - D-mannose 2gm daily.   _______________________________________________

## 2023-02-09 NOTE — LETTER
"2/9/2023       RE: Lauren Nick  [address]  [address]  [facility] [address]     Dear Colleague,    Thank you for referring your patient, Lauren Nick, to the [facility] at [facility]. Please see a copy of my visit note below.    PT WILL MEET YOU IN MYCHART    BEFORE COVID PT HAD A PROCEDURE... SHE RECALLS A \"LOOP\" OF SORTS.  SHE DOESN'T REMEMBER MUCH      Pat is a 72 year old who is being evaluated via a billable video visit.      How would you like to obtain your AVS? MyChart  If the video visit is dropped, the invitation should be resent by: Text to cell phone: [phone]  Will anyone else be joining your video visit? No        Video-Visit Details    Type of service:  Video Visit   Video Start Time: 8:58 AM  Video End Time:9:16 AM    Originating Location (pt. Location): Home    Distant Location (provider location):  Off-site  Platform used for Video Visit: [facility]     Urology Clinic    Name: Lauren Nick    MRN: [id number]   YOB: 1950  Accompanied at today's visit by:self              Assessment and Plan:   72 year old female with nocturia, nocturnal enuresis, night sweats, hx of UTIs, history of breast cancer    - Will have patient follow-up for exam and PVR check.  - Will try to get records from [facility] in [facility] to review what procedures she had done. Question if was some type of urethral dilatation based on patient's description.  - Avoid bladder irritants.  - Avoid fluids before bed.  - Discussed correlation between night time urinary symptoms and sleep apnea. Will refer for possible sleep study.  - Advised patient to f/u with her PCP in regards to her night sweats as this is not a typical urologic symptom.   - Advise to start daily probiotic and cranberry supplement for UTI prevention.  - Advise to contact clinic if develops UTIs in the future.   - Information given on " "AVS in regards to further UTI prevention.     Orders Placed This Encounter   Procedures     Adult Sleep Eval & Management Referral     After discussing the assessment and plan with patient, patient verbalized understanding and agreed to the above plan. All questions answered.     45 minutes were spent today on the date of the encounter in reviewing the EMR, direct patient care, coordination of care and documentation in addition to placing orders.     Judy Roberts PA-C  February 9, 2023    Patient Care Team:  Wegener, Joel Daniel Irwin, MD as PCP - General (Family Medicine)  Wegener, Joel Daniel Irwin, MD as Assigned PCP  Reggie King MD as Assigned Heart and Vascular Provider  Rohit Thao MD as MD (Critical Care)  Rohit Thao MD as Assigned Pulmonology Provider  Keo Lee MD as MD (Dermatology)  Keo Lee MD as Assigned Surgical Provider  Judy Roberts PA-C as Physician Assistant  WEGENER, JOEL DANIEL IRWIN          Chief Complaint:   nocturia          History of Present Illness:   February 9, 2023    HISTORY: Lauren Nick is a 72 year old female as a new consultation for concerns of occasional night sweats and nocturnal enuresis over the past couple months. Referred by Dr. Wegener. Reports long standing history of nocturia for years, but the nocturnal enuresis and night sweats are new symptoms. Does state that her night time urinary symptoms have improved recently. States she still has night sweats that leave her \"drenched\" at night but no longer occur at same time as her nocturnal enuresis episodes. Patient reports voiding every couple hours during the day and 2-3x/night. Denies KENYA and UUI. Denies history of sleep apnea. Unsure if snores at night.States she does not sleep well at night leaving her not well rested, ever since her chemo treatment years ago.  Feels like they do not empty completely after voiding. Reports having a \"loop\" somewhere that makes it " "harder to void. Was seen at [facility] in [facility], for a procedure to help void better right before COVID pandemic. She is unsure what the procedure was and where the \"loop\" was anatomically. Was suppose to have a second procedure but never underwent this procedure due to COVID pandemic. Feels like she emptied her bladder better after the procedure but now her symptoms of incomplete bladder emptying have returned. Per chart review, no urologic imaging in EMR. Drinks 1 cup of coffee per day and soda daily. Reports hx of Jina but none since moving back to [place] from [place]. Gets about 1-2 UTIs per year. Typical UTI symptoms include dysuria, lower abdominal cramping, urinary urgency/frequency. Denies gross hematuria or hx of kidney stones.  Denies prolapse symptoms. Not sexually active. Bowels are regular. Hx of cholecystectomy. PMH is significant for hypothyroidism, aortic valve replacement, thoracic aortic aneurysm, cardiac pacemaker. She also has hx of breast cancer s/p lumpectomy, radiation and chemo. No longer follows with oncology. Denies hx of abnormal pap smears. Went through menopause in her 30s after her chemo treatment. Denies PMB. Former smoker from age 18 to 28 years. Patient voices no other concerns at this time.              Past Medical History:     Past Medical History:   Diagnosis Date     Arthritis Some in knees, hips and shoulder     Cancer (H) Breast cancer 9/89 lumpectomy, 6 months of chemo     Depressive disorder Put on meds 20 years ago for this.     Heart disease Bicuspid valve diagnosed in my 50 s     Mumps      Spider veins      Thyroid disease 02/21 diagnosed     Uncomplicated asthma A few years ago            Past Surgical History:     Past Surgical History:   Procedure Laterality Date     ABDOMEN SURGERY  Gallbladder    removed in 2010     BIOPSY  10/2018     BREAST SURGERY  09/25/1989     CARDIAC SURGERY  03/22/2021     CHOLECYSTECTOMY  03/2010     " COLONOSCOPY  2019     COLONOSCOPY N/A 11/10/2022    Procedure: COLONOSCOPY, WITH POLYPECTOMY AND BIOPSY;  Surgeon: Rafa Parks MD;  Location:  GI     ENT SURGERY  2011     EYE SURGERY  2015     THORACIC SURGERY      valve replacement            Social History:     Social History     Tobacco Use     Smoking status: Former     Packs/day: 1.00     Years: 10.00     Pack years: 10.00     Types: Cigarettes     Start date: 1969     Quit date: 10/1/1979     Years since quittin.3     Smokeless tobacco: Never   Substance Use Topics     Alcohol use: Yes     Comment: occ            Family History:     Family History   Problem Relation Age of Onset     Hypertension Mother      Cancer Mother      Other Cancer Mother          if kidney cancer at age 86     Other Cancer Father          of stomach cancer age 43     Heart Disease Paternal Grandmother      Hypertension Paternal Grandmother      Obesity Paternal Grandmother      Hypertension Sister      Hyperlipidemia Sister      Anesthesia Reaction Son      Hypertension Son      Heart Disease Daughter      Heart Disease Sister      Hypertension Sister      Hyperlipidemia Sister      Colon Cancer Sister      Diabetes Sister      Breast Cancer Sister      Coronary Artery Disease Sister      Hyperlipidemia Sister      Thyroid Disease Sister      Other Cancer Other         Neuroblastoma age 5              Allergies:     Allergies   Allergen Reactions     Penicillins Hives     Statins [Hmg-Coa-R Inhibitors]      Myalgias to multiple statins            Medications:     Current Outpatient Medications   Medication Sig     albuterol (PROAIR HFA/PROVENTIL HFA/VENTOLIN HFA) 108 (90 Base) MCG/ACT inhaler INHALE 2 PUFFS INTO THE LUNGS EVERY 6 HOURS     aspirin 81 MG EC tablet Take 81 mg by mouth daily     coenzyme Q-10 (CO-Q10) 50 MG capsule Take 2 capsules (100 mg) by mouth daily     escitalopram (LEXAPRO) 20 MG tablet TAKE 1 TABLET BY MOUTH EVERY DAY      "ezetimibe (ZETIA) 10 MG tablet TAKE 1 TABLET (10 MG) BY MOUTH DAILY.     fluticasone (FLONASE) 50 MCG/ACT nasal spray Spray 2 sprays into both nostrils daily     fluticasone-salmeterol (ADVAIR) 500-50 MCG/DOSE inhaler Inhale 1 puff into the lungs every 12 hours     levothyroxine (SYNTHROID/LEVOTHROID) 50 MCG tablet TAKE 1 TABLET BY MOUTH EVERY DAY     LORazepam (ATIVAN) 1 MG tablet TAKE 1 TABLET (1 MG) BY MOUTH NIGHTLY AS NEEDED FOR SLEEP     losartan (COZAAR) 25 MG tablet Take 1 tablet (25 mg) by mouth daily     metoprolol succinate ER (TOPROL-XL) 25 MG 24 hr tablet Take 1 tablet (25 mg) by mouth 2 times daily     spacer (OPTICHAMBER MURALI) holding chamber Use with inhaler     STATIN NOT PRESCRIBED (INTENTIONAL) Please choose reason not prescribed from choices below.     vitamin C (ASCORBIC ACID) 1000 MG TABS Take 1,000 mg by mouth daily     zinc gluconate 50 MG tablet Take 50 mg by mouth daily     Current Facility-Administered Medications   Medication     lidocaine (PF) (XYLOCAINE) 1 % injection 9 mL             Review of Systems:    ROS: 14 point ROS neg other than the symptoms noted above in the HPI.          Physical Exam:   Height 1.6 m (5' 3\"), weight 62.6 kg (138 lb), not currently breastfeeding.  5' 3\", Body mass index is 24.45 kg/m ., 138 lbs 0 oz  Gen appearance: Age-appropriate appearing female in NAD.   HEENT:  EOMI, conjunctiva clear/white. Normal ROM of neck for age.   Psych:  alert , In no acute distress.  Neuro:  A&Ox3  Resp:  Normal respiratory effort; not in acute respiratory distress.          Data:    Labs:  Creatinine   Date Value Ref Range Status   11/21/2022 0.81 0.51 - 0.95 mg/dL Final       Ancillary Procedure on 02/08/2023   Component Date Value Ref Range Status     Date Time Interrogation Session 02/08/2023 [id number]   Final     Implantable Pulse Generator Manufa* 02/08/2023 [employer]   Final     Implantable Pulse Generator Model 02/08/2023 2272 [employer]   Final     " Implantable Pulse Generator Serial* 02/08/2023 [id number]   Final     Type Interrogation Session 02/08/2023 Remote   Final     Re-programmed During Session 02/08/2023 NO   Final     Clinic Name 02/08/2023 [facility]   Final     Implantable Pulse Generator Type 02/08/2023 Pacemaker   Final     Implantable Pulse Generator Implan* 02/08/2023 [id number]   Final     Implantable Lead  02/08/2023 [employer]   Final     Implantable Lead Model 02/08/2023 2088TC Tendril STS   Final     Implantable Lead Serial Number 02/08/2023 [id number]   Final     Implantable Lead Implant Date 02/08/2023 [id number]   Final     Implantable Lead Polarity Type 02/08/2023 Bipolar Lead   Final     Implantable Lead Location Detail 1 02/08/2023 UNKNOWN   Final     Implantable Lead Location 02/08/2023 Right Atrium   Final     Implantable Lead  02/08/2023 [employer]   Final     Implantable Lead Model 02/08/2023 2088TC Tendril STS   Final     Implantable Lead Serial Number 02/08/2023 [id number]   Final     Implantable Lead Implant Date 02/08/2023 [id number]   Final     Implantable Lead Polarity Type 02/08/2023 Bipolar Lead   Final     Implantable Lead Location Detail 1 02/08/2023 UNKNOWN   Final     Implantable Lead Location 02/08/2023 Right Ventricle   Final     Sixto Setting Mode (NBG Code) 02/08/2023 DDDR   Final     Sixto Setting Lower Rate Limit 02/08/2023 60  [beats]/min Final     Sixto Setting Maximum Tracking Rate 02/08/2023 130  [beats]/min Final     Sixto Setting Maximum Sensor Rate 02/08/2023 130  [beats]/min Final     Sixto Setting CARIE Delay Low 02/08/2023 150  ms Final     Sixto Setting PAV Delay Low 02/08/2023 200  ms Final     Sixto Setting AT Mode Switch Rate 02/08/2023 180  [beats]/min Final     Sixto Setting AT Mode Switch Mode 02/08/2023 DDIR   Final     Lead Channel Setting Sensing Polar* 02/08/2023 Bipolar   Final     Lead Channel Setting Sensing Anode* 02/08/2023 Right Atrium   Final     Lead Channel  Setting Sensing Anode* 02/08/2023 Ring   Final     Lead Channel Setting Sensing Catho* 02/08/2023 Right Atrium   Final     Lead Channel Setting Sensing Catho* 02/08/2023 Tip   Final     Lead Channel Setting Sensing Sensi* 02/08/2023 0.5  mV Final     Lead Channel Setting Sensing Adapt* 02/08/2023 Fixed   Final     Lead Channel Setting Sensing Polar* 02/08/2023 Bipolar   Final     Lead Channel Setting Sensing Anode* 02/08/2023 Right Ventricle   Final     Lead Channel Setting Sensing Anode* 02/08/2023 Ring   Final     Lead Channel Setting Sensing Catho* 02/08/2023 Right Ventricle   Final     Lead Channel Setting Sensing Catho* 02/08/2023 Tip   Final     Lead Channel Setting Sensing Sensi* 02/08/2023 2.0  mV Final     Lead Channel Setting Sensing Adapt* 02/08/2023 Fixed   Final     Lead Channel Setting Pacing Polari* 02/08/2023 Bipolar   Final     Lead Channel Setting Pacing Anode * 02/08/2023 Right Atrium   Final     Lead Channel Setting Pacing Anode * 02/08/2023 Ring   Final     Lead Channel Setting Sensing Catho* 02/08/2023 Right Atrium   Final     Lead Channel Setting Sensing Catho* 02/08/2023 Tip   Final     Lead Channel Setting Pacing Pulse * 02/08/2023 0.4  ms Final     Lead Channel Setting Pacing Amplit* 02/08/2023 2.0  V Final     Lead Channel Setting Pacing Captur* 02/08/2023 Fixed Pacing   Final     Lead Channel Setting Pacing Polari* 02/08/2023 Bipolar   Final     Lead Channel Setting Pacing Anode * 02/08/2023 Right Ventricle   Final     Lead Channel Setting Pacing Anode * 02/08/2023 Ring   Final     Lead Channel Setting Sensing Catho* 02/08/2023 Right Ventricle   Final     Lead Channel Setting Sensing Catho* 02/08/2023 Tip   Final     Lead Channel Setting Pacing Pulse * 02/08/2023 0.4  ms Final     Lead Channel Setting Pacing Amplit* 02/08/2023 1.125   Final     Lead Channel Setting Pacing Captur* 02/08/2023 Adaptive   Final     Lead Channel Status 02/08/2023 Null   Final     Lead Channel Impedance Value  02/08/2023 430  ohm Final     Lead Channel Sensing Intrinsic Amp* 02/08/2023 5.0  mV Final     Lead Channel Pacing Threshold Ampl* 02/08/2023 0.5  V Final     Lead Channel Pacing Threshold Puls* 02/08/2023 0.4  ms Final     Lead Channel Status 02/08/2023 Null   Final     Lead Channel Impedance Value 02/08/2023 460  ohm Final     Lead Channel Sensing Intrinsic Amp* 02/08/2023 8.3  mV Final     Lead Channel Pacing Threshold Ampl* 02/08/2023 0.875  V Final     Lead Channel Pacing Threshold Puls* 02/08/2023 0.4  ms Final     Battery Date Time of Measurements 02/08/2023 20230205132704   Final     Battery Status 02/08/2023 Middle of Service   Final     Battery RRT Trigger 02/08/2023 When current voltage < 2.6 volts   Final     Battery Remaining Longevity 02/08/2023 110  mo Final     Battery Remaining Percentage 02/08/2023 86.0  % Final     Battery Voltage 02/08/2023 3.01  V Final     Statistic Heart Rate Date Time Sta* 02/08/2023 20230206012434   Final     Statistic Heart Rate Date Time End 02/08/2023 20230208040102   Final     Statistic Atrial Heart Rate Min 02/08/2023 50  [beats]/min Final     Statistic Atrial Heart Rate Mean 02/08/2023 71  [beats]/min Final     Statistic Atrial Heart Rate Max 02/08/2023 180  [beats]/min Final     Statistic Ventricular Heart Rate M* 02/08/2023 50  [beats]/min Final     Statistic Ventricular Heart Rate M* 02/08/2023 71  [beats]/min Final     Statistic Ventricular Heart Rate M* 02/08/2023 190  [beats]/min Final     Sixto Statistic Date Time Start 02/08/2023 20230206012434   Final     Sixto Statistic Date Time End 02/08/2023 20230208040102   Final     Sixto Statistic RA Percent Paced 02/08/2023 33.0  % Final     Sixto Statistic RV Percent Paced 02/08/2023 1.0  % Final

## 2023-02-09 NOTE — PROGRESS NOTES
"PT WILL MEET YOU IN [id number]    BEFORE COVID PT HAD A PROCEDURE... SHE RECALLS A \"LOOP\" OF SORTS.  SHE DOESN'T REMEMBER MUCH      Pat is a 72 year old who is being evaluated via a billable video visit.      How would you like to obtain your AVS? [facility]  If the video visit is dropped, the invitation should be resent by: Text to cell phone: [phone]  Will anyone else be joining your video visit? No        Video-Visit Details    Type of service:  Video Visit   Video Start Time: 8:58 AM  Video End Time:9:16 AM    Originating Location (pt. Location): Home    Distant Location (provider location):  Off-site  Platform used for Video Visit: [facility]     Urology Clinic    Name: Lauren Nick    MRN: [id number]   YOB: 1950  Accompanied at today's visit by:self              Assessment and Plan:   72 year old female with nocturia, nocturnal enuresis, night sweats, hx of UTIs, history of breast cancer    - Will have patient follow-up for exam and PVR check.  - Will try to get records from [facility] in [facility] to review what procedures she had done. Question if was some type of urethral dilatation based on patient's description.  - Avoid bladder irritants.  - Avoid fluids before bed.  - Discussed correlation between night time urinary symptoms and sleep apnea. Will refer for possible sleep study.  - Advised patient to f/u with her PCP in regards to her night sweats as this is not a typical urologic symptom.   - Advise to start daily probiotic and cranberry supplement for UTI prevention.  - Advise to contact clinic if develops UTIs in the future.   - Information given on AVS in regards to further UTI prevention.     Orders Placed This Encounter   Procedures     Adult Sleep Eval & Management Referral     After discussing the assessment and plan with patient, patient verbalized understanding and agreed to the above plan. All questions answered.     45 minutes were spent today on the date " "of the encounter in reviewing the EMR, direct patient care, coordination of care and documentation in addition to placing orders.     Judy Roberts PA-C  February 9, 2023    Patient Care Team:  Wegener, Joel Daniel Irwin, MD as PCP - General (Family Medicine)  Wegener, Joel Daniel Irwin, MD as Assigned PCP  Reggie King MD as Assigned Heart and Vascular Provider  Rohit Thao MD as MD (Critical Care)  Rohit Thao MD as Assigned Pulmonology Provider  Keo Lee MD as MD (Dermatology)  Keo Lee MD as Assigned Surgical Provider  Judy Roberts PA-C as Physician Assistant  WEGENER, JOEL DANIEL IRWIN          Chief Complaint:   nocturia          History of Present Illness:   February 9, 2023    HISTORY: Lauren Nick is a 72 year old female as a new consultation for concerns of occasional night sweats and nocturnal enuresis over the past couple months. Referred by Dr. Wegener. Reports long standing history of nocturia for years, but the nocturnal enuresis and night sweats are new symptoms. Does state that her night time urinary symptoms have improved recently. States she still has night sweats that leave her \"drenched\" at night but no longer occur at same time as her nocturnal enuresis episodes. Patient reports voiding every couple hours during the day and 2-3x/night. Denies KENYA and UUI. Denies history of sleep apnea. Unsure if snores at night.States she does not sleep well at night leaving her not well rested, ever since her chemo treatment years ago.  Feels like they do not empty completely after voiding. Reports having a \"loop\" somewhere that makes it harder to void. Was seen at [facility] in [facility], for a procedure to help void better right before COVID pandemic. She is unsure what the procedure was and where the \"loop\" was anatomically. Was suppose to have a second procedure but never underwent this procedure due to COVID pandemic. Feels like " she emptied her bladder better after the procedure but now her symptoms of incomplete bladder emptying have returned. Per chart review, no urologic imaging in EMR. Drinks 1 cup of coffee per day and soda daily. Reports hx of Jina but none since moving back to [place] from [place]. Gets about 1-2 UTIs per year. Typical UTI symptoms include dysuria, lower abdominal cramping, urinary urgency/frequency. Denies gross hematuria or hx of kidney stones.  Denies prolapse symptoms. Not sexually active. Bowels are regular. Hx of cholecystectomy. PMH is significant for hypothyroidism, aortic valve replacement, thoracic aortic aneurysm, cardiac pacemaker. She also has hx of breast cancer s/p lumpectomy, radiation and chemo. No longer follows with oncology. Denies hx of abnormal pap smears. Went through menopause in her 30s after her chemo treatment. Denies PMB. Former smoker from age 18 to 28 years. Patient voices no other concerns at this time.              Past Medical History:     Past Medical History:   Diagnosis Date     Arthritis Some in knees, hips and shoulder     Cancer (H) Breast cancer 9/89 lumpectomy, 6 months of chemo     Depressive disorder Put on meds 20 years ago for this.     Heart disease Bicuspid valve diagnosed in my 50 s     Mumps      Spider veins      Thyroid disease 02/21 diagnosed     Uncomplicated asthma A few years ago            Past Surgical History:     Past Surgical History:   Procedure Laterality Date     ABDOMEN SURGERY  Gallbladder    removed in 2010     BIOPSY  10/2018     BREAST SURGERY  09/25/1989     CARDIAC SURGERY  03/22/2021     CHOLECYSTECTOMY  03/2010     COLONOSCOPY  03/2019     COLONOSCOPY N/A 11/10/2022    Procedure: COLONOSCOPY, WITH POLYPECTOMY AND BIOPSY;  Surgeon: Rafa Parks MD;  Location:  GI     ENT SURGERY  06/2011     EYE SURGERY  09/2015     THORACIC SURGERY  03/21    valve replacement            Social History:     Social History     Tobacco Use     Smoking status:  Former     Packs/day: 1.00     Years: 10.00     Pack years: 10.00     Types: Cigarettes     Start date: 1969     Quit date: 10/1/1979     Years since quittin.3     Smokeless tobacco: Never   Substance Use Topics     Alcohol use: Yes     Comment: occ            Family History:     Family History   Problem Relation Age of Onset     Hypertension Mother      Cancer Mother      Other Cancer Mother          if kidney cancer at age 86     Other Cancer Father          of stomach cancer age 43     Heart Disease Paternal Grandmother      Hypertension Paternal Grandmother      Obesity Paternal Grandmother      Hypertension Sister      Hyperlipidemia Sister      Anesthesia Reaction Son      Hypertension Son      Heart Disease Daughter      Heart Disease Sister      Hypertension Sister      Hyperlipidemia Sister      Colon Cancer Sister      Diabetes Sister      Breast Cancer Sister      Coronary Artery Disease Sister      Hyperlipidemia Sister      Thyroid Disease Sister      Other Cancer Other         Neuroblastoma age 5              Allergies:     Allergies   Allergen Reactions     Penicillins Hives     Statins [Hmg-Coa-R Inhibitors]      Myalgias to multiple statins            Medications:     Current Outpatient Medications   Medication Sig     albuterol (PROAIR HFA/PROVENTIL HFA/VENTOLIN HFA) 108 (90 Base) MCG/ACT inhaler INHALE 2 PUFFS INTO THE LUNGS EVERY 6 HOURS     aspirin 81 MG EC tablet Take 81 mg by mouth daily     coenzyme Q-10 (CO-Q10) 50 MG capsule Take 2 capsules (100 mg) by mouth daily     escitalopram (LEXAPRO) 20 MG tablet TAKE 1 TABLET BY MOUTH EVERY DAY     ezetimibe (ZETIA) 10 MG tablet TAKE 1 TABLET (10 MG) BY MOUTH DAILY.     fluticasone (FLONASE) 50 MCG/ACT nasal spray Spray 2 sprays into both nostrils daily     fluticasone-salmeterol (ADVAIR) 500-50 MCG/DOSE inhaler Inhale 1 puff into the lungs every 12 hours     levothyroxine (SYNTHROID/LEVOTHROID) 50 MCG tablet TAKE 1 TABLET BY MOUTH  "EVERY DAY     LORazepam (ATIVAN) 1 MG tablet TAKE 1 TABLET (1 MG) BY MOUTH NIGHTLY AS NEEDED FOR SLEEP     losartan (COZAAR) 25 MG tablet Take 1 tablet (25 mg) by mouth daily     metoprolol succinate ER (TOPROL-XL) 25 MG 24 hr tablet Take 1 tablet (25 mg) by mouth 2 times daily     spacer (OPTICHAMBER MURALI) holding chamber Use with inhaler     STATIN NOT PRESCRIBED (INTENTIONAL) Please choose reason not prescribed from choices below.     vitamin C (ASCORBIC ACID) 1000 MG TABS Take 1,000 mg by mouth daily     zinc gluconate 50 MG tablet Take 50 mg by mouth daily     Current Facility-Administered Medications   Medication     lidocaine (PF) (XYLOCAINE) 1 % injection 9 mL             Review of Systems:    ROS: 14 point ROS neg other than the symptoms noted above in the HPI.          Physical Exam:   Height 1.6 m (5' 3\"), weight 62.6 kg (138 lb), not currently breastfeeding.  5' 3\", Body mass index is 24.45 kg/m ., 138 lbs 0 oz  Gen appearance: Age-appropriate appearing female in NAD.   HEENT:  EOMI, conjunctiva clear/white. Normal ROM of neck for age.   Psych:  alert , In no acute distress.  Neuro:  A&Ox3  Resp:  Normal respiratory effort; not in acute respiratory distress.          Data:    Labs:  Creatinine   Date Value Ref Range Status   11/21/2022 0.81 0.51 - 0.95 mg/dL Final       Ancillary Procedure on 02/08/2023   Component Date Value Ref Range Status     Date Time Interrogation Session 02/08/2023 [id number]   Final     Implantable Pulse Generator Manufa* 02/08/2023 [employer]   Final     Implantable Pulse Generator Model 02/08/2023 2272 [employer] MRI   Final     Implantable Pulse Generator Serial* 02/08/2023 [id number]   Final     Type Interrogation Session 02/08/2023 Remote   Final     Re-programmed During Session 02/08/2023 NO   Final     Clinic Name 02/08/2023 [facility]   Final     Implantable Pulse Generator Type 02/08/2023 Pacemaker   Final     Implantable Pulse Generator Implan* 02/08/2023 [id number] "   Final     Implantable Lead  02/08/2023 [employer]   Final     Implantable Lead Model 02/08/2023 2088TC Tendril STS   Final     Implantable Lead Serial Number 02/08/2023 [id number]   Final     Implantable Lead Implant Date 02/08/2023 [id number]   Final     Implantable Lead Polarity Type 02/08/2023 Bipolar Lead   Final     Implantable Lead Location Detail 1 02/08/2023 UNKNOWN   Final     Implantable Lead Location 02/08/2023 Right Atrium   Final     Implantable Lead  02/08/2023 [employer]   Final     Implantable Lead Model 02/08/2023 2088TC Tendril STS   Final     Implantable Lead Serial Number 02/08/2023 [id number]   Final     Implantable Lead Implant Date 02/08/2023 [id number]   Final     Implantable Lead Polarity Type 02/08/2023 Bipolar Lead   Final     Implantable Lead Location Detail 1 02/08/2023 UNKNOWN   Final     Implantable Lead Location 02/08/2023 Right Ventricle   Final     Sixto Setting Mode (NBG Code) 02/08/2023 DDDR   Final     Sixot Setting Lower Rate Limit 02/08/2023 60  [beats]/min Final     Sixto Setting Maximum Tracking Rate 02/08/2023 130  [beats]/min Final     Sixto Setting Maximum Sensor Rate 02/08/2023 130  [beats]/min Final     Sixto Setting CARIE Delay Low 02/08/2023 150  ms Final     Sixto Setting PAV Delay Low 02/08/2023 200  ms Final     Sixto Setting AT Mode Switch Rate 02/08/2023 180  [beats]/min Final     Sixto Setting AT Mode Switch Mode 02/08/2023 DDIR   Final     Lead Channel Setting Sensing Polar* 02/08/2023 Bipolar   Final     Lead Channel Setting Sensing Anode* 02/08/2023 Right Atrium   Final     Lead Channel Setting Sensing Anode* 02/08/2023 Ring   Final     Lead Channel Setting Sensing Catho* 02/08/2023 Right Atrium   Final     Lead Channel Setting Sensing Catho* 02/08/2023 Tip   Final     Lead Channel Setting Sensing Sensi* 02/08/2023 0.5  mV Final     Lead Channel Setting Sensing Adapt* 02/08/2023 Fixed   Final     Lead Channel Setting Sensing  Polar* 02/08/2023 Bipolar   Final     Lead Channel Setting Sensing Anode* 02/08/2023 Right Ventricle   Final     Lead Channel Setting Sensing Anode* 02/08/2023 Ring   Final     Lead Channel Setting Sensing Catho* 02/08/2023 Right Ventricle   Final     Lead Channel Setting Sensing Catho* 02/08/2023 Tip   Final     Lead Channel Setting Sensing Sensi* 02/08/2023 2.0  mV Final     Lead Channel Setting Sensing Adapt* 02/08/2023 Fixed   Final     Lead Channel Setting Pacing Polari* 02/08/2023 Bipolar   Final     Lead Channel Setting Pacing Anode * 02/08/2023 Right Atrium   Final     Lead Channel Setting Pacing Anode * 02/08/2023 Ring   Final     Lead Channel Setting Sensing Catho* 02/08/2023 Right Atrium   Final     Lead Channel Setting Sensing Catho* 02/08/2023 Tip   Final     Lead Channel Setting Pacing Pulse * 02/08/2023 0.4  ms Final     Lead Channel Setting Pacing Amplit* 02/08/2023 2.0  V Final     Lead Channel Setting Pacing Captur* 02/08/2023 Fixed Pacing   Final     Lead Channel Setting Pacing Polari* 02/08/2023 Bipolar   Final     Lead Channel Setting Pacing Anode * 02/08/2023 Right Ventricle   Final     Lead Channel Setting Pacing Anode * 02/08/2023 Ring   Final     Lead Channel Setting Sensing Catho* 02/08/2023 Right Ventricle   Final     Lead Channel Setting Sensing Catho* 02/08/2023 Tip   Final     Lead Channel Setting Pacing Pulse * 02/08/2023 0.4  ms Final     Lead Channel Setting Pacing Amplit* 02/08/2023 1.125   Final     Lead Channel Setting Pacing Captur* 02/08/2023 Adaptive   Final     Lead Channel Status 02/08/2023 Null   Final     Lead Channel Impedance Value 02/08/2023 430  ohm Final     Lead Channel Sensing Intrinsic Amp* 02/08/2023 5.0  mV Final     Lead Channel Pacing Threshold Ampl* 02/08/2023 0.5  V Final     Lead Channel Pacing Threshold Puls* 02/08/2023 0.4  ms Final     Lead Channel Status 02/08/2023 Null   Final     Lead Channel Impedance Value 02/08/2023 460  ohm Final     Lead Channel  Sensing Intrinsic Amp* 02/08/2023 8.3  mV Final     Lead Channel Pacing Threshold Ampl* 02/08/2023 0.875  V Final     Lead Channel Pacing Threshold Puls* 02/08/2023 0.4  ms Final     Battery Date Time of Measurements 02/08/2023 20230205132704   Final     Battery Status 02/08/2023 Middle of Service   Final     Battery RRT Trigger 02/08/2023 When current voltage < 2.6 volts   Final     Battery Remaining Longevity 02/08/2023 110  mo Final     Battery Remaining Percentage 02/08/2023 86.0  % Final     Battery Voltage 02/08/2023 3.01  V Final     Statistic Heart Rate Date Time Sta* 02/08/2023 20230206012434   Final     Statistic Heart Rate Date Time End 02/08/2023 20230208040102   Final     Statistic Atrial Heart Rate Min 02/08/2023 50  [beats]/min Final     Statistic Atrial Heart Rate Mean 02/08/2023 71  [beats]/min Final     Statistic Atrial Heart Rate Max 02/08/2023 180  [beats]/min Final     Statistic Ventricular Heart Rate M* 02/08/2023 50  [beats]/min Final     Statistic Ventricular Heart Rate M* 02/08/2023 71  [beats]/min Final     Statistic Ventricular Heart Rate M* 02/08/2023 190  [beats]/min Final     Sixto Statistic Date Time Start 02/08/2023 20230206012434   Final     Sixto Statistic Date Time End 02/08/2023 20230208040102   Final     Sixto Statistic RA Percent Paced 02/08/2023 33.0  % Final     Sixto Statistic RV Percent Paced 02/08/2023 1.0  % Final

## 2023-02-10 ENCOUNTER — NURSE TRIAGE (OUTPATIENT)
Dept: NURSING | Facility: CLINIC | Age: 73
End: 2023-02-10
Payer: MEDICARE

## 2023-02-10 ENCOUNTER — MYC MEDICAL ADVICE (OUTPATIENT)
Dept: FAMILY MEDICINE | Facility: CLINIC | Age: 73
End: 2023-02-10
Payer: MEDICARE

## 2023-02-10 PROCEDURE — C9803 HOPD COVID-19 SPEC COLLECT: HCPCS

## 2023-02-10 PROCEDURE — 99285 EMERGENCY DEPT VISIT HI MDM: CPT | Mod: CS,25

## 2023-02-10 PROCEDURE — 87637 SARSCOV2&INF A&B&RSV AMP PRB: CPT | Performed by: EMERGENCY MEDICINE

## 2023-02-11 ENCOUNTER — HOSPITAL ENCOUNTER (EMERGENCY)
Facility: CLINIC | Age: 73
Discharge: HOME | End: 2023-02-11
Attending: EMERGENCY MEDICINE | Admitting: EMERGENCY MEDICINE
Payer: MEDICARE

## 2023-02-11 ENCOUNTER — APPOINTMENT (OUTPATIENT)
Dept: GENERAL RADIOLOGY | Facility: CLINIC | Age: 73
End: 2023-02-11
Attending: EMERGENCY MEDICINE
Payer: MEDICARE

## 2023-02-11 ENCOUNTER — PATIENT OUTREACH (OUTPATIENT)
Dept: CARE COORDINATION | Facility: CLINIC | Age: 73
End: 2023-02-11

## 2023-02-11 VITALS
OXYGEN SATURATION: 100 % | SYSTOLIC BLOOD PRESSURE: 151 MMHG | TEMPERATURE: 98.4 F | BODY MASS INDEX: 24.59 KG/M2 | DIASTOLIC BLOOD PRESSURE: 71 MMHG | HEART RATE: 71 BPM | WEIGHT: 138.8 LBS | HEIGHT: 63 IN | RESPIRATION RATE: 25 BRPM

## 2023-02-11 DIAGNOSIS — U07.1 INFECTION DUE TO 2019 NOVEL CORONAVIRUS: ICD-10-CM

## 2023-02-11 LAB
ANION GAP SERPL CALCULATED.3IONS-SCNC: 10 MMOL/L (ref 7–15)
ATRIAL RATE - MUSE: 63 BPM
BASOPHILS # BLD AUTO: 0 10E3/UL (ref 0–0.2)
BASOPHILS NFR BLD AUTO: 0 %
BUN SERPL-MCNC: 25.5 MG/DL (ref 8–23)
CALCIUM SERPL-MCNC: 9 MG/DL (ref 8.8–10.2)
CHLORIDE SERPL-SCNC: 101 MMOL/L (ref 98–107)
CREAT SERPL-MCNC: 0.89 MG/DL (ref 0.51–0.95)
D DIMER PPP FEU-MCNC: 0.45 UG/ML FEU (ref 0–0.5)
DEPRECATED HCO3 PLAS-SCNC: 28 MMOL/L (ref 22–29)
DIASTOLIC BLOOD PRESSURE - MUSE: NORMAL MMHG
EOSINOPHIL # BLD AUTO: 0.1 10E3/UL (ref 0–0.7)
EOSINOPHIL NFR BLD AUTO: 2 %
ERYTHROCYTE [DISTWIDTH] IN BLOOD BY AUTOMATED COUNT: 13.8 % (ref 10–15)
FLUAV RNA SPEC QL NAA+PROBE: NEGATIVE
FLUBV RNA RESP QL NAA+PROBE: NEGATIVE
GFR SERPL CREATININE-BSD FRML MDRD: 69 ML/MIN/1.73M2
GLUCOSE SERPL-MCNC: 90 MG/DL (ref 70–99)
HCT VFR BLD AUTO: 37.5 % (ref 35–47)
HGB BLD-MCNC: 11.3 G/DL (ref 11.7–15.7)
IMM GRANULOCYTES # BLD: 0.1 10E3/UL
IMM GRANULOCYTES NFR BLD: 1 %
INTERPRETATION ECG - MUSE: NORMAL
LYMPHOCYTES # BLD AUTO: 1.3 10E3/UL (ref 0.8–5.3)
LYMPHOCYTES NFR BLD AUTO: 16 %
MCH RBC QN AUTO: 27.2 PG (ref 26.5–33)
MCHC RBC AUTO-ENTMCNC: 30.1 G/DL (ref 31.5–36.5)
MCV RBC AUTO: 90 FL (ref 78–100)
MONOCYTES # BLD AUTO: 0.7 10E3/UL (ref 0–1.3)
MONOCYTES NFR BLD AUTO: 9 %
NEUTROPHILS # BLD AUTO: 5.5 10E3/UL (ref 1.6–8.3)
NEUTROPHILS NFR BLD AUTO: 72 %
NRBC # BLD AUTO: 0 10E3/UL
NRBC BLD AUTO-RTO: 0 /100
NT-PROBNP SERPL-MCNC: 106 PG/ML (ref 0–900)
P AXIS - MUSE: 28 DEGREES
PLATELET # BLD AUTO: 158 10E3/UL (ref 150–450)
POTASSIUM SERPL-SCNC: 4.2 MMOL/L (ref 3.4–5.3)
PR INTERVAL - MUSE: 182 MS
QRS DURATION - MUSE: 88 MS
QT - MUSE: 410 MS
QTC - MUSE: 419 MS
R AXIS - MUSE: 72 DEGREES
RBC # BLD AUTO: 4.15 10E6/UL (ref 3.8–5.2)
RSV RNA SPEC NAA+PROBE: NEGATIVE
SARS-COV-2 RNA RESP QL NAA+PROBE: POSITIVE
SODIUM SERPL-SCNC: 139 MMOL/L (ref 136–145)
SYSTOLIC BLOOD PRESSURE - MUSE: NORMAL MMHG
T AXIS - MUSE: -8 DEGREES
TROPONIN T SERPL HS-MCNC: 7 NG/L
VENTRICULAR RATE- MUSE: 63 BPM
WBC # BLD AUTO: 7.8 10E3/UL (ref 4–11)

## 2023-02-11 PROCEDURE — 85379 FIBRIN DEGRADATION QUANT: CPT | Performed by: EMERGENCY MEDICINE

## 2023-02-11 PROCEDURE — 85025 COMPLETE CBC W/AUTO DIFF WBC: CPT | Performed by: EMERGENCY MEDICINE

## 2023-02-11 PROCEDURE — 84484 ASSAY OF TROPONIN QUANT: CPT | Performed by: EMERGENCY MEDICINE

## 2023-02-11 PROCEDURE — 80048 BASIC METABOLIC PNL TOTAL CA: CPT | Performed by: EMERGENCY MEDICINE

## 2023-02-11 PROCEDURE — 36415 COLL VENOUS BLD VENIPUNCTURE: CPT | Performed by: EMERGENCY MEDICINE

## 2023-02-11 PROCEDURE — 83880 ASSAY OF NATRIURETIC PEPTIDE: CPT | Performed by: EMERGENCY MEDICINE

## 2023-02-11 PROCEDURE — 93005 ELECTROCARDIOGRAM TRACING: CPT

## 2023-02-11 PROCEDURE — 71046 X-RAY EXAM CHEST 2 VIEWS: CPT

## 2023-02-11 ASSESSMENT — ACTIVITIES OF DAILY LIVING (ADL): ADLS_ACUITY_SCORE: 35

## 2023-02-11 NOTE — ED TRIAGE NOTES
SOB and cough; onset yesterday. Pt called PCP who directed her to come to ED. Pt has hx of asthma and reports inhaler hasn't been helping. Pt reports taking at home Covid test with a negative result.      Triage Assessment     Row Name 02/10/23 [id number]       Triage Assessment (Adult)    Airway WDL WDL       Respiratory WDL    Respiratory WDL WDL       Skin Circulation/Temperature WDL    Skin Circulation/Temperature WDL WDL       Cardiac WDL    Cardiac WDL WDL       Peripheral/Neurovascular WDL    Peripheral Neurovascular WDL WDL       Cognitive/Neuro/Behavioral WDL    Cognitive/Neuro/Behavioral WDL WDL

## 2023-02-11 NOTE — PROGRESS NOTES
Clinic Care Coordination Contact    Referral Type: Virtual Home Monitoring - Epic Care Companion    Patient referred for Virtual Home Monitoring Program for either COVID-19 or Community Acquired Pneumonia diagnosis following recent [facility] ED visit.  Epic Care Companion referral was also placed by provider.    Criteria for Virtual Home Monitoring telephone outreach is not met after review of ED encounter/ED provider note because:    1) ED provider note indicates assessment was negative for respiratory distress. O2 sats were stable throughout course of ED visit per chart review.      2) Patient was not discharged with new supplemental oxygen.    Per notes, ED provider and/or ED care team discussed appropriate follow up guidelines with patient prior to discharge or reflected these instructions on AVS.       Care Companion team remains available to support patient via [employer] account once activated by patient.     Coco Chamberlain, STORMY  [facility]  - RN Care Coordinator

## 2023-02-11 NOTE — TELEPHONE ENCOUNTER
Triage Call:    Patient calling to request refill of prednisone and azithromycin.  She reports that she developed cough and runny nose while in [place] on 1/29/23 and was prescribed prednisone and azithromycin, her symptoms improved but have now returned.  She has a history of asthma and denies improvement of cough with albuterol.  She denies abnormal breath sounds, fever, confusion, weakness, or difficulty breathing.  She denies taking a Covid test.      According to the protocol, patient should schedule virtual visit.  Advised patient to take a Covid test at home and call back to advise if positive or negative to continue with triage.  Patient verbalizes understanding and agrees with plan of care.     Leonela Sanchez RN  02/10/23 9:31 PM  [facility] Nurse Advisor    Reason for Disposition    [1] HIGH RISK for severe COVID complications (e.g., weak immune system, age > 64 years, obesity with BMI > 25, pregnant, chronic lung disease or other chronic medical condition) AND [2] COVID symptoms (e.g., cough, fever)  (Exceptions: Already seen by PCP and no new or worsening symptoms.)    Additional Information    Negative: SEVERE difficulty breathing (e.g., struggling for each breath, speaks in single words)    Negative: Difficult to awaken or acting confused (e.g., disoriented, slurred speech)    Negative: Bluish (or gray) lips or face now    Negative: Shock suspected (e.g., cold/pale/clammy skin, too weak to stand, low BP, rapid pulse)    Negative: Sounds like a life-threatening emergency to the triager    Negative: [1] Diagnosed or suspected COVID-19 AND [2] symptoms lasting 3 or more weeks    Negative: [1] COVID-19 exposure AND [2] no symptoms    Negative: COVID-19 vaccine reaction suspected (e.g., fever, headache, muscle aches) occurring 1 to 3 days after getting vaccine    Negative: COVID-19 vaccine, questions about    Negative: [1] Lives with someone known to have influenza (flu test positive) AND [2]  flu-like symptoms (e.g., cough, runny nose, sore throat, SOB; with or without fever)    Negative: [1] Adult with possible COVID-19 symptoms AND [2] triager concerned about severity of symptoms or other causes    Negative: COVID-19 and breastfeeding, questions about    Negative: SEVERE or constant chest pain or pressure  (Exception: Mild central chest pain, present only when coughing.)    Negative: MODERATE difficulty breathing (e.g., speaks in phrases, SOB even at rest, pulse 100-120)    Negative: [1] Headache AND [2] stiff neck (can't touch chin to chest)    Negative: Oxygen level (e.g., pulse oximetry) 90 percent or lower    Negative: Chest pain or pressure    Negative: Patient sounds very sick or weak to the triager    Negative: MILD difficulty breathing (e.g., minimal/no SOB at rest, SOB with walking, pulse <100)    Negative: Fever > 103 F (39.4 C)    Negative: [1] Fever > 101 F (38.3 C) AND [2] age > 60 years    Negative: [1] Fever > 100.0 F (37.8 C) AND [2] bedridden (e.g., nursing home patient, CVA, chronic illness, recovering from surgery)    Protocols used: CORONAVIRUS (COVID-19) DIAGNOSED OR [id number]

## 2023-02-11 NOTE — ED PROVIDER NOTES
History     Chief Complaint:  Shortness of Breath and Cough       HPI   Lauren Nick is a 72 year old female with a history of asthma, breast cancer, valve replacement who presents with coughing she notes last week they traveled down to [place] and when she got down there she developed loss of voice.  She called her primary care doctor and was given a course of prednisone and erythromycin.  By the end of the week her symptoms had subsided.  She then flew back on Sunday.  They took care of their autistic grandson today.  Tonight she developed coughing and tried using her inhaler but that seemed to make it worse.  She denies fevers or chills.  She called back into her care provider and was directed here as they could not get a hold of anyone and also instructed her to take a COVID.  Her COVID test was negative.  She does not have a nebulizer at home.  Denies fevers or chills.  Denies chest pain.  Denies ongoing sore throat.  Does have a history of valve surgery but denies any history of heart failure or peripheral edema.      Independent Historian: Patient      Review of External Notes: Chart review    ROS:  Review of Systems   Cough and shortness of breath negative for chest pain leg pain swelling fevers chills runny nose sore throat all other systems negative    Allergies:  Penicillins  Statins [Hmg-Coa-R Inhibitors]     Medications:    nirmatrelvir and ritonavir (PAXLOVID) therapy pack  albuterol (PROAIR HFA/PROVENTIL HFA/VENTOLIN HFA) 108 (90 Base) MCG/ACT inhaler  aspirin 81 MG EC tablet  coenzyme Q-10 (CO-Q10) 50 MG capsule  escitalopram (LEXAPRO) 20 MG tablet  ezetimibe (ZETIA) 10 MG tablet  fluticasone (FLONASE) 50 MCG/ACT nasal spray  fluticasone-salmeterol (ADVAIR) 500-50 MCG/DOSE inhaler  levothyroxine (SYNTHROID/LEVOTHROID) 50 MCG tablet  LORazepam (ATIVAN) 1 MG tablet  losartan (COZAAR) 25 MG tablet  metoprolol succinate ER (TOPROL-XL) 25 MG 24 hr tablet  spacer (OPTICHAMBER MURALI) holding  chamber  STATIN NOT PRESCRIBED (INTENTIONAL)  vitamin C (ASCORBIC ACID) 1000 MG TABS  zinc gluconate 50 MG tablet        Past Medical History:    Past Medical History:   Diagnosis Date     Arthritis Some in knees, hips and shoulder     Cancer (H) Breast cancer 9/89 lumpectomy, 6 months of chemo     Depressive disorder Put on meds 20 years ago for this.     Heart disease Bicuspid valve diagnosed in my 50 s     Mumps      Spider veins      Thyroid disease 02/21 diagnosed     Uncomplicated asthma A few years ago       Past Surgical History:    Past Surgical History:   Procedure Laterality Date     ABDOMEN SURGERY  Gallbladder    removed in 2010     BIOPSY  10/2018     BREAST SURGERY  09/25/1989     CARDIAC SURGERY  03/22/2021     CHOLECYSTECTOMY  03/2010     COLONOSCOPY  03/2019     COLONOSCOPY N/A 11/10/2022    Procedure: COLONOSCOPY, WITH POLYPECTOMY AND BIOPSY;  Surgeon: Rafa Parks MD;  Location:  GI     ENT SURGERY  06/2011     EYE SURGERY  09/2015     THORACIC SURGERY  03/21    valve replacement        Family History:    family history includes Anesthesia Reaction in her son; Breast Cancer in her sister; Cancer in her mother; Colon Cancer in her sister; Coronary Artery Disease in her sister; Diabetes in her sister; Heart Disease in her daughter, paternal grandmother, and sister; Hyperlipidemia in her sister, sister, and sister; Hypertension in her mother, paternal grandmother, sister, sister, and son; Obesity in her paternal grandmother; Other Cancer in her father, mother, and another family member; Thyroid Disease in her sister.    Social History:   reports that she quit smoking about 43 years ago. Her smoking use included cigarettes. She started smoking about 53 years ago. She has a 10.00 pack-year smoking history. She has never used smokeless tobacco. She reports current alcohol use. She reports that she does not use drugs.  PCP: Wegener, Joel Daniel Irwin     Physical Exam     Patient Vitals for the past  "24 hrs:   BP Temp Temp src Pulse Resp SpO2 Height Weight   02/11/23 0200 (!) 151/71 -- -- 71 25 100 % -- --   02/11/23 0120 -- -- -- 64 14 98 % -- --   02/11/23 0035 -- -- -- 62 18 99 % -- --   02/11/23 0030 139/71 -- -- -- -- -- -- --   02/10/23 2339 (!) 149/62 98.4  F (36.9  C) Oral 64 18 -- 1.6 m (5' 3\") 63 kg (138 lb 12.8 oz)        Physical Exam  GENERAL: well developed, pleasant  HEAD: atraumatic  EYES: pupils reactive, extraocular muscles intact, conjunctivae normal  ENT:  mucus membranes moist  NECK:  trachea midline, normal range of motion  RESPIRATORY: no tachypnea, breath sounds clear to auscultation   CVS: normal S1/S2, 3 out of 6 systolic murmur, intact distal pulses, no significant peripheral edema  ABDOMEN: soft, nontender, nondistention  MUSCULOSKELETAL: no deformities  SKIN: warm and dry, no acute rashes or ulceration  NEURO: GCS 15, cranial nerves intact, alert and oriented x3  PSYCH:  Mood/affect normal      Emergency Department Course     EKG shows sinus rhythm rate of 63 with  QRS 88 and QT QTc of 410 419 with normal sinus rhythm without ischemic changes    Imaging:  XR Chest 2 Views   Final Result   IMPRESSION: Heart size and pulmonary vascularity normal. Slight elevation right hemidiaphragm and minimal atelectasis. Lungs otherwise clear. Aortic valve prosthesis and atrial appendage occlusion device. Pacemaker lead tips right atrium and right    ventricle. Surgical clips left axilla. Clips right upper quadrant.        Report per radiology    Laboratory:  Labs Ordered and Resulted from Time of ED Arrival to Time of ED Departure   INFLUENZA A/B & SARS-COV2 PCR MULTIPLEX - Abnormal       Result Value    Influenza A PCR Negative      Influenza B PCR Negative      RSV PCR Negative      SARS CoV2 PCR Positive (*)    BASIC METABOLIC PANEL - Abnormal    Sodium 139      Potassium 4.2      Chloride 101      Carbon Dioxide (CO2) 28      Anion Gap 10      Urea Nitrogen 25.5 (*)     Creatinine 0.89   "    Calcium 9.0      Glucose 90      GFR Estimate 69     CBC WITH PLATELETS AND DIFFERENTIAL - Abnormal    WBC Count 7.8      RBC Count 4.15      Hemoglobin 11.3 (*)     Hematocrit 37.5      MCV 90      MCH 27.2      MCHC 30.1 (*)     RDW 13.8      Platelet Count 158      % Neutrophils 72      % Lymphocytes 16      % Monocytes 9      % Eosinophils 2      % Basophils 0      % Immature Granulocytes 1      NRBCs per 100 WBC 0      Absolute Neutrophils 5.5      Absolute Lymphocytes 1.3      Absolute Monocytes 0.7      Absolute Eosinophils 0.1      Absolute Basophils 0.0      Absolute Immature Granulocytes 0.1      Absolute NRBCs 0.0     TROPONIN T, HIGH SENSITIVITY - Normal    Troponin T, High Sensitivity 7     NT PROBNP INPATIENT - Normal    N terminal Pro BNP Inpatient 106     D DIMER QUANTITATIVE - Normal    D-Dimer Quantitative 0.45          Procedures   na    Emergency Department Course & Assessments:             Interventions:  Medications - No data to display     Independent Interpretation (X-rays, CTs, rhythm strip):  X-ray    Consultations/Discussion of Management or Tests:  na        Social Determinants of Health affecting care:         Assessments:      Disposition:  The patient was discharged to home.     Impression & Plan    CMS Diagnoses: None            Medical Decision Making:  Patient presents with cough and history of asthma as well as underlying valve replacement.  Certainly infectious etiology is high in the differential although she took a COVID test at home that was negative.  She has had some recent trips and travel so PE is considered given her underlying breast cancer as well as heart failure given underlying valve history although does not carry this diagnosis.  Asthma exacerbation is considered as well.  COVID is positive.  Rest of her work-up is negative.  She is not hypoxic.  Went through a med check her with her list of medications in terms of paxlovid and no adjustments need to be  done.    Critical Care time:  was 0 minutes for this patient excluding procedures.    Diagnosis:    ICD-10-CM    1. Infection due to 2019 novel coronavirus  U07.1 MyChart Care Companion COVID Pathway     [facility] Virtual Home Monitoring Referral: COVID           Discharge Medications:  Discharge Medication List as of 2/11/2023  2:18 AM      START taking these medications    Details   nirmatrelvir and ritonavir (PAXLOVID) therapy pack Take 3 tablets by mouth 2 times daily for 5 days Take 2 Nirmatrelvir tablets and 1 Ritonavir tablet twice daily for 5 days., Disp-30 tablet, R-0, E-PrescribeIf Paxlovid unavailable and no Molnupiravir ordered, refer patient to MNRAP at [email or web address]. If Molnupiravir ordered along with Paxlovid, dispense Molnupiravir and review embryotoxicity.                Boogie Murdock MD    2/11/2023   Boogie Murdock MD Adams, Shaun L, MD  02/11/23 0242

## 2023-02-11 NOTE — TELEPHONE ENCOUNTER
Nurse Triage SBAR    Situation: Returning cough    Background: Patient calling. Pt took a covid test at home - Covid test was negative. Hx of asthma. Prescribed prednisone and azithromycin in [place]. Symptoms were improving.     Assessment: Cough is now returning - returned yesterday. Tried her albuterol inhalers - seemed to make the cough worse thus making her feel sob due to the coughing. Runny nose. No wheezing, denied fever, no confusion, no weakness. Moderate SOB. Throat is a little sore.     Protocol Recommended Disposition: Emergency Department (Or PCP Triage)    Recommendation: According to the protocol, Patient should go to the ED now (Or PCP Triage). Advised Patient to wait for a call-back after nurse speaks with the on-call Provider. Care advice given. Patient verbalizes understanding and agrees with plan of care.     Paging  is calling on call Dr Lacho Larry at 10:14pm and 10:30pm - no response. Now the paging  is paging Dr Herron at 10:45pm - she wants the medical director to be contacted for uptown paged for advisement. Paging  paged out Dr Joel Wegener at 10:59pm - no response after 10 minutes.      RN used judgment and advised patient to be seen in the ED. Patient was called back and informed to go into the ED. Patient stated understanding and will go into the [facility] ED now.       Juhi Shea RN Nursing Advisor 2/10/2023 11:15 PM     Reason for Disposition    MODERATE difficulty breathing (e.g., speaks in phrases, SOB even at rest, pulse 100-120)    Additional Information    Negative: SEVERE difficulty breathing (e.g., struggling for each breath, speaks in single words)    Negative: Sounds like a life-threatening emergency to the triager    Negative: [1] Recent hospitalization for pneumonia AND [2] taking an antibiotic    Negative: [1] Animal bite infection AND [2] taking an antibiotic    Negative: [1] Ear  infection (Otitis Media) AND [2] taking an antibiotic     Negative: [1] Cellulitis AND [2] taking an antibiotic    Negative: [1] Ear  infection (Swimmer's Ear) AND [2] taking an antibiotic    Negative: [1] Sinus infection AND [2] taking an antibiotic    Negative: [1] Strep throat AND [2] taking an antibiotic    Negative: [1] Urinary tract  infection (e.g., cystitis, pyelonephritis, urethritis, epididymitis) AND [2] male AND [3] taking an antibiotic    Negative: [1] Urinary tract  infection (e.g., cystitis, pyelonephritis, urethritis) AND [2] female AND [3] taking an antibiotic    Negative: [1] Wound infection AND [2] taking an antibiotic    Protocols used: INFECTION ON ANTIBIOTIC FOLLOW-UP CALL-A-AH

## 2023-02-20 ENCOUNTER — VIRTUAL VISIT (OUTPATIENT)
Dept: FAMILY MEDICINE | Facility: CLINIC | Age: 73
End: 2023-02-20
Payer: MEDICARE

## 2023-02-20 ENCOUNTER — TELEPHONE (OUTPATIENT)
Dept: FAMILY MEDICINE | Facility: CLINIC | Age: 73
End: 2023-02-20

## 2023-02-20 DIAGNOSIS — Z87.74 H/O BICUSPID AORTIC VALVE: ICD-10-CM

## 2023-02-20 DIAGNOSIS — E03.9 HYPOTHYROIDISM, UNSPECIFIED TYPE: ICD-10-CM

## 2023-02-20 DIAGNOSIS — Z95.2 H/O AORTIC VALVE REPLACEMENT: ICD-10-CM

## 2023-02-20 DIAGNOSIS — K14.8 TONGUE LUMP: ICD-10-CM

## 2023-02-20 DIAGNOSIS — F33.41 RECURRENT MAJOR DEPRESSION IN PARTIAL REMISSION (H): ICD-10-CM

## 2023-02-20 DIAGNOSIS — J45.21 MILD INTERMITTENT ASTHMA WITH ACUTE EXACERBATION: Primary | ICD-10-CM

## 2023-02-20 DIAGNOSIS — U07.1 INFECTION DUE TO 2019 NOVEL CORONAVIRUS: ICD-10-CM

## 2023-02-20 DIAGNOSIS — I77.810 ASCENDING AORTA DILATATION (H): ICD-10-CM

## 2023-02-20 DIAGNOSIS — I10 UNCONTROLLED HYPERTENSION: ICD-10-CM

## 2023-02-20 PROBLEM — F33.42 RECURRENT MAJOR DEPRESSION IN COMPLETE REMISSION (H): Status: ACTIVE | Noted: 2023-02-20

## 2023-02-20 PROCEDURE — 99214 OFFICE O/P EST MOD 30 MIN: CPT | Mod: CS | Performed by: FAMILY MEDICINE

## 2023-02-20 RX ORDER — FLUTICASONE PROPIONATE AND SALMETEROL 500; 50 UG/1; UG/1
1 POWDER RESPIRATORY (INHALATION) EVERY 12 HOURS
Qty: 3 EACH | Refills: 3 | Status: SHIPPED | OUTPATIENT
Start: 2023-02-20 | End: 2023-08-08

## 2023-02-20 RX ORDER — PREDNISONE 10 MG/1
10 TABLET ORAL DAILY
Qty: 5 TABLET | Refills: 0 | Status: SHIPPED | OUTPATIENT
Start: 2023-02-20 | End: 2023-03-07

## 2023-02-20 ASSESSMENT — ANXIETY QUESTIONNAIRES
2. NOT BEING ABLE TO STOP OR CONTROL WORRYING: NOT AT ALL
GAD7 TOTAL SCORE: 1
GAD7 TOTAL SCORE: 1
7. FEELING AFRAID AS IF SOMETHING AWFUL MIGHT HAPPEN: NOT AT ALL
GAD7 TOTAL SCORE: 1
1. FEELING NERVOUS, ANXIOUS, OR ON EDGE: NOT AT ALL
6. BECOMING EASILY ANNOYED OR IRRITABLE: NOT AT ALL
5. BEING SO RESTLESS THAT IT IS HARD TO SIT STILL: NOT AT ALL
3. WORRYING TOO MUCH ABOUT DIFFERENT THINGS: SEVERAL DAYS
4. TROUBLE RELAXING: NOT AT ALL
7. FEELING AFRAID AS IF SOMETHING AWFUL MIGHT HAPPEN: NOT AT ALL
8. IF YOU CHECKED OFF ANY PROBLEMS, HOW DIFFICULT HAVE THESE MADE IT FOR YOU TO DO YOUR WORK, TAKE CARE OF THINGS AT HOME, OR GET ALONG WITH OTHER PEOPLE?: NOT DIFFICULT AT ALL
IF YOU CHECKED OFF ANY PROBLEMS ON THIS QUESTIONNAIRE, HOW DIFFICULT HAVE THESE PROBLEMS MADE IT FOR YOU TO DO YOUR WORK, TAKE CARE OF THINGS AT HOME, OR GET ALONG WITH OTHER PEOPLE: NOT DIFFICULT AT ALL

## 2023-02-20 NOTE — TELEPHONE ENCOUNTER
MARITO.S,  Patient calling to follow-up from visit today.  Went to  inhalers from the pharmacy, cost of them was over $600, which is too much.  Patient wondering if she can use what she already has at home?  Has the following:  Flovent  mcg inhaler  Ventolin HFA with 134 doses left  Albuterol that she also keeps in her purse for emergencies- has 170 doses left on    Patient does not use inhalers regularly for asthma treatment: last ones were for bronchitis.  None are , Flovent is unopened.  Please advise.  Gloria NOE RN

## 2023-02-20 NOTE — PROGRESS NOTES
Nicolette is a 72 year old who is being evaluated via a billable video visit.      How would you like to obtain your AVS? MyChart  If the video visit is dropped, the invitation should be resent by: Text to cell phone: [phone]  Will anyone else be joining your video visit? No          Assessment & Plan   72 year old female with recent diagnosis and history of COVID infection on 2/11/2023.  She is still coughing most likely secondary to exacerbation of her asthma.  I do recommend inhaled steroid and a short course of oral steroid.  I do not recommend any antibiotic because her cough has scanty clear sputum.  As far as her concerned about a nodule or a pimple under her tongue is concerned I was not able to really visualize it on video virtual visit and I do recommend a separate appointment regarding a lump on tongue with PCP and I do not believe it is an urgent appointment.    Mild intermittent asthma with acute exacerbation  Plan.  Add inhaled steroid.  A new prescription is sent.  Use twice daily and wash mouth after use.  Also a short course of prednisone 10 mg daily for 5 days.  - fluticasone-salmeterol (ADVAIR) 500-50 MCG/ACT inhaler; Inhale 1 puff into the lungs every 12 hours  - predniSONE (DELTASONE) 10 MG tablet; Take 1 tablet (10 mg) by mouth daily  Potential medication side effects were discussed with the patient; let me know if any occur.    Infection due to 2019 novel coronavirus  Plan.  Stay well-hydrated.  Keep cough covered.  No acute breathing distress sign.  Office visit for further question or concerns specially if the cough is getting productive with fever.    Uncontrolled hypertension  Plan.  Emergency room visit showed high blood pressure.  She reports overall its in good range.  I do recommend monitor blood pressure at home and the target is less than 140/90.  No medication changes were made.       hyperlipidemia   Hypothyroidism, unspecified type  Recurrent major depression in partial remission  (H)  Plan: - considered this problem when making today's plans  She did have a question about antidepressant.  I do recommend to continue with it I think she is expecting her hoping to taper it off and that should be a good conversation on routine follow-up for problem-oriented follow-up with PCP.       -followed by primary care physicain     Ascending aorta dilatation (H)  H/O aortic valve replacement  H/O bicuspid aortic valve  Plan: - considered this problem when making today's plans  - no interventions today       -followed by specialist.      Prescription drug management  I spent a total of 33 minutes on the day of the visit.   Time spent doing chart review, history and exam, documentation and further activities per the note        No follow-ups on file.   Follow-up Visit   Expected date:  Feb 27, 2023 (Approximate)      Follow Up Appointment Details:     Follow-up with whom?: Any Primary Care Provider    Follow-Up for what?: Acute Issue Recheck    Additional Details: Coughing    How?: In Person                    Kat Choe MD  [facility]    Rita Will is a 72 year old, presenting for the following health issues:  Hospital F/U      HPI   Answers for HPI/ROS submitted by the patient on 2/20/2023  If you checked off any problems, how difficult have these problems made it for you to do your work, take care of things at home, or get along with other people?: Not difficult at all  PHQ9 TOTAL SCORE: 2  CATARINA 7 TOTAL SCORE: 1      Hospital Follow-up Visit:    Hospital/Nursing Home/IP Rehab Facility: [facility]  Date of Admission: 2/11/23  Date of Discharge: 2/11/23  Reason(s) for Admission: Infection due to 2019 novel coronavirus    Was your hospitalization related to COVID-19? no  How are you feeling today? Better and concerned about ongoing coughing.  Upcoming trip to [place] next weekend.  In the past 24 hours have you had shortness of breath when  speaking, walking, or climbing stairs? My breathing issues have improved  Do you have a cough? Yes, I have a cough but it's not worse  When is the last time you had a fever greater than 100? 2/11/22  Are you having any other symptoms? White pimple under the tongue.   Do you have any other stressors you would like to discuss with your provider? No      Was the patient in the ICU or did the patient experience delirium during hospitalization?  No    Problems taking medications regularly:  None  Medication changes since discharge: None  Problems adhering to non-medication therapy:  None    Summary of hospitalization:  [facility] discharge summary reviewed  Diagnostic Tests/Treatments reviewed.  Follow up needed: none  Other Healthcare Providers Involved in Patient s Care:         None  Update since discharge: improved.   Diagnosed with COVID on February 11.   is much better.  She continues to cough though it is improved.  She is very about coughing with scanty clear sputum.  Because next week is scheduled to travel for a week to [place].  She does have asthma, does not use any preventative inhaler though its been written in her listed medication.  She reports no fever.  Appetite is okay.  Overall energy and activity is within normal limits.  She reports  is much better and his symptoms of COVID have resolved.  Additional concerned about a large white pimple under the tongue that she googled and she is concerned if she needs to be seen for that.  She is not on smoker, does not chew tobacco or her cigar.  Its not painful.  She first noted a pimple under her tongue on 15 February that is 5 days ago and's been unchanged.  She feels that her tongue goes with that white pimple because she is aware of that but there is no tenderness.      She has history of asthma hypothyroidism hypertension depression and reports that depression is in remission.             Review of Systems   Constitutional,  HEENT, cardiovascular, pulmonary, GI, , musculoskeletal, neuro, skin, endocrine and psych systems are negative, except as otherwise noted.      Objective           Vitals:  No vitals were obtained today due to virtual visit.    Physical Exam   GENERAL: Healthy, alert and no distress  EYES: Eyes grossly normal to inspection.  No discharge or erythema, or obvious scleral/conjunctival abnormalities.  RESP: No audible wheeze, cough, or visible cyanosis.  No visible retractions or increased work of breathing.    SKIN: Visible skin clear. No significant rash, abnormal pigmentation or lesions.  NEURO: Cranial nerves grossly intact.  Mentation and speech appropriate for age.  PSYCH: Mentation appears normal, affect normal/bright, judgement and insight intact, normal speech and appearance well-groomed.                Video-Visit Details    Type of service:  Video Visit   Video Start Time: 9:34 AM  Video End Time:9:46 AM    Originating Location (pt. Location): Home    Distant Location (provider location):  On-site  Platform used for Video Visit: [employer]

## 2023-03-07 ENCOUNTER — OFFICE VISIT (OUTPATIENT)
Dept: UROLOGY | Facility: CLINIC | Age: 73
End: 2023-03-07
Payer: MEDICARE

## 2023-03-07 VITALS
HEIGHT: 63 IN | SYSTOLIC BLOOD PRESSURE: 100 MMHG | WEIGHT: 140 LBS | BODY MASS INDEX: 24.8 KG/M2 | OXYGEN SATURATION: 97 % | DIASTOLIC BLOOD PRESSURE: 60 MMHG | HEART RATE: 59 BPM

## 2023-03-07 DIAGNOSIS — R39.14 FEELING OF INCOMPLETE BLADDER EMPTYING: ICD-10-CM

## 2023-03-07 DIAGNOSIS — N90.89 VULVAR IRRITATION: ICD-10-CM

## 2023-03-07 DIAGNOSIS — N39.44 NOCTURNAL ENURESIS: ICD-10-CM

## 2023-03-07 DIAGNOSIS — R35.1 NOCTURIA: Primary | ICD-10-CM

## 2023-03-07 DIAGNOSIS — Z85.3 H/O MALIGNANT NEOPLASM OF BREAST: ICD-10-CM

## 2023-03-07 DIAGNOSIS — M62.89 PELVIC FLOOR DYSFUNCTION: ICD-10-CM

## 2023-03-07 DIAGNOSIS — Z87.898 HISTORY OF URINARY RETENTION: ICD-10-CM

## 2023-03-07 DIAGNOSIS — Z87.440 PERSONAL HISTORY OF URINARY TRACT INFECTION: ICD-10-CM

## 2023-03-07 LAB
ALBUMIN UR-MCNC: NEGATIVE MG/DL
APPEARANCE UR: CLEAR
BILIRUB UR QL STRIP: NEGATIVE
COLOR UR AUTO: YELLOW
GLUCOSE UR STRIP-MCNC: NEGATIVE MG/DL
HGB UR QL STRIP: NEGATIVE
KETONES UR STRIP-MCNC: NEGATIVE MG/DL
LEUKOCYTE ESTERASE UR QL STRIP: NEGATIVE
NITRATE UR QL: NEGATIVE
PH UR STRIP: 6 [PH] (ref 5–7)
RESIDUAL VOLUME (RV) (EXTERNAL): 18
SP GR UR STRIP: 1.01 (ref 1–1.03)
UROBILINOGEN UR STRIP-ACNC: 0.2 E.U./DL

## 2023-03-07 PROCEDURE — 81003 URINALYSIS AUTO W/O SCOPE: CPT | Performed by: PHYSICIAN ASSISTANT

## 2023-03-07 PROCEDURE — 99215 OFFICE O/P EST HI 40 MIN: CPT | Mod: 25 | Performed by: PHYSICIAN ASSISTANT

## 2023-03-07 PROCEDURE — 51798 US URINE CAPACITY MEASURE: CPT | Performed by: PHYSICIAN ASSISTANT

## 2023-03-07 RX ORDER — CLOBETASOL PROPIONATE 0.5 MG/G
OINTMENT TOPICAL
Qty: 45 G | Refills: 3 | Status: SHIPPED | OUTPATIENT
Start: 2023-03-07 | End: 2023-03-09

## 2023-03-07 ASSESSMENT — PAIN SCALES - GENERAL: PAINLEVEL: NO PAIN (0)

## 2023-03-07 NOTE — LETTER
3/7/2023       RE: Lauren Nick  [address]  [address]  [facility] [address]     Dear Colleague,    Thank you for referring your patient, Lauren Nick, to the [facility] at [facility]. Please see a copy of my visit note below.    Urology Clinic    Name: Lauren Nick    MRN: [id number]   YOB: 1950  Accompanied at today's visit by:self                 Chief Complaint:   nocturia          History of Present Illness:   March 7, 2023    HISTORY:   We have been following 72 year old Lauren Nick for nocturia, nocturnal enuresis, night sweats, hx of UTIs, history of breast cancer. Last seen on 2/9/23 for consultation. Last encounter reported night sweats. States this has since resolved but plans to f/u with PCP about this soon. Was recently diagnosed with covid on 2/11/23. Continues to have night time urinary symptoms but improve with avoiding fluids before bed. Voids every couple hours during the day and 2-3x/night. Denies urinary incontinence. Was referred for sleep study at last encounter. Was seen at [facility] in [facility], for a procedure to help void better right before COVID pandemic. She is unsure what the procedure was. Was suppose to have a second procedure but never underwent this procedure due to COVID pandemic. Feels like she emptied her bladder better after the procedure but now her symptoms of incomplete bladder emptying have returned. Have been unsuccessful at retrieving notes from [place] since last encounter. Patient was able to retrieve UDS however. UDS from 1/28/20 showed Adequate bladder capacity at 335mL during filling phase. During pressure flow segment of testing, patient voided 300mL with max flow rate 10ml/sec and maximum detrusor pressure of [id number]. Average flow rate of 5mL/second. Unfortunately had a PVR of 120mL at end of study. Does not appear to have true  DO on UDS tracing or leakage noted, and appears to have good compliance during filling phase. She is trying to avoid bladder irritants. Has reported hx of Jina but none since moving back to [place] from [place]. Gets about 1-2 UTIs per year. Typical UTI symptoms include dysuria, lower abdominal cramping, urinary urgency/frequency.Bowels are regular. Hx of cholecystectomy. PMH is significant for asthma, aortic valve replacement, thoracic aortic aneurysm, cardiac pacemaker. She also has hx of breast cancer s/p lumpectomy, radiation and chemo. No longer follows with oncology. Went through menopause in her 30s after her chemo treatment. Denies PMB. Former smoker of 10 years; quit at 28. Patient voices no other concerns at this time.               Allergies:     Allergies   Allergen Reactions     Penicillins Hives     Statins [Hmg-Coa-R Inhibitors]      Myalgias to multiple statins            Medications:     Current Outpatient Medications   Medication Sig     albuterol (PROAIR HFA/PROVENTIL HFA/VENTOLIN HFA) 108 (90 Base) MCG/ACT inhaler INHALE 2 PUFFS INTO THE LUNGS EVERY 6 HOURS     aspirin 81 MG EC tablet Take 81 mg by mouth daily     coenzyme Q-10 (CO-Q10) 50 MG capsule Take 2 capsules (100 mg) by mouth daily     escitalopram (LEXAPRO) 20 MG tablet TAKE 1 TABLET BY MOUTH EVERY DAY     ezetimibe (ZETIA) 10 MG tablet TAKE 1 TABLET (10 MG) BY MOUTH DAILY.     fluticasone (FLONASE) 50 MCG/ACT nasal spray Spray 2 sprays into both nostrils daily     fluticasone-salmeterol (ADVAIR) 500-50 MCG/ACT inhaler Inhale 1 puff into the lungs every 12 hours     levothyroxine (SYNTHROID/LEVOTHROID) 50 MCG tablet TAKE 1 TABLET BY MOUTH EVERY DAY     LORazepam (ATIVAN) 1 MG tablet TAKE 1 TABLET (1 MG) BY MOUTH NIGHTLY AS NEEDED FOR SLEEP     losartan (COZAAR) 25 MG tablet Take 1 tablet (25 mg) by mouth daily     metoprolol succinate ER (TOPROL-XL) 25 MG 24 hr tablet Take 1 tablet (25 mg) by mouth 2 times daily     spacer (OPTICHAMBER  MURALI) holding chamber Use with inhaler     STATIN NOT PRESCRIBED (INTENTIONAL) Please choose reason not prescribed from choices below.     vitamin C (ASCORBIC ACID) 1000 MG TABS Take 1,000 mg by mouth daily     zinc gluconate 50 MG tablet Take 50 mg by mouth daily     Current Facility-Administered Medications   Medication     lidocaine (PF) (XYLOCAINE) 1 % injection 9 mL               Past  Surgical History:     Past Surgical History:   Procedure Laterality Date     ABDOMEN SURGERY  Gallbladder    removed in 2010     BIOPSY  10/2018     BREAST SURGERY  09/25/1989     CARDIAC SURGERY  03/22/2021     CHOLECYSTECTOMY  03/2010     COLONOSCOPY  03/2019     COLONOSCOPY N/A 11/10/2022    Procedure: COLONOSCOPY, WITH POLYPECTOMY AND BIOPSY;  Surgeon: Rafa Parks MD;  Location:  GI     ENT SURGERY  06/2011     EYE SURGERY  09/2015     THORACIC SURGERY  03/21    valve replacement             Physical Exam:   There were no vitals filed for this visit.  PSYCH: NAD  EYES: EOMI  NEURO: AAO x3  : Has mild erythema noted along labia majora and minora bilaterally extending to clitoral de leon and to perineum and around rectum. Skin appears dry. Vulvar changes suggestive of lichen simplex chronicus. No lesions, ulcers, bleeding, swelling, drainage, rashes noted. Urethra normal. Negative for KENYA with reduction of speculum when instructed to cough. Vaginal mucosa atrophic. No pain to palpation on external or internal exam. Good support. Strength 2/5. No obvious masses, lesions, ulcers, bleeding noted on internal or external exam.     LABS:   PVR 18mL    Creatinine   Date Value Ref Range Status   02/11/2023 0.89 0.51 - 0.95 mg/dL Final     UDS from urology clinic in [place]:   UDS from 1/28/20 showed Adequate bladder capacity at 335mL during filling phase. During pressure flow segment of testing, patient voided 300mL with max flow rate 10ml/sec and maximum detrusor pressure of [id number]. Average flow rate of 5mL/second.  Unfortunately had a PVR of 120mL at end of study. Does not appear to have true DO on UDS tracing or leakage noted, and appears to have good compliance during filling phase.         Assessment and Plan:   72 year old is a pleasant female who has hx of urinary retention, nocturia, nocturnal enuresis, night sweats (resolved), hx of UTIs, pelvic floor dysfunction, vulvar irritation. Her hx is complicated by hx of breast cancer.     Plan:   - PVR WNL  - Referred to PFPT.  - Keep appointemnt with PCP about night sweats as planned.   - Keep appointment for sleep study.  - Avoid fluids before bed  - Avoid bladder irritants  - Reviewed UDS with patient. Will try and get rest of records from [facility] in [facility].  - Given hx of urinary retention, will obtain a renal US.   - Vulvar irritation appears to be characteristic of lichen simplex chronicus. Start clobetasol ointment twice weekly for vulvar irritation.   - Plan to follow-up in 3 months with PVR.  - After discussing the assessment and plan with patient, patient verbalizes understanding and agrees to the above plan. All questions answered.     Other orders as below:  Orders Placed This Encounter   Procedures     Urinalysis Macroscopic     45 minutes spent on the date of the encounter doing chart review, review of outside records, review of test results, interpretation of tests, reviewing UDS from urology clinic in [place] (took 10 minutes), patient visit, exam and documentation.      Judy Roberts PA-C  Urology  March 7, 2023      Patient Care Team:  Wegener, Joel Daniel Irwin, MD as PCP - General (Family Medicine)  Wegener, Joel Daniel Irwin, MD as Assigned PCP  Reggie Kign MD as Assigned Heart and Vascular Provider  Rohit Thao MD as MD (Critical Care)  Rohit Thao MD as Assigned Pulmonology Provider  Keo Lee MD as MD (Dermatology)  Keo Lee MD as Assigned Surgical Provider  Judy Roberts PA-C  as Physician Assistant

## 2023-03-07 NOTE — NURSING NOTE
Chief Complaint   Patient presents with     PVR check     Patient states she has been having issues with being incontinent during the night.   Bladder scan showed 18 ml today.  Alejandrina Ellis LPN

## 2023-03-07 NOTE — PATIENT INSTRUCTIONS
- Please schedule your kidney ultrasound: Please call [phone] to schedule this at the [facility] ([place]) or [facility] ([place]).      - Referred to physical therapy. They will call you to schedule.   - Keep appointment with your primary care provider.  - avoid fluids 3 hours before bed.  - Take your time when voiding.   - Keep appointment for sleep study.  - Below is a list of things that can irritate the bladder and should be eliminated:  Caffeinated soft drinks.  Coffee.  Tea.  Chocolate.  Tomato-based foods.  Acidic juices and fruits. (includes cranberry juice)  Alcohol.  Nicotine  Carbonated drinks.  Aspartame/Nutrasweet.      Locations for Pelvic Floor Physical Therapy:    M [facility] ([facility] services - [facility] [place]   [facility]:   M [facility] - [facility] services - [facility]    _________________________________________________

## 2023-03-09 DIAGNOSIS — N90.89 VULVAR IRRITATION: ICD-10-CM

## 2023-03-09 RX ORDER — CLOBETASOL PROPIONATE 0.5 MG/G
OINTMENT TOPICAL
Qty: 45 G | Refills: 3 | Status: SHIPPED | OUTPATIENT
Start: 2023-03-09 | End: 2023-10-17

## 2023-03-20 ENCOUNTER — VIRTUAL VISIT (OUTPATIENT)
Dept: FAMILY MEDICINE | Facility: CLINIC | Age: 73
End: 2023-03-20
Payer: MEDICARE

## 2023-03-20 DIAGNOSIS — H92.01 RIGHT EAR PAIN: Primary | ICD-10-CM

## 2023-03-20 PROCEDURE — 99213 OFFICE O/P EST LOW 20 MIN: CPT | Mod: VID | Performed by: FAMILY MEDICINE

## 2023-03-20 RX ORDER — NEOMYCIN SULFATE, POLYMYXIN B SULFATE AND HYDROCORTISONE 10; 3.5; 1 MG/ML; MG/ML; [USP'U]/ML
3 SUSPENSION/ DROPS AURICULAR (OTIC) 3 TIMES DAILY
Qty: 10 ML | Refills: 0 | Status: SHIPPED | OUTPATIENT
Start: 2023-03-20 | End: 2023-07-17

## 2023-03-20 NOTE — PROGRESS NOTES
Nicolette is a 72 year old who is being evaluated via a billable video visit.      How would you like to obtain your AVS? MyChart  If the video visit is dropped, the invitation should be resent by: Text to cell phone: [phone]  Will anyone else be joining your video visit? 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  She is advised to monitor her symptoms.  The pain has been somewhat fluctuating.  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  - neomycin-polymyxin-hydrocortisone (CORTISPORIN) 3.5-93349-6 otic suspension; Place 3 drops into the right ear 3 times daily  Dispense: 10 mL; Refill: 0      Follow-up is advised in 2 weeks for recurring or unresolved concerns.  Prescription drug management             No follow-ups on file.   Follow-up Visit   Expected date:  Mar 27, 2023 (Approximate)      Follow Up Appointment Details:     Follow-up with whom?: Any Primary Care Provider    Follow-Up for what?: Acute Issue Recheck    Additional Details: Right ear pain    How?: In Person                    Kat Choe MD  [facility]    [facility]   Nicolette is a 72 year old presenting for the following health issues:  Ear Problem      History of Present Illness       Reason for visit:  Follow up to covid    She eats 2-3 servings of fruits and vegetables daily.She consumes 0 sweetened beverage(s) daily.She exercises with enough effort to increase her heart rate 30 to 60 minutes per day.  She exercises with enough effort to increase her heart rate 5 days per week.   She is taking medications regularly.       Concern - Right ear ache   Onset: 1 week   Description: Right ear ache   Intensity: mild  Progression of Symptoms:  constant  Accompanying Signs & Symptoms: None   Previous history of similar problem: None   Precipitating factors:        Worsened by:  None  Alleviating factors:        Improved by: None   Therapies tried and outcome: None    She reports that she is overall in usual state of health.  She did have COVID early February.  At this resolved.  She noted right ear pain significant last week.  Since then it is on and off.  It was affecting her more at night.  Now it happens for a few minutes to an hour at random pace during the day.  She is a swimmer.  Wondering if she got significant water in ear.  She denies change in her hearing.  She does use hearing aid consistently.    Overall in usual state of health.    Review of Systems   Constitutional, HEENT, cardiovascular, pulmonary, GI, , musculoskeletal, neuro, skin, endocrine and psych systems are negative, except as otherwise noted.      Objective    Vitals - Patient Reported  Pain Score: Mild Pain (3)  Pain Loc: Ear      Vitals:  No vitals were obtained today due to virtual visit.    Physical Exam   GENERAL: Healthy, alert and no distress  EYES: Eyes grossly normal to inspection.  No discharge or erythema, or obvious scleral/conjunctival abnormalities.  RESP: No audible wheeze, cough, or visible cyanosis.  No visible retractions or increased work of breathing.    SKIN: Visible skin clear. No significant rash, abnormal pigmentation or lesions.  NEURO: Cranial nerves grossly intact.  Mentation and speech appropriate for age.  PSYCH: Mentation appears normal, affect normal/bright, judgement and insight intact, normal speech and appearance well-groomed.                Video-Visit Details    Type of service:  Video Visit   Video Start Time: 1:13 PM  Video End Time:1:23 PM    Originating Location (pt. Location): Home    Distant Location (provider location):  On-site  Platform used for Video Visit: [employer]

## 2023-03-21 ENCOUNTER — ANCILLARY PROCEDURE (OUTPATIENT)
Dept: ULTRASOUND IMAGING | Facility: CLINIC | Age: 73
End: 2023-03-21
Attending: PHYSICIAN ASSISTANT
Payer: MEDICARE

## 2023-03-21 DIAGNOSIS — R35.1 NOCTURIA: ICD-10-CM

## 2023-03-21 DIAGNOSIS — R39.14 FEELING OF INCOMPLETE BLADDER EMPTYING: ICD-10-CM

## 2023-03-21 DIAGNOSIS — Z87.440 PERSONAL HISTORY OF URINARY TRACT INFECTION: ICD-10-CM

## 2023-03-21 PROCEDURE — 76770 US EXAM ABDO BACK WALL COMP: CPT

## 2023-03-30 NOTE — PROGRESS NOTES
Physical Therapy Initial Examination/Evaluation  April 3, 2023    Lauren Nick is a 72 year old female referred to physical therapy by Judy Roberts PA-C for treatment of nocturia and feeling of incomplete bladder emptying with Precautions/Restrictions/MD instructions evaluate and treat.     Therapist Impression:   Patient has complaints of urinary leakage while sleeping. Reports this started 3-4 months ago. Doesn't happen every night. Reports she will wake up while it's happening. Sleeps with towel underneath or pad just in case. States it doesn't happen very much. Also reports feeling of incomplete bladder emptying. History of pelvic discomfort due to holding back urine for hours while at work.   Patient found to have decreased hip mobility, decreased PF strength, sluggish return to baseline, decreased proprioceptive awareness of PF musculature, impaired DBE, and decreased PF lengthening.. Patient given HEP with DBE, happy baby, child's pose, TrA activation, elevator PF and bladder diary. Patient would benefit from skilled physical therapy in order to address the above impairments and return to PLOF.     Subjective:  DOI/onset: 3/7/23   Related PMH: h/o pelvic pain several years ago   Chief Complaint/Functional Limitations:   Nocturia, incomplete bladder emptying and see below in therapy evaluation codes   Occupation: retired  Job duties: repetitive tasks  Current HEP/exercise regimen: yoga  Patient's goals are see chief complaints decrease nocturia, improve bladder function     Other pertinent PMH/Red Flags: Cancer, Heart Problems   Barriers at home/work: None as reported by patient  Pertinent Surgical History: orthopedic surgery, heart surgery, cancer surgery  Medications: Thyroid, Anti-depressants and High blood pressure  General health as reported by patient: good      Urination:  Do you leak on the way to the bathroom or with a strong urge to void? No  - if she held it too long will happen rarely  "  Do you leak with cough,sneeze, jumping, running?No   Any other activities that cause leaking? No   Do you have triggers that make you feel you can't wait to go to the bathroom? No   what are they n/a .  Type of pad and number used per day? 0 - sometimes at night   When you leak what is the amount? Moderate     How often do you use the bathroom? Couple hours   How many times do you get up to urinate at night? 2-3     Can you stop the flow of urine when on the toilet? No    Do you strain to pass urine? No  Do you have a slow or hesitant urinary stream? Yes - slow sometimes - feels like it comes out more than one stream   Do you have difficulty initiating the urine stream? No  Is urination painful?  No    How many bladder infections have you had in last 12 months? 1     Fluid intake(one glass is 8oz or one cup) 60 oz/day, 1 caffinated glasses/day  Rarely drinks alcohol     Bowel habits:  Frequency of bowel movements? Daily - every few days   Consistancy of stool? hard  Do you ignore the urge to defecate? Yes  Do you strain to pass stool? Yes    Pelvic Pain:  Do you have any pelvic pain with intercourse, exams, use of tampons?  Does not have intercourse very often - has vibrator - feels good to have \"release\"   Is initial penetration during intercourse painful? No  Is deeper penetration painful? No    Given birth? Yes Any complications? No  # of vaginal delieveries? 3    # of C-sections? 0   # of episiotomies? 1 .  Are you sexually active?No  Have you ever been worried for your physical safety? No  Any abdominal or pelvic surgeries? none    OBSERVATION    Static Posture  Standing posture: Posterior pelvic tilt  Sitting posture: Good    Pelvic symmetry: Positive  Introitus: erythema around introitus and perineal area.  Trendelenberg Sign:Negative    ROM  Lumbar AROM: Hands to ankles, extension limited 50%  Single leg standing unilateral hip flexion PSIS:Negative  Standing forward flexion PSIS:Negative  Passive Hip " ROM:R LE proximal adductor pain with hip flexion and IR; IR limited 75% jolly, hip flexion limited 50% RLE, 25% LLE  PAT:Negative  Hip and Knee Strength   MMT Left Right   Hip Flexion 4-/5 4-/5   Hip Extension 4/5 4/5   Hip Abduction 4/5 4/5   Hip ER 4/5 4-/5   Hip IR 4/5 4-/5       MUSCLE PERFORMANCE  Double limb squat observations: Anterior knee translation, Increased trunk lean and Narrow CHEMA  Active SLR:Negative    Baseline PF tone:hyper  PF Tone with cough: hyper  Valsalva: hyper  PF Response quality: sluggish  PF Power: Center: 2.  Endurance: Maximum contraction in seconds: 10  # of endurance contractions before fatigue: 2  Quick contraction repetitions prior to fatigue: 4.  Specificity/accessory muscles: adductors , Synergy with abdominals: WNL/WFL.  Prolapse: Cyctocele/Rectocele/Uterine stgstrstastdstest:st st1st Urethrocele: none, Enterocele: none.  Pelvic Floor Lengthening: PF contraction with bear down   Diaphragmatic Breathing: no PF movement with max cues - tendency to chest breathe and inhale/exhale quickly     PALPATION: Pain: levator ani, obturator internis and transverse perineal muscle        Assessment/Plan:  Patient is a 72 year old female with pelvic complaints.    Patient has the following significant findings with corresponding treatment plan.                Diagnosis 1:  Nocturia   Pain -  manual therapy, self management, education and home program  Decreased ROM/flexibility - manual therapy, therapeutic exercise, therapeutic activity and home program  Decreased strength - therapeutic exercise, therapeutic activities and home program  Decreased proprioception - neuro re-education, therapeutic activities and home program  Decreased function - therapeutic activities and home program  Impaired posture - neuro re-education, therapeutic activities and home program    Therapy Evaluation Codes:   1) History comprised of:   Personal factors that impact the plan of care:      None.    Comorbidity factors that impact  the plan of care are:      Cancer and Heart problems.     Medications impacting care: Anti-depressant and High blood pressure.  2) Examination of Body Systems comprised of:   Body structures and functions that impact the plan of care:      Pelvis.   Activity limitations that impact the plan of care are:      Frequency, Urgency and Urinary incontinence.  3) Clinical presentation characteristics are:   Stable/Uncomplicated.  4) Decision-Making    Low complexity using standardized patient assessment instrument and/or measureable assessment of functional outcome.  Cumulative Therapy Evaluation is: Low complexity.    Previous and current functional limitations:  (See Goal Flow Sheet for this information)    Short term and Long term goals: (See Goal Flow Sheet for this information)     Communication ability:  Patient appears to be able to clearly communicate and understand verbal and written communication and follow directions correctly.  Treatment Explanation - The following has been discussed with the patient:   RX ordered/plan of care  Anticipated outcomes  Possible risks and side effects  This patient would benefit from PT intervention to resume normal activities.   Rehab potential is good.    Frequency:  1 X week, once daily  Duration:  for 4 weeks tapering to every other X a week over 8 weeks  Discharge Plan:  Achieve all LTG.  Independent in home treatment program.  Reach maximal therapeutic benefit.    Please refer to the daily flowsheet for treatment today, total treatment time and time spent performing 1:1 timed codes.     Please refer to the daily flowsheet for treatment today, total treatment time and time spent performing 1:1 timed codes.

## 2023-04-03 ENCOUNTER — THERAPY VISIT (OUTPATIENT)
Dept: PHYSICAL THERAPY | Facility: CLINIC | Age: 73
End: 2023-04-03
Attending: PHYSICIAN ASSISTANT
Payer: MEDICARE

## 2023-04-03 DIAGNOSIS — R39.14 FEELING OF INCOMPLETE BLADDER EMPTYING: ICD-10-CM

## 2023-04-03 DIAGNOSIS — I77.810 ASCENDING AORTA DILATATION (H): ICD-10-CM

## 2023-04-03 DIAGNOSIS — M62.89 PELVIC FLOOR DYSFUNCTION IN FEMALE: ICD-10-CM

## 2023-04-03 DIAGNOSIS — R35.1 NOCTURIA: ICD-10-CM

## 2023-04-03 DIAGNOSIS — M62.89 PELVIC FLOOR DYSFUNCTION: ICD-10-CM

## 2023-04-03 PROCEDURE — 97535 SELF CARE MNGMENT TRAINING: CPT | Mod: GP

## 2023-04-03 PROCEDURE — 97161 PT EVAL LOW COMPLEX 20 MIN: CPT | Mod: GP

## 2023-04-03 PROCEDURE — 97112 NEUROMUSCULAR REEDUCATION: CPT | Mod: GP

## 2023-04-03 PROCEDURE — 97110 THERAPEUTIC EXERCISES: CPT | Mod: GP

## 2023-04-03 RX ORDER — LOSARTAN POTASSIUM 25 MG/1
25 TABLET ORAL DAILY
Qty: 90 TABLET | Refills: 1 | Status: SHIPPED | OUTPATIENT
Start: 2023-04-03 | End: 2023-08-08

## 2023-04-03 NOTE — PROGRESS NOTES
[facility]    OUTPATIENT Physical Therapy ORTHOPEDIC EVALUATION  PLAN OF TREATMENT FOR OUTPATIENT REHABILITATION  (COMPLETE FOR INITIAL CLAIMS ONLY)  Patient's Last Name, First Name, M.I.  YOB: 1950  Lauren Nick    Provider s Name:  [facility]   Medical Record No.  [id number]   Start of Care Date:  04/03/23   Onset Date:   03/07/23   Treatment Diagnosis:  nocturia Medical Diagnosis:     Nocturia  Feeling of incomplete bladder emptying  Pelvic floor dysfunction  Pelvic floor dysfunction in female       Goals:     04/03/23 0700   Urinary Leakage   Previous Functional Level No problems   Current Functional Level Number of urinary leakage episodes in a month   Performance Level 2-4   STG Target Performance Decrease leakage with   Performance Level at night; 2x/month   Rationale continence throughout the night   Due Date 04/24/23   LTG Target Performance Decrease leakage with   Performance Level sleeping 1x/month   Rationale continence throughout the night   Due Date 07/01/23   Urination Dysfunction   Previous Functional Level No pain with voiding   Current Functional Level Incomplete emptying   STG Target Performance Decrease incomplete emptying   Performance Level using relaxation techniques; 50% of the time   Rationale Work toward normal voiding patterns to focus on ADLS, work, or school   Due Date 04/24/23   LTG Target Performance Decrease incomplete emptying   Performance Level 75% of the time with relaxation techniques   Rationale Work toward normal voiding patterns to focus on ADLS, work, or school   Due Date 07/01/23         Therapy Frequency:  1x/week for 4 weeks tapering off to every other week for 8 weeks  Predicted Duration of Therapy Intervention:  12 weeks; 8 visits    Camryn Mcgowan, PT                 I CERTIFY THE NEED FOR THESE SERVICES FURNISHED  UNDER        THIS PLAN OF TREATMENT AND WHILE UNDER MY CARE     (Physician co-signature of this document indicates review and certification of the therapy plan).                     Certification Date From:  04/03/23   Certification Date To:  07/01/23    Referring Provider:  Judy Roberts    Initial Assessment        See Epic Evaluation SOC Date: 04/03/23

## 2023-04-10 ENCOUNTER — THERAPY VISIT (OUTPATIENT)
Dept: PHYSICAL THERAPY | Facility: CLINIC | Age: 73
End: 2023-04-10
Attending: PHYSICIAN ASSISTANT
Payer: MEDICARE

## 2023-04-10 DIAGNOSIS — M62.89 PELVIC FLOOR DYSFUNCTION IN FEMALE: Primary | ICD-10-CM

## 2023-04-10 DIAGNOSIS — R39.14 FEELING OF INCOMPLETE BLADDER EMPTYING: ICD-10-CM

## 2023-04-10 DIAGNOSIS — R35.1 NOCTURIA: ICD-10-CM

## 2023-04-10 PROCEDURE — 97110 THERAPEUTIC EXERCISES: CPT | Mod: GP

## 2023-04-10 PROCEDURE — 97112 NEUROMUSCULAR REEDUCATION: CPT | Mod: GP

## 2023-04-17 ENCOUNTER — THERAPY VISIT (OUTPATIENT)
Dept: PHYSICAL THERAPY | Facility: CLINIC | Age: 73
End: 2023-04-17
Attending: PHYSICIAN ASSISTANT
Payer: MEDICARE

## 2023-04-17 DIAGNOSIS — M62.89 PELVIC FLOOR DYSFUNCTION IN FEMALE: Primary | ICD-10-CM

## 2023-04-17 DIAGNOSIS — R39.14 FEELING OF INCOMPLETE BLADDER EMPTYING: ICD-10-CM

## 2023-04-17 DIAGNOSIS — R35.1 NOCTURIA: ICD-10-CM

## 2023-04-17 PROCEDURE — 97110 THERAPEUTIC EXERCISES: CPT | Mod: GP

## 2023-04-17 PROCEDURE — 97112 NEUROMUSCULAR REEDUCATION: CPT | Mod: GP

## 2023-05-06 DIAGNOSIS — F51.01 PRIMARY INSOMNIA: ICD-10-CM

## 2023-05-08 ENCOUNTER — THERAPY VISIT (OUTPATIENT)
Dept: PHYSICAL THERAPY | Facility: CLINIC | Age: 73
End: 2023-05-08
Payer: MEDICARE

## 2023-05-08 DIAGNOSIS — M62.89 PELVIC FLOOR DYSFUNCTION IN FEMALE: Primary | ICD-10-CM

## 2023-05-08 DIAGNOSIS — R35.1 NOCTURIA: ICD-10-CM

## 2023-05-08 DIAGNOSIS — R39.14 FEELING OF INCOMPLETE BLADDER EMPTYING: ICD-10-CM

## 2023-05-08 PROCEDURE — 97140 MANUAL THERAPY 1/> REGIONS: CPT | Mod: GP

## 2023-05-08 PROCEDURE — 97112 NEUROMUSCULAR REEDUCATION: CPT | Mod: GP

## 2023-05-08 RX ORDER — LORAZEPAM 1 MG/1
1 TABLET ORAL
Qty: 30 TABLET | Refills: 5 | Status: SHIPPED | OUTPATIENT
Start: 2023-05-08 | End: 2023-10-12

## 2023-05-08 NOTE — PROGRESS NOTES
[facility]    OUTPATIENT Physical Therapy ORTHOPEDIC EVALUATION  PLAN OF TREATMENT FOR OUTPATIENT REHABILITATION  (COMPLETE FOR INITIAL CLAIMS ONLY)  Patient's Last Name, First Name, M.I.  YOB: 1950  Lauren Nick    Provider s Name:  [facility]   Medical Record No.  [id number]   Start of Care Date:  04/03/23   Onset Date:   03/07/23   Treatment Diagnosis:  nocturia Medical Diagnosis:     Pelvic floor dysfunction in female  Nocturia  Feeling of incomplete bladder emptying       Goals:     05/08/23 0700   Urinary Leakage   Previous Functional Level No problems   Current Functional Level Number of urinary leakage episodes in a month   Performance Level nocutria occuring about 1x/week; only if she doesn't stop drinking water at 830/9   STG Target Performance Decrease leakage with   Performance Level at night; 2x/month   Rationale continence throughout the night   Due Date 04/24/23   Date Goal Met 04/17/23   LTG Target Performance Decrease leakage with   Performance Level sleeping 1x/month   Rationale continence throughout the night   Due Date 07/08/23   Urination Dysfunction   Previous Functional Level No pain with voiding   Current Functional Level Incomplete emptying   Performance Level takes more time on toilet - more will come out and will do PF contract/relax to improve emptying this has been working well.   STG Target Performance Decrease incomplete emptying   Performance Level using relaxation techniques; 50% of the time   Rationale Work toward normal voiding patterns to focus on ADLS, work, or school   Due Date 04/24/23   Date Goal Met 04/17/23   LTG Target Performance Decrease incomplete emptying   Performance Level 75% of the time with relaxation techniques   Rationale Work toward normal voiding patterns to focus on ADLS, work, or school   Due Date  07/08/23   Date Goal Met 05/08/23         Therapy Frequency:  1x/month for 2 months  Predicted Duration of Therapy Intervention:  3 months; 6 visits    Camryn Mcgowan, PT                 I CERTIFY THE NEED FOR THESE SERVICES FURNISHED UNDER        THIS PLAN OF TREATMENT AND WHILE UNDER MY CARE     (Physician co-signature of this document indicates review and certification of the therapy plan).                     Certification Date From:  05/08/23   Certification Date To:  07/08/23    Referring Provider:  Judy Roberts    Initial Assessment        See Epic Evaluation SOC Date: 04/03/23

## 2023-05-08 NOTE — PROGRESS NOTES
PROGRESS  REPORT    Progress reporting period is from 4/3/23 to 5/8/23.       SUBJECTIVE  Subjective changes noted by patient:  Patient reports she continues to have nocturia about 1x/week. Notices if she stops drinking water around 830/9 pm she won't have any leakage throughout the night.    Initial Pain level: 0/10.   Changes in function:  Yes (See Goal flowsheet attached for changes in current functional level)  Adverse reaction to treatment or activity: None    OBJECTIVE  Changes noted in objective findings:  Yes,   Objective: PF strength: 3/5, mod return to baseline, min lengthening with DBE, no PF movement with bear down; mod tension in ischiocavernosus jolly     ASSESSMENT/PLAN  Updated problem list and treatment plan: Diagnosis 1:  nocturia  Decreased ROM/flexibility - manual therapy, therapeutic exercise, therapeutic activity and home program  Decreased proprioception - neuro re-education, therapeutic activities and home program  Decreased function - therapeutic activities and home program  STG/LTGs have been met or progress has been made towards goals:  Yes (See Goal flow sheet completed today.)  Assessment of Progress: The patient's condition is improving.  The patient's condition has potential to improve.  Self Management Plans:  Patient has been instructed in a home treatment program.  I have re-evaluated this patient and find that the nature, scope, duration and intensity of the therapy is appropriate for the medical condition of the patient.  Lauren continues to require the following intervention to meet STG and LTG's:  PT    Recommendations:  This patient would benefit from continued therapy.     Frequency:  1 X a month, once daily  Duration:  for 2 months    Please refer to the daily flowsheet for treatment today, total treatment time and time spent performing 1:1 timed codes.

## 2023-05-08 NOTE — TELEPHONE ENCOUNTER
Chronic med , up to date on care parameters. No suspicious activity on [place] rx monitoring database .     Refiled. Joel Wegener,MD

## 2023-05-17 ENCOUNTER — ANCILLARY PROCEDURE (OUTPATIENT)
Dept: CARDIOLOGY | Facility: CLINIC | Age: 73
End: 2023-05-17
Attending: INTERNAL MEDICINE
Payer: MEDICARE

## 2023-05-17 DIAGNOSIS — Z95.0 CARDIAC PACEMAKER IN SITU: ICD-10-CM

## 2023-05-17 PROCEDURE — 93296 REM INTERROG EVL PM/IDS: CPT | Performed by: INTERNAL MEDICINE

## 2023-05-17 PROCEDURE — 93294 REM INTERROG EVL PM/LDLS PM: CPT | Performed by: INTERNAL MEDICINE

## 2023-05-18 ASSESSMENT — SLEEP AND FATIGUE QUESTIONNAIRES
HOW LIKELY ARE YOU TO NOD OFF OR FALL ASLEEP WHILE SITTING AND READING: SLIGHT CHANCE OF DOZING
HOW LIKELY ARE YOU TO NOD OFF OR FALL ASLEEP WHILE LYING DOWN TO REST IN THE AFTERNOON WHEN CIRCUMSTANCES PERMIT: MODERATE CHANCE OF DOZING
HOW LIKELY ARE YOU TO NOD OFF OR FALL ASLEEP WHEN YOU ARE A PASSENGER IN A CAR FOR AN HOUR WITHOUT A BREAK: SLIGHT CHANCE OF DOZING
HOW LIKELY ARE YOU TO NOD OFF OR FALL ASLEEP WHILE SITTING AND TALKING TO SOMEONE: WOULD NEVER DOZE
HOW LIKELY ARE YOU TO NOD OFF OR FALL ASLEEP WHILE WATCHING TV: WOULD NEVER DOZE
HOW LIKELY ARE YOU TO NOD OFF OR FALL ASLEEP WHILE SITTING INACTIVE IN A PUBLIC PLACE: WOULD NEVER DOZE
HOW LIKELY ARE YOU TO NOD OFF OR FALL ASLEEP IN A CAR, WHILE STOPPED FOR A FEW MINUTES IN TRAFFIC: WOULD NEVER DOZE
HOW LIKELY ARE YOU TO NOD OFF OR FALL ASLEEP WHILE SITTING QUIETLY AFTER LUNCH WITHOUT ALCOHOL: WOULD NEVER DOZE

## 2023-05-25 ENCOUNTER — OFFICE VISIT (OUTPATIENT)
Dept: SLEEP MEDICINE | Facility: CLINIC | Age: 73
End: 2023-05-25
Attending: PHYSICIAN ASSISTANT
Payer: MEDICARE

## 2023-05-25 VITALS
HEART RATE: 60 BPM | WEIGHT: 139.8 LBS | DIASTOLIC BLOOD PRESSURE: 71 MMHG | SYSTOLIC BLOOD PRESSURE: 114 MMHG | OXYGEN SATURATION: 97 % | HEIGHT: 63 IN | BODY MASS INDEX: 24.77 KG/M2

## 2023-05-25 DIAGNOSIS — N39.44 NOCTURNAL ENURESIS: ICD-10-CM

## 2023-05-25 DIAGNOSIS — R35.1 NOCTURIA: ICD-10-CM

## 2023-05-25 DIAGNOSIS — G47.21 DELAYED SLEEP PHASE SYNDROME: Primary | ICD-10-CM

## 2023-05-25 PROCEDURE — 99205 OFFICE O/P NEW HI 60 MIN: CPT | Performed by: PHYSICIAN ASSISTANT

## 2023-05-25 RX ORDER — ADHESIVE TAPE 3"X 2.3 YD
4 TAPE, NON-MEDICATED TOPICAL AT BEDTIME
Qty: 120 ML | Refills: 3 | Status: SHIPPED | OUTPATIENT
Start: 2023-05-25 | End: 2023-09-07

## 2023-05-25 NOTE — PATIENT INSTRUCTIONS
Each of us has a built in tendency to find it easier to stay up late at night or get up early in the morning.  Being a  night owl  or  early bird  is a function of our internal body clock.  When you are forced to keep a sleep schedule that goes against your typical habits (because of a job or other responsibilities) insomnia can be the result.  Try the following changes to see if you can improve your sleep patterns:  Pick a sleep and wake schedule with about 7-8 hours in bed that is consistent.  That means keep the same bedtime and rise time every day of the week including the weekends  Try to get outside for about 30 minutes after you get up in the morning.    Sunlight is the best way to  set  your internal body clock or alternatively, use of SAD lamp/light therapy box for 30 minutes each morning after waking will also work   Take melatonin - 1 - 5 mg about 4-5 hours before bedtime  Please keep a sleep log or diary during this time to track your sleep patterns
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
## 2023-05-25 NOTE — NURSING NOTE
"Chief Complaint   Patient presents with     Sleep Problem     Night owl, tired       Initial /71   Pulse 60   Ht 1.6 m (5' 3\")   Wt 63.4 kg (139 lb 12.8 oz)   SpO2 97%   BMI 24.76 kg/m   Estimated body mass index is 24.76 kg/m  as calculated from the following:    Height as of this encounter: 1.6 m (5' 3\").    Weight as of this encounter: 63.4 kg (139 lb 12.8 oz).    Medication Reconciliation: complete  ESS 4  Neck circumference: 34 centimeters.  Judi Mustafa MA    "

## 2023-06-05 LAB
MDC_IDC_EPISODE_DTM: NORMAL
MDC_IDC_EPISODE_ID: NORMAL
MDC_IDC_EPISODE_TYPE: NORMAL
MDC_IDC_LEAD_IMPLANT_DT: NORMAL
MDC_IDC_LEAD_IMPLANT_DT: NORMAL
MDC_IDC_LEAD_LOCATION: NORMAL
MDC_IDC_LEAD_LOCATION: NORMAL
MDC_IDC_LEAD_LOCATION_DETAIL_1: NORMAL
MDC_IDC_LEAD_LOCATION_DETAIL_1: NORMAL
MDC_IDC_LEAD_MFG: NORMAL
MDC_IDC_LEAD_MFG: NORMAL
MDC_IDC_LEAD_MODEL: NORMAL
MDC_IDC_LEAD_MODEL: NORMAL
MDC_IDC_LEAD_POLARITY_TYPE: NORMAL
MDC_IDC_LEAD_POLARITY_TYPE: NORMAL
MDC_IDC_LEAD_SERIAL: NORMAL
MDC_IDC_LEAD_SERIAL: NORMAL
MDC_IDC_MSMT_BATTERY_DTM: NORMAL
MDC_IDC_MSMT_BATTERY_REMAINING_LONGEVITY: 106 MO
MDC_IDC_MSMT_BATTERY_REMAINING_PERCENTAGE: 83 %
MDC_IDC_MSMT_BATTERY_RRT_TRIGGER: NORMAL
MDC_IDC_MSMT_BATTERY_STATUS: NORMAL
MDC_IDC_MSMT_BATTERY_VOLTAGE: 3.01 V
MDC_IDC_MSMT_LEADCHNL_RA_IMPEDANCE_VALUE: 360 OHM
MDC_IDC_MSMT_LEADCHNL_RA_LEAD_CHANNEL_STATUS: NORMAL
MDC_IDC_MSMT_LEADCHNL_RA_PACING_THRESHOLD_AMPLITUDE: 0.5 V
MDC_IDC_MSMT_LEADCHNL_RA_PACING_THRESHOLD_PULSEWIDTH: 0.4 MS
MDC_IDC_MSMT_LEADCHNL_RA_SENSING_INTR_AMPL: 3.3 MV
MDC_IDC_MSMT_LEADCHNL_RV_IMPEDANCE_VALUE: 460 OHM
MDC_IDC_MSMT_LEADCHNL_RV_LEAD_CHANNEL_STATUS: NORMAL
MDC_IDC_MSMT_LEADCHNL_RV_PACING_THRESHOLD_AMPLITUDE: 0.88 V
MDC_IDC_MSMT_LEADCHNL_RV_PACING_THRESHOLD_PULSEWIDTH: 0.4 MS
MDC_IDC_MSMT_LEADCHNL_RV_SENSING_INTR_AMPL: 5.2 MV
MDC_IDC_PG_IMPLANT_DTM: NORMAL
MDC_IDC_PG_MFG: NORMAL
MDC_IDC_PG_MODEL: NORMAL
MDC_IDC_PG_SERIAL: NORMAL
MDC_IDC_PG_TYPE: NORMAL
MDC_IDC_SESS_CLINIC_NAME: NORMAL
MDC_IDC_SESS_DTM: NORMAL
MDC_IDC_SESS_REPROGRAMMED: NO
MDC_IDC_SESS_TYPE: NORMAL
MDC_IDC_SET_BRADY_AT_MODE_SWITCH_MODE: NORMAL
MDC_IDC_SET_BRADY_AT_MODE_SWITCH_RATE: 180 {BEATS}/MIN
MDC_IDC_SET_BRADY_LOWRATE: 60 {BEATS}/MIN
MDC_IDC_SET_BRADY_MAX_SENSOR_RATE: 130 {BEATS}/MIN
MDC_IDC_SET_BRADY_MAX_TRACKING_RATE: 130 {BEATS}/MIN
MDC_IDC_SET_BRADY_MODE: NORMAL
MDC_IDC_SET_BRADY_PAV_DELAY_LOW: 200 MS
MDC_IDC_SET_BRADY_SAV_DELAY_LOW: 150 MS
MDC_IDC_SET_LEADCHNL_RA_PACING_AMPLITUDE: 2 V
MDC_IDC_SET_LEADCHNL_RA_PACING_ANODE_ELECTRODE_1: NORMAL
MDC_IDC_SET_LEADCHNL_RA_PACING_ANODE_LOCATION_1: NORMAL
MDC_IDC_SET_LEADCHNL_RA_PACING_CAPTURE_MODE: NORMAL
MDC_IDC_SET_LEADCHNL_RA_PACING_CATHODE_ELECTRODE_1: NORMAL
MDC_IDC_SET_LEADCHNL_RA_PACING_CATHODE_LOCATION_1: NORMAL
MDC_IDC_SET_LEADCHNL_RA_PACING_POLARITY: NORMAL
MDC_IDC_SET_LEADCHNL_RA_PACING_PULSEWIDTH: 0.4 MS
MDC_IDC_SET_LEADCHNL_RA_SENSING_ADAPTATION_MODE: NORMAL
MDC_IDC_SET_LEADCHNL_RA_SENSING_ANODE_ELECTRODE_1: NORMAL
MDC_IDC_SET_LEADCHNL_RA_SENSING_ANODE_LOCATION_1: NORMAL
MDC_IDC_SET_LEADCHNL_RA_SENSING_CATHODE_ELECTRODE_1: NORMAL
MDC_IDC_SET_LEADCHNL_RA_SENSING_CATHODE_LOCATION_1: NORMAL
MDC_IDC_SET_LEADCHNL_RA_SENSING_POLARITY: NORMAL
MDC_IDC_SET_LEADCHNL_RA_SENSING_SENSITIVITY: 0.5 MV
MDC_IDC_SET_LEADCHNL_RV_PACING_AMPLITUDE: 1.12
MDC_IDC_SET_LEADCHNL_RV_PACING_ANODE_ELECTRODE_1: NORMAL
MDC_IDC_SET_LEADCHNL_RV_PACING_ANODE_LOCATION_1: NORMAL
MDC_IDC_SET_LEADCHNL_RV_PACING_CAPTURE_MODE: NORMAL
MDC_IDC_SET_LEADCHNL_RV_PACING_CATHODE_ELECTRODE_1: NORMAL
MDC_IDC_SET_LEADCHNL_RV_PACING_CATHODE_LOCATION_1: NORMAL
MDC_IDC_SET_LEADCHNL_RV_PACING_POLARITY: NORMAL
MDC_IDC_SET_LEADCHNL_RV_PACING_PULSEWIDTH: 0.4 MS
MDC_IDC_SET_LEADCHNL_RV_SENSING_ADAPTATION_MODE: NORMAL
MDC_IDC_SET_LEADCHNL_RV_SENSING_ANODE_ELECTRODE_1: NORMAL
MDC_IDC_SET_LEADCHNL_RV_SENSING_ANODE_LOCATION_1: NORMAL
MDC_IDC_SET_LEADCHNL_RV_SENSING_CATHODE_ELECTRODE_1: NORMAL
MDC_IDC_SET_LEADCHNL_RV_SENSING_CATHODE_LOCATION_1: NORMAL
MDC_IDC_SET_LEADCHNL_RV_SENSING_POLARITY: NORMAL
MDC_IDC_SET_LEADCHNL_RV_SENSING_SENSITIVITY: 2 MV
MDC_IDC_STAT_AT_BURDEN_PERCENT: 0 %
MDC_IDC_STAT_AT_DTM_END: NORMAL
MDC_IDC_STAT_AT_DTM_START: NORMAL
MDC_IDC_STAT_AT_MODE_SW_COUNT: 0
MDC_IDC_STAT_AT_MODE_SW_COUNT_PER_DAY: 0
MDC_IDC_STAT_AT_MODE_SW_PERCENT_TIME: 0 %
MDC_IDC_STAT_BRADY_AP_VP_PERCENT: 1 %
MDC_IDC_STAT_BRADY_AP_VS_PERCENT: 28 %
MDC_IDC_STAT_BRADY_AS_VP_PERCENT: 1 %
MDC_IDC_STAT_BRADY_AS_VS_PERCENT: 72 %
MDC_IDC_STAT_BRADY_DTM_END: NORMAL
MDC_IDC_STAT_BRADY_DTM_START: NORMAL
MDC_IDC_STAT_BRADY_RA_PERCENT_PACED: 28 %
MDC_IDC_STAT_BRADY_RV_PERCENT_PACED: 1 %
MDC_IDC_STAT_CRT_DTM_END: NORMAL
MDC_IDC_STAT_CRT_DTM_START: NORMAL
MDC_IDC_STAT_HEART_RATE_ATRIAL_MAX: 230 {BEATS}/MIN
MDC_IDC_STAT_HEART_RATE_ATRIAL_MEAN: 71 {BEATS}/MIN
MDC_IDC_STAT_HEART_RATE_ATRIAL_MIN: 50 {BEATS}/MIN
MDC_IDC_STAT_HEART_RATE_DTM_END: NORMAL
MDC_IDC_STAT_HEART_RATE_DTM_START: NORMAL
MDC_IDC_STAT_HEART_RATE_VENTRICULAR_MAX: 220 {BEATS}/MIN
MDC_IDC_STAT_HEART_RATE_VENTRICULAR_MEAN: 71 {BEATS}/MIN
MDC_IDC_STAT_HEART_RATE_VENTRICULAR_MIN: 50 {BEATS}/MIN

## 2023-06-07 ENCOUNTER — OFFICE VISIT (OUTPATIENT)
Dept: UROLOGY | Facility: CLINIC | Age: 73
End: 2023-06-07
Payer: MEDICARE

## 2023-06-07 VITALS
OXYGEN SATURATION: 97 % | HEIGHT: 63 IN | SYSTOLIC BLOOD PRESSURE: 126 MMHG | HEART RATE: 60 BPM | BODY MASS INDEX: 24.98 KG/M2 | WEIGHT: 141 LBS | DIASTOLIC BLOOD PRESSURE: 70 MMHG

## 2023-06-07 DIAGNOSIS — L28.0 LICHEN SIMPLEX CHRONICUS: ICD-10-CM

## 2023-06-07 DIAGNOSIS — M62.89 PELVIC FLOOR DYSFUNCTION: ICD-10-CM

## 2023-06-07 DIAGNOSIS — Z87.440 PERSONAL HISTORY OF URINARY TRACT INFECTION: ICD-10-CM

## 2023-06-07 DIAGNOSIS — Z85.3 H/O MALIGNANT NEOPLASM OF BREAST: ICD-10-CM

## 2023-06-07 DIAGNOSIS — N39.44 NOCTURNAL ENURESIS: ICD-10-CM

## 2023-06-07 DIAGNOSIS — R35.1 NOCTURIA: Primary | ICD-10-CM

## 2023-06-07 LAB — RESIDUAL VOLUME (RV) (EXTERNAL): 15

## 2023-06-07 PROCEDURE — 99214 OFFICE O/P EST MOD 30 MIN: CPT | Mod: 25 | Performed by: PHYSICIAN ASSISTANT

## 2023-06-07 PROCEDURE — 51798 US URINE CAPACITY MEASURE: CPT | Performed by: PHYSICIAN ASSISTANT

## 2023-06-07 ASSESSMENT — PAIN SCALES - GENERAL: PAINLEVEL: NO PAIN (0)

## 2023-06-07 NOTE — NURSING NOTE
Chief Complaint   Patient presents with     Nocturia     Patient here today for follow up       PVR done by bladder scan  PVR 15ml  Patient state doing well          Linda Sloan, RMA

## 2023-06-07 NOTE — PROGRESS NOTES
Urology Clinic      Name: Lauren Nick    MRN: [id number]   YOB: 1950  Accompanied at today's visit by:self                 Chief Complaint:   Nocturia.           History of Present Illness:   June 7, 2023    HISTORY:   We have been following 72 year old Lauren Nick for nocturia, nocturnal enuresis, hx of UTIs, history of breast cancer. Last seen on 3/20/23 and referred to PFPT, avoid bladder irritants and avoid fluids before bed. States PFPT and avoiding fluids before bed has significantly improved her night time symptoms. She underwent sleep study and reports she does not have sleep apnea. Last encounter noted lichen simplex chronicus on exam along vulva and had patient start steroid ointment; states this is going well. Was able to get rest of notes from [facility] in [place] from urologist Dr. Sang Almendarez. She underwent UDS which was reviewed at last encounter. She also had cysto with dilatation for urethral stricture but no other abnormalities seen on cysto in 2/2020. Patient denies trouble starting stream, issues with stream or feeling of incomplete emptying. We did obtain a renal US on 3/7/23 as there is history of urinary retention from her UDS (PVR was 120mL). Renal US was unremarkable. Reported night sweats at last encounter and followed up with her PCP; this has since resolved. No UTIs since last encounter.     PMH is significant for asthma, aortic valve replacement, thoracic aortic aneurysm, cardiac pacemaker. She also has hx of breast cancer s/p lumpectomy, radiation and chemo. No longer follows with oncology. Patient voices no other concerns at this time. Patient voices no other concerns at this time.            Allergies:     Allergies   Allergen Reactions     Penicillins Hives     Ambien [Zolpidem]      Complex behaviors     Statins [Statins]      Myalgias to multiple statins            Medications:     Current Outpatient Medications   Medication Sig      albuterol (PROAIR HFA/PROVENTIL HFA/VENTOLIN HFA) 108 (90 Base) MCG/ACT inhaler INHALE 2 PUFFS INTO THE LUNGS EVERY 6 HOURS     aspirin 81 MG EC tablet Take 81 mg by mouth daily     clobetasol (TEMOVATE) 0.05 % external ointment Apply pea sized amount to finger and rub along external vaginal tissue 2x per week at night. Remember to wash your hands after applying and avoid contact with your eyes.     coenzyme Q-10 (CO-Q10) 50 MG capsule Take 2 capsules (100 mg) by mouth daily     escitalopram (LEXAPRO) 20 MG tablet TAKE 1 TABLET BY MOUTH EVERY DAY     ezetimibe (ZETIA) 10 MG tablet TAKE 1 TABLET (10 MG) BY MOUTH DAILY.     fluticasone (FLONASE) 50 MCG/ACT nasal spray Spray 2 sprays into both nostrils daily     fluticasone-salmeterol (ADVAIR) 500-50 MCG/ACT inhaler Inhale 1 puff into the lungs every 12 hours     levothyroxine (SYNTHROID/LEVOTHROID) 50 MCG tablet TAKE 1 TABLET BY MOUTH EVERY DAY     LORazepam (ATIVAN) 1 MG tablet TAKE 1 TABLET (1 MG) BY MOUTH NIGHTLY AS NEEDED FOR SLEEP     losartan (COZAAR) 25 MG tablet Take 1 tablet (25 mg) by mouth daily     Melatonin 3 MG/4ML LIQD Take 4 mLs by mouth At Bedtime     metoprolol succinate ER (TOPROL XL) 25 MG 24 hr tablet TAKE 1 TABLET BY MOUTH TWICE A DAY     STATIN NOT PRESCRIBED (INTENTIONAL) Please choose reason not prescribed from choices below.     vitamin C (ASCORBIC ACID) 1000 MG TABS Take 1,000 mg by mouth daily     zinc gluconate 50 MG tablet Take 50 mg by mouth daily     neomycin-polymyxin-hydrocortisone (CORTISPORIN) 3.5-43824-5 otic suspension Place 3 drops into the right ear 3 times daily     spacer ([employer] MURALI) holding chamber Use with inhaler     Current Facility-Administered Medications   Medication     lidocaine (PF) (XYLOCAINE) 1 % injection 9 mL             Past  Surgical History:     Past Surgical History:   Procedure Laterality Date     ABDOMEN SURGERY  Gallbladder    removed in 2010     BIOPSY  10/2018     BREAST SURGERY  09/25/1989  "    CARDIAC SURGERY  03/22/2021     CHOLECYSTECTOMY  03/2010     COLONOSCOPY  03/2019     COLONOSCOPY N/A 11/10/2022    Procedure: COLONOSCOPY, WITH POLYPECTOMY AND BIOPSY;  Surgeon: Rafa Parks MD;  Location:  GI     ENT SURGERY  06/2011     EYE SURGERY  09/2015     THORACIC SURGERY  03/21    valve replacement             Physical Exam:     Vitals:    06/07/23 0940   BP: 126/70   Pulse: 60   SpO2: 97%   Weight: 64 kg (141 lb)   Height: 1.6 m (5' 3\")     PSYCH: NAD  EYES: EOMI  NEURO: AAO x3    LABS:   PVR 15 mL    Creatinine   Date Value Ref Range Status   02/11/2023 0.89 0.51 - 0.95 mg/dL Final       1/28/20 UDS from [place]: \"first desire 102mL, strong desire 259ml, capacity 355ml. voided 300mL with max flow rate of 10ml/sec and max detrusor recoding of [id number]. Average flow rate 5ml/sec with PVR of 120mL. Dx urinary retention and voiding dysfunction.\"  - Per personal review of UDS, does not appear to have true DO on UDS tracing or leakage noted, and appears to have good compliance during filling phase.     Renal US 3/21/23  FINDINGS:     RIGHT KIDNEY: 9.5 x 5.1 x 5.5 cm. Normal without hydronephrosis or masses.      LEFT KIDNEY: 9.7 x 5.0 x 4.0 cm. Normal without hydronephrosis or masses.      BLADDER: Mildly distended, no focal abnormality                                                                      IMPRESSION:  Normal kidney ultrasound.         Assessment and Plan:   72 year old is a pleasant female who has nocturia, lichen simplex chronicus, pelvic floor dysfunction, hx of UTIs. Hx is complicated by breast cancer.     Plan:  - PVR WNL  - Continue PT exercises.  - Patient doing well; plan to follow-up in 6 months.  - Continue to monitor UTIs as stable currently; has not had any recently.   - Continue to avoid bladder irritants and avoid fluids before bed.  - Continue clobetasol ointment; repeat exam 3/2024.   - After discussing the assessment and plan with patient, patient verbalizes understanding " and agrees to the above plan. All questions answered.     21 minutes spent on the date of the encounter doing chart review, review of Renal US results, review of test results, interpretation of tests, patient visit and documentation.      Judy Roberts PA-C  Urology  June 7, 2023      Patient Care Team:  Wegener, Joel Daniel Irwin, MD as PCP - General (Family Medicine)  Wegener, Joel Daniel Irwin, MD as Assigned PCP  Reggie King MD as Assigned Heart and Vascular Provider  Rohit Thao MD as MD (Critical Care)  Rohit Thao MD as Assigned Pulmonology Provider  Keo Lee MD as MD (Dermatology)  Keo Lee MD as Assigned Surgical Provider  Judy Roberts PA-C as Physician Assistant  Jeana Levin MD as Assigned Musculoskeletal Provider

## 2023-06-07 NOTE — LETTER
6/7/2023       RE: Lauren Nick  [address]  [facility] [address]     Dear Colleague,    Thank you for referring your patient, Lauren Nick, to the [facility] at [facility]. Please see a copy of my visit note below.    Urology Clinic      Name: Lauren Nick    MRN: [id number]   YOB: 1950  Accompanied at today's visit by:self                 Chief Complaint:   Nocturia.           History of Present Illness:   June 7, 2023    HISTORY:   We have been following 72 year old Lauren Nick for nocturia, nocturnal enuresis, hx of UTIs, history of breast cancer. Last seen on 3/20/23 and referred to PFPT, avoid bladder irritants and avoid fluids before bed. States PFPT and avoiding fluids before bed has significantly improved her night time symptoms. She underwent sleep study and reports she does not have sleep apnea. Last encounter noted lichen simplex chronicus on exam along vulva and had patient start steroid ointment; states this is going well. Was able to get rest of notes from [facility] in [place] from urologist Dr. Sang Almendarez. She underwent UDS which was reviewed at last encounter. She also had cysto with dilatation for urethral stricture but no other abnormalities seen on cysto in 2/2020. Patient denies trouble starting stream, issues with stream or feeling of incomplete emptying. We did obtain a renal US on 3/7/23 as there is history of urinary retention from her UDS (PVR was 120mL). Renal US was unremarkable. Reported night sweats at last encounter and followed up with her PCP; this has since resolved. No UTIs since last encounter.     PMH is significant for asthma, aortic valve replacement, thoracic aortic aneurysm, cardiac pacemaker. She also has hx of breast cancer s/p lumpectomy, radiation and chemo. No longer follows with oncology. Patient voices no other concerns at this  time. Patient voices no other concerns at this time.            Allergies:     Allergies   Allergen Reactions    Penicillins Hives    Ambien [Zolpidem]      Complex behaviors    Statins [Statins]      Myalgias to multiple statins            Medications:     Current Outpatient Medications   Medication Sig    albuterol (PROAIR HFA/PROVENTIL HFA/VENTOLIN HFA) 108 (90 Base) MCG/ACT inhaler INHALE 2 PUFFS INTO THE LUNGS EVERY 6 HOURS    aspirin 81 MG EC tablet Take 81 mg by mouth daily    clobetasol (TEMOVATE) 0.05 % external ointment Apply pea sized amount to finger and rub along external vaginal tissue 2x per week at night. Remember to wash your hands after applying and avoid contact with your eyes.    coenzyme Q-10 (CO-Q10) 50 MG capsule Take 2 capsules (100 mg) by mouth daily    escitalopram (LEXAPRO) 20 MG tablet TAKE 1 TABLET BY MOUTH EVERY DAY    ezetimibe (ZETIA) 10 MG tablet TAKE 1 TABLET (10 MG) BY MOUTH DAILY.    fluticasone (FLONASE) 50 MCG/ACT nasal spray Spray 2 sprays into both nostrils daily    fluticasone-salmeterol (ADVAIR) 500-50 MCG/ACT inhaler Inhale 1 puff into the lungs every 12 hours    levothyroxine (SYNTHROID/LEVOTHROID) 50 MCG tablet TAKE 1 TABLET BY MOUTH EVERY DAY    LORazepam (ATIVAN) 1 MG tablet TAKE 1 TABLET (1 MG) BY MOUTH NIGHTLY AS NEEDED FOR SLEEP    losartan (COZAAR) 25 MG tablet Take 1 tablet (25 mg) by mouth daily    Melatonin 3 MG/4ML LIQD Take 4 mLs by mouth At Bedtime    metoprolol succinate ER (TOPROL XL) 25 MG 24 hr tablet TAKE 1 TABLET BY MOUTH TWICE A DAY    STATIN NOT PRESCRIBED (INTENTIONAL) Please choose reason not prescribed from choices below.    vitamin C (ASCORBIC ACID) 1000 MG TABS Take 1,000 mg by mouth daily    zinc gluconate 50 MG tablet Take 50 mg by mouth daily    neomycin-polymyxin-hydrocortisone (CORTISPORIN) 3.5-89362-8 otic suspension Place 3 drops into the right ear 3 times daily    spacer ([employer] MURALI) holding chamber Use with inhaler     Current  "Facility-Administered Medications   Medication    lidocaine (PF) (XYLOCAINE) 1 % injection 9 mL             Past  Surgical History:     Past Surgical History:   Procedure Laterality Date    ABDOMEN SURGERY  Gallbladder    removed in 2010    BIOPSY  10/2018    BREAST SURGERY  09/25/1989    CARDIAC SURGERY  03/22/2021    CHOLECYSTECTOMY  03/2010    COLONOSCOPY  03/2019    COLONOSCOPY N/A 11/10/2022    Procedure: COLONOSCOPY, WITH POLYPECTOMY AND BIOPSY;  Surgeon: Rafa Parks MD;  Location:  GI    ENT SURGERY  06/2011    EYE SURGERY  09/2015    THORACIC SURGERY  03/21    valve replacement             Physical Exam:     Vitals:    06/07/23 0940   BP: 126/70   Pulse: 60   SpO2: 97%   Weight: 64 kg (141 lb)   Height: 1.6 m (5' 3\")     PSYCH: NAD  EYES: EOMI  NEURO: AAO x3    LABS:   PVR 15 mL    Creatinine   Date Value Ref Range Status   02/11/2023 0.89 0.51 - 0.95 mg/dL Final       1/28/20 UDS from [place]: \"first desire 102mL, strong desire 259ml, capacity 355ml. voided 300mL with max flow rate of 10ml/sec and max detrusor recoding of [id number]. Average flow rate 5ml/sec with PVR of 120mL. Dx urinary retention and voiding dysfunction.\"  - Per personal review of UDS, does not appear to have true DO on UDS tracing or leakage noted, and appears to have good compliance during filling phase.     Renal US 3/21/23  FINDINGS:     RIGHT KIDNEY: 9.5 x 5.1 x 5.5 cm. Normal without hydronephrosis or masses.      LEFT KIDNEY: 9.7 x 5.0 x 4.0 cm. Normal without hydronephrosis or masses.      BLADDER: Mildly distended, no focal abnormality                                                                      IMPRESSION:  Normal kidney ultrasound.         Assessment and Plan:   72 year old is a pleasant female who has nocturia, lichen simplex chronicus, pelvic floor dysfunction, hx of UTIs. Hx is complicated by breast cancer.     Plan:  - PVR WNL  - Continue PT exercises.  - Patient doing well; plan to follow-up in 6 months.  - " Continue to monitor UTIs as stable currently; has not had any recently.   - Continue to avoid bladder irritants and avoid fluids before bed.  - Continue clobetasol ointment; repeat exam 3/2024.   - After discussing the assessment and plan with patient, patient verbalizes understanding and agrees to the above plan. All questions answered.     21 minutes spent on the date of the encounter doing chart review, review of Renal US results, review of test results, interpretation of tests, patient visit and documentation.      Judy Roberts PA-C  Urology  June 7, 2023      Patient Care Team:  Wegener, Joel Daniel Irwin, MD as PCP - General (Family Medicine)  Wegener, Joel Daniel Irwin, MD as Assigned PCP  Reggie King MD as Assigned Heart and Vascular Provider  Rohit Thao MD as MD (Critical Care)  Rohit Thao MD as Assigned Pulmonology Provider  Keo Lee MD as MD (Dermatology)  Keo Lee MD as Assigned Surgical Provider  Judy Roberts PA-C as Physician Assistant  Jeana Levin MD as Assigned Musculoskeletal Provider

## 2023-06-24 DIAGNOSIS — E78.5 HYPERLIPIDEMIA LDL GOAL <130: ICD-10-CM

## 2023-06-26 NOTE — TELEPHONE ENCOUNTER
"Routing refill request to provider for review/approval because:  Lab results comment from 11/21/22    \"Your results revealed elevations in your total cholesterol, LDL\"bad cholesterol\" and triglycerides. We normally prescribe statins, but since you are not able to tolerate it, I would recommend discussing your results with your Cardiologist on 02/02/2023\"   States she wasn't eating well at the time   Did not follow up with cardiology yet  Has future physical and cardiology appointments in November    Thanks,  Ivy ABDI RN    "

## 2023-06-27 RX ORDER — EZETIMIBE 10 MG/1
TABLET ORAL
Qty: 90 TABLET | Refills: 1 | Status: SHIPPED | OUTPATIENT
Start: 2023-06-27 | End: 2023-09-07

## 2023-07-17 ENCOUNTER — OFFICE VISIT (OUTPATIENT)
Dept: FAMILY MEDICINE | Facility: CLINIC | Age: 73
End: 2023-07-17
Payer: MEDICARE

## 2023-07-17 VITALS
TEMPERATURE: 97.4 F | OXYGEN SATURATION: 96 % | HEART RATE: 60 BPM | WEIGHT: 144.4 LBS | DIASTOLIC BLOOD PRESSURE: 78 MMHG | HEIGHT: 62 IN | BODY MASS INDEX: 26.57 KG/M2 | RESPIRATION RATE: 16 BRPM | SYSTOLIC BLOOD PRESSURE: 124 MMHG

## 2023-07-17 DIAGNOSIS — L98.9 SKIN LESION: Primary | ICD-10-CM

## 2023-07-17 PROCEDURE — 99213 OFFICE O/P EST LOW 20 MIN: CPT | Performed by: FAMILY MEDICINE

## 2023-07-17 ASSESSMENT — PAIN SCALES - GENERAL: PAINLEVEL: NO PAIN (0)

## 2023-07-17 NOTE — PROGRESS NOTES
"  Assessment & Plan     Skin lesion    - Adult Dermatology Referral; Future  Recommending to see dermatology for evaluation and management.           BMI:   Estimated body mass index is 26.4 kg/m  as calculated from the following:    Height as of this encounter: 1.575 m (5' 2.01\").    Weight as of this encounter: 65.5 kg (144 lb 6.4 oz).           Samy Moura MD  [facility] ONDINA Will is a 73 year old, presenting for the following health issues:  Derm Problem and Mouth Problem        7/17/2023     4:16 PM   Additional Questions   Roomed by Dillan   Accompanied by N/A     History of Present Illness       Reason for visit:  Spot above my lip that is getting bigget  Symptom onset:  More than a month  Symptoms include:  It s getting bigget  Symptom intensity:  Mild  Symptom progression:  Staying the same  Had these symptoms before:  No  What makes it worse:  No  What makes it better:  No    She eats 2-3 servings of fruits and vegetables daily.She consumes 0 sweetened beverage(s) daily.She exercises with enough effort to increase her heart rate 30 to 60 minutes per day.  She exercises with enough effort to increase her heart rate 5 days per week.   She is taking medications regularly.               Review of Systems         Objective    /78   Pulse 60   Temp 97.4  F (36.3  C) (Temporal)   Resp 16   Ht 1.575 m (5' 2.01\")   Wt 65.5 kg (144 lb 6.4 oz)   SpO2 96%   BMI 26.40 kg/m    Body mass index is 26.4 kg/m .  Physical Exam   GENERAL: healthy, alert and no distress  Skin: Patient noted to have a keratotic lesion on the upper lip.                  "

## 2023-07-18 DIAGNOSIS — E03.9 HYPOTHYROIDISM, UNSPECIFIED TYPE: ICD-10-CM

## 2023-07-18 RX ORDER — LEVOTHYROXINE SODIUM 50 UG/1
TABLET ORAL
Qty: 30 TABLET | Refills: 1 | Status: SHIPPED | OUTPATIENT
Start: 2023-07-18 | End: 2023-08-08

## 2023-07-31 ENCOUNTER — OFFICE VISIT (OUTPATIENT)
Dept: FAMILY MEDICINE | Facility: CLINIC | Age: 73
End: 2023-07-31
Payer: MEDICARE

## 2023-07-31 DIAGNOSIS — L98.9 SKIN LESION: ICD-10-CM

## 2023-07-31 DIAGNOSIS — L57.0 ACTINIC KERATOSIS: Primary | ICD-10-CM

## 2023-07-31 DIAGNOSIS — M65.4 DE QUERVAIN'S DISEASE (TENOSYNOVITIS): ICD-10-CM

## 2023-07-31 PROCEDURE — 17000 DESTRUCT PREMALG LESION: CPT | Performed by: PHYSICIAN ASSISTANT

## 2023-07-31 PROCEDURE — 17003 DESTRUCT PREMALG LES 2-14: CPT | Performed by: PHYSICIAN ASSISTANT

## 2023-07-31 PROCEDURE — 99213 OFFICE O/P EST LOW 20 MIN: CPT | Mod: 25 | Performed by: PHYSICIAN ASSISTANT

## 2023-07-31 ASSESSMENT — PAIN SCALES - GENERAL: PAINLEVEL: NO PAIN (0)

## 2023-07-31 NOTE — PROGRESS NOTES
[facility] Dermatology Note  Encounter Date: Jul 31, 2023  Office Visit      Dermatology Problem List:  1. AKs - cryo  2. LTM - L preauricular - likely emerging SK, will recheck at next visit    Social: Lived in [place] for 8 years  ____________________________________________    Assessment & Plan:  # Actinic keratosis. Philtrum x1, frontal hair line x1  - Cryotherapy performed today, see procedure note below.  - recommended FBSE in 4-6mo    # Lesion to monitor - L preauricular - present for bout 1 week  - appears most consistent with emerging SK, plan to recheck at follow up skin exam  - recommended FBSE in 4-6mo    # De Quervain's  - R palm  - asx for patient  - reassured, discussed etiology    Procedures Performed:   - Cryotherapy procedure note, location(s): see above. After verbal consent and discussion of risks and benefits including, but not limited to, dyspigmentation/scar, blister, and pain, 2 lesion(s) was(were) treated with 1-2 mm freeze border for 1-2 cycles with liquid nitrogen. Post cryotherapy instructions were provided.     Follow-up: 4 month(s) in-person, or earlier for new or changing lesions    Staff:     All risks, benefits and alternatives were discussed with patient.  Patient is in agreement and understands the assessment and plan.  All questions were answered.    Milka Martinez PA-C, MPAS  [facility]: Phone: [phone], Fax: [phone]  [facility]: Phone: [phone],  Fax: [phone]  [facility]: Phone: [phone], Fax: [phone]  ____________________________________________    CC: Derm Problem (Lesion on upper lip/Pink spot on forehead in hairline/Dark spot by left ear/Palm of right hand ? callus)      HPI:  Ms. Lauren Nick is a 73 year old female who presents today as a new patient for several spots to check. Referred by Dr. Moura. Notes pink spot  near hair line as well as on upper lip. Dark spot on the L ear that is new within the past week and thickening of the R palm.    Patient is otherwise feeling well, without additional concerns.    Labs:  none    Physical Exam:  Vitals: There were no vitals taken for this visit.  SKIN: Sun-exposed skin, which includes the head/face, neck, both arms, digits, and/or nails was examined.    - L preauricular - 4mm dark brown somewhat waxy appearing macule   - There are erythematous macules with overyling adherent scale on the philtrum x1 and frontal h airline x1. .   - R palm with nontender induration  - No other lesions of concern on areas examined.     Medications:  Current Outpatient Medications   Medication    albuterol (PROAIR HFA/PROVENTIL HFA/VENTOLIN HFA) 108 (90 Base) MCG/ACT inhaler    aspirin 81 MG EC tablet    clobetasol (TEMOVATE) 0.05 % external ointment    escitalopram (LEXAPRO) 20 MG tablet    fluticasone (FLONASE) 50 MCG/ACT nasal spray    fluticasone-salmeterol (ADVAIR) 500-50 MCG/ACT inhaler    levothyroxine (SYNTHROID/LEVOTHROID) 50 MCG tablet    LORazepam (ATIVAN) 1 MG tablet    losartan (COZAAR) 25 MG tablet    metoprolol succinate ER (TOPROL XL) 25 MG 24 hr tablet    vitamin C (ASCORBIC ACID) 1000 MG TABS    zinc gluconate 50 MG tablet    coenzyme Q-10 (CO-Q10) 50 MG capsule    ezetimibe (ZETIA) 10 MG tablet    Melatonin 3 MG/4ML LIQD    STATIN NOT PRESCRIBED (INTENTIONAL)     Current Facility-Administered Medications   Medication    lidocaine (PF) (XYLOCAINE) 1 % injection 9 mL      Past Medical/Surgical History:   Patient Active Problem List   Diagnosis    H/O aortic valve replacement    H/O bicuspid aortic valve    Thoracic aortic aneurysm without rupture (H)    H/O malignant neoplasm of breast    H/O lumpectomy    History of colonic polyps    Recurrent major depressive disorder, in partial remission (H)    Myalgia due to statin    Hip pain, right    Osteopenia, unspecified location     Hypothyroidism, unspecified type    Ascending aorta dilatation (H)    Mild intermittent asthma with acute exacerbation    Infection due to 2019 novel coronavirus    Uncontrolled hypertension    Tongue lump    Recurrent major depression in complete remission (H)    Right ear pain    Pelvic floor dysfunction in female    Nocturia    Feeling of incomplete bladder emptying     Past Medical History:   Diagnosis Date    Thyroid disease 02/21 diagnosed    Uncomplicated asthma A few years ago    Uncontrolled hypertension 2/20/2023

## 2023-07-31 NOTE — LETTER
7/31/2023         RE: Lauren Nick  [address]  [place] [address]        Dear Colleague,    Thank you for referring your patient, Lauren Nick, to the [facility]. Please see a copy of my visit note below.    [facility] Dermatology Note  Encounter Date: Jul 31, 2023  Office Visit      Dermatology Problem List:  1. AKs - cryo  2. LTM - L preauricular - likely emerging SK, will recheck at next visit    Social: Lived in [place] for 8 years  ____________________________________________    Assessment & Plan:  # Actinic keratosis. Philtrum x1, frontal hair line x1  - Cryotherapy performed today, see procedure note below.  - recommended FBSE in 4-6mo    # Lesion to monitor - L preauricular - present for bout 1 week  - appears most consistent with emerging SK, plan to recheck at follow up skin exam  - recommended FBSE in 4-6mo    # De Quervain's  - R palm  - asx for patient  - reassured, discussed etiology    Procedures Performed:   - Cryotherapy procedure note, location(s): see above. After verbal consent and discussion of risks and benefits including, but not limited to, dyspigmentation/scar, blister, and pain, 2 lesion(s) was(were) treated with 1-2 mm freeze border for 1-2 cycles with liquid nitrogen. Post cryotherapy instructions were provided.     Follow-up: 4 month(s) in-person, or earlier for new or changing lesions    Staff:     All risks, benefits and alternatives were discussed with patient.  Patient is in agreement and understands the assessment and plan.  All questions were answered.    Milka Martinez PA-C, MPAS  [facility]: Phone: [phone], Fax: [phone]  [facility]: Phone: [phone],  Fax: [phone]  [facility]: Phone: [phone], Fax: [phone]  ____________________________________________    CC: Derm Problem (Lesion on  upper lip/Pink spot on forehead in hairline/Dark spot by left ear/Palm of right hand ? callus)      HPI:  Ms. Lauren Nick is a 73 year old female who presents today as a new patient for several spots to check. Referred by Dr. Moura. Notes pink spot near hair line as well as on upper lip. Dark spot on the L ear that is new within the past week and thickening of the R palm.    Patient is otherwise feeling well, without additional concerns.    Labs:  none    Physical Exam:  Vitals: There were no vitals taken for this visit.  SKIN: Sun-exposed skin, which includes the head/face, neck, both arms, digits, and/or nails was examined.    - L preauricular - 4mm dark brown somewhat waxy appearing macule   - There are erythematous macules with overyling adherent scale on the philtrum x1 and frontal h airline x1. .   - R palm with nontender induration  - No other lesions of concern on areas examined.     Medications:  Current Outpatient Medications   Medication     albuterol (PROAIR HFA/PROVENTIL HFA/VENTOLIN HFA) 108 (90 Base) MCG/ACT inhaler     aspirin 81 MG EC tablet     clobetasol (TEMOVATE) 0.05 % external ointment     escitalopram (LEXAPRO) 20 MG tablet     fluticasone (FLONASE) 50 MCG/ACT nasal spray     fluticasone-salmeterol (ADVAIR) 500-50 MCG/ACT inhaler     levothyroxine (SYNTHROID/LEVOTHROID) 50 MCG tablet     LORazepam (ATIVAN) 1 MG tablet     losartan (COZAAR) 25 MG tablet     metoprolol succinate ER (TOPROL XL) 25 MG 24 hr tablet     vitamin C (ASCORBIC ACID) 1000 MG TABS     zinc gluconate 50 MG tablet     coenzyme Q-10 (CO-Q10) 50 MG capsule     ezetimibe (ZETIA) 10 MG tablet     Melatonin 3 MG/4ML LIQD     STATIN NOT PRESCRIBED (INTENTIONAL)     Current Facility-Administered Medications   Medication     lidocaine (PF) (XYLOCAINE) 1 % injection 9 mL      Past Medical/Surgical History:   Patient Active Problem List   Diagnosis     H/O aortic valve replacement     H/O bicuspid aortic valve     Thoracic  aortic aneurysm without rupture (H)     H/O malignant neoplasm of breast     H/O lumpectomy     History of colonic polyps     Recurrent major depressive disorder, in partial remission (H)     Myalgia due to statin     Hip pain, right     Osteopenia, unspecified location     Hypothyroidism, unspecified type     Ascending aorta dilatation (H)     Mild intermittent asthma with acute exacerbation     Infection due to 2019 novel coronavirus     Uncontrolled hypertension     Tongue lump     Recurrent major depression in complete remission (H)     Right ear pain     Pelvic floor dysfunction in female     Nocturia     Feeling of incomplete bladder emptying     Past Medical History:   Diagnosis Date     Thyroid disease 02/21 diagnosed     Uncomplicated asthma A few years ago     Uncontrolled hypertension 2/20/2023                        Again, thank you for allowing me to participate in the care of your patient.        Sincerely,        Milka Martinez PA-C

## 2023-08-07 ASSESSMENT — PATIENT HEALTH QUESTIONNAIRE - PHQ9
10. IF YOU CHECKED OFF ANY PROBLEMS, HOW DIFFICULT HAVE THESE PROBLEMS MADE IT FOR YOU TO DO YOUR WORK, TAKE CARE OF THINGS AT HOME, OR GET ALONG WITH OTHER PEOPLE: NOT DIFFICULT AT ALL
SUM OF ALL RESPONSES TO PHQ QUESTIONS 1-9: 3
SUM OF ALL RESPONSES TO PHQ QUESTIONS 1-9: 3

## 2023-08-08 ENCOUNTER — OFFICE VISIT (OUTPATIENT)
Dept: FAMILY MEDICINE | Facility: CLINIC | Age: 73
End: 2023-08-08
Payer: MEDICARE

## 2023-08-08 VITALS
RESPIRATION RATE: 16 BRPM | BODY MASS INDEX: 26.13 KG/M2 | HEIGHT: 62 IN | DIASTOLIC BLOOD PRESSURE: 78 MMHG | OXYGEN SATURATION: 95 % | SYSTOLIC BLOOD PRESSURE: 132 MMHG | HEART RATE: 74 BPM | TEMPERATURE: 97.3 F | WEIGHT: 142 LBS

## 2023-08-08 DIAGNOSIS — Z87.74 H/O BICUSPID AORTIC VALVE: ICD-10-CM

## 2023-08-08 DIAGNOSIS — J30.2 SEASONAL ALLERGIC RHINITIS, UNSPECIFIED TRIGGER: ICD-10-CM

## 2023-08-08 DIAGNOSIS — M25.561 ACUTE PAIN OF RIGHT KNEE: Primary | ICD-10-CM

## 2023-08-08 DIAGNOSIS — Z95.2 H/O AORTIC VALVE REPLACEMENT: ICD-10-CM

## 2023-08-08 DIAGNOSIS — I77.810 ASCENDING AORTA DILATATION (H): ICD-10-CM

## 2023-08-08 DIAGNOSIS — E03.9 HYPOTHYROIDISM, UNSPECIFIED TYPE: ICD-10-CM

## 2023-08-08 DIAGNOSIS — J41.0 SIMPLE CHRONIC BRONCHITIS (H): ICD-10-CM

## 2023-08-08 DIAGNOSIS — F33.41 RECURRENT MAJOR DEPRESSIVE DISORDER, IN PARTIAL REMISSION (H): ICD-10-CM

## 2023-08-08 PROCEDURE — 99214 OFFICE O/P EST MOD 30 MIN: CPT | Performed by: PHYSICIAN ASSISTANT

## 2023-08-08 RX ORDER — LEVOTHYROXINE SODIUM 50 UG/1
50 TABLET ORAL DAILY
Qty: 90 TABLET | Refills: 1 | Status: SHIPPED | OUTPATIENT
Start: 2023-08-08 | End: 2024-02-02

## 2023-08-08 RX ORDER — LOSARTAN POTASSIUM 25 MG/1
25 TABLET ORAL DAILY
Qty: 90 TABLET | Refills: 1 | Status: SHIPPED | OUTPATIENT
Start: 2023-08-08 | End: 2024-04-17

## 2023-08-08 RX ORDER — ALBUTEROL SULFATE 90 UG/1
2 AEROSOL, METERED RESPIRATORY (INHALATION) EVERY 6 HOURS
Qty: 8.5 G | Refills: 3 | Status: SHIPPED | OUTPATIENT
Start: 2023-08-08 | End: 2024-08-27

## 2023-08-08 RX ORDER — FLUTICASONE PROPIONATE 50 MCG
2 SPRAY, SUSPENSION (ML) NASAL DAILY
Qty: 16 G | Refills: 11 | Status: SHIPPED | OUTPATIENT
Start: 2023-08-08 | End: 2024-06-03

## 2023-08-08 RX ORDER — ESCITALOPRAM OXALATE 20 MG/1
20 TABLET ORAL DAILY
Qty: 90 TABLET | Refills: 1 | Status: SHIPPED | OUTPATIENT
Start: 2023-08-08 | End: 2024-03-29

## 2023-08-08 RX ORDER — METOPROLOL SUCCINATE 25 MG/1
25 TABLET, EXTENDED RELEASE ORAL 2 TIMES DAILY
Qty: 180 TABLET | Refills: 1 | Status: SHIPPED | OUTPATIENT
Start: 2023-08-08 | End: 2024-01-19

## 2023-08-08 ASSESSMENT — PAIN SCALES - PAIN ENJOYMENT GENERAL ACTIVITY SCALE (PEG)
AVG_PAIN_PASTWEEK: 6
INTERFERED_GENERAL_ACTIVITY: 7
PEG_TOTALSCORE: 7
INTERFERED_ENJOYMENT_LIFE: 8

## 2023-08-08 ASSESSMENT — PAIN SCALES - GENERAL: PAINLEVEL: NO PAIN (0)

## 2023-08-08 NOTE — PROGRESS NOTES
"  Assessment & Plan     Acute pain of right knee  Improved, does not feel further treatment/evaluation needed at this time.    Simple chronic bronchitis (H)  Doing well  - albuterol (PROAIR HFA/PROVENTIL HFA/VENTOLIN HFA) 108 (90 Base) MCG/ACT inhaler; Inhale 2 puffs into the lungs every 6 hours    Seasonal allergic rhinitis, unspecified trigger    - fluticasone (FLONASE) 50 MCG/ACT nasal spray; Spray 2 sprays into both nostrils daily    Hypothyroidism, unspecified type  Stable, will be due for labs soon  - levothyroxine (SYNTHROID/LEVOTHROID) 50 MCG tablet; Take 1 tablet (50 mcg) by mouth daily    Recurrent major depressive disorder, in partial remission (H)  Working well.  Possibly interested in tapering down at some time.  - escitalopram (LEXAPRO) 20 MG tablet; Take 1 tablet (20 mg) by mouth daily    H/O aortic valve replacement    - metoprolol succinate ER (TOPROL XL) 25 MG 24 hr tablet; Take 1 tablet (25 mg) by mouth 2 times daily    H/O bicuspid aortic valve    - metoprolol succinate ER (TOPROL XL) 25 MG 24 hr tablet; Take 1 tablet (25 mg) by mouth 2 times daily    Ascending aorta dilatation (H)    - losartan (COZAAR) 25 MG tablet; Take 1 tablet (25 mg) by mouth daily             BMI:   Estimated body mass index is 25.97 kg/m  as calculated from the following:    Height as of this encounter: 1.575 m (5' 2\").    Weight as of this encounter: 64.4 kg (142 lb).           Moy Smith PA-C  [facility] is a 73 year old, presenting for the following health issues:  Fall        8/8/2023     4:18 PM   Additional Questions   Roomed by talia goetz   Accompanied by mir         8/8/2023     4:18 PM   Patient Reported Additional Medications   Patient reports taking the following new medications n/a       History of Present Illness       Reason for visit:  The left knee is better but the right one still hurts and is impacting one I can do.  Symptom onset:  More than a " "month  Symptoms include:  Trouble walking, pain  Symptom intensity:  Moderate  Symptom progression:  Staying the same  Had these symptoms before:  No  What makes it worse:  Exercising  and walking is difficult  What makes it better:  Ice on it    She eats 2-3 servings of fruits and vegetables daily.She consumes 0 sweetened beverage(s) daily.She exercises with enough effort to increase her heart rate 30 to 60 minutes per day.  She exercises with enough effort to increase her heart rate 4 days per week.   She is taking medications regularly.         Since she has made this visit the right knee has improved quite a bit as well.      Review of Systems   Constitutional, HEENT, cardiovascular, pulmonary, gi and gu systems are negative, except as otherwise noted.      Objective    /78 (BP Location: Left arm, Patient Position: Sitting, Cuff Size: Adult Regular)   Pulse 74   Temp 97.3  F (36.3  C) (Temporal)   Resp 16   Ht 1.575 m (5' 2\")   Wt 64.4 kg (142 lb)   SpO2 95%   BMI 25.97 kg/m    Body mass index is 25.97 kg/m .  Physical Exam   GENERAL: alert and no distress  EYES: Eyes grossly normal to inspection  RESP: lungs clear to auscultation - no rales, rhonchi or wheezes  CV: regular rate and rhythm, normal S1 S2, no S3 or S4, no murmur, click or rub, no peripheral edema and peripheral pulses strong  MS: no gross musculoskeletal defects noted, no edema  SKIN: no suspicious lesions or rashes  PSYCH: mentation appears normal, affect normal/bright                      "

## 2023-09-07 ENCOUNTER — MYC MEDICAL ADVICE (OUTPATIENT)
Dept: FAMILY MEDICINE | Facility: CLINIC | Age: 73
End: 2023-09-07
Payer: MEDICARE

## 2023-09-07 DIAGNOSIS — M16.0 PRIMARY OSTEOARTHRITIS OF BOTH HIPS: Primary | ICD-10-CM

## 2023-09-13 ENCOUNTER — TELEPHONE (OUTPATIENT)
Dept: CARDIOLOGY | Facility: CLINIC | Age: 73
End: 2023-09-13

## 2023-09-13 ENCOUNTER — OFFICE VISIT (OUTPATIENT)
Dept: CARDIOLOGY | Facility: CLINIC | Age: 73
End: 2023-09-13
Payer: MEDICARE

## 2023-09-13 ENCOUNTER — ANCILLARY PROCEDURE (OUTPATIENT)
Dept: CARDIOLOGY | Facility: CLINIC | Age: 73
End: 2023-09-13
Attending: INTERNAL MEDICINE
Payer: MEDICARE

## 2023-09-13 VITALS
HEIGHT: 62 IN | HEART RATE: 60 BPM | WEIGHT: 146 LBS | BODY MASS INDEX: 26.87 KG/M2 | DIASTOLIC BLOOD PRESSURE: 82 MMHG | SYSTOLIC BLOOD PRESSURE: 128 MMHG | OXYGEN SATURATION: 97 %

## 2023-09-13 DIAGNOSIS — E78.5 HYPERLIPIDEMIA LDL GOAL <70: Primary | ICD-10-CM

## 2023-09-13 DIAGNOSIS — I49.5 SSS (SICK SINUS SYNDROME) (H): Primary | ICD-10-CM

## 2023-09-13 DIAGNOSIS — I48.0 PAROXYSMAL ATRIAL FIBRILLATION (H): Primary | ICD-10-CM

## 2023-09-13 DIAGNOSIS — I77.810 ASCENDING AORTA DILATATION (H): ICD-10-CM

## 2023-09-13 DIAGNOSIS — I48.0 PAROXYSMAL ATRIAL FIBRILLATION (H): ICD-10-CM

## 2023-09-13 DIAGNOSIS — Z95.0 CARDIAC PACEMAKER IN SITU: ICD-10-CM

## 2023-09-13 PROCEDURE — 93280 PM DEVICE PROGR EVAL DUAL: CPT | Performed by: INTERNAL MEDICINE

## 2023-09-13 PROCEDURE — 99214 OFFICE O/P EST MOD 30 MIN: CPT | Mod: 25 | Performed by: INTERNAL MEDICINE

## 2023-09-13 NOTE — PROGRESS NOTES
HPI and Plan:   A very pleasant 73-year-old female s/p bioprosthetic aortic valve replacement with 23 mm Inspira valve for severe aortic stenosis in the setting of bicuspid aortic valve this was done in March 2021.  She also had pacemaker and plantation due to postoperative bradycardia.  Outside notes indicate no significant coronary disease.  She also has history of dyslipidemia with history of intolerance to several statins  I saw the patient in December 2021 when she established care with us after moving from [place] to [place].  She got enrolled in our device clinic.  She also had an echocardiogram in March 2022 that showed normal LV function with well-seated normal functioning bioprosthetic valve with mean gradient of 13 mg okay without any aortic regurgitation.  Ascending aorta was noted to be 4.4 cm.  To be noted  in May 2020 when it was noted to be 4.2 cm.  She had an MRI aorta done in July 2022 that showed borderline dilated ascending order measuring 3.9 x 3.8 cm.  Today she is coming for routine follow-up.  Overall health wise she feels well.  No cardiac complaints. , She was on Zetia but this was discontinued.  Device interrogation today showed paroxysmal episode of atrial fibrillation yesterday for several hours.  Patient is on beta-blocker.  She is asymptomatic denies any palpitation chest discomfort shortness of dizziness presyncope or syncope.  She is on baby aspirin.  No bleeding issues.     Assessment and plan  A pleasant 73-year-old female s/p bioprosthetic aortic valve replacement for severe aortic valve stenosis in the setting of bicuspid aortic valve this was done in March 2021.  Echocardiogram last year showed normal functioning bioprosthetic valve.  Cardiac auscultation was benign today without any significant murmur.  MR aorta shows ascending aorta to be only borderline dilated.  I recommended we can repeat an MRI of the aorta in 2 years time which will be next year.  She will continue  predental work-up antibiotic prophylaxis.  Discussed with patient about pathophysiology of atrial fibrillation and stroke prophylaxis.  Her [id number] score is 3 (age, gender, history of hypertension).  We discussed pros and cons of anticoagulation.  We discussed about Coumadin versus NOACs.  Patient understands the rational of anticoagulation and the risk and now and wishes to proceed with it.  I recommend apixaban 5 mg twice daily.  I have also given her the name of further NOACs to see if there is any significant cost difference.  To avoid increased risk of bleeding with both anticoagulation and repeat therapy I recommend discontinuing baby aspirin and continuing apixaban.     1.  History of bicuspid aortic valve with severe aortic valve stenosis s/p bioprosthetic aortic valve replacement 2021.  Echocardiogram last year showed normal functioning prosthetic valve.  Normal LV function.   2.  History of bradycardia post AVR s/p pacemaker implantation.  Dual-chamber pacemaker plantation.  Normal functioning pacemaker on recent device check.  3.  Newly diagnosed paroxysmal atrial fibrillation on device interrogation.  Asymptomatic.  CHADS2 Vascor of 3  4.  Mild dilated ascending, echocardiogram overestimating the size on recent MRI mildly dilated ascending aorta measuring 3.9 cm.  On losartan and metoprolol.    Recommendations  Start apixaban 5 mg twice daily  Discontinue aspirin  Continue regular device checkup through device clinic  If any concerns for bleeding patient will let us know otherwise we can see her back in 1 year  Repeat MRA aorta in 1 year time  Continue predental work-up antibiotic prophylaxis.    Today's clinic visit entailed:  Review of the result(s) of each unique test - device interrogation, echo, MRA        Orders Placed This Encounter   Procedures    MRI Angiogram chest w & w/o contrast    Follow-Up with Cardiology       Orders Placed This Encounter   Medications    apixaban ANTICOAGULANT  (ELIQUIS) 5 MG tablet     Sig: Take 1 tablet (5 mg) by mouth 2 times daily     Dispense:  180 tablet     Refill:  3       Medications Discontinued During This Encounter   Medication Reason    aspirin 81 MG EC tablet          Encounter Diagnoses   Name Primary?    Hyperlipidemia LDL goal <70 Yes    Paroxysmal atrial fibrillation (H)     Ascending aorta dilatation (H)        CURRENT MEDICATIONS:  Current Outpatient Medications   Medication Sig Dispense Refill    albuterol (PROAIR HFA/PROVENTIL HFA/VENTOLIN HFA) 108 (90 Base) MCG/ACT inhaler Inhale 2 puffs into the lungs every 6 hours 8.5 g 3    apixaban ANTICOAGULANT (ELIQUIS) 5 MG tablet Take 1 tablet (5 mg) by mouth 2 times daily 180 tablet 3    clobetasol (TEMOVATE) 0.05 % external ointment Apply pea sized amount to finger and rub along external vaginal tissue 2x per week at night. Remember to wash your hands after applying and avoid contact with your eyes. 45 g 3    coenzyme Q-10 (CO-Q10) 50 MG capsule Take 100 mg by mouth daily      escitalopram (LEXAPRO) 20 MG tablet Take 1 tablet (20 mg) by mouth daily 90 tablet 1    fluticasone (FLONASE) 50 MCG/ACT nasal spray Spray 2 sprays into both nostrils daily 16 g 11    levothyroxine (SYNTHROID/LEVOTHROID) 50 MCG tablet Take 1 tablet (50 mcg) by mouth daily 90 tablet 1    LORazepam (ATIVAN) 1 MG tablet TAKE 1 TABLET (1 MG) BY MOUTH NIGHTLY AS NEEDED FOR SLEEP 30 tablet 5    losartan (COZAAR) 25 MG tablet Take 1 tablet (25 mg) by mouth daily 90 tablet 1    metoprolol succinate ER (TOPROL XL) 25 MG 24 hr tablet Take 1 tablet (25 mg) by mouth 2 times daily 180 tablet 1    vitamin C (ASCORBIC ACID) 1000 MG TABS Take 1,000 mg by mouth daily      STATIN NOT PRESCRIBED (INTENTIONAL) Please choose reason not prescribed from choices below. (Patient not taking: Reported on 7/31/2023)         ALLERGIES     Allergies   Allergen Reactions    Penicillins Hives    Ambien [Zolpidem]      Complex behaviors    Statins [Statins]       Myalgias to multiple statins       PAST MEDICAL HISTORY:  Past Medical History:   Diagnosis Date    Thyroid disease  diagnosed    Uncomplicated asthma A few years ago    Uncontrolled hypertension 2023       PAST SURGICAL HISTORY:  Past Surgical History:   Procedure Laterality Date    ABDOMEN SURGERY  Gallbladder    removed in     BIOPSY  10/2018    BREAST SURGERY  1989    CARDIAC SURGERY  2021    CHOLECYSTECTOMY  2010    COLONOSCOPY  2019    COLONOSCOPY N/A 11/10/2022    Procedure: COLONOSCOPY, WITH POLYPECTOMY AND BIOPSY;  Surgeon: Rafa Parks MD;  Location:  GI    ENT SURGERY  2011    EYE SURGERY  2015    THORACIC SURGERY      valve replacement       FAMILY HISTORY:  Family History   Problem Relation Age of Onset    Hypertension Mother     Cancer Mother     Other Cancer Mother          if kidney cancer at age 86    Other Cancer Father          of stomach cancer age 43    Heart Disease Paternal Grandmother     Hypertension Paternal Grandmother     Obesity Paternal Grandmother     Hypertension Sister     Hyperlipidemia Sister     Heart Disease Sister     Hypertension Sister     Hyperlipidemia Sister     Colon Cancer Sister     Diabetes Sister     Breast Cancer Sister     Skin Cancer Sister     Coronary Artery Disease Sister     Hyperlipidemia Sister     Thyroid Disease Sister     Skin Cancer Sister     Anesthesia Reaction Son     Hypertension Son     Heart Disease Daughter     Other Cancer Other         Neuroblastoma age 5       SOCIAL HISTORY:  Social History     Socioeconomic History    Marital status:      Spouse name: None    Number of children: None    Years of education: None    Highest education level: None   Tobacco Use    Smoking status: Former     Packs/day: 1.00     Years: 10.00     Pack years: 10.00     Types: Cigarettes     Start date: 1969     Quit date: 10/1/1979     Years since quittin.9    Smokeless tobacco: Never   Vaping Use  "   Vaping Use: Never used   Substance and Sexual Activity    Alcohol use: Yes     Comment: occ    Drug use: Never    Sexual activity: Not Currently     Partners: Male     Birth control/protection: Post-menopausal   Other Topics Concern    Parent/sibling w/ CABG, MI or angioplasty before 65F 55M? No       Review of Systems:  Skin:          Eyes:         ENT:         Respiratory:          Cardiovascular:         Gastroenterology:        Genitourinary:         Musculoskeletal:         Neurologic:         Psychiatric:         Heme/Lymph/Imm:         Endocrine:           Physical Exam:  Vitals: /82   Pulse 60   Ht 1.575 m (5' 2\")   Wt 66.2 kg (146 lb)   SpO2 97%   BMI 26.70 kg/m      General patient appears comfortable  Neck normal JVP  Cardiovascular system S1-S2 normal there is grade 2 x 6 ejection systolic murmur with early systolic peaking over the right upper sternal border, no S3-S4 rub or gallop  Respiratory system clear to auscultation  Extremities no edema      CC  No referring provider defined for this encounter.                "

## 2023-09-13 NOTE — LETTER
9/13/2023    Lacho Larry MD  [address]  [facility] [address]    RE: Lauren Nick       Dear Colleague,     I had the pleasure of seeing Lauren Nick in the [facility].  HPI and Plan:   A very pleasant 73-year-old female s/p bioprosthetic aortic valve replacement with 23 mm Inspira valve for severe aortic stenosis in the setting of bicuspid aortic valve this was done in March 2021.  She also had pacemaker and plantation due to postoperative bradycardia.  Outside notes indicate no significant coronary disease.  She also has history of dyslipidemia with history of intolerance to several statins  I saw the patient in December 2021 when she established care with us after moving from [place] to [place].  She got enrolled in our device clinic.  She also had an echocardiogram in March 2022 that showed normal LV function with well-seated normal functioning bioprosthetic valve with mean gradient of 13 mg okay without any aortic regurgitation.  Ascending aorta was noted to be 4.4 cm.  To be noted  in May 2020 when it was noted to be 4.2 cm.  She had an MRI aorta done in July 2022 that showed borderline dilated ascending order measuring 3.9 x 3.8 cm.  Today she is coming for routine follow-up.  Overall health wise she feels well.  No cardiac complaints. , She was on Zetia but this was discontinued.  Device interrogation today showed paroxysmal episode of atrial fibrillation yesterday for several hours.  Patient is on beta-blocker.  She is asymptomatic denies any palpitation chest discomfort shortness of dizziness presyncope or syncope.  She is on baby aspirin.  No bleeding issues.     Assessment and plan  A pleasant 73-year-old female s/p bioprosthetic aortic valve replacement for severe aortic valve stenosis in the setting of bicuspid aortic valve this was done in March 2021.  Echocardiogram last year showed normal functioning bioprosthetic valve.  Cardiac auscultation  was benign today without any significant murmur.  MR aorta shows ascending aorta to be only borderline dilated.  I recommended we can repeat an MRI of the aorta in 2 years time which will be next year.  She will continue predental work-up antibiotic prophylaxis.  Discussed with patient about pathophysiology of atrial fibrillation and stroke prophylaxis.  Her [id number] score is 3 (age, gender, history of hypertension).  We discussed pros and cons of anticoagulation.  We discussed about Coumadin versus NOACs.  Patient understands the rational of anticoagulation and the risk and now and wishes to proceed with it.  I recommend apixaban 5 mg twice daily.  I have also given her the name of further NOACs to see if there is any significant cost difference.  To avoid increased risk of bleeding with both anticoagulation and repeat therapy I recommend discontinuing baby aspirin and continuing apixaban.     1.  History of bicuspid aortic valve with severe aortic valve stenosis s/p bioprosthetic aortic valve replacement 2021.  Echocardiogram last year showed normal functioning prosthetic valve.  Normal LV function.   2.  History of bradycardia post AVR s/p pacemaker implantation.  Dual-chamber pacemaker plantation.  Normal functioning pacemaker on recent device check.  3.  Newly diagnosed paroxysmal atrial fibrillation on device interrogation.  Asymptomatic.  CHADS2 Vascor of 3  4.  Mild dilated ascending, echocardiogram overestimating the size on recent MRI mildly dilated ascending aorta measuring 3.9 cm.  On losartan and metoprolol.    Recommendations  Start apixaban 5 mg twice daily  Discontinue aspirin  Continue regular device checkup through device clinic  If any concerns for bleeding patient will let us know otherwise we can see her back in 1 year  Repeat MRA aorta in 1 year time  Continue predental work-up antibiotic prophylaxis.    Today's clinic visit entailed:  Review of the result(s) of each unique test - device  interrogation, echo, MRA        Orders Placed This Encounter   Procedures    MRI Angiogram chest w & w/o contrast    Follow-Up with Cardiology       Orders Placed This Encounter   Medications    apixaban ANTICOAGULANT (ELIQUIS) 5 MG tablet     Sig: Take 1 tablet (5 mg) by mouth 2 times daily     Dispense:  180 tablet     Refill:  3       Medications Discontinued During This Encounter   Medication Reason    aspirin 81 MG EC tablet          Encounter Diagnoses   Name Primary?    Hyperlipidemia LDL goal <70 Yes    Paroxysmal atrial fibrillation (H)     Ascending aorta dilatation (H)        CURRENT MEDICATIONS:  Current Outpatient Medications   Medication Sig Dispense Refill    albuterol (PROAIR HFA/PROVENTIL HFA/VENTOLIN HFA) 108 (90 Base) MCG/ACT inhaler Inhale 2 puffs into the lungs every 6 hours 8.5 g 3    apixaban ANTICOAGULANT (ELIQUIS) 5 MG tablet Take 1 tablet (5 mg) by mouth 2 times daily 180 tablet 3    clobetasol (TEMOVATE) 0.05 % external ointment Apply pea sized amount to finger and rub along external vaginal tissue 2x per week at night. Remember to wash your hands after applying and avoid contact with your eyes. 45 g 3    coenzyme Q-10 (CO-Q10) 50 MG capsule Take 100 mg by mouth daily      escitalopram (LEXAPRO) 20 MG tablet Take 1 tablet (20 mg) by mouth daily 90 tablet 1    fluticasone (FLONASE) 50 MCG/ACT nasal spray Spray 2 sprays into both nostrils daily 16 g 11    levothyroxine (SYNTHROID/LEVOTHROID) 50 MCG tablet Take 1 tablet (50 mcg) by mouth daily 90 tablet 1    LORazepam (ATIVAN) 1 MG tablet TAKE 1 TABLET (1 MG) BY MOUTH NIGHTLY AS NEEDED FOR SLEEP 30 tablet 5    losartan (COZAAR) 25 MG tablet Take 1 tablet (25 mg) by mouth daily 90 tablet 1    metoprolol succinate ER (TOPROL XL) 25 MG 24 hr tablet Take 1 tablet (25 mg) by mouth 2 times daily 180 tablet 1    vitamin C (ASCORBIC ACID) 1000 MG TABS Take 1,000 mg by mouth daily      STATIN NOT PRESCRIBED (INTENTIONAL) Please choose reason not  prescribed from choices below. (Patient not taking: Reported on 2023)         ALLERGIES     Allergies   Allergen Reactions    Penicillins Hives    Ambien [Zolpidem]      Complex behaviors    Statins [Statins]      Myalgias to multiple statins       PAST MEDICAL HISTORY:  Past Medical History:   Diagnosis Date    Thyroid disease  diagnosed    Uncomplicated asthma A few years ago    Uncontrolled hypertension 2023       PAST SURGICAL HISTORY:  Past Surgical History:   Procedure Laterality Date    ABDOMEN SURGERY  Gallbladder    removed in     BIOPSY  10/2018    BREAST SURGERY  1989    CARDIAC SURGERY  2021    CHOLECYSTECTOMY  2010    COLONOSCOPY  2019    COLONOSCOPY N/A 11/10/2022    Procedure: COLONOSCOPY, WITH POLYPECTOMY AND BIOPSY;  Surgeon: Rafa Parks MD;  Location:  GI    ENT SURGERY  2011    EYE SURGERY  2015    THORACIC SURGERY      valve replacement       FAMILY HISTORY:  Family History   Problem Relation Age of Onset    Hypertension Mother     Cancer Mother     Other Cancer Mother          if kidney cancer at age 86    Other Cancer Father          of stomach cancer age 43    Heart Disease Paternal Grandmother     Hypertension Paternal Grandmother     Obesity Paternal Grandmother     Hypertension Sister     Hyperlipidemia Sister     Heart Disease Sister     Hypertension Sister     Hyperlipidemia Sister     Colon Cancer Sister     Diabetes Sister     Breast Cancer Sister     Skin Cancer Sister     Coronary Artery Disease Sister     Hyperlipidemia Sister     Thyroid Disease Sister     Skin Cancer Sister     Anesthesia Reaction Son     Hypertension Son     Heart Disease Daughter     Other Cancer Other         Neuroblastoma age 5       SOCIAL HISTORY:  Social History     Socioeconomic History    Marital status:      Spouse name: None    Number of children: None    Years of education: None    Highest education level: None   Tobacco Use    Smoking  "status: Former     Packs/day: 1.00     Years: 10.00     Pack years: 10.00     Types: Cigarettes     Start date: 1969     Quit date: 10/1/1979     Years since quittin.9    Smokeless tobacco: Never   Vaping Use    Vaping Use: Never used   Substance and Sexual Activity    Alcohol use: Yes     Comment: occ    Drug use: Never    Sexual activity: Not Currently     Partners: Male     Birth control/protection: Post-menopausal   Other Topics Concern    Parent/sibling w/ CABG, MI or angioplasty before 65F 55M? No       Review of Systems:  Skin:          Eyes:         ENT:         Respiratory:          Cardiovascular:         Gastroenterology:        Genitourinary:         Musculoskeletal:         Neurologic:         Psychiatric:         Heme/Lymph/Imm:         Endocrine:           Physical Exam:  Vitals: /82   Pulse 60   Ht 1.575 m (5' 2\")   Wt 66.2 kg (146 lb)   SpO2 97%   BMI 26.70 kg/m      General patient appears comfortable  Neck normal JVP  Cardiovascular system S1-S2 normal there is grade 2 x 6 ejection systolic murmur with early systolic peaking over the right upper sternal border, no S3-S4 rub or gallop  Respiratory system clear to auscultation  Extremities no edema      CC  No referring provider defined for this encounter.    Thank you for allowing me to participate in the care of your patient.      Sincerely,     Reggie King MD     [facility]  "

## 2023-09-13 NOTE — TELEPHONE ENCOUNTER
Pat was in to see Dr. King, and he prescribed Eliquis.  Pat states that she went to the pharmacy and it's just too expensive.  The pharmacist and Dr. King suggested the next choice would be Rivaroxaban\Xarelto.    Nicolette is requesting us to send in new Rx for this, instead of Eliquis.  [facility] in [place].    Lucia Mcdonnell RN on 9/13/2023 at 1:47 PM

## 2023-09-13 NOTE — TELEPHONE ENCOUNTER
Alert received from patient's PPM for exceeded AT/AF burden.   7 mode switch episodes logged on 9/12/23 from [id number] (clear undersensing of AF waves so likely one 3.5h episode). EGMs confirm A>V for AF(luttler) w/ elevated V rates from the 70-160s (>110bpm about 70% of the time). Presenting EGM confirms patient is back in an AS-VS/ rhythm.  Patient does NOT have a previous history of AF and is not on OAC.   Spoke with patient. She did not have any symptoms alongside event. She is actually coming into the clinic today for her annual device check and follow up with Dr. King.   Will route this encounter high priority to Dr. King and team 3 so they are aware of findings prior to today's appt.  LARS BURROWS

## 2023-09-13 NOTE — TELEPHONE ENCOUNTER
[facility] Call Center    Phone Message    May a detailed message be left on voicemail: yes     Reason for Call: Medication Refill Request    Has the patient contacted the pharmacy for the refill? Yes   Name of medication being requested: Xarelto 20 MG Once Day  Provider who prescribed the medication: Fernando  Pharmacy: [facility] [id number] [address]    Date medication is needed: Now, Patient stats the other medication was to expensive.     Action Taken: Other: Cardiology     Travel Screening: Not Applicable    Thank you!  [facility]

## 2023-09-14 LAB
MDC_IDC_LEAD_IMPLANT_DT: NORMAL
MDC_IDC_LEAD_IMPLANT_DT: NORMAL
MDC_IDC_LEAD_LOCATION: NORMAL
MDC_IDC_LEAD_LOCATION: NORMAL
MDC_IDC_LEAD_LOCATION_DETAIL_1: NORMAL
MDC_IDC_LEAD_LOCATION_DETAIL_1: NORMAL
MDC_IDC_LEAD_MFG: NORMAL
MDC_IDC_LEAD_MFG: NORMAL
MDC_IDC_LEAD_MODEL: NORMAL
MDC_IDC_LEAD_MODEL: NORMAL
MDC_IDC_LEAD_POLARITY_TYPE: NORMAL
MDC_IDC_LEAD_POLARITY_TYPE: NORMAL
MDC_IDC_LEAD_SERIAL: NORMAL
MDC_IDC_LEAD_SERIAL: NORMAL
MDC_IDC_MSMT_BATTERY_REMAINING_LONGEVITY: 109 MO
MDC_IDC_MSMT_BATTERY_STATUS: NORMAL
MDC_IDC_MSMT_BATTERY_VOLTAGE: 3.01 V
MDC_IDC_MSMT_LEADCHNL_RA_IMPEDANCE_VALUE: 387.5 OHM
MDC_IDC_MSMT_LEADCHNL_RA_PACING_THRESHOLD_AMPLITUDE: 0.5 V
MDC_IDC_MSMT_LEADCHNL_RA_PACING_THRESHOLD_AMPLITUDE: 0.5 V
MDC_IDC_MSMT_LEADCHNL_RA_PACING_THRESHOLD_PULSEWIDTH: 0.4 MS
MDC_IDC_MSMT_LEADCHNL_RA_PACING_THRESHOLD_PULSEWIDTH: 0.4 MS
MDC_IDC_MSMT_LEADCHNL_RA_SENSING_INTR_AMPL: 3.2 MV
MDC_IDC_MSMT_LEADCHNL_RV_IMPEDANCE_VALUE: 512.5 OHM
MDC_IDC_MSMT_LEADCHNL_RV_PACING_THRESHOLD_AMPLITUDE: 1 V
MDC_IDC_MSMT_LEADCHNL_RV_PACING_THRESHOLD_AMPLITUDE: 1 V
MDC_IDC_MSMT_LEADCHNL_RV_PACING_THRESHOLD_PULSEWIDTH: 0.4 MS
MDC_IDC_MSMT_LEADCHNL_RV_PACING_THRESHOLD_PULSEWIDTH: 0.4 MS
MDC_IDC_MSMT_LEADCHNL_RV_SENSING_INTR_AMPL: 5.8 MV
MDC_IDC_PG_IMPLANT_DTM: NORMAL
MDC_IDC_PG_MFG: NORMAL
MDC_IDC_PG_MODEL: NORMAL
MDC_IDC_PG_SERIAL: NORMAL
MDC_IDC_PG_TYPE: NORMAL
MDC_IDC_SESS_CLINIC_NAME: NORMAL
MDC_IDC_SESS_DTM: NORMAL
MDC_IDC_SESS_TYPE: NORMAL
MDC_IDC_SET_BRADY_AT_MODE_SWITCH_MODE: NORMAL
MDC_IDC_SET_BRADY_AT_MODE_SWITCH_RATE: 180 {BEATS}/MIN
MDC_IDC_SET_BRADY_HYSTRATE: NORMAL
MDC_IDC_SET_BRADY_LOWRATE: 60 {BEATS}/MIN
MDC_IDC_SET_BRADY_MAX_SENSOR_RATE: 130 {BEATS}/MIN
MDC_IDC_SET_BRADY_MAX_TRACKING_RATE: 130 {BEATS}/MIN
MDC_IDC_SET_BRADY_MODE: NORMAL
MDC_IDC_SET_BRADY_NIGHT_RATE: NORMAL
MDC_IDC_SET_BRADY_PAV_DELAY_LOW: 200 MS
MDC_IDC_SET_BRADY_SAV_DELAY_LOW: 150 MS
MDC_IDC_SET_LEADCHNL_RA_PACING_AMPLITUDE: 2 V
MDC_IDC_SET_LEADCHNL_RA_PACING_CAPTURE_MODE: NORMAL
MDC_IDC_SET_LEADCHNL_RA_PACING_POLARITY: NORMAL
MDC_IDC_SET_LEADCHNL_RA_PACING_PULSEWIDTH: 0.4 MS
MDC_IDC_SET_LEADCHNL_RA_SENSING_ADAPTATION_MODE: NORMAL
MDC_IDC_SET_LEADCHNL_RA_SENSING_POLARITY: NORMAL
MDC_IDC_SET_LEADCHNL_RV_PACING_AMPLITUDE: 1.12
MDC_IDC_SET_LEADCHNL_RV_PACING_CAPTURE_MODE: NORMAL
MDC_IDC_SET_LEADCHNL_RV_PACING_POLARITY: NORMAL
MDC_IDC_SET_LEADCHNL_RV_PACING_PULSEWIDTH: 0.4 MS
MDC_IDC_SET_LEADCHNL_RV_SENSING_POLARITY: NORMAL
MDC_IDC_SET_LEADCHNL_RV_SENSING_SENSITIVITY: 2 MV
MDC_IDC_STAT_AT_MODE_SW_COUNT: 7
MDC_IDC_STAT_BRADY_RA_PERCENT_PACED: 30 %
MDC_IDC_STAT_BRADY_RV_PERCENT_PACED: 0.07 %

## 2023-09-26 DIAGNOSIS — M25.551 BILATERAL HIP PAIN: Primary | ICD-10-CM

## 2023-09-26 DIAGNOSIS — M25.552 BILATERAL HIP PAIN: Primary | ICD-10-CM

## 2023-09-28 ENCOUNTER — TELEPHONE (OUTPATIENT)
Dept: ORTHOPEDICS | Facility: CLINIC | Age: 73
End: 2023-09-28
Payer: MEDICARE

## 2023-09-28 DIAGNOSIS — M25.561 RIGHT KNEE PAIN, UNSPECIFIED CHRONICITY: Primary | ICD-10-CM

## 2023-09-28 ASSESSMENT — ENCOUNTER SYMPTOMS
MUSCLE CRAMPS: 0
MYALGIAS: 1
LIGHT-HEADEDNESS: 0
MUSCLE WEAKNESS: 0
TASTE DISTURBANCE: 0
EXERCISE INTOLERANCE: 0
SYNCOPE: 0
PALPITATIONS: 1
SLEEP DISTURBANCES DUE TO BREATHING: 0
SINUS CONGESTION: 1
NECK MASS: 0
HOARSE VOICE: 1
SINUS PAIN: 0
STIFFNESS: 1
SMELL DISTURBANCE: 0
ARTHRALGIAS: 1
HYPERTENSION: 0
SORE THROAT: 0
HYPOTENSION: 0
LEG PAIN: 0
JOINT SWELLING: 1
ORTHOPNEA: 0
TROUBLE SWALLOWING: 0
BACK PAIN: 0
NECK PAIN: 0

## 2023-09-28 NOTE — TELEPHONE ENCOUNTER
Patient was called back. Dr. Dietz did give the OK to have her right knee xrayed before the visit next week.  She stated that she had a fall a couple of weeks ago and her knee still is bothering her. See is aware that he may not have enough time to look at both hips and the right knee. She knows to arrive early for the xrays.

## 2023-09-28 NOTE — TELEPHONE ENCOUNTER
[facility] Call Center    Phone Message    May a detailed message be left on voicemail: yes     Reason for Call: Pt wants to add her right knee x-ray to her appt on 10/5 please call regarding this    Action Taken: Other: [facility]    Travel Screening: Not Applicable

## 2023-10-02 NOTE — TELEPHONE ENCOUNTER
DIAGNOSIS: Primary osteoarthritis of both hips   APPOINTMENT DATE: 10/05/2023   NOTES STATUS DETAILS   OFFICE NOTE from referring provider Internal 09/07/2023 - My C Message   OFFICE NOTE from other specialist Internal 03/17/2022 - Jeana Levin MD - [facility]    03/02/2022 - Lola Vazquez MD - [facility]   MEDICATION LIST Internal    IMAGING Internal

## 2023-10-05 ENCOUNTER — OFFICE VISIT (OUTPATIENT)
Dept: ORTHOPEDICS | Facility: CLINIC | Age: 73
End: 2023-10-05
Attending: FAMILY MEDICINE
Payer: MEDICARE

## 2023-10-05 ENCOUNTER — PRE VISIT (OUTPATIENT)
Dept: ORTHOPEDICS | Facility: CLINIC | Age: 73
End: 2023-10-05

## 2023-10-05 ENCOUNTER — ANCILLARY PROCEDURE (OUTPATIENT)
Dept: GENERAL RADIOLOGY | Facility: CLINIC | Age: 73
End: 2023-10-05
Attending: ORTHOPAEDIC SURGERY
Payer: MEDICARE

## 2023-10-05 VITALS — BODY MASS INDEX: 25.34 KG/M2 | HEIGHT: 63 IN | WEIGHT: 143 LBS

## 2023-10-05 DIAGNOSIS — M25.552 BILATERAL HIP PAIN: ICD-10-CM

## 2023-10-05 DIAGNOSIS — M25.551 BILATERAL HIP PAIN: ICD-10-CM

## 2023-10-05 DIAGNOSIS — M25.561 RIGHT KNEE PAIN, UNSPECIFIED CHRONICITY: ICD-10-CM

## 2023-10-05 DIAGNOSIS — M16.11 PRIMARY LOCALIZED OSTEOARTHRITIS OF RIGHT HIP: Primary | ICD-10-CM

## 2023-10-05 DIAGNOSIS — M16.0 PRIMARY OSTEOARTHRITIS OF BOTH HIPS: ICD-10-CM

## 2023-10-05 PROCEDURE — 73562 X-RAY EXAM OF KNEE 3: CPT | Mod: RT | Performed by: RADIOLOGY

## 2023-10-05 PROCEDURE — 99204 OFFICE O/P NEW MOD 45 MIN: CPT | Performed by: ORTHOPAEDIC SURGERY

## 2023-10-05 PROCEDURE — 73522 X-RAY EXAM HIPS BI 3-4 VIEWS: CPT | Mod: GC | Performed by: RADIOLOGY

## 2023-10-05 NOTE — LETTER
10/5/2023         RE: Lauren Nick  [address] [facility] [address]        Dear Colleague,    Thank you for referring your patient, Lauren Nick, to the [facility]. Please see a copy of my visit note below.    Assessment: This is a 73-year-old female with advanced right hip osteoarthritis associated with severe symptoms despite comprehensive nonoperative management including activity modification, oral pain medication, and an intra-articular injection.  Her symptoms interfere with her activities of daily living though she is still able to exercise (swimming).  Today we discussed trigger diagnosis and the treatment options including living with it and total hip arthroplasty.We spent twenty minutes discussing total hip arthroplasty.  We discussed the implants, the procedure, the risks and benefits, and the post-operative course.  We discussed blood clots, blood clots to the lungs, injury to blood vessels and nerves, dislocation, infection, and leg length difference.  We discussed that as part of the routine part of surgical care a qualified assist may finish closing the wound after I have left the room.  We reviewed templated radiographs as part of the teaching.  All the patients questions were answered to the best of my ability.      Plan: Right total hip arthroplasty when the patient chooses to go forward with a      Chief Complaint: Consult (Consulting bilateral hips osteoarthritis referred by Dr. Joel Wegener // would like to discuss right hip replacement and also notes right knee pain due to a fall 3 months  )      Physician:  Chris Cain*    HPI: Lauren Nick is a 73 year old female who presents today for evaluation of her right hip.     Location of symptoms:  gorin  Onset:insidious  Duration of symptoms: About 10 years though more severe for about 3 years   Quality of symptoms: aching and sharp  Severity:severe  Alleviating: activity  modification  Exacerbating: activities  Previous Treatments: Previous treatments include activity modification, oral pain medication    DENAOS Jr:49.86  PROMIS Mental: (P) 17  PROMIS Physical: (P) 14  PROMIS Total: (P) 31  [facility] Activity Scale:    MEDICAL HISTORY:   Past Medical History:   Diagnosis Date     Thyroid disease 02/21 diagnosed     Uncomplicated asthma A few years ago     Uncontrolled hypertension 2/20/2023       Pertinent negatives:  Patient has no history of DVT or PE. Discussed risk factors.    Medications:     Current Outpatient Medications:      albuterol (PROAIR HFA/PROVENTIL HFA/VENTOLIN HFA) 108 (90 Base) MCG/ACT inhaler, Inhale 2 puffs into the lungs every 6 hours, Disp: 8.5 g, Rfl: 3     clobetasol (TEMOVATE) 0.05 % external ointment, Apply pea sized amount to finger and rub along external vaginal tissue 2x per week at night. Remember to wash your hands after applying and avoid contact with your eyes., Disp: 45 g, Rfl: 3     coenzyme Q-10 (CO-Q10) 50 MG capsule, Take 100 mg by mouth daily, Disp: , Rfl:      escitalopram (LEXAPRO) 20 MG tablet, Take 1 tablet (20 mg) by mouth daily, Disp: 90 tablet, Rfl: 1     fluticasone (FLONASE) 50 MCG/ACT nasal spray, Spray 2 sprays into both nostrils daily, Disp: 16 g, Rfl: 11     levothyroxine (SYNTHROID/LEVOTHROID) 50 MCG tablet, Take 1 tablet (50 mcg) by mouth daily, Disp: 90 tablet, Rfl: 1     LORazepam (ATIVAN) 1 MG tablet, TAKE 1 TABLET (1 MG) BY MOUTH NIGHTLY AS NEEDED FOR SLEEP, Disp: 30 tablet, Rfl: 5     losartan (COZAAR) 25 MG tablet, Take 1 tablet (25 mg) by mouth daily, Disp: 90 tablet, Rfl: 1     metoprolol succinate ER (TOPROL XL) 25 MG 24 hr tablet, Take 1 tablet (25 mg) by mouth 2 times daily, Disp: 180 tablet, Rfl: 1     rivaroxaban ANTICOAGULANT (XARELTO) 20 MG TABS tablet, Take 1 tablet (20 mg) by mouth daily (with dinner), Disp: 90 tablet, Rfl: 3     STATIN NOT PRESCRIBED (INTENTIONAL), Please choose reason not prescribed from choices  below. (Patient not taking: Reported on 2023), Disp: , Rfl:      vitamin C (ASCORBIC ACID) 1000 MG TABS, Take 1,000 mg by mouth daily, Disp: , Rfl:     Current Facility-Administered Medications:      lidocaine (PF) (XYLOCAINE) 1 % injection 9 mL, 9 mL, , , Jeana Levin MD, 9 mL at 22 1620    Allergies: Penicillins, Ambien [zolpidem], and Statins [statins]    SURGICAL HISTORY:   Past Surgical History:   Procedure Laterality Date     ABDOMEN SURGERY  Gallbladder    removed in      BIOPSY  10/2018     BREAST SURGERY  1989     CARDIAC SURGERY  2021     CHOLECYSTECTOMY  2010     COLONOSCOPY  2019     COLONOSCOPY N/A 11/10/2022    Procedure: COLONOSCOPY, WITH POLYPECTOMY AND BIOPSY;  Surgeon: Rafa Parks MD;  Location:  GI     ENT SURGERY  2011     EYE SURGERY  2015     THORACIC SURGERY      valve replacement       HISTORY:   Family History   Problem Relation Age of Onset     Hypertension Mother      Cancer Mother      Other Cancer Mother          if kidney cancer at age 86     Other Cancer Father          of stomach cancer age 43     Heart Disease Paternal Grandmother      Hypertension Paternal Grandmother      Obesity Paternal Grandmother      Hypertension Sister      Hyperlipidemia Sister      Heart Disease Sister      Hypertension Sister      Hyperlipidemia Sister      Colon Cancer Sister      Diabetes Sister      Breast Cancer Sister      Skin Cancer Sister      Coronary Artery Disease Sister      Hyperlipidemia Sister      Thyroid Disease Sister      Skin Cancer Sister      Anesthesia Reaction Son      Hypertension Son      Heart Disease Daughter      Other Cancer Other         Neuroblastoma age 5       SOCIAL HISTORY:   Social History     Tobacco Use     Smoking status: Former     Packs/day: 1.00     Years: 10.00     Pack years: 10.00     Types: Cigarettes     Start date: 1969     Quit date: 10/1/1979     Years since quittin.0     Smokeless  "tobacco: Never   Substance Use Topics     Alcohol use: Yes     Comment: occ       REVIEW OF SYSTEMS:  The comprehensive review of systems from the intake form was reviewed with the patient.  No fever, weight change or fatigue. No dry eyes. No oral ulcers, sore throat or voice change. No palpitations, syncope, angina or edema.  No chest pain, excessive sleepiness, shortness of breath or hemoptysis.   No abdominal pain, nausea, vomiting, diarrhea or heartburn.  No skin rash. No focal weakness or numbness. No bleeding or lymphadenopathy. No rhinitis or hives.     Exam:  On physical examination the patient appears the stated age, is in no acute distress, alert and oriented, affect is appropriate, and breathing is non-labored.  Vitals are documented in the EMR and have been reviewed:    Ht 1.59 m (5' 2.6\")   Wt 64.9 kg (143 lb)   BMI 25.66 kg/m    5' 2.598\"  Body mass index is 25.66 kg/m .    Rises from chair: Easily  Gait: Mild antalgic less time on the right  Trendelenburg test:  Gains the exam table:    RIGHT hip subjective: A little irritated  Abd:20  Add:10  Flexion:80  IRF: -20  ERF: 30  Impingement test: Positive groin reproduces her symptoms      LEFT hip subjective: Benign  Abd:  Add:  Flexion: 95  IRF: Pain is 5  ERF: 25  Impingement test: Negative  Tenderness to palpation    Distal the circulatory, motor, and sensation exam is intact with 5/5 EHL, gastroc-soleus, and tibialis anterior.  Sensation to light touch is intact.  Dorsalis pedis and posterior tibialis pulses are palpable.  There are no sores on the feet, no bruising, and no lymphedema.    Imaging:   Right hip advanced osteoarthritis with complete loss of the medial joint space.  There is been progression since her radiograph a year and a half ago.  She has developed multiple large subchondral cysts in the acetabulum medially.    Answers submitted by the patient for this visit:  Symptoms you have experienced in the last 30 days (Submitted on " 9/28/2023)  General Symptoms: No  Skin Symptoms: No  HENT Symptoms: Yes  EYE SYMPTOMS: No  HEART SYMPTOMS: Yes  LUNG SYMPTOMS: No  INTESTINAL SYMPTOMS: No  URINARY SYMPTOMS: No  GYNECOLOGIC SYMPTOMS: No  BREAST SYMPTOMS: No  SKELETAL SYMPTOMS: Yes  BLOOD SYMPTOMS: No  NERVOUS SYSTEM SYMPTOMS: No  MENTAL HEALTH SYMPTOMS: No  Please answer the questions below to tell us what conditions you are experiencing: (Submitted on 9/28/2023)  Ear pain: No  Ear discharge: No  Hearing loss: No  Tinnitus: No  Nosebleeds: No  Congestion: Yes  Sinus pain: No  Trouble swallowing: No   Voice hoarseness: Yes  Mouth sores: No  Sore throat: No  Tooth pain: No  Gum tenderness: No  Bleeding gums: No  Change in taste: No  Change in sense of smell: No  Dry mouth: Yes  Hearing aid used: Yes  Neck lump: No  Please answer the questions below to tell us what condition you are experiencing: (Submitted on 9/28/2023)  Chest pain or pressure: No  Fast or irregular heartbeat: Yes  Pain in legs with walking: No  Trouble breathing while lying down: No  Fingers or toes appear blue: No  High blood pressure: No  Low blood pressure: No  Fainting: No  Murmurs: No  Pacemaker: Yes  Varicose veins: No  Edema or swelling: No  Wake up at night with shortness of breath: No  Light-headedness: No  Exercise intolerance: No  Please answer the questions below to tell us what condition you are experiencing: (Submitted on 9/28/2023)  Back pain: No  Muscle aches: Yes  Neck pain: No  Swollen joints: Yes  Joint pain: Yes  Bone pain: Yes  Muscle cramps: No  Muscle weakness: No  Joint stiffness: Yes  Bone fracture: No      duplicate      Again, thank you for allowing me to participate in the care of your patient.        Sincerely,        Sidney Dietz MD

## 2023-10-05 NOTE — NURSING NOTE
"Reason For Visit:   Chief Complaint   Patient presents with    Consult     Consulting bilateral hips osteoarthritis referred by Dr. Joel Wegener // would like to discuss right hip replacement and also notes right knee pain due to a fall 3 months         Ht 1.59 m (5' 2.6\")   Wt 64.9 kg (143 lb)   BMI 25.66 kg/m      Pain Assessment  Patient Currently in Pain: Yes  0-10 Pain Scale: 4  Primary Pain Location: Hip (right hip and knee)      Jacques Loera ATC    "

## 2023-10-05 NOTE — PROGRESS NOTES
Assessment: This is a 73-year-old female with advanced right hip osteoarthritis associated with severe symptoms despite comprehensive nonoperative management including activity modification, oral pain medication, and an intra-articular injection.  Her symptoms interfere with her activities of daily living though she is still able to exercise (swimming).  Today we discussed trigger diagnosis and the treatment options including living with it and total hip arthroplasty.We spent twenty minutes discussing total hip arthroplasty.  We discussed the implants, the procedure, the risks and benefits, and the post-operative course.  We discussed blood clots, blood clots to the lungs, injury to blood vessels and nerves, dislocation, infection, and leg length difference.  We discussed that as part of the routine part of surgical care a qualified assist may finish closing the wound after I have left the room.  We reviewed templated radiographs as part of the teaching.  All the patients questions were answered to the best of my ability.      Plan: Right total hip arthroplasty when the patient chooses to go forward with a      Chief Complaint: Consult (Consulting bilateral hips osteoarthritis referred by Dr. Joel Wegener // would like to discuss right hip replacement and also notes right knee pain due to a fall 3 months  )      Physician:  Chris Cain*    HPI: Lauren Nick is a 73 year old female who presents today for evaluation of her right hip.     Location of symptoms:  gorin  Onset:insidious  Duration of symptoms: About 10 years though more severe for about 3 years   Quality of symptoms: aching and sharp  Severity:severe  Alleviating: activity modification  Exacerbating: activities  Previous Treatments: Previous treatments include activity modification, oral pain medication    FLORENTIN Jr:49.86  PROMIS Mental: (P) 17  PROMIS Physical: (P) 14  PROMIS Total: (P) 31  [facility] Activity Scale:    MEDICAL HISTORY:   Past  Medical History:   Diagnosis Date    Thyroid disease 02/21 diagnosed    Uncomplicated asthma A few years ago    Uncontrolled hypertension 2/20/2023       Pertinent negatives:  Patient has no history of DVT or PE. Discussed risk factors.    Medications:     Current Outpatient Medications:     albuterol (PROAIR HFA/PROVENTIL HFA/VENTOLIN HFA) 108 (90 Base) MCG/ACT inhaler, Inhale 2 puffs into the lungs every 6 hours, Disp: 8.5 g, Rfl: 3    clobetasol (TEMOVATE) 0.05 % external ointment, Apply pea sized amount to finger and rub along external vaginal tissue 2x per week at night. Remember to wash your hands after applying and avoid contact with your eyes., Disp: 45 g, Rfl: 3    coenzyme Q-10 (CO-Q10) 50 MG capsule, Take 100 mg by mouth daily, Disp: , Rfl:     escitalopram (LEXAPRO) 20 MG tablet, Take 1 tablet (20 mg) by mouth daily, Disp: 90 tablet, Rfl: 1    fluticasone (FLONASE) 50 MCG/ACT nasal spray, Spray 2 sprays into both nostrils daily, Disp: 16 g, Rfl: 11    levothyroxine (SYNTHROID/LEVOTHROID) 50 MCG tablet, Take 1 tablet (50 mcg) by mouth daily, Disp: 90 tablet, Rfl: 1    LORazepam (ATIVAN) 1 MG tablet, TAKE 1 TABLET (1 MG) BY MOUTH NIGHTLY AS NEEDED FOR SLEEP, Disp: 30 tablet, Rfl: 5    losartan (COZAAR) 25 MG tablet, Take 1 tablet (25 mg) by mouth daily, Disp: 90 tablet, Rfl: 1    metoprolol succinate ER (TOPROL XL) 25 MG 24 hr tablet, Take 1 tablet (25 mg) by mouth 2 times daily, Disp: 180 tablet, Rfl: 1    rivaroxaban ANTICOAGULANT (XARELTO) 20 MG TABS tablet, Take 1 tablet (20 mg) by mouth daily (with dinner), Disp: 90 tablet, Rfl: 3    STATIN NOT PRESCRIBED (INTENTIONAL), Please choose reason not prescribed from choices below. (Patient not taking: Reported on 7/31/2023), Disp: , Rfl:     vitamin C (ASCORBIC ACID) 1000 MG TABS, Take 1,000 mg by mouth daily, Disp: , Rfl:     Current Facility-Administered Medications:     lidocaine (PF) (XYLOCAINE) 1 % injection 9 mL, 9 mL, , , Cowdrey, Jeana, MD, 9 mL at  22 1620    Allergies: Penicillins, Ambien [zolpidem], and Statins [statins]    SURGICAL HISTORY:   Past Surgical History:   Procedure Laterality Date    ABDOMEN SURGERY  Gallbladder    removed in     BIOPSY  10/2018    BREAST SURGERY  1989    CARDIAC SURGERY  2021    CHOLECYSTECTOMY  2010    COLONOSCOPY  2019    COLONOSCOPY N/A 11/10/2022    Procedure: COLONOSCOPY, WITH POLYPECTOMY AND BIOPSY;  Surgeon: Rafa Parks MD;  Location:  GI    ENT SURGERY  2011    EYE SURGERY  2015    THORACIC SURGERY      valve replacement       HISTORY:   Family History   Problem Relation Age of Onset    Hypertension Mother     Cancer Mother     Other Cancer Mother          if kidney cancer at age 86    Other Cancer Father          of stomach cancer age 43    Heart Disease Paternal Grandmother     Hypertension Paternal Grandmother     Obesity Paternal Grandmother     Hypertension Sister     Hyperlipidemia Sister     Heart Disease Sister     Hypertension Sister     Hyperlipidemia Sister     Colon Cancer Sister     Diabetes Sister     Breast Cancer Sister     Skin Cancer Sister     Coronary Artery Disease Sister     Hyperlipidemia Sister     Thyroid Disease Sister     Skin Cancer Sister     Anesthesia Reaction Son     Hypertension Son     Heart Disease Daughter     Other Cancer Other         Neuroblastoma age 5       SOCIAL HISTORY:   Social History     Tobacco Use    Smoking status: Former     Packs/day: 1.00     Years: 10.00     Pack years: 10.00     Types: Cigarettes     Start date: 1969     Quit date: 10/1/1979     Years since quittin.0    Smokeless tobacco: Never   Substance Use Topics    Alcohol use: Yes     Comment: occ       REVIEW OF SYSTEMS:  The comprehensive review of systems from the intake form was reviewed with the patient.  No fever, weight change or fatigue. No dry eyes. No oral ulcers, sore throat or voice change. No palpitations, syncope, angina or edema.  No  "chest pain, excessive sleepiness, shortness of breath or hemoptysis.   No abdominal pain, nausea, vomiting, diarrhea or heartburn.  No skin rash. No focal weakness or numbness. No bleeding or lymphadenopathy. No rhinitis or hives.     Exam:  On physical examination the patient appears the stated age, is in no acute distress, alert and oriented, affect is appropriate, and breathing is non-labored.  Vitals are documented in the EMR and have been reviewed:    Ht 1.59 m (5' 2.6\")   Wt 64.9 kg (143 lb)   BMI 25.66 kg/m    5' 2.598\"  Body mass index is 25.66 kg/m .    Rises from chair: Easily  Gait: Mild antalgic less time on the right  Trendelenburg test:  Gains the exam table:    RIGHT hip subjective: A little irritated  Abd:20  Add:10  Flexion:80  IRF: -20  ERF: 30  Impingement test: Positive groin reproduces her symptoms      LEFT hip subjective: Benign  Abd:  Add:  Flexion: 95  IRF: Pain is 5  ERF: 25  Impingement test: Negative  Tenderness to palpation    Distal the circulatory, motor, and sensation exam is intact with 5/5 EHL, gastroc-soleus, and tibialis anterior.  Sensation to light touch is intact.  Dorsalis pedis and posterior tibialis pulses are palpable.  There are no sores on the feet, no bruising, and no lymphedema.    Imaging:   Right hip advanced osteoarthritis with complete loss of the medial joint space.  There is been progression since her radiograph a year and a half ago.  She has developed multiple large subchondral cysts in the acetabulum medially.    Answers submitted by the patient for this visit:  Symptoms you have experienced in the last 30 days (Submitted on 9/28/2023)  General Symptoms: No  Skin Symptoms: No  HENT Symptoms: Yes  EYE SYMPTOMS: No  HEART SYMPTOMS: Yes  LUNG SYMPTOMS: No  INTESTINAL SYMPTOMS: No  URINARY SYMPTOMS: No  GYNECOLOGIC SYMPTOMS: No  BREAST SYMPTOMS: No  SKELETAL SYMPTOMS: Yes  BLOOD SYMPTOMS: No  NERVOUS SYSTEM SYMPTOMS: No  MENTAL HEALTH SYMPTOMS: No  Please answer " the questions below to tell us what conditions you are experiencing: (Submitted on 9/28/2023)  Ear pain: No  Ear discharge: No  Hearing loss: No  Tinnitus: No  Nosebleeds: No  Congestion: Yes  Sinus pain: No  Trouble swallowing: No   Voice hoarseness: Yes  Mouth sores: No  Sore throat: No  Tooth pain: No  Gum tenderness: No  Bleeding gums: No  Change in taste: No  Change in sense of smell: No  Dry mouth: Yes  Hearing aid used: Yes  Neck lump: No  Please answer the questions below to tell us what condition you are experiencing: (Submitted on 9/28/2023)  Chest pain or pressure: No  Fast or irregular heartbeat: Yes  Pain in legs with walking: No  Trouble breathing while lying down: No  Fingers or toes appear blue: No  High blood pressure: No  Low blood pressure: No  Fainting: No  Murmurs: No  Pacemaker: Yes  Varicose veins: No  Edema or swelling: No  Wake up at night with shortness of breath: No  Light-headedness: No  Exercise intolerance: No  Please answer the questions below to tell us what condition you are experiencing: (Submitted on 9/28/2023)  Back pain: No  Muscle aches: Yes  Neck pain: No  Swollen joints: Yes  Joint pain: Yes  Bone pain: Yes  Muscle cramps: No  Muscle weakness: No  Joint stiffness: Yes  Bone fracture: No

## 2023-10-06 ENCOUNTER — HOSPITAL ENCOUNTER (INPATIENT)
Facility: CLINIC | Age: 73
Setting detail: SURGERY ADMIT
End: 2023-10-06
Attending: ORTHOPAEDIC SURGERY | Admitting: ORTHOPAEDIC SURGERY
Payer: MEDICARE

## 2023-10-06 ENCOUNTER — TELEPHONE (OUTPATIENT)
Dept: ORTHOPEDICS | Facility: CLINIC | Age: 73
End: 2023-10-06

## 2023-10-06 NOTE — TELEPHONE ENCOUNTER
Date Scheduled: 3/13/24  Facility: Surgery Locations: [facility]  Surgeon: Dr. Dietz   Post-op appointment scheduled:    scheduled?:   Surgery packet/instructions confirmed received?  Yes  Pre op physical/PAC appointment:   Special Considerations:     Patient is having dental work done the middle of November, and has a trip planned in February. She has opted to wait until after her trip as Dr. Dietz's first opening wasn't until mid December. She is going to talk with her PCP about what medications she can take as she is on blood thinners as well.    Rosie Mcclellan  Surgery Scheduling Coordinator  Ph: [phone]

## 2023-10-11 ENCOUNTER — MYC MEDICAL ADVICE (OUTPATIENT)
Dept: FAMILY MEDICINE | Facility: CLINIC | Age: 73
End: 2023-10-11
Payer: MEDICARE

## 2023-10-11 DIAGNOSIS — F51.01 PRIMARY INSOMNIA: ICD-10-CM

## 2023-10-12 NOTE — TELEPHONE ENCOUNTER
JW,      Please see [facility] message  Routing refill request to provider for review/approval because:  Drug not on the [facility] refill protocol     Future OV with you Tuesday 10/17.    Thank you,  Ro Peterson RN

## 2023-10-13 RX ORDER — LORAZEPAM 1 MG/1
1 TABLET ORAL
Qty: 30 TABLET | Refills: 0 | Status: SHIPPED | OUTPATIENT
Start: 2023-10-13 | End: 2023-11-13

## 2023-10-17 ENCOUNTER — OFFICE VISIT (OUTPATIENT)
Dept: FAMILY MEDICINE | Facility: CLINIC | Age: 73
End: 2023-10-17
Payer: MEDICARE

## 2023-10-17 VITALS
WEIGHT: 145.5 LBS | SYSTOLIC BLOOD PRESSURE: 137 MMHG | TEMPERATURE: 97.3 F | DIASTOLIC BLOOD PRESSURE: 72 MMHG | HEIGHT: 62 IN | RESPIRATION RATE: 15 BRPM | OXYGEN SATURATION: 98 % | BODY MASS INDEX: 26.78 KG/M2 | HEART RATE: 60 BPM

## 2023-10-17 DIAGNOSIS — Z29.11 NEED FOR VACCINATION AGAINST RESPIRATORY SYNCYTIAL VIRUS: ICD-10-CM

## 2023-10-17 DIAGNOSIS — I48.0 PAROXYSMAL ATRIAL FIBRILLATION (H): ICD-10-CM

## 2023-10-17 DIAGNOSIS — M16.0 PRIMARY OSTEOARTHRITIS OF BOTH HIPS: Primary | ICD-10-CM

## 2023-10-17 PROCEDURE — 99214 OFFICE O/P EST MOD 30 MIN: CPT | Performed by: FAMILY MEDICINE

## 2023-10-17 PROCEDURE — 90480 ADMN SARSCOV2 VAC 1/ONLY CMP: CPT | Performed by: FAMILY MEDICINE

## 2023-10-17 PROCEDURE — 91320 SARSCV2 VAC 30MCG TRS-SUC IM: CPT | Performed by: FAMILY MEDICINE

## 2023-10-17 RX ORDER — RESPIRATORY SYNCYTIAL VIRUS VACCINE 120MCG/0.5
0.5 KIT INTRAMUSCULAR ONCE
Qty: 1 EACH | Refills: 0 | Status: CANCELLED | OUTPATIENT
Start: 2023-10-17 | End: 2023-10-17

## 2023-10-17 ASSESSMENT — PAIN SCALES - GENERAL: PAINLEVEL: SEVERE PAIN (6)

## 2023-10-17 NOTE — PATIENT INSTRUCTIONS
Reviewed full-spectrum hip arthritis care as she gets second opinion on srgical technique including:     Off-loading with weight loss or cane.  Physical therapy for strengthening and off-loading. Did a fair amount several years ago and will let me know if wants to do again.   Steroid injections - however this can delay surgery for 90 days or more.   Nsaids contraindicated due to being on xarelto now for a-fib.   Could consider turmeric supplement   Next level pain medication would be tramadol  and then potentially opiods however both agree best to avoid/not at that point at this time.     Follow up November for fasting annual wellness/check lipids considering stopped ezetimibe due to cost.

## 2023-10-17 NOTE — PROGRESS NOTES
"  Assessment & Plan   Reviewed full-spectrum hip arthritis care as she gets second opinion on srgical technique including:     Off-loading with weight loss or cane.  Physical therapy for strengthening and off-loading. Did a fair amount several years ago and will let me know if wants to do again.   Steroid injections - however this can delay surgery for 90 days or more.   Nsaids contraindicated due to being on xarelto now for a-fib.   Could consider turmeric supplement   Can take up to 1500mg/acetaminophen (tylenol) per day.   Next level pain medication would be tramadol  and then potentially opiods however both agree best to avoid/not at that point at this time.     Follow up November for fasting annual wellness/check lipids considering stopped ezetimibe due to cost.   Primary osteoarthritis of both hips      Paroxysmal atrial fibrillation (H)    - rivaroxaban ANTICOAGULANT (XARELTO) 20 MG TABS tablet    Need for vaccination against respiratory syncytial virus          I spent a total of 35 minutes on the day of the visit.   Time spent by me doing chart review, history and exam, documentation and further activities per the note       BMI:   Estimated body mass index is 26.23 kg/m  as calculated from the following:    Height as of this encounter: 1.586 m (5' 2.45\").    Weight as of this encounter: 66 kg (145 lb 8 oz).           Joel Daniel Wegener, MD  [facility] is a 73 year old, presenting for the following health issues:  Pain      History of Present Illness       Reason for visit:  To discuss pain management for my right hip until I can have my surgery.  Symptom onset:  More than a month  Symptoms include:  Psin in hip abd knee on right side  Symptom intensity:  Severe  Symptom progression:  Worsening  Had these symptoms before:  Yes  Has tried/received treatment for these symptoms:  Yes  What makes it better:  Yes taking advil but now I can t take it because I am on a " "blood thinnet.    She eats 2-3 servings of fruits and vegetables daily.She consumes 0 sweetened beverage(s) daily.She exercises with enough effort to increase her heart rate 20 to 29 minutes per day.  She exercises with enough effort to increase her heart rate 4 days per week.   She is taking medications regularly.     Additional Problem: Derm problem on hand               Review of Systems         Objective    /72   Pulse 60   Temp 97.3  F (36.3  C) (Temporal)   Resp 15   Ht 1.586 m (5' 2.45\")   Wt 66 kg (145 lb 8 oz)   LMP  (LMP Unknown)   SpO2 98%   BMI 26.23 kg/m    Body mass index is 26.23 kg/m .  Physical Exam           Appears well.               "

## 2023-10-24 ENCOUNTER — TELEPHONE (OUTPATIENT)
Dept: CARDIOLOGY | Facility: CLINIC | Age: 73
End: 2023-10-24
Payer: MEDICARE

## 2023-10-24 DIAGNOSIS — I48.0 PAROXYSMAL ATRIAL FIBRILLATION (H): ICD-10-CM

## 2023-10-24 NOTE — TELEPHONE ENCOUNTER
[facility] Call Center    Phone Message    May a detailed message be left on voicemail: yes     Reason for Call: Medication Question or concern regarding medication   Prescription Clarification  Name of Medication: rivaroxaban ANTICOAGULANT (XARELTO) 20 MG TABS tablet   Prescribing Provider:    Pharmacy:    What on the order needs clarification? Pt states she went into the pharmacy and was told the medication was discontinued, and she would like to know why, please reach out to further discuss.      Action Taken: Other: Cardiology    Travel Screening: Not Applicable    Thank you!  [facility]

## 2023-10-27 ENCOUNTER — ANCILLARY PROCEDURE (OUTPATIENT)
Dept: CT IMAGING | Facility: CLINIC | Age: 73
End: 2023-10-27
Attending: FAMILY MEDICINE
Payer: MEDICARE

## 2023-10-27 DIAGNOSIS — R31.0 GROSS HEMATURIA: ICD-10-CM

## 2023-10-27 PROCEDURE — 74176 CT ABD & PELVIS W/O CONTRAST: CPT | Mod: MG

## 2023-10-31 ENCOUNTER — VIRTUAL VISIT (OUTPATIENT)
Dept: UROLOGY | Facility: CLINIC | Age: 73
End: 2023-10-31
Payer: MEDICARE

## 2023-10-31 DIAGNOSIS — R31.0 GROSS HEMATURIA: ICD-10-CM

## 2023-10-31 PROCEDURE — 99214 OFFICE O/P EST MOD 30 MIN: CPT | Mod: VID | Performed by: PHYSICIAN ASSISTANT

## 2023-10-31 NOTE — PROGRESS NOTES
Virtual Visit Details    Type of service:  Video Visit   Video Start Time: 10:03 AM  Video End Time:10:16 AM    Originating Location (pt. Location): Home    Distant Location (provider location):  On-site  Platform used for Video Visit: Jerri    CC: Hematuria.    HPI: It is a pleasure to see Ms. Lauren Nick, a 73 year old female, asked to be seen in consultation by Dr. Parkinson for evaluation of intermittent gross hematuria. Has seen Judy Roberts PA-C in the past for nocturia.     Ms. Nick voids without difficulty (other than freq attributed to high vol water consumption).  She currently denies any dysuria, pyuria, hesitancy, intermittency, feelings of incomplete emptying, or any recent history of urinary tract infections or stones.    5 family members with RCC.  CT w/o contrast done 10/27/23.     On Xarelto for a-fib.     Hematuria Risk Factors:  Age >40: Yes     Smoking history: former  Occupational exposure to chemicals or dyes (ie, benzenes, aromatic amines): no  History of urologic disorder or disease: no  History of irritative voiding symptoms: no  History of urinary tract infection: no  Analgesic abuse: no  History of pelvic irradiation: no    Past Medical History:   Diagnosis Date    Thyroid disease 02/21 diagnosed    Uncomplicated asthma A few years ago    Uncontrolled hypertension 2/20/2023     Past Surgical History:   Procedure Laterality Date    ABDOMEN SURGERY  Gallbladder    removed in 2010    BIOPSY  10/2018    BREAST SURGERY  09/25/1989    CARDIAC SURGERY  03/22/2021    CHOLECYSTECTOMY  03/2010    COLONOSCOPY  03/2019    COLONOSCOPY N/A 11/10/2022    Procedure: COLONOSCOPY, WITH POLYPECTOMY AND BIOPSY;  Surgeon: Rafa Parks MD;  Location:  GI    ENT SURGERY  06/2011    EYE SURGERY  09/2015    THORACIC SURGERY  03/21    valve replacement     Current Outpatient Medications   Medication Sig Dispense Refill    albuterol (PROAIR HFA/PROVENTIL HFA/VENTOLIN HFA) 108 (90 Base) MCG/ACT inhaler  Inhale 2 puffs into the lungs every 6 hours 8.5 g 3    escitalopram (LEXAPRO) 20 MG tablet Take 1 tablet (20 mg) by mouth daily 90 tablet 1    fluticasone (FLONASE) 50 MCG/ACT nasal spray Spray 2 sprays into both nostrils daily 16 g 11    levothyroxine (SYNTHROID/LEVOTHROID) 50 MCG tablet Take 1 tablet (50 mcg) by mouth daily 90 tablet 1    LORazepam (ATIVAN) 1 MG tablet Take 1 tablet (1 mg) by mouth nightly as needed for sleep 30 tablet 0    losartan (COZAAR) 25 MG tablet Take 1 tablet (25 mg) by mouth daily 90 tablet 1    metoprolol succinate ER (TOPROL XL) 25 MG 24 hr tablet Take 1 tablet (25 mg) by mouth 2 times daily 180 tablet 1    rivaroxaban ANTICOAGULANT (XARELTO) 20 MG TABS tablet Take 1 tablet (20 mg) by mouth daily (with dinner) 90 tablet 3    STATIN NOT PRESCRIBED (INTENTIONAL) Please choose reason not prescribed from choices below.      vitamin C (ASCORBIC ACID) 1000 MG TABS Take 1,000 mg by mouth daily      coenzyme Q-10 (CO-Q10) 50 MG capsule Take 100 mg by mouth daily       Allergies   Allergen Reactions    Penicillins Hives    Ambien [Zolpidem]      Complex behaviors    Statins [Statins]      Myalgias to multiple statins     FAMILY HISTORY: There is reported history of genitourinary carcinoma (RCC x5).  There is no history of urolithiasis.      Social History     Socioeconomic History    Marital status:      Spouse name: Not on file    Number of children: Not on file    Years of education: Not on file    Highest education level: Not on file   Occupational History    Not on file   Tobacco Use    Smoking status: Former     Packs/day: 1.00     Years: 10.00     Additional pack years: 0.00     Total pack years: 10.00     Types: Cigarettes     Start date: 1969     Quit date: 10/1/1979     Years since quittin.1    Smokeless tobacco: Never   Vaping Use    Vaping Use: Never used   Substance and Sexual Activity    Alcohol use: Yes     Comment: occ    Drug use: Never    Sexual activity: Not  Currently     Partners: Male     Birth control/protection: Post-menopausal   Other Topics Concern    Parent/sibling w/ CABG, MI or angioplasty before 65F 55M? No   Social History Narrative    Not on file     Social Determinants of Health     Financial Resource Strain: Low Risk  (10/10/2023)    Financial Resource Strain     Within the past 12 months, have you or your family members you live with been unable to get utilities (heat, electricity) when it was really needed?: No   Food Insecurity: Low Risk  (10/10/2023)    Food Insecurity     Within the past 12 months, did you worry that your food would run out before you got money to buy more?: No     Within the past 12 months, did the food you bought just not last and you didn t have money to get more?: No   Transportation Needs: Low Risk  (10/10/2023)    Transportation Needs     Within the past 12 months, has lack of transportation kept you from medical appointments, getting your medicines, non-medical meetings or appointments, work, or from getting things that you need?: No   Physical Activity: Not on file   Stress: Not on file   Social Connections: Not on file   Interpersonal Safety: Low Risk  (10/17/2023)    Interpersonal Safety     Do you feel physically and emotionally safe where you currently live?: Yes     Within the past 12 months, have you been hit, slapped, kicked or otherwise physically hurt by someone?: No     Within the past 12 months, have you been humiliated or emotionally abused in other ways by your partner or ex-partner?: No   Housing Stability: Low Risk  (10/10/2023)    Housing Stability     Do you have housing? : Yes     Are you worried about losing your housing?: No       PHYSICAL EXAM:   There were no vitals filed for this visit.  PSYCH: NAD  EYES: EOMI  MOUTH: MMM  NEURO: AAO x3    Ancillary Procedure on 09/13/2023   Component Date Value Ref Range Status    Date Time Interrogation Session 09/13/2023 [id number]   Final    Implantable Pulse  Generator Manufa* 09/13/2023 [facility]   Final    Implantable Pulse Generator Model 09/13/2023 [employer] [facility]   Final    Implantable Pulse Generator Serial* 09/13/2023 [id number]   Final    Type Interrogation Session 09/13/2023 In Clinic   Final    Clinic Name 09/13/2023 [facility]   Final    Implantable Pulse Generator Type 09/13/2023 Pacemaker   Final    Implantable Pulse Generator Implan* 09/13/2023 [id number]   Final    Implantable Lead  09/13/2023 [facility]   Final    Implantable Lead Model 09/13/2023 2088TC Tendril STS   Final    Implantable Lead Serial Number 09/13/2023 [id number]   Final    Implantable Lead Implant Date 09/13/2023 [id number]   Final    Implantable Lead Polarity Type 09/13/2023 Bipolar Lead   Final    Implantable Lead Location Detail 1 09/13/2023 UNKNOWN   Final    Implantable Lead Location 09/13/2023 Right Atrium   Final    Implantable Lead  09/13/2023 [facility]   Final    Implantable Lead Model 09/13/2023 2088TC Tendril STS   Final    Implantable Lead Serial Number 09/13/2023 [id number]   Final    Implantable Lead Implant Date 09/13/2023 [id number]   Final    Implantable Lead Polarity Type 09/13/2023 Bipolar Lead   Final    Implantable Lead Location Detail 1 09/13/2023 UNKNOWN   Final    Implantable Lead Location 09/13/2023 Right Ventricle   Final    Sixto Setting Mode (NBG Code) 09/13/2023 DDDR   Final    Sixto Setting Lower Rate Limit 09/13/2023 60  [beats]/min Final    Sixto Setting Maximum Tracking Rate 09/13/2023 130  [beats]/min Final    Sixto Setting Maximum Sensor Rate 09/13/2023 130  [beats]/min Final    Sixto Setting Hysterisis Rate 09/13/2023 Off   Final    Sixto Setting Night Rate 09/13/2023 Off   Final    Sixto Setting CARIE Delay Low 09/13/2023 150  ms Final    Sixto Setting PAV Delay Low 09/13/2023 200  ms Final    Sixto Setting AT Mode Switch Rate 09/13/2023 180  [beats]/min Final    Sixto Setting AT Mode Switch Mode 09/13/2023 DDIR   Final     Lead Channel Setting Sensing Polar* 09/13/2023 Bipolar   Final    Lead Channel Setting Sensing Adapt* 09/13/2023 Fixed   Final    Lead Channel Setting Sensing Polar* 09/13/2023 Bipolar   Final    Lead Channel Setting Sensing Sensi* 09/13/2023 2.0  mV Final    Lead Channel Setting Pacing Polari* 09/13/2023 Bipolar   Final    Lead Channel Setting Pacing Pulse * 09/13/2023 0.4  ms Final    Lead Channel Setting Pacing Amplit* 09/13/2023 2.0  V Final    Lead Channel Setting Pacing Captur* 09/13/2023 Fixed Pacing   Final    Lead Channel Setting Pacing Polari* 09/13/2023 Bipolar   Final    Lead Channel Setting Pacing Pulse * 09/13/2023 0.4  ms Final    Lead Channel Setting Pacing Amplit* 09/13/2023 1.125   Final    Lead Channel Setting Pacing Captur* 09/13/2023 Adaptive   Final    Lead Channel Impedance Value 09/13/2023 387.5  ohm Final    Lead Channel Sensing Intrinsic Amp* 09/13/2023 3.2  mV Final    Lead Channel Pacing Threshold Ampl* 09/13/2023 0.5  V Final    Lead Channel Pacing Threshold Puls* 09/13/2023 0.4  ms Final    Lead Channel Pacing Threshold Ampl* 09/13/2023 0.5  V Final    Lead Channel Pacing Threshold Puls* 09/13/2023 0.4  ms Final    Lead Channel Impedance Value 09/13/2023 512.5  ohm Final    Lead Channel Sensing Intrinsic Amp* 09/13/2023 5.8  mV Final    Lead Channel Pacing Threshold Ampl* 09/13/2023 1.0  V Final    Lead Channel Pacing Threshold Puls* 09/13/2023 0.4  ms Final    Lead Channel Pacing Threshold Ampl* 09/13/2023 1.0  V Final    Lead Channel Pacing Threshold Puls* 09/13/2023 0.4  ms Final    Battery Status 09/13/2023 Unknown   Final    Battery Remaining Longevity 09/13/2023 109  mo Final    Battery Voltage 09/13/2023 3.01  V Final    Sixto Statistic RA Percent Paced 09/13/2023 30.0  % Final    Sixto Statistic RV Percent Paced 09/13/2023 0.07  % Final    Atrial Tachy Statistic Number Of M* 09/13/2023 7.0   Final       IMAGING: CT w/o unremarkable    ASSESSMENT and PLAN:    73 year old  female with gross hematuria, significant family hx of RCC.  The differential diagnosis at this point includes stone disease, infection, vaginal contaminant, urothelial malignancy, renal disorder versus another yet unknown diagnosis.    At this time, recommend proceeding with comprehensive hematuria evaluation to include:  - Urine cytology to look for cells concerning for malignancy.  - CT urogram for upper tract imaging.  - Cystoscopy with the first available urologist to evaluate the interior of the bladder. Follow up for hematuria as recommended by urologist performing cystoscopic evaluation.  -Xarelto does not need to be held in a setting of gross hematuria unless clot retention.     Thank you for allowing me to participate in Ms. Nick's care. I will keep you updated of her progress, but please do not hesitate to contact me with any questions.    Nikki Newman PA-C  [facility]  30 minutes spent on the date of the encounter doing chart review, review of outside records, review of test results, interpretation of tests, patient visit and documentation.

## 2023-10-31 NOTE — PATIENT INSTRUCTIONS
- Urine cytology to look for abnormal cells. Please make an appointment at your local [place] lab to leave a urine sample ([phone]).  - CT scan of the kidneys. You can call [phone] to schedule this or the office can help you with that.  - My office will call you to arrange a cystoscopy with the  urologist to evaluate the interior of the bladder. Follow up as recommended by the urologist.    CYSTOSCOPY    What is a Cystoscopy?  This is a procedure done to check for problems inside the bladder.  Problems may include polyps (growths), tumors, inflammation (swelling and redness) and other concerns.    The Urologist inserts a thin tube (called a cystoscope) into the bladder.  The tube is about the size of a pencil.  We will give you numbing medicine to reduce the pain or discomfort you may feel.    The Urologist will be able to see inside the bladder by filling the bladder with water.  The water makes it easier to see any problems that may be present. You will have a sense to need to urinate and this is normal.       How should I get ready for the exam?  Nothing to do to prepare. You may eat normally the day of the exam. There is no sedation, so you may drive yourself to and from if you can drive.       Please tell your doctor if:  You have a history of urinary tract infections.  You know that you have a tumor in your bladder.  You have bleeding problems.  You have any allergies.  You are or may be pregnant.      What happens after the exam?  You may go back to your normal diet and activity as you feel ready.    For the next two days after the exam, you may notice:  Some blood in your urine.  Some burning when you urinate (use the toilet).  An urge to urinate more often.  Bladder spasms.    These are normal after the procedure. They should go away on their own after a day or two.      You can help to relieve the above listed symptoms by:  Drinking 6 to 8 large glasses of water each day (includes drinks at  meals).  This will help clear the urine.  Take warm baths to relieve pain and bladder spasms.  Do not add anything to the bath water.  You may take Tylenol (acetaminophen) per label instructions for discomfort.

## 2023-10-31 NOTE — NURSING NOTE
Is the patient currently in the state of [place]? YES    Visit mode:VIDEO    If the visit is dropped, the patient can be reconnected by: TELEPHONE VISIT: Phone number:   Telephone Information:   Mobile [phone]       Will anyone else be joining the visit? NO  (If patient encounters technical issues they should call [phone]    How would you like to obtain your AVS? MyChart    Are changes needed to the allergy or medication list? Pt stated no med changes    Reason for visit: Video Visit (Gross hematuria )    Chelsy MCFARLANDF

## 2023-10-31 NOTE — LETTER
10/31/2023       RE: Lauren Nick  [address]  [place] [address]     Dear Colleague,    Thank you for referring your patient, Lauren Nick, to the [facility] at [facility]. Please see a copy of my visit note below.    Virtual Visit Details    Type of service:  Video Visit   Video Start Time: 10:03 AM  Video End Time:10:16 AM    Originating Location (pt. Location): Home    Distant Location (provider location):  On-site  Platform used for Video Visit: Jerri    CC: Hematuria.    HPI: It is a pleasure to see Ms. Lauren Nick, a 73 year old female, asked to be seen in consultation by Dr. Parkinson for evaluation of intermittent gross hematuria. Has seen Judy Roberts PA-C in the past for nocturia.     Ms. Nick voids without difficulty (other than freq attributed to high vol water consumption).  She currently denies any dysuria, pyuria, hesitancy, intermittency, feelings of incomplete emptying, or any recent history of urinary tract infections or stones.    5 family members with RCC.  CT w/o contrast done 10/27/23.     On Xarelto for valve replacement.     Hematuria Risk Factors:  Age >40: Yes     Smoking history: former  Occupational exposure to chemicals or dyes (ie, benzenes, aromatic amines): no  History of urologic disorder or disease: no  History of irritative voiding symptoms: no  History of urinary tract infection: no  Analgesic abuse: no  History of pelvic irradiation: no    Past Medical History:   Diagnosis Date    Thyroid disease 02/21 diagnosed    Uncomplicated asthma A few years ago    Uncontrolled hypertension 2/20/2023     Past Surgical History:   Procedure Laterality Date    ABDOMEN SURGERY  Gallbladder    removed in 2010    BIOPSY  10/2018    BREAST SURGERY  09/25/1989    CARDIAC SURGERY  03/22/2021    CHOLECYSTECTOMY  03/2010    COLONOSCOPY  03/2019    COLONOSCOPY N/A 11/10/2022    Procedure: COLONOSCOPY, WITH  POLYPECTOMY AND BIOPSY;  Surgeon: Rafa Parks MD;  Location:  GI    ENT SURGERY  06/2011    EYE SURGERY  09/2015    THORACIC SURGERY  03/21    valve replacement     Current Outpatient Medications   Medication Sig Dispense Refill    albuterol (PROAIR HFA/PROVENTIL HFA/VENTOLIN HFA) 108 (90 Base) MCG/ACT inhaler Inhale 2 puffs into the lungs every 6 hours 8.5 g 3    escitalopram (LEXAPRO) 20 MG tablet Take 1 tablet (20 mg) by mouth daily 90 tablet 1    fluticasone (FLONASE) 50 MCG/ACT nasal spray Spray 2 sprays into both nostrils daily 16 g 11    levothyroxine (SYNTHROID/LEVOTHROID) 50 MCG tablet Take 1 tablet (50 mcg) by mouth daily 90 tablet 1    LORazepam (ATIVAN) 1 MG tablet Take 1 tablet (1 mg) by mouth nightly as needed for sleep 30 tablet 0    losartan (COZAAR) 25 MG tablet Take 1 tablet (25 mg) by mouth daily 90 tablet 1    metoprolol succinate ER (TOPROL XL) 25 MG 24 hr tablet Take 1 tablet (25 mg) by mouth 2 times daily 180 tablet 1    rivaroxaban ANTICOAGULANT (XARELTO) 20 MG TABS tablet Take 1 tablet (20 mg) by mouth daily (with dinner) 90 tablet 3    STATIN NOT PRESCRIBED (INTENTIONAL) Please choose reason not prescribed from choices below.      vitamin C (ASCORBIC ACID) 1000 MG TABS Take 1,000 mg by mouth daily      coenzyme Q-10 (CO-Q10) 50 MG capsule Take 100 mg by mouth daily       Allergies   Allergen Reactions    Penicillins Hives    Ambien [Zolpidem]      Complex behaviors    Statins [Statins]      Myalgias to multiple statins     FAMILY HISTORY: There is reported history of genitourinary carcinoma (RCC x5).  There is no history of urolithiasis.      Social History     Socioeconomic History    Marital status:      Spouse name: Not on file    Number of children: Not on file    Years of education: Not on file    Highest education level: Not on file   Occupational History    Not on file   Tobacco Use    Smoking status: Former     Packs/day: 1.00     Years: 10.00     Additional pack years:  0.00     Total pack years: 10.00     Types: Cigarettes     Start date: 1969     Quit date: 10/1/1979     Years since quittin.1    Smokeless tobacco: Never   Vaping Use    Vaping Use: Never used   Substance and Sexual Activity    Alcohol use: Yes     Comment: occ    Drug use: Never    Sexual activity: Not Currently     Partners: Male     Birth control/protection: Post-menopausal   Other Topics Concern    Parent/sibling w/ CABG, MI or angioplasty before 65F 55M? No   Social History Narrative    Not on file     Social Determinants of Health     Financial Resource Strain: Low Risk  (10/10/2023)    Financial Resource Strain     Within the past 12 months, have you or your family members you live with been unable to get utilities (heat, electricity) when it was really needed?: No   Food Insecurity: Low Risk  (10/10/2023)    Food Insecurity     Within the past 12 months, did you worry that your food would run out before you got money to buy more?: No     Within the past 12 months, did the food you bought just not last and you didn t have money to get more?: No   Transportation Needs: Low Risk  (10/10/2023)    Transportation Needs     Within the past 12 months, has lack of transportation kept you from medical appointments, getting your medicines, non-medical meetings or appointments, work, or from getting things that you need?: No   Physical Activity: Not on file   Stress: Not on file   Social Connections: Not on file   Interpersonal Safety: Low Risk  (10/17/2023)    Interpersonal Safety     Do you feel physically and emotionally safe where you currently live?: Yes     Within the past 12 months, have you been hit, slapped, kicked or otherwise physically hurt by someone?: No     Within the past 12 months, have you been humiliated or emotionally abused in other ways by your partner or ex-partner?: No   Housing Stability: Low Risk  (10/10/2023)    Housing Stability     Do you have housing? : Yes     Are you worried  about losing your housing?: No       PHYSICAL EXAM:   There were no vitals filed for this visit.  PSYCH: NAD  EYES: EOMI  MOUTH: MMM  NEURO: AAO x3    Ancillary Procedure on 09/13/2023   Component Date Value Ref Range Status    Date Time Interrogation Session 09/13/2023 [id number]   Final    Implantable Pulse Generator Manufa* 09/13/2023 [employer]   Final    Implantable Pulse Generator Model 09/13/2023 [employer]   Final    Implantable Pulse Generator Serial* 09/13/2023 [id number]   Final    Type Interrogation Session 09/13/2023 In Clinic   Final    Clinic Name 09/13/2023 [facility]   Final    Implantable Pulse Generator Type 09/13/2023 Pacemaker   Final    Implantable Pulse Generator Implan* 09/13/2023 [id number]   Final    Implantable Lead  09/13/2023 [facility] [facility]   Final    Implantable Lead Model 09/13/2023 2088TC Tendril STS   Final    Implantable Lead Serial Number 09/13/2023 [id number]   Final    Implantable Lead Implant Date 09/13/2023 [id number]   Final    Implantable Lead Polarity Type 09/13/2023 Bipolar Lead   Final    Implantable Lead Location Detail 1 09/13/2023 UNKNOWN   Final    Implantable Lead Location 09/13/2023 Right Atrium   Final    Implantable Lead  09/13/2023 [facility] [facility]   Final    Implantable Lead Model 09/13/2023 2088TC Tendril STS   Final    Implantable Lead Serial Number 09/13/2023 [id number]   Final    Implantable Lead Implant Date 09/13/2023 [id number]   Final    Implantable Lead Polarity Type 09/13/2023 Bipolar Lead   Final    Implantable Lead Location Detail 1 09/13/2023 UNKNOWN   Final    Implantable Lead Location 09/13/2023 Right Ventricle   Final    Sixto Setting Mode (NBG Code) 09/13/2023 DDDR   Final    Sixto Setting Lower Rate Limit 09/13/2023 60  [beats]/min Final    Sixto Setting Maximum Tracking Rate 09/13/2023 130  [beats]/min Final    Sixto Setting Maximum Sensor Rate 09/13/2023 130  [beats]/min Final    Sixto Setting Hysterisis Rate  09/13/2023 Off   Final    Sixto Setting Night Rate 09/13/2023 Off   Final    Sixto Setting CARIE Delay Low 09/13/2023 150  ms Final    Sixto Setting PAV Delay Low 09/13/2023 200  ms Final    Sixto Setting AT Mode Switch Rate 09/13/2023 180  [beats]/min Final    Sixto Setting AT Mode Switch Mode 09/13/2023 DDIR   Final    Lead Channel Setting Sensing Polar* 09/13/2023 Bipolar   Final    Lead Channel Setting Sensing Adapt* 09/13/2023 Fixed   Final    Lead Channel Setting Sensing Polar* 09/13/2023 Bipolar   Final    Lead Channel Setting Sensing Sensi* 09/13/2023 2.0  mV Final    Lead Channel Setting Pacing Polari* 09/13/2023 Bipolar   Final    Lead Channel Setting Pacing Pulse * 09/13/2023 0.4  ms Final    Lead Channel Setting Pacing Amplit* 09/13/2023 2.0  V Final    Lead Channel Setting Pacing Captur* 09/13/2023 Fixed Pacing   Final    Lead Channel Setting Pacing Polari* 09/13/2023 Bipolar   Final    Lead Channel Setting Pacing Pulse * 09/13/2023 0.4  ms Final    Lead Channel Setting Pacing Amplit* 09/13/2023 1.125   Final    Lead Channel Setting Pacing Captur* 09/13/2023 Adaptive   Final    Lead Channel Impedance Value 09/13/2023 387.5  ohm Final    Lead Channel Sensing Intrinsic Amp* 09/13/2023 3.2  mV Final    Lead Channel Pacing Threshold Ampl* 09/13/2023 0.5  V Final    Lead Channel Pacing Threshold Puls* 09/13/2023 0.4  ms Final    Lead Channel Pacing Threshold Ampl* 09/13/2023 0.5  V Final    Lead Channel Pacing Threshold Puls* 09/13/2023 0.4  ms Final    Lead Channel Impedance Value 09/13/2023 512.5  ohm Final    Lead Channel Sensing Intrinsic Amp* 09/13/2023 5.8  mV Final    Lead Channel Pacing Threshold Ampl* 09/13/2023 1.0  V Final    Lead Channel Pacing Threshold Puls* 09/13/2023 0.4  ms Final    Lead Channel Pacing Threshold Ampl* 09/13/2023 1.0  V Final    Lead Channel Pacing Threshold Puls* 09/13/2023 0.4  ms Final    Battery Status 09/13/2023 Unknown   Final    Battery Remaining Longevity 09/13/2023 109   mo Final    Battery Voltage 09/13/2023 3.01  V Final    Sixto Statistic RA Percent Paced 09/13/2023 30.0  % Final    Sixto Statistic RV Percent Paced 09/13/2023 0.07  % Final    Atrial Tachy Statistic Number Of M* 09/13/2023 7.0   Final       IMAGING: CT w/o unremarkable    ASSESSMENT and PLAN:    73 year old female with gross hematuria, significant family hx of RCC.  The differential diagnosis at this point includes stone disease, infection, vaginal contaminant, urothelial malignancy, renal disorder versus another yet unknown diagnosis.    At this time, recommend proceeding with comprehensive hematuria evaluation to include:  - Urine cytology to look for cells concerning for malignancy.  - CT urogram for upper tract imaging.  - Cystoscopy with the first available urologist to evaluate the interior of the bladder. Follow up for hematuria as recommended by urologist performing cystoscopic evaluation.    Thank you for allowing me to participate in Ms. Nick's care. I will keep you updated of her progress, but please do not hesitate to contact me with any questions.    Nikki Newman PA-C  [facility]  30 minutes spent on the date of the encounter doing chart review, review of outside records, review of test results, interpretation of tests, patient visit and documentation.

## 2023-11-01 ENCOUNTER — TELEPHONE (OUTPATIENT)
Dept: UROLOGY | Facility: CLINIC | Age: 73
End: 2023-11-01
Payer: MEDICARE

## 2023-11-02 ENCOUNTER — TELEPHONE (OUTPATIENT)
Dept: FAMILY MEDICINE | Facility: CLINIC | Age: 73
End: 2023-11-02
Payer: MEDICARE

## 2023-11-02 NOTE — TELEPHONE ENCOUNTER
General Call    Contacts         Type Contact Phone/Fax    11/02/2023 11:03 AM CDT Phone (Incoming) Nicolette Nick (Self) [phone] (M)          Reason for Call:  PT would like to confirm 11/21 appt is with Dr Wegener.    What are your questions or concerns:  PT states the appt in MY Chart says for hip and spine on 11/21 is with Alfred Monk not Dr Parkinson.  Informed PT that I could see if for Dr Parkinson and [facility]  Wanted to confirm with the clinic directly.    Date of last appointment with provider: 10/17/2023    Could we send this information to you in [employer] or would you prefer to receive a phone call?:   Patient would prefer a phone call   Okay to leave a detailed message?: Yes at Cell number on file:    Telephone Information:   Mobile [phone]

## 2023-11-08 ENCOUNTER — PATIENT OUTREACH (OUTPATIENT)
Dept: GASTROENTEROLOGY | Facility: CLINIC | Age: 73
End: 2023-11-08
Payer: MEDICARE

## 2023-11-09 ENCOUNTER — ANCILLARY PROCEDURE (OUTPATIENT)
Dept: CT IMAGING | Facility: CLINIC | Age: 73
End: 2023-11-09
Attending: PHYSICIAN ASSISTANT
Payer: MEDICARE

## 2023-11-09 ENCOUNTER — HOSPITAL ENCOUNTER (OUTPATIENT)
Dept: MAMMOGRAPHY | Facility: CLINIC | Age: 73
Discharge: HOME | End: 2023-11-09
Attending: FAMILY MEDICINE
Payer: MEDICARE

## 2023-11-09 ENCOUNTER — LAB (OUTPATIENT)
Dept: LAB | Facility: CLINIC | Age: 73
End: 2023-11-09
Payer: MEDICARE

## 2023-11-09 DIAGNOSIS — R31.0 GROSS HEMATURIA: ICD-10-CM

## 2023-11-09 DIAGNOSIS — Z12.31 VISIT FOR SCREENING MAMMOGRAM: ICD-10-CM

## 2023-11-09 LAB
CREAT BLD-MCNC: 1 MG/DL (ref 0.5–1)
EGFRCR SERPLBLD CKD-EPI 2021: 60 ML/MIN/1.73M2

## 2023-11-09 PROCEDURE — 88112 CYTOPATH CELL ENHANCE TECH: CPT | Mod: 26 | Performed by: PATHOLOGY

## 2023-11-09 PROCEDURE — 74178 CT ABD&PLV WO CNTR FLWD CNTR: CPT | Mod: MG

## 2023-11-09 PROCEDURE — 250N000011 HC RX IP 250 OP 636: Performed by: PHYSICIAN ASSISTANT

## 2023-11-09 PROCEDURE — 77067 SCR MAMMO BI INCL CAD: CPT

## 2023-11-09 PROCEDURE — 82565 ASSAY OF CREATININE: CPT

## 2023-11-09 PROCEDURE — 250N000009 HC RX 250: Performed by: PHYSICIAN ASSISTANT

## 2023-11-09 RX ORDER — IOPAMIDOL 755 MG/ML
100 INJECTION, SOLUTION INTRAVASCULAR ONCE
Status: COMPLETED | OUTPATIENT
Start: 2023-11-09 | End: 2023-11-09

## 2023-11-09 RX ADMIN — IOPAMIDOL 100 ML: 755 INJECTION, SOLUTION INTRAVENOUS at 13:38

## 2023-11-09 RX ADMIN — SODIUM CHLORIDE 120 ML: 9 INJECTION, SOLUTION INTRAVENOUS at 13:38

## 2023-11-10 LAB
PATH REPORT.COMMENTS IMP SPEC: NORMAL
PATH REPORT.FINAL DX SPEC: NORMAL
PATH REPORT.GROSS SPEC: NORMAL
PATH REPORT.RELEVANT HX SPEC: NORMAL

## 2023-11-13 ENCOUNTER — MYC REFILL (OUTPATIENT)
Dept: FAMILY MEDICINE | Facility: CLINIC | Age: 73
End: 2023-11-13

## 2023-11-13 DIAGNOSIS — F51.01 PRIMARY INSOMNIA: ICD-10-CM

## 2023-11-14 RX ORDER — LORAZEPAM 1 MG/1
1 TABLET ORAL
Qty: 30 TABLET | Refills: 0 | Status: SHIPPED | OUTPATIENT
Start: 2023-11-14 | End: 2023-12-15

## 2023-11-14 ASSESSMENT — ENCOUNTER SYMPTOMS
MYALGIAS: 0
PARESTHESIAS: 0
CONSTIPATION: 0
HEMATOCHEZIA: 0
DIARRHEA: 0
DYSURIA: 0
PALPITATIONS: 0
ARTHRALGIAS: 1
HEADACHES: 0
SHORTNESS OF BREATH: 0
COUGH: 1
SORE THROAT: 0
HEARTBURN: 0
FREQUENCY: 1
EYE PAIN: 0
CHILLS: 0
WEAKNESS: 0
ABDOMINAL PAIN: 0
DIZZINESS: 0
BREAST MASS: 0
FEVER: 0
NAUSEA: 0
HEMATURIA: 1
JOINT SWELLING: 0

## 2023-11-14 ASSESSMENT — ACTIVITIES OF DAILY LIVING (ADL): CURRENT_FUNCTION: NO ASSISTANCE NEEDED

## 2023-11-16 ENCOUNTER — OFFICE VISIT (OUTPATIENT)
Dept: UROLOGY | Facility: CLINIC | Age: 73
End: 2023-11-16
Payer: MEDICARE

## 2023-11-16 VITALS
BODY MASS INDEX: 24.98 KG/M2 | OXYGEN SATURATION: 96 % | HEIGHT: 63 IN | SYSTOLIC BLOOD PRESSURE: 118 MMHG | HEART RATE: 64 BPM | DIASTOLIC BLOOD PRESSURE: 78 MMHG | WEIGHT: 141 LBS

## 2023-11-16 DIAGNOSIS — R31.0 GROSS HEMATURIA: Primary | ICD-10-CM

## 2023-11-16 LAB
ALBUMIN UR-MCNC: NEGATIVE MG/DL
APPEARANCE UR: CLEAR
BILIRUB UR QL STRIP: NEGATIVE
COLOR UR AUTO: YELLOW
GLUCOSE UR STRIP-MCNC: NEGATIVE MG/DL
HGB UR QL STRIP: NEGATIVE
KETONES UR STRIP-MCNC: NEGATIVE MG/DL
LEUKOCYTE ESTERASE UR QL STRIP: NEGATIVE
NITRATE UR QL: NEGATIVE
PH UR STRIP: 6 [PH] (ref 5–7)
SP GR UR STRIP: >=1.03 (ref 1–1.03)
UROBILINOGEN UR STRIP-ACNC: 0.2 E.U./DL

## 2023-11-16 PROCEDURE — 52000 CYSTOURETHROSCOPY: CPT | Performed by: UROLOGY

## 2023-11-16 PROCEDURE — 81003 URINALYSIS AUTO W/O SCOPE: CPT | Performed by: UROLOGY

## 2023-11-16 PROCEDURE — 99213 OFFICE O/P EST LOW 20 MIN: CPT | Mod: 25 | Performed by: UROLOGY

## 2023-11-16 ASSESSMENT — PAIN SCALES - GENERAL: PAINLEVEL: NO PAIN (0)

## 2023-11-16 NOTE — LETTER
11/16/2023       RE: Lauren Nick  [address]  [place] [address]     Dear Colleague,    Thank you for referring your patient, Lauren Nick, to the [facility] at [facility]. Please see a copy of my visit note below.    Office Visit Note  [facility]  [phone]    Nov 16, 2023    [unfilled]    1950    UROLOGIC DIAGNOSES:    Gross hematuria    CURRENT INTERVENTIONS:        History:    This is a 73-year-old woman who is undergoing a work-up for gross hematuria in the urology clinic.  She reports having gross hematuria 1 time when she felt a sudden release in her bladder and then had a great deal of blood in the urine.  She said she continued to have pink urine for a day or so and then it resolved.  This was a few weeks ago and hematuria has resolved with no recurrence.  However, urinalyses have not shown hematuria.  She has a strong family history of renal cell carcinoma.  Due to the gross hematuria reports a hematuria work-up was initiated.  She had a CT urogram performed on November 9 that was negative.  Urine cytology on November 9 was negative.  She is here today for a cystoscopy in order to complete her hematuria work-up.      Imaging: I personally reviewed her CT scan images.  CT scan normal.  No stones or hydronephrosis or tumors on either side.    Labs:      MEDICATIONS:    Current Outpatient Medications:     albuterol (PROAIR HFA/PROVENTIL HFA/VENTOLIN HFA) 108 (90 Base) MCG/ACT inhaler, Inhale 2 puffs into the lungs every 6 hours, Disp: 8.5 g, Rfl: 3    escitalopram (LEXAPRO) 20 MG tablet, Take 1 tablet (20 mg) by mouth daily, Disp: 90 tablet, Rfl: 1    fluticasone (FLONASE) 50 MCG/ACT nasal spray, Spray 2 sprays into both nostrils daily, Disp: 16 g, Rfl: 11    levothyroxine (SYNTHROID/LEVOTHROID) 50 MCG tablet, Take 1 tablet (50 mcg) by mouth daily, Disp: 90 tablet, Rfl: 1    LORazepam (ATIVAN) 1 MG  "tablet, Take 1 tablet (1 mg) by mouth nightly as needed for sleep, Disp: 30 tablet, Rfl: 0    losartan (COZAAR) 25 MG tablet, Take 1 tablet (25 mg) by mouth daily, Disp: 90 tablet, Rfl: 1    metoprolol succinate ER (TOPROL XL) 25 MG 24 hr tablet, Take 1 tablet (25 mg) by mouth 2 times daily, Disp: 180 tablet, Rfl: 1    rivaroxaban ANTICOAGULANT (XARELTO) 20 MG TABS tablet, Take 1 tablet (20 mg) by mouth daily (with dinner), Disp: 90 tablet, Rfl: 3    STATIN NOT PRESCRIBED (INTENTIONAL), Please choose reason not prescribed from choices below., Disp: , Rfl:     Current Facility-Administered Medications:     lidocaine (PF) (XYLOCAINE) 1 % injection 9 mL, 9 mL, , , Jeana Levin MD, 9 mL at 03/17/22 1620    ALLERGIES:     Allergies   Allergen Reactions    Penicillins Hives    Ambien [Zolpidem]      Complex behaviors    Statins [Statins]      Myalgias to multiple statins       REVIEW OF SYSTEMS: Ten point review of systems without change as outlined in HPI    SURGICAL HISTORY:    Past Surgical History:   Procedure Laterality Date    ABDOMEN SURGERY  Gallbladder    removed in 2010    BIOPSY  10/2018    BREAST SURGERY  09/25/1989    CARDIAC SURGERY  03/22/2021    CHOLECYSTECTOMY  03/2010    COLONOSCOPY  03/2019    COLONOSCOPY N/A 11/10/2022    Procedure: COLONOSCOPY, WITH POLYPECTOMY AND BIOPSY;  Surgeon: Rafa Parks MD;  Location:  GI    ENT SURGERY  06/2011    EYE SURGERY  09/2015    THORACIC SURGERY  03/21    valve replacement         PHYSICAL EXAM:    /78   Pulse 64   Ht 1.6 m (5' 3\")   Wt 64 kg (141 lb)   LMP  (LMP Unknown)   SpO2 96%   BMI 24.98 kg/m      Constitutional: Well developed. Conversant and in no acute distress  Eyes: Anicteric sclera, conjunctiva clear, normal extraocular movements  ENT: Normocephalic and atraumatic,   Skin: Warm and dry. No rashes or lesions  Cardiac: No peripheral edema  Back/Flank: Not done  CNS/PNS: Normal musculature and movements, moves all extremities " normally  Respiratory: Normal non-labored breathing  Abdomen: Soft nontender and nondistended  Peripheral Vascular: No peripheral edema  Mental Status/Psych: Alert and Oriented x 3. Normal mood and affect      External Genitalia: Not done  Bladder: Not done  Urethra: Not done   Vagina: Not done    Cystoscopy: I performed flexible cystoscopy and the bladder was normal throughout.  No tumors identified throughout the bladder.      Urinalysis:  UA RESULTS:  Recent Labs   Lab Test 11/16/23  0949   COLOR Yellow   APPEARANCE Clear   URINEGLC Negative   URINEBILI Negative   URINEKETONE Negative   SG >=1.030   UBLD Negative   URINEPH 6.0   PROTEIN Negative   UROBILINOGEN 0.2   NITRITE Negative   LEUKEST Negative         IMPRESSION:    Negative hematuria work-up    PLAN:    She has had a negative hematuria work-up complete with a CT scan, urine cytology, and cystoscopy.  From her history, it sounds as if she may have passed a kidney stone as the cause of her hematuria.  I counseled her that if she ever does have hematuria again she should contact us again at that time.      Alli Christianson M.D.

## 2023-11-16 NOTE — NURSING NOTE
Chief Complaint   Patient presents with    GROSS HEMATURIA     HERE IN CLINIC FOR CYSTOSCOPY WITH DR PAULINA Stein, CMA

## 2023-11-16 NOTE — PROGRESS NOTES
Office Visit Note  [facility]  [phone]    Nov 16, 2023    [unfilled]    1950    UROLOGIC DIAGNOSES:    Gross hematuria    CURRENT INTERVENTIONS:        History:    This is a 73-year-old woman who is undergoing a work-up for gross hematuria in the urology clinic.  She reports having gross hematuria 1 time when she felt a sudden release in her bladder and then had a great deal of blood in the urine.  She said she continued to have pink urine for a day or so and then it resolved.  This was a few weeks ago and hematuria has resolved with no recurrence.  However, urinalyses have not shown hematuria.  She has a strong family history of renal cell carcinoma.  Due to the gross hematuria reports a hematuria work-up was initiated.  She had a CT urogram performed on November 9 that was negative.  Urine cytology on November 9 was negative.  She is here today for a cystoscopy in order to complete her hematuria work-up.      Imaging: I personally reviewed her CT scan images.  CT scan normal.  No stones or hydronephrosis or tumors on either side.    Labs:      MEDICATIONS:    Current Outpatient Medications:     albuterol (PROAIR HFA/PROVENTIL HFA/VENTOLIN HFA) 108 (90 Base) MCG/ACT inhaler, Inhale 2 puffs into the lungs every 6 hours, Disp: 8.5 g, Rfl: 3    escitalopram (LEXAPRO) 20 MG tablet, Take 1 tablet (20 mg) by mouth daily, Disp: 90 tablet, Rfl: 1    fluticasone (FLONASE) 50 MCG/ACT nasal spray, Spray 2 sprays into both nostrils daily, Disp: 16 g, Rfl: 11    levothyroxine (SYNTHROID/LEVOTHROID) 50 MCG tablet, Take 1 tablet (50 mcg) by mouth daily, Disp: 90 tablet, Rfl: 1    LORazepam (ATIVAN) 1 MG tablet, Take 1 tablet (1 mg) by mouth nightly as needed for sleep, Disp: 30 tablet, Rfl: 0    losartan (COZAAR) 25 MG tablet, Take 1 tablet (25 mg) by mouth daily, Disp: 90 tablet, Rfl: 1    metoprolol succinate ER (TOPROL XL) 25 MG 24 hr tablet, Take 1 tablet (25 mg) by mouth 2 times daily, Disp: 180  "tablet, Rfl: 1    rivaroxaban ANTICOAGULANT (XARELTO) 20 MG TABS tablet, Take 1 tablet (20 mg) by mouth daily (with dinner), Disp: 90 tablet, Rfl: 3    STATIN NOT PRESCRIBED (INTENTIONAL), Please choose reason not prescribed from choices below., Disp: , Rfl:     Current Facility-Administered Medications:     lidocaine (PF) (XYLOCAINE) 1 % injection 9 mL, 9 mL, , , Jeana Levin MD, 9 mL at 03/17/22 1620    ALLERGIES:     Allergies   Allergen Reactions    Penicillins Hives    Ambien [Zolpidem]      Complex behaviors    Statins [Statins]      Myalgias to multiple statins       REVIEW OF SYSTEMS: Ten point review of systems without change as outlined in HPI    SURGICAL HISTORY:    Past Surgical History:   Procedure Laterality Date    ABDOMEN SURGERY  Gallbladder    removed in 2010    BIOPSY  10/2018    BREAST SURGERY  09/25/1989    CARDIAC SURGERY  03/22/2021    CHOLECYSTECTOMY  03/2010    COLONOSCOPY  03/2019    COLONOSCOPY N/A 11/10/2022    Procedure: COLONOSCOPY, WITH POLYPECTOMY AND BIOPSY;  Surgeon: Rafa Parks MD;  Location:  GI    ENT SURGERY  06/2011    EYE SURGERY  09/2015    THORACIC SURGERY  03/21    valve replacement         PHYSICAL EXAM:    /78   Pulse 64   Ht 1.6 m (5' 3\")   Wt 64 kg (141 lb)   LMP  (LMP Unknown)   SpO2 96%   BMI 24.98 kg/m      Constitutional: Well developed. Conversant and in no acute distress  Eyes: Anicteric sclera, conjunctiva clear, normal extraocular movements  ENT: Normocephalic and atraumatic,   Skin: Warm and dry. No rashes or lesions  Cardiac: No peripheral edema  Back/Flank: Not done  CNS/PNS: Normal musculature and movements, moves all extremities normally  Respiratory: Normal non-labored breathing  Abdomen: Soft nontender and nondistended  Peripheral Vascular: No peripheral edema  Mental Status/Psych: Alert and Oriented x 3. Normal mood and affect      External Genitalia: Not done  Bladder: Not done  Urethra: Not done   Vagina: Not done    Cystoscopy: I " performed flexible cystoscopy and the bladder was normal throughout.  No tumors identified throughout the bladder.      Urinalysis:  UA RESULTS:  Recent Labs   Lab Test 11/16/23  0949   COLOR Yellow   APPEARANCE Clear   URINEGLC Negative   URINEBILI Negative   URINEKETONE Negative   SG >=1.030   UBLD Negative   URINEPH 6.0   PROTEIN Negative   UROBILINOGEN 0.2   NITRITE Negative   LEUKEST Negative         IMPRESSION:    Negative hematuria work-up    PLAN:    She has had a negative hematuria work-up complete with a CT scan, urine cytology, and cystoscopy.  From her history, it sounds as if she may have passed a kidney stone as the cause of her hematuria.  I counseled her that if she ever does have hematuria again she should contact us again at that time.      Alli Christianson M.D.

## 2023-11-16 NOTE — NURSING NOTE

## 2023-11-16 NOTE — PATIENT INSTRUCTIONS

## 2023-11-21 ENCOUNTER — OFFICE VISIT (OUTPATIENT)
Dept: FAMILY MEDICINE | Facility: CLINIC | Age: 73
End: 2023-11-21
Attending: FAMILY MEDICINE
Payer: MEDICARE

## 2023-11-21 VITALS
WEIGHT: 143 LBS | HEART RATE: 65 BPM | BODY MASS INDEX: 25.34 KG/M2 | DIASTOLIC BLOOD PRESSURE: 68 MMHG | TEMPERATURE: 95.2 F | RESPIRATION RATE: 18 BRPM | SYSTOLIC BLOOD PRESSURE: 110 MMHG | OXYGEN SATURATION: 96 % | HEIGHT: 63 IN

## 2023-11-21 DIAGNOSIS — I10 UNCONTROLLED HYPERTENSION: ICD-10-CM

## 2023-11-21 DIAGNOSIS — E78.5 HYPERLIPIDEMIA LDL GOAL <70: ICD-10-CM

## 2023-11-21 DIAGNOSIS — Z79.01 CHRONIC ANTICOAGULATION: ICD-10-CM

## 2023-11-21 DIAGNOSIS — Z00.00 ENCOUNTER FOR MEDICARE ANNUAL WELLNESS EXAM: Primary | ICD-10-CM

## 2023-11-21 DIAGNOSIS — E03.9 HYPOTHYROIDISM, UNSPECIFIED TYPE: ICD-10-CM

## 2023-11-21 DIAGNOSIS — I10 HYPERTENSION GOAL BP (BLOOD PRESSURE) < 140/90: ICD-10-CM

## 2023-11-21 LAB
ALBUMIN SERPL BCG-MCNC: 4.3 G/DL (ref 3.5–5.2)
ALBUMIN UR-MCNC: NEGATIVE MG/DL
ALP SERPL-CCNC: 65 U/L (ref 40–150)
ALT SERPL W P-5'-P-CCNC: 12 U/L (ref 0–50)
ANION GAP SERPL CALCULATED.3IONS-SCNC: 9 MMOL/L (ref 7–15)
APPEARANCE UR: CLEAR
AST SERPL W P-5'-P-CCNC: 20 U/L (ref 0–45)
BILIRUB SERPL-MCNC: 0.3 MG/DL
BILIRUB UR QL STRIP: NEGATIVE
BUN SERPL-MCNC: 20.3 MG/DL (ref 8–23)
CALCIUM SERPL-MCNC: 9.6 MG/DL (ref 8.8–10.2)
CHLORIDE SERPL-SCNC: 107 MMOL/L (ref 98–107)
CHOLEST SERPL-MCNC: 226 MG/DL
COLOR UR AUTO: YELLOW
CREAT SERPL-MCNC: 0.97 MG/DL (ref 0.51–0.95)
CREAT UR-MCNC: 214 MG/DL
DEPRECATED HCO3 PLAS-SCNC: 26 MMOL/L (ref 22–29)
EGFRCR SERPLBLD CKD-EPI 2021: 62 ML/MIN/1.73M2
ERYTHROCYTE [DISTWIDTH] IN BLOOD BY AUTOMATED COUNT: 13.2 % (ref 10–15)
GLUCOSE SERPL-MCNC: 100 MG/DL (ref 70–99)
GLUCOSE UR STRIP-MCNC: NEGATIVE MG/DL
HCT VFR BLD AUTO: 41.4 % (ref 35–47)
HDLC SERPL-MCNC: 53 MG/DL
HGB BLD-MCNC: 12.4 G/DL (ref 11.7–15.7)
HGB UR QL STRIP: NEGATIVE
KETONES UR STRIP-MCNC: NEGATIVE MG/DL
LDLC SERPL CALC-MCNC: 154 MG/DL
LEUKOCYTE ESTERASE UR QL STRIP: NEGATIVE
MCH RBC QN AUTO: 27 PG (ref 26.5–33)
MCHC RBC AUTO-ENTMCNC: 30 G/DL (ref 31.5–36.5)
MCV RBC AUTO: 90 FL (ref 78–100)
MICROALBUMIN UR-MCNC: <12 MG/L
MICROALBUMIN/CREAT UR: NORMAL MG/G{CREAT}
NITRATE UR QL: NEGATIVE
NONHDLC SERPL-MCNC: 173 MG/DL
PH UR STRIP: 6 [PH] (ref 5–7)
PLATELET # BLD AUTO: 188 10E3/UL (ref 150–450)
POTASSIUM SERPL-SCNC: 4.5 MMOL/L (ref 3.4–5.3)
PROT SERPL-MCNC: 6.9 G/DL (ref 6.4–8.3)
RBC # BLD AUTO: 4.59 10E6/UL (ref 3.8–5.2)
SODIUM SERPL-SCNC: 142 MMOL/L (ref 135–145)
SP GR UR STRIP: >=1.03 (ref 1–1.03)
TRIGL SERPL-MCNC: 97 MG/DL
TSH SERPL DL<=0.005 MIU/L-ACNC: 1.96 UIU/ML (ref 0.3–4.2)
UROBILINOGEN UR STRIP-ACNC: 0.2 E.U./DL
WBC # BLD AUTO: 5.1 10E3/UL (ref 4–11)

## 2023-11-21 PROCEDURE — 80053 COMPREHEN METABOLIC PANEL: CPT | Performed by: FAMILY MEDICINE

## 2023-11-21 PROCEDURE — G0439 PPPS, SUBSEQ VISIT: HCPCS | Performed by: FAMILY MEDICINE

## 2023-11-21 PROCEDURE — 82570 ASSAY OF URINE CREATININE: CPT | Performed by: FAMILY MEDICINE

## 2023-11-21 PROCEDURE — 82043 UR ALBUMIN QUANTITATIVE: CPT | Performed by: FAMILY MEDICINE

## 2023-11-21 PROCEDURE — 80061 LIPID PANEL: CPT | Performed by: FAMILY MEDICINE

## 2023-11-21 PROCEDURE — 36415 COLL VENOUS BLD VENIPUNCTURE: CPT | Performed by: FAMILY MEDICINE

## 2023-11-21 PROCEDURE — 84443 ASSAY THYROID STIM HORMONE: CPT | Performed by: FAMILY MEDICINE

## 2023-11-21 PROCEDURE — 85027 COMPLETE CBC AUTOMATED: CPT | Performed by: FAMILY MEDICINE

## 2023-11-21 PROCEDURE — 81003 URINALYSIS AUTO W/O SCOPE: CPT | Mod: QW | Performed by: FAMILY MEDICINE

## 2023-11-21 ASSESSMENT — ENCOUNTER SYMPTOMS
WEAKNESS: 0
FREQUENCY: 1
HEARTBURN: 0
DYSURIA: 0
DIARRHEA: 0
MYALGIAS: 0
FEVER: 0
PALPITATIONS: 0
CHILLS: 0
PARESTHESIAS: 0
BREAST MASS: 0
HEMATOCHEZIA: 0
ABDOMINAL PAIN: 0
HEADACHES: 0
NAUSEA: 0
SHORTNESS OF BREATH: 0
SORE THROAT: 0
HEMATURIA: 1
CONSTIPATION: 0
DIZZINESS: 0
JOINT SWELLING: 0
EYE PAIN: 0
COUGH: 1
ARTHRALGIAS: 1

## 2023-11-21 ASSESSMENT — ACTIVITIES OF DAILY LIVING (ADL): CURRENT_FUNCTION: NO ASSISTANCE NEEDED

## 2023-11-21 ASSESSMENT — PAIN SCALES - GENERAL: PAINLEVEL: NO PAIN (0)

## 2023-11-21 ASSESSMENT — PATIENT HEALTH QUESTIONNAIRE - PHQ9
SUM OF ALL RESPONSES TO PHQ QUESTIONS 1-9: 3
10. IF YOU CHECKED OFF ANY PROBLEMS, HOW DIFFICULT HAVE THESE PROBLEMS MADE IT FOR YOU TO DO YOUR WORK, TAKE CARE OF THINGS AT HOME, OR GET ALONG WITH OTHER PEOPLE: NOT DIFFICULT AT ALL
SUM OF ALL RESPONSES TO PHQ QUESTIONS 1-9: 3

## 2023-11-21 NOTE — PATIENT INSTRUCTIONS
Patient Education   Personalized Prevention Plan  You are due for the preventive services outlined below.  Your care team is available to assist you in scheduling these services.  If you have already completed any of these items, please share that information with your care team to update in your medical record.  Health Maintenance Due   Topic Date Due     RSV VACCINE (Pregnancy & 60+) (1 - 1-dose 60+ series) Never done     Comprehensive Metabolic Panel  11/21/2023     Cholesterol Lab  11/21/2023     Kidney Microalbumin Urine Test  11/21/2023     Thyroid Function Lab  11/21/2023     ANNUAL REVIEW OF HM ORDERS  11/21/2023     Learning About Dietary Guidelines  What are the Dietary Guidelines for Americans?     Dietary Guidelines for Americans provide tips for eating well and staying healthy. This helps reduce the risk for long-term (chronic) diseases.  These guidelines recommend that you:  Eat and drink the right amount for you. The U.S[place] government's food guide is called MyPlate. It can help you make your own well-balanced eating plan.  Try to balance your eating with your activity. This helps you stay at a healthy weight.  Drink alcohol in moderation, if at all.  Limit foods high in salt, saturated fat, trans fat, and added sugar.  These guidelines are from the U.S. Department of Agriculture and the U.S. Department of Health and Human Services. They are updated every 5 years.  What is MyPlate?  MyPlate is the U.S. government's food guide. It can help you make your own well-balanced eating plan. A balanced eating plan means that you eat enough, but not too much, and that your food gives you the nutrients you need to stay healthy.  MyPlate focuses on eating plenty of whole grains, fruits, and vegetables, and on limiting fat and sugar. It is available online at www.ChooseMyPlate.gov.  How can you get started?  If you're trying to eat healthier, you can slowly change your eating habits over time. You don't have to make  "big changes all at once. Start by adding one or two healthy foods to your meals each day.  Grains  Choose whole-grain breads and cereals and whole-wheat pasta and whole-grain crackers.  Vegetables  Eat a variety of vegetables every day. They have lots of nutrients and are part of a heart-healthy diet.  Fruits  Eat a variety of fruits every day. Fruits contain lots of nutrients. Choose fresh fruit instead of fruit juice.  Protein foods  Choose fish and lean poultry more often. Eat red meat and fried meats less often. Dried beans, tofu, and nuts are also good sources of protein.  Dairy  Choose low-fat or fat-free products from this food group. If you have problems digesting milk, try eating cheese or yogurt instead.  Fats and oils  Limit fats and oils if you're trying to cut calories. Choose healthy fats when you cook. These include canola oil and olive oil.  Where can you learn more?  Go to https://www.[employer].net/patiented  Enter D676 in the search box to learn more about \"Learning About Dietary Guidelines.\"  Current as of: February 28, 2023               Content Version: 13.8    8967-5204 [employer].   Care instructions adapted under license by your healthcare professional. If you have questions about a medical condition or this instruction, always ask your healthcare professional. [employer] disclaims any warranty or liability for your use of this information.      Hearing Loss: Care Instructions  Overview     Hearing loss is a sudden or slow decrease in how well you hear. It can range from slight to profound. Permanent hearing loss can occur with aging. It also can happen when you are exposed long-term to loud noise. Examples include listening to loud music, riding motorcycles, or being around other loud machines.  Hearing loss can affect your work and home life. It can make you feel lonely or depressed. You may feel that you have lost your independence. But hearing aids and other " devices can help you hear better and feel connected to others.  Follow-up care is a key part of your treatment and safety. Be sure to make and go to all appointments, and call your doctor if you are having problems. It's also a good idea to know your test results and keep a list of the medicines you take.  How can you care for yourself at home?  Avoid loud noises whenever possible. This helps keep your hearing from getting worse.  Always wear hearing protection around loud noises.  Wear a hearing aid as directed.  A professional can help you pick a hearing aid that will work best for you.  You can also get hearing aids over the counter for mild to moderate hearing loss.  Have hearing tests as your doctor suggests. They can show whether your hearing has changed. Your hearing aid may need to be adjusted.  Use other devices as needed. These may include:  Telephone amplifiers and hearing aids that can connect to a television, stereo, radio, or microphone.  Devices that use lights or vibrations. These alert you to the doorbell, a ringing telephone, or a baby monitor.  Television closed-captioning. This shows the words at the bottom of the screen. Most new TVs can do this.  TTY (text telephone). This lets you type messages back and forth on the telephone instead of talking or listening. These devices are also called TDD. When messages are typed on the keyboard, they are sent over the phone line to a receiving TTY. The message is shown on a monitor.  Use text messaging, social media, and email if it is hard for you to communicate by telephone.  Try to learn a listening technique called speechreading. It is not lipreading. You pay attention to people's gestures, expressions, posture, and tone of voice. These clues can help you understand what a person is saying. Face the person you are talking to, and have them face you. Make sure the lighting is good. You need to see the other person's face clearly.  Think about counseling  "if you need help to adjust to your hearing loss.  When should you call for help?  Watch closely for changes in your health, and be sure to contact your doctor if:    You think your hearing is getting worse.     You have new symptoms, such as dizziness or nausea.   Where can you learn more?  Go to https://www.[employer].net/patiented  Enter R798 in the search box to learn more about \"Hearing Loss: Care Instructions.\"  Current as of: February 28, 2023               Content Version: 13.8    2753-5205 [employer].   Care instructions adapted under license by your healthcare professional. If you have questions about a medical condition or this instruction, always ask your healthcare professional. [employer] disclaims any warranty or liability for your use of this information.      Bladder Training: Care Instructions  Your Care Instructions     Bladder training is used to treat urge incontinence and stress incontinence. Urge incontinence means that the need to urinate comes on so fast that you can't get to a toilet in time. Stress incontinence means that you leak urine because of pressure on your bladder. For example, it may happen when you laugh, cough, or lift something heavy.  Bladder training can increase how long you can wait before you have to urinate. It can also help your bladder hold more urine. And it can give you better control over the urge to urinate.  It is important to remember that bladder training takes a few weeks to a few months to make a difference. You may not see results right away, but don't give up.  Follow-up care is a key part of your treatment and safety. Be sure to make and go to all appointments, and call your doctor if you are having problems. It's also a good idea to know your test results and keep a list of the medicines you take.  How can you care for yourself at home?  Work with your doctor to come up with a bladder training program that is right for you. You " "may use one or more of the following methods.  Delayed urination  In the beginning, try to keep from urinating for 5 minutes after you first feel the need to go.  While you wait, take deep, slow breaths to relax. Kegel exercises can also help you delay the need to go to the bathroom.  After some practice, when you can easily wait 5 minutes to urinate, try to wait 10 minutes before you urinate.  Slowly increase the waiting period until you are able to control when you have to urinate.  Scheduled urination  Empty your bladder when you first wake up in the morning.  Schedule times throughout the day when you will urinate.  Start by going to the bathroom every hour, even if you don't need to go.  Slowly increase the time between trips to the bathroom.  When you have found a schedule that works well for you, keep doing it.  If you wake up during the night and have to urinate, do it. Apply your schedule to waking hours only.  Kegel exercises  These tighten and strengthen pelvic muscles, which can help you control the flow of urine. (If doing these exercises causes pain, stop doing them and talk with your doctor.) To do Kegel exercises:  Squeeze your muscles as if you were trying not to pass gas. Or squeeze your muscles as if you were stopping the flow of urine. Your belly, legs, and buttocks shouldn't move.  Hold the squeeze for 3 seconds, then relax for 5 to 10 seconds.  Start with 3 seconds, then add 1 second each week until you are able to squeeze for 10 seconds.  Repeat the exercise 10 times a session. Do 3 to 8 sessions a day.  When should you call for help?  Watch closely for changes in your health, and be sure to contact your doctor if:    Your incontinence is getting worse.     You do not get better as expected.   Where can you learn more?  Go to https://www.healthwise.net/patiented  Enter V684 in the search box to learn more about \"Bladder Training: Care Instructions.\"  Current as of: February 28, " 2023               Content Version: 13.8    0430-6520 [employer].   Care instructions adapted under license by your healthcare professional. If you have questions about a medical condition or this instruction, always ask your healthcare professional. [employer] disclaims any warranty or liability for your use of this information.

## 2023-11-21 NOTE — LETTER
My COPD Action Plan     Name: Lauren Nick    YOB: 1950   Date: 11/21/2023    My doctor: Joel Daniel Wegener, MD   [address]  [facility] [address] [phone]  My Controller Medicine:    Dose:      My Rescue Medicine: Albuterol (Proair/Ventolin/Proventil) inhaler   Dose: 108/90 base     My Flare Up Medicine:    Dose:      My COPD Severity:       Use of Oxygen: Oxygen Not Prescribed      Make sure you've had your pneumonia   vaccines.          GREEN ZONE       Doing well today      Usual level of activity and exercise    Usual amount of cough and mucus    No shortness of breath    Usual level of health (thinking clearly, sleeping well, feel like eating) Actions:      Take daily medicines    Use oxygen as prescribed    Follow regular exercise and diet plan    Avoid cigarette smoke and other irritants that harm the lungs           YELLOW ZONE          Having a bad day or flare up      Short of breath more than usual    A lot more sputum (mucus) than usual    Sputum looks yellow, green, tan, brown or bloody    More coughing or wheezing    Fever or chills    Less energy; trouble completing activities    Trouble thinking or focusing    Using quick relief inhaler or nebulizer more often    Poor sleep; symptoms wake me up    Do not feel like eating Actions:      Get plenty of rest    Take daily medicines    Use quick relief inhaler every 4-6 hours    If you use oxygen, call you doctor to see if you should adjust your oxygen    Do breathing exercises or other things to help you relax    Let a loved one, friend or neighbor know you are feeling worse    Call your care team if you have 2 or more symptoms.  Start taking steroids or antibiotics if directed by your care team           RED ZONE       Need medical care now      Severe shortness of breath (feel you can't breathe)    Fever, chills    Not enough breath to do any  activity    Trouble coughing up mucus, walking or talking    Blood in mucus    Frequent coughing   Rescue medicines are not working    Not able to sleep because of breathing    Feel confused or drowsy    Chest pain    Actions:      Call your health care team.  If you cannot reach your care team, call 911 or go to the emergency room.        Annual Reminders:  Meet with Care Team, Flu Shot every Fall  Pharmacy:    [facility] - [place], [place] - [address]  [id number] [place] - [address] [place] [address]

## 2023-11-21 NOTE — PROGRESS NOTES
"SUBJECTIVE:   Nicolette is a 73 year old, presenting for the following:  Physical (AWV)        10/17/2023     9:22 AM   Additional Questions   Roomed by Trevor VUONG       Are you in the first 12 months of your Medicare coverage?  No    Healthy Habits:     In general, how would you rate your overall health?  Good    Frequency of exercise:  4-5 days/week    Duration of exercise:  30-45 minutes    Do you usually eat at least 4 servings of fruit and vegetables a day, include whole grains    & fiber and avoid regularly eating high fat or \"junk\" foods?  No    Taking medications regularly:  Yes    Medication side effects:  None    Ability to successfully perform activities of daily living:  No assistance needed    Home Safety:  No safety concerns identified    Hearing Impairment:  Difficulty following a conversation in a noisy restaurant or crowded room    In the past 6 months, have you been bothered by leaking of urine? Yes    In general, how would you rate your overall mental or emotional health?  Good    Additional concerns today:  No    Wellness Visit Notes:    -Last mammo done 11/09/2023 (impression: negative). Per , pt completing mammos Q2 years.   -Last DEXA done 4/18/2022 (impression: osteopenia; most negative T-score of -1.8 at L hip).  Next due in April 2025.   -Last colon cancer screening done via colonoscopy on 11/10/2022 (impression: 4 polyps removed, otherwise negative). Next due in Nov 2027.   -Immunizations: Pt is up to date on current recommended vaccines.   -ACP: Discussed medical directive/ACP in detail with patient today and provided our Honoring Choices documents. Advised pt return a copy to clinic once completed; pt expressed understanding.   -Education: Pt education provided on Hearing Concerns and Incontinence Concerns. Pt currently has hearing aids, and is satisfied with them.   -COPD Action Plan document created in Communications; sent via [employer]            Have you ever done Advance Care Planning? (For " example, a Health Directive, POLST, or a discussion with a medical provider or your loved ones about your wishes): Yes, patient states has an Advance Care Planning document and will bring a copy to the clinic.       Fall risk  Fallen 2 or more times in the past year?: No  Any fall with injury in the past year?: Yes    Cognitive Screening   1) Repeat 3 items (Leader, Season, Table)    2) Clock draw: NORMAL  3) 3 item recall: Recalls 3 objects  Results: 3 items recalled: COGNITIVE IMPAIRMENT LESS LIKELY    Mini-CogTM Copyright VALERIE Ureña. Licensed by the author for use in [facility]; reprinted with permission ([email or web address]). All rights reserved.      Do you have sleep apnea, excessive snoring or daytime drowsiness? : no    Reviewed and updated as needed this visit by clinical staff   Tobacco  Allergies  Meds  Problems             Reviewed and updated as needed this visit by Provider                 Social History     Tobacco Use     Smoking status: Former     Packs/day: 1.00     Years: 10.00     Additional pack years: 0.00     Total pack years: 10.00     Types: Cigarettes     Start date: 1969     Quit date: 10/1/1979     Years since quittin.1     Smokeless tobacco: Never   Substance Use Topics     Alcohol use: Yes     Comment: occ             2023    10:44 AM   Alcohol Use   Prescreen: >3 drinks/day or >7 drinks/week? No          No data to display              Do you have a current opioid prescription? No  Do you use any other controlled substances or medications that are not prescribed by a provider? None              Current providers sharing in care for this patient include:   Patient Care Team:  Wegener, Joel Daniel Irwin, MD as PCP - General (Family Medicine)  Wegener, Joel Daniel Irwin, MD as Assigned PCP  Reggie King MD as Assigned Heart and Vascular Provider  Rohit Thao MD as MD (Critical Care)  Rohit Thao MD as Assigned Pulmonology Provider  Jesus  MD Keo as MD (Dermatology)  Judy Roberts PA-C as Physician Assistant  Janet Tran PA-C as Assigned Sleep Provider  Milka Martinez PA-C as Physician Assistant (Dermatology)  Milka Martinez PA-C as Assigned Surgical Provider  Sidney Dietz MD as Assigned Musculoskeletal Provider  Nikki Newman PA-C as Physician Assistant (Urology)    The following health maintenance items are reviewed in Epic and correct as of today:  Health Maintenance   Topic Date Due     RSV VACCINE (Pregnancy & 60+) (1 - 1-dose 60+ series) Never done     CMP  11/21/2023     LIPID  11/21/2023     MICROALBUMIN  11/21/2023     TSH W/FREE T4 REFLEX  11/21/2023     PHQ-9  05/21/2024     MEDICARE ANNUAL WELLNESS VISIT  11/21/2024     ANNUAL REVIEW OF HM ORDERS  11/21/2024     FALL RISK ASSESSMENT  11/21/2024     DEXA  04/18/2025     MAMMO SCREENING  11/09/2025     COLORECTAL CANCER SCREENING  11/10/2027     ADVANCE CARE PLANNING  11/25/2027     DTAP/TDAP/TD IMMUNIZATION (2 - Td or Tdap) 11/30/2031     SPIROMETRY  Completed     HEPATITIS C SCREENING  Completed     COPD ACTION PLAN  Completed     DEPRESSION ACTION PLAN  Completed     INFLUENZA VACCINE  Completed     ZOSTER IMMUNIZATION  Completed     COVID-19 Vaccine  Completed     IPV IMMUNIZATION  Aged Out     HPV IMMUNIZATION  Aged Out     MENINGITIS IMMUNIZATION  Aged Out     RSV MONOCLONAL ANTIBODY  Aged Out     Pneumococcal Vaccine: 65+ Years  Discontinued     Lab work is in process          Review of Systems   Constitutional:  Negative for chills and fever.   HENT:  Positive for congestion and hearing loss. Negative for ear pain and sore throat.    Eyes:  Negative for pain and visual disturbance.   Respiratory:  Positive for cough. Negative for shortness of breath.    Cardiovascular:  Negative for chest pain, palpitations and peripheral edema.   Gastrointestinal:  Negative for abdominal pain, constipation, diarrhea, heartburn, hematochezia and nausea.  "  Breasts:  Negative for tenderness, breast mass and discharge.   Genitourinary:  Positive for frequency, hematuria and vaginal bleeding. Negative for dysuria, genital sores, pelvic pain, urgency and vaginal discharge.   Musculoskeletal:  Positive for arthralgias. Negative for joint swelling and myalgias.   Skin:  Negative for rash.   Neurological:  Negative for dizziness, weakness, headaches and paresthesias.   Psychiatric/Behavioral:  Negative for mood changes.          OBJECTIVE:   LMP  (LMP Unknown)  Estimated body mass index is 24.98 kg/m  as calculated from the following:    Height as of 11/16/23: 1.6 m (5' 3\").    Weight as of 11/16/23: 64 kg (141 lb).  Physical Exam  GENERAL: healthy, alert and no distress  EYES: Eyes grossly normal to inspection, PERRL and conjunctivae and sclerae normal  HENT: ear canals and TM's normal, nose and mouth without ulcers or lesions  NECK: no adenopathy, no asymmetry, masses, or scars and thyroid normal to palpation  RESP: lungs clear to auscultation - no rales, rhonchi or wheezes  CV: regular rate and rhythm, normal S1 S2, no S3 or S4, no murmur, click or rub, no peripheral edema and peripheral pulses strong  ABDOMEN: soft, nontender, no hepatosplenomegaly, no masses and bowel sounds normal  MS: no gross musculoskeletal defects noted, no edema  SKIN: no suspicious lesions or rashes  NEURO: Normal strength and tone, mentation intact and speech normal  PSYCH: mentation appears normal, affect normal/bright        ASSESSMENT / PLAN:   (Z00.00) Encounter for Medicare annual wellness exam  (primary encounter diagnosis)      -Last mammo done 11/09/2023 (impression: negative). Per HM, pt completing mammos Q2 years.   -Last DEXA done 4/18/2022 (impression: osteopenia; most negative T-score of -1.8 at L hip).  Next due in April 2025.   -Last colon cancer screening done via colonoscopy on 11/10/2022 (impression: 4 polyps removed, otherwise negative). Next due in Nov 2027. "   -Immunizations: Pt is up to date on current recommended vaccines.   -ACP: Discussed medical directive/ACP in detail with patient today and provided our Honoring Choices documents. Advised pt return a copy to clinic once completed; pt expressed understanding.   -Education: Pt education provided on Hearing Concerns and Incontinence Concerns. Pt currently has hearing aids, and is satisfied with them.   -COPD Action Plan document created in Communications; sent via [employer]      (I10) Uncontrolled hypertension  Comment: now contgrolled.   Plan: COMPREHENSIVE METABOLIC PANEL            (E78.5) Hyperlipidemia LDL goal <70  Comment:   Plan: Lipid panel reflex to direct LDL Non-fasting            (E03.9) Hypothyroidism, unspecified type  Comment:   Plan: TSH WITH FREE T4 REFLEX            (Z79.01) Chronic anticoagulation  Comment:   Plan: CBC with platelets            (I10) Hypertension goal BP (blood pressure) < 140/90  Comment:   Plan: Albumin Random Urine Quantitative with Creat         Ratio              Patient has been advised of split billing requirements and indicates understanding: Yes      COUNSELING:  Reviewed preventive health counseling, as reflected in patient instructions       Regular exercise       Healthy diet/nutrition       Immunizations                 Colon cancer screening       Advanced Planning         She reports that she quit smoking about 44 years ago. Her smoking use included cigarettes. She started smoking about 54 years ago. She has a 10.00 pack-year smoking history. She has never used smokeless tobacco.      Appropriate preventive services were discussed with this patient, including applicable screening as appropriate for fall prevention, nutrition, physical activity, Tobacco-use cessation, weight loss and cognition.  Checklist reviewing preventive services available has been given to the patient.    Reviewed patients plan of care and provided an AVS. The Basic Care Plan (routine screening  as documented in Health Maintenance) for Lauren meets the Care Plan requirement. This Care Plan has been established and reviewed with the Patient.          Joel Daniel Wegener, MD  [facility]    Identified Health Risks:  I have reviewed Opioid Use Disorder and Substance Use Disorder risk factors and made any needed referrals.     The patient was counseled and encouraged to consider modifying their diet and eating habits. She was provided with information on recommended healthy diet options.    The patient was provided with written information regarding signs of hearing loss.    Information on urinary incontinence and treatment options given to patient.    Answers submitted by the patient for this visit:    Patient Health Questionnaire (Submitted on 11/21/2023)    If you checked off any problems, how difficult have these problems made it for you to do your work, take care of things at home, or get along with other people?: Not difficult at all    PHQ9 TOTAL SCORE: 3

## 2023-11-30 ENCOUNTER — DOCUMENTATION ONLY (OUTPATIENT)
Dept: OTHER | Facility: CLINIC | Age: 73
End: 2023-11-30

## 2023-12-04 ENCOUNTER — OFFICE VISIT (OUTPATIENT)
Dept: FAMILY MEDICINE | Facility: CLINIC | Age: 73
End: 2023-12-04
Payer: MEDICARE

## 2023-12-04 DIAGNOSIS — D18.01 CHERRY ANGIOMA: ICD-10-CM

## 2023-12-04 DIAGNOSIS — L81.4 LENTIGO: ICD-10-CM

## 2023-12-04 DIAGNOSIS — D48.5 NEOPLASM OF UNCERTAIN BEHAVIOR OF SKIN: Primary | ICD-10-CM

## 2023-12-04 DIAGNOSIS — D22.9 MULTIPLE BENIGN NEVI: ICD-10-CM

## 2023-12-04 DIAGNOSIS — D49.2 NEOPLASM OF SKIN: ICD-10-CM

## 2023-12-04 PROCEDURE — 11102 TANGNTL BX SKIN SINGLE LES: CPT | Performed by: PHYSICIAN ASSISTANT

## 2023-12-04 PROCEDURE — 88305 TISSUE EXAM BY PATHOLOGIST: CPT | Performed by: DERMATOLOGY

## 2023-12-04 PROCEDURE — 99213 OFFICE O/P EST LOW 20 MIN: CPT | Mod: 25 | Performed by: PHYSICIAN ASSISTANT

## 2023-12-04 NOTE — PROGRESS NOTES
[facility] Dermatology Note  Encounter Date: Dec 4, 2023  Office Visit      Dermatology Problem List:  Last FBSC performed on 12/4/23    1. AKs - cryo  2. NUB - L preauricular - S/p Bx performed on 12/4/23: Pending results    Social: Lived in [place] for 8 years  ____________________________________________    Assessment & Plan:  # NUB - L preauricular - pigmented BCC vs other  - Shave Biopsy performed, see procedure  note below  - Differential Diagnosis: Pigmented BCC vs other  - Photographed today.     # Benign findings: multiple benign nevi, lentigines, cherry angiomas, SKs  - edu on benign etiology  - Signs and Symptoms of non-melanoma skin cancer and ABCDEs of melanoma reviewed with patient. Patient encouraged to perform monthly self skin exams and educated on how to perform them. UV precautions reviewed with patient. Patient was asked about new or changing moles/lesions on body.   - Sunscreen: Apply 20 minutes prior to going outdoors and reapply every two hours, when wet or sweating. We recommend using an SPF 30 or higher, and to use one that is water resistant.     - RTC for changes     Procedures Performed:   - Shave biopsy procedure note, location(s): See above. After discussion of benefits and risks including but not limited to bleeding, infection, scar, incomplete removal, recurrence, and non-diagnostic biopsy, written consent and photographs were obtained. The area was cleaned with isopropyl alcohol. 0.5mL of 1% lidocaine with epinephrine was injected to obtain adequate anesthesia of lesion(s). Shave biopsy at site(s) performed. Hemostasis was achieved with aluminium chloride. Petrolatum ointment and a sterile dressing were applied. The patient tolerated the procedure and no complications were noted. The patient was provided with verbal and written post care instructions.      Follow-up: pending path results    Staff and scribe     Scribe Disclosure:   WILLIAM CRANE, am serving as a  scribe; to document services personally performed by Milka Martinez PA-C -based on data collection and the provider's statements to me.     Provider Disclosure:  I agree with above History, Review of Systems, Physical exam and Plan.  I have reviewed the content of the documentation and have edited it as needed. I have personally performed the services documented here and the documentation accurately represents those services and the decisions I have made.      Electronically signed by:    All risks, benefits and alternatives were discussed with patient.  Patient is in agreement and understands the assessment and plan.  All questions were answered.    Milka Martinez PA-C, [facility]  [facility]: Phone: [phone], Fax: [phone]  [facility]: Phone: [phone],  Fax: [phone]  [facility]: Phone: [phone], Fax: [phone]  ____________________________________________    CC: Skin Check (Black mole on left side of face popped out in last 3-4 months)      Reviewed patients past medical history and pertinent chart review prior to patient's visit today.     HPI:  Ms. Lauren Nick is a 73 year old female who presents today as a return patient for Skin check.     Today patient reports that there is a black mole on the left side of her face. Patient reports that the mole became present about 3-4 months ago.     Patient is otherwise feeling well, without additional concerns.    Labs:  N/A    Physical Exam:  Vitals: LMP  (LMP Unknown)   SKIN: Full skin, which includes the head/face, both arms, chest, back, abdomen,both legs, genitalia and/or groin buttocks, digits and/or nails, was examined.   - Ragsdale's skin type II, Has <100 nevi  - There are dome shaped bright red papules on the trunk.   - Multiple regular brown pigmented macules and papules are identified on the trunk and extremities.   -  Scattered brown macules on sun exposed areas.  - There are waxy stuck on tan to brown papules on the trunk.    - On the left preauricular there is a  6 mm pink and brown papule   - No other lesions of concern on areas examined.               Medications:  Current Outpatient Medications   Medication    albuterol (PROAIR HFA/PROVENTIL HFA/VENTOLIN HFA) 108 (90 Base) MCG/ACT inhaler    escitalopram (LEXAPRO) 20 MG tablet    fluticasone (FLONASE) 50 MCG/ACT nasal spray    levothyroxine (SYNTHROID/LEVOTHROID) 50 MCG tablet    LORazepam (ATIVAN) 1 MG tablet    losartan (COZAAR) 25 MG tablet    metoprolol succinate ER (TOPROL XL) 25 MG 24 hr tablet    rivaroxaban ANTICOAGULANT (XARELTO) 20 MG TABS tablet    STATIN NOT PRESCRIBED (INTENTIONAL)     Current Facility-Administered Medications   Medication    lidocaine (PF) (XYLOCAINE) 1 % injection 9 mL      Past Medical/Surgical History:   Patient Active Problem List   Diagnosis    H/O aortic valve replacement    H/O bicuspid aortic valve    Thoracic aortic aneurysm without rupture (H24)    H/O malignant neoplasm of breast    H/O lumpectomy    History of colonic polyps    Recurrent major depressive disorder, in partial remission (H24)    Myalgia due to statin    Hip pain, right    Osteopenia, unspecified location    Hypothyroidism, unspecified type    Ascending aorta dilatation (H24)    Mild intermittent asthma with acute exacerbation    Infection due to 2019 novel coronavirus    Uncontrolled hypertension    Tongue lump    Recurrent major depression in complete remission (H24)    Right ear pain    Pelvic floor dysfunction in female    Nocturia    Feeling of incomplete bladder emptying    Primary osteoarthritis of both hips     Past Medical History:   Diagnosis Date    Arthritis 11/16    Saw a doctor received a shot in [place].    Cancer (H) 09/1989    breast cancer, surgery, and radiation    Heart disease 03/21/2021    Heart valve replacement    Thyroid disease 02/21 diagnosed     Uncomplicated asthma A few years ago    Uncontrolled hypertension 02/20/2023

## 2023-12-04 NOTE — LETTER
12/4/2023         RE: Lauren Nick  [address]  [place] [address]        Dear Colleague,    [facility]. Please see a copy of my visit note below.    [facility] Dermatology Note  Encounter Date: Dec 4, 2023  Office Visit      Dermatology Problem List:  Last FBSC performed on 12/4/23    1. AKs - cryo  2. NUB - L preauricular - S/p Bx performed on 12/4/23: Pending results    Social: Lived in [place] for 8 years  ____________________________________________    Assessment & Plan:  # NUB - L preauricular - pigmented BCC vs other  - Shave Biopsy performed, see procedure  note below  - Differential Diagnosis: Pigmented BCC vs other  - Photographed today.     # Benign findings: multiple benign nevi, lentigines, cherry angiomas, SKs  - edu on benign etiology  - Signs and Symptoms of non-melanoma skin cancer and ABCDEs of melanoma reviewed with patient. Patient encouraged to perform monthly self skin exams and educated on how to perform them. UV precautions reviewed with patient. Patient was asked about new or changing moles/lesions on body.   - Sunscreen: Apply 20 minutes prior to going outdoors and reapply every two hours, when wet or sweating. We recommend using an SPF 30 or higher, and to use one that is water resistant.     - RTC for changes     Procedures Performed:   - Shave biopsy procedure note, location(s): See above. After discussion of benefits and risks including but not limited to bleeding, infection, scar, incomplete removal, recurrence, and non-diagnostic biopsy, written consent and photographs were obtained. The area was cleaned with isopropyl alcohol. 0.5mL of 1% lidocaine with epinephrine was injected to obtain adequate anesthesia of lesion(s). Shave biopsy at site(s) performed. Hemostasis was achieved with aluminium chloride. Petrolatum ointment and a sterile dressing were applied. The patient  tolerated the procedure and no complications were noted. The patient was provided with verbal and written post care instructions.      Follow-up: pending path results    Staff and scribe     Scribe Disclosure:   I, WILLIAM HOWARD, am serving as a scribe; to document services personally performed by Milka Martinez PA-C -based on data collection and the provider's statements to me.     Provider Disclosure:  I agree with above History, Review of Systems, Physical exam and Plan.  I have reviewed the content of the documentation and have edited it as needed. I have personally performed the services documented here and the documentation accurately represents those services and the decisions I have made.      Electronically signed by:    All risks, benefits and alternatives were discussed with patient.  Patient is in agreement and understands the assessment and plan.  All questions were answered.    Milka Martinez PA-C, [facility]  [facility]: Phone: [phone], Fax: [phone]  [facility]: Phone: [phone],  Fax: [phone]  [facility]: Phone: [phone], Fax: [phone]  ____________________________________________    CC: Skin Check (Black mole on left side of face popped out in last 3-4 months)      Reviewed patients past medical history and pertinent chart review prior to patient's visit today.     HPI:  Ms. Lauren Nick is a 73 year old female who presents today as a return patient for Skin check.     Today patient reports that there is a black mole on the left side of her face. Patient reports that the mole became present about 3-4 months ago.     Patient is otherwise feeling well, without additional concerns.    Labs:  N/A    Physical Exam:  Vitals: LMP  (LMP Unknown)   SKIN: Full skin, which includes the head/face, both arms, chest, back, abdomen,both legs, genitalia and/or groin buttocks,  digits and/or nails, was examined.   - Ragsdale's skin type II, Has <100 nevi  - There are dome shaped bright red papules on the trunk.   - Multiple regular brown pigmented macules and papules are identified on the trunk and extremities.   - Scattered brown macules on sun exposed areas.  - There are waxy stuck on tan to brown papules on the trunk.    - On the left preauricular there is a  6 mm pink and brown papule   - No other lesions of concern on areas examined.               Medications:  Current Outpatient Medications   Medication     albuterol (PROAIR HFA/PROVENTIL HFA/VENTOLIN HFA) 108 (90 Base) MCG/ACT inhaler     escitalopram (LEXAPRO) 20 MG tablet     fluticasone (FLONASE) 50 MCG/ACT nasal spray     levothyroxine (SYNTHROID/LEVOTHROID) 50 MCG tablet     LORazepam (ATIVAN) 1 MG tablet     losartan (COZAAR) 25 MG tablet     metoprolol succinate ER (TOPROL XL) 25 MG 24 hr tablet     rivaroxaban ANTICOAGULANT (XARELTO) 20 MG TABS tablet     STATIN NOT PRESCRIBED (INTENTIONAL)     Current Facility-Administered Medications   Medication     lidocaine (PF) (XYLOCAINE) 1 % injection 9 mL      Past Medical/Surgical History:   Patient Active Problem List   Diagnosis     H/O aortic valve replacement     H/O bicuspid aortic valve     Thoracic aortic aneurysm without rupture (H24)     H/O malignant neoplasm of breast     H/O lumpectomy     History of colonic polyps     Recurrent major depressive disorder, in partial remission (H24)     Myalgia due to statin     Hip pain, right     Osteopenia, unspecified location     Hypothyroidism, unspecified type     Ascending aorta dilatation (H24)     Mild intermittent asthma with acute exacerbation     Infection due to 2019 novel coronavirus     Uncontrolled hypertension     Tongue lump     Recurrent major depression in complete remission (H24)     Right ear pain     Pelvic floor dysfunction in female     Nocturia     Feeling of incomplete bladder emptying     Primary  osteoarthritis of both hips     Past Medical History:   Diagnosis Date     Arthritis 11/16    Saw a doctor received a shot in [place].     Cancer (H) 09/1989    breast cancer, surgery, and radiation     Heart disease 03/21/2021    Heart valve replacement     Thyroid disease 02/21 diagnosed     Uncomplicated asthma A few years ago     Uncontrolled hypertension 02/20/2023                        Again, thank you for allowing me to participate in the care of your patient.        Sincerely,        Milka Martinez PA-C

## 2023-12-05 ENCOUNTER — LAB (OUTPATIENT)
Dept: LAB | Facility: CLINIC | Age: 73
End: 2023-12-05
Payer: MEDICARE

## 2023-12-05 DIAGNOSIS — R31.0 GROSS HEMATURIA: ICD-10-CM

## 2023-12-05 LAB
ALBUMIN UR-MCNC: 30 MG/DL
APPEARANCE UR: ABNORMAL
BILIRUB UR QL STRIP: NEGATIVE
COLOR UR AUTO: ABNORMAL
GLUCOSE UR STRIP-MCNC: NEGATIVE MG/DL
HGB UR QL STRIP: ABNORMAL
KETONES UR STRIP-MCNC: NEGATIVE MG/DL
LEUKOCYTE ESTERASE UR QL STRIP: NEGATIVE
NITRATE UR QL: NEGATIVE
PH UR STRIP: 6 [PH] (ref 5–7)
SP GR UR STRIP: 1.02 (ref 1–1.03)
UROBILINOGEN UR STRIP-ACNC: 0.2 E.U./DL

## 2023-12-05 PROCEDURE — 87086 URINE CULTURE/COLONY COUNT: CPT

## 2023-12-05 PROCEDURE — 81003 URINALYSIS AUTO W/O SCOPE: CPT | Mod: QW

## 2023-12-06 ENCOUNTER — TELEPHONE (OUTPATIENT)
Dept: FAMILY MEDICINE | Facility: CLINIC | Age: 73
End: 2023-12-06

## 2023-12-06 DIAGNOSIS — C44.310 BCC (BASAL CELL CARCINOMA), FACE: Primary | ICD-10-CM

## 2023-12-06 DIAGNOSIS — I48.0 PAROXYSMAL ATRIAL FIBRILLATION (H): ICD-10-CM

## 2023-12-06 LAB
BACTERIA UR CULT: NORMAL
PATH REPORT.COMMENTS IMP SPEC: ABNORMAL
PATH REPORT.COMMENTS IMP SPEC: ABNORMAL
PATH REPORT.COMMENTS IMP SPEC: YES
PATH REPORT.FINAL DX SPEC: ABNORMAL
PATH REPORT.GROSS SPEC: ABNORMAL
PATH REPORT.MICROSCOPIC SPEC OTHER STN: ABNORMAL
PATH REPORT.RELEVANT HX SPEC: ABNORMAL

## 2023-12-06 NOTE — TELEPHONE ENCOUNTER
Pt states that she had a full work up for blood in her urine. On Monday she had blood in her urine again. She has a call to her cardiologist to get off of the blood thinners. Please see My chart messages to Urology. Pt is asking for PCP's thoughts on what she should do moving forward? Thanks    Catherine Smith RN  [facility]

## 2023-12-06 NOTE — TELEPHONE ENCOUNTER
S/w pt and went over Milka's message below about biopsy results.  Explained and answered all questions regarding mohs procedure.  Advised a  will call pt to schedule appt.   Pt states understanding.    Marley REYNA RN  [facility] Shaneka Barton  [phone]

## 2023-12-06 NOTE — TELEPHONE ENCOUNTER
----- Message from Milka Martinez PA-C sent at 12/6/2023 12:00 PM CST -----  BCC - needs MMS - refer to Jesus/Sohan please :)    Zuleyka DEVI(1). Left preauricular:  - Basal cell carcinoma, superficial and nodular types

## 2023-12-07 ENCOUNTER — VIRTUAL VISIT (OUTPATIENT)
Dept: FAMILY MEDICINE | Facility: CLINIC | Age: 73
End: 2023-12-07
Payer: MEDICARE

## 2023-12-07 ENCOUNTER — TELEPHONE (OUTPATIENT)
Dept: CARDIOLOGY | Facility: CLINIC | Age: 73
End: 2023-12-07
Payer: MEDICARE

## 2023-12-07 DIAGNOSIS — R31.29 OTHER MICROSCOPIC HEMATURIA: Primary | ICD-10-CM

## 2023-12-07 DIAGNOSIS — I48.0 PAROXYSMAL ATRIAL FIBRILLATION (H): ICD-10-CM

## 2023-12-07 DIAGNOSIS — Z79.01 CHRONIC ANTICOAGULATION: ICD-10-CM

## 2023-12-07 PROCEDURE — 99441 PR PHYSICIAN TELEPHONE EVALUATION 5-10 MIN: CPT | Mod: 95 | Performed by: FAMILY MEDICINE

## 2023-12-07 NOTE — TELEPHONE ENCOUNTER
Nicolette Nick  [facility] Heart Team 3  Phone Number: [phone]     Hi, this is the second time in two months i have had blood in my urine overnight.  I called my urologist and first time I had a full work up.  CT scan, urine and cystoscopy all were negative.  Then it happened again Monday evening all night into middle of afternoon Tuesday.  Called the urologist did a urine test, blood in the urine again.  Nobody has said what I am supposed to do going forward. Obviously, I don t want to keep bleeding and I stopped the blood thinner just yesterday so nobody has told me what to do moving forward.    Thank you,  Nicolette Nick        I reached out to patient about her hematuria concerns.    On Monday she noted blood in her urine-A urine test was done to r/o UTI which came back negative for this.    She took it upon herself to stop her Xarelto on Tuesday. Today her urine is starting to clear but still has a pink color to it.    I sent a message to her urology team to obtain their recommendation about Xarelto. Below is there reply:    Loretta Ross RN Crawford, William W., RN Hi Bill    Thanks for reaching out, Nikki is out of the office today but she would like me to let you know to have patient push fluids.  If urine is pink (vs fruit punch, red wine or with clots), she can resume anticoagulation. Update us tomorrow on what the progress is. Nikki is encouraged by recent normal urologic eval.    Thanks,  Loretta Ross RN, BSN  Care Coordinator  [facility]         I called patient back to inform her that per their recommendation she can resume Xarelto and she needs to touch base with her urology team on Friday of this week.      She agreed to do so and will resume Xarelto this evening.

## 2023-12-07 NOTE — PROGRESS NOTES
"Nicolette is a 73 year old who is being evaluated via a billable telephone visit.      What phone number would you like to be contacted at?   How would you like to obtain your AVS? MyChart    Distant Location (provider location):  On-site    Assessment & Plan     Other microscopic hematuria  In setting of chronic xarelto treatment/anticoagulation for atrialfibrillation/stroke prevention.  Desiring opinion regarding continuation of xarelto .  Reports having extensive evaluation for urinary tract bleeding by her urologist after first episode of this.     Happened twice now past several days with pink to red urine.  No urinary obstruction or hesitancy.     My opinion would be to compare this to minimal bleeding with hemorrhoids.  Generally would not be life threatening or serious.  If occurs and mild little risk to stop xarelto for 1-2 days.  However benefit of staying on xarelto as much as possible for stroke prevention I feel is greater than risk of serious bleeding or sequelae from xarelto.  Recommended discussing with her urologist to get their opinion on how to monitor for serious bleeding. If develops blood clots/urinary retention would certainly want to see urologic care.     Paroxysmal atrial fibrillation (H)      Chronic anticoagulation                 BMI:   Estimated body mass index is 25.33 kg/m  as calculated from the following:    Height as of 11/21/23: 1.6 m (5' 3\").    Weight as of 11/21/23: 64.9 kg (143 lb).           Joel Daniel Wegener, MD  [facility]    Subjective   Nicolette is a 73 year old, presenting for the following health issues:  No chief complaint on file.      HPI             Review of Systems         Objective           Vitals:  No vitals were obtained today due to virtual visit.    Physical Exam   healthy, alert and no distress  PSYCH: Alert and oriented times 3; coherent speech, normal   rate and volume, able to articulate logical thoughts, able   to abstract reason, no " tangential thoughts, no hallucinations   or delusions  Her affect is normal  RESP: No cough, no audible wheezing, able to talk in full sentences  Remainder of exam unable to be completed due to telephone visits                Phone call duration: 8 minutes

## 2023-12-08 NOTE — TELEPHONE ENCOUNTER
Nikki Newman, Percy Siu, RN  I believe you heard from my team yesterday when I was out of office. Plan to repeat cysto with Dr. Christianson and she was given recs about restarting Xarelto.    Nikki

## 2023-12-11 ENCOUNTER — TELEPHONE (OUTPATIENT)
Dept: DERMATOLOGY | Facility: CLINIC | Age: 73
End: 2023-12-11
Payer: MEDICARE

## 2023-12-11 NOTE — TELEPHONE ENCOUNTER
Called patient to schedule surgery with Dr. Parry    Date of Surgery: 01/25    Surgery type: Mohs    Consult scheduled: Yes    Has patient had mohs with us before? No    Additional comments: olga Schmitz on 12/11/2023 at 9:12 AM

## 2023-12-12 ENCOUNTER — DOCUMENTATION ONLY (OUTPATIENT)
Dept: FAMILY MEDICINE | Facility: CLINIC | Age: 73
End: 2023-12-12
Payer: MEDICARE

## 2023-12-12 DIAGNOSIS — R31.0 GROSS HEMATURIA: ICD-10-CM

## 2023-12-12 DIAGNOSIS — R73.9 ELEVATED BLOOD SUGAR: ICD-10-CM

## 2023-12-12 DIAGNOSIS — Z13.6 CARDIOVASCULAR SCREENING; LDL GOAL LESS THAN 130: Primary | ICD-10-CM

## 2023-12-12 DIAGNOSIS — Z79.01 CHRONIC ANTICOAGULATION: ICD-10-CM

## 2023-12-12 DIAGNOSIS — Z13.1 SCREENING FOR DIABETES MELLITUS: ICD-10-CM

## 2023-12-12 NOTE — PROGRESS NOTES
Lauren Nick has an upcoming lab appointment:    Future Appointments   Date Time Provider Department Center   12/15/2023 11:00 AM EC LAB [facility]   12[facility]18[facility]2023 12[facility]00 [facility]   1/11/2024 11:45 AM Сергей Parry MD [facility] [place]   1/25/2024  8:00 AM Сергей Parry MD [facility] [place]   6/6/2024  9:30 AM EC LAB [facility]     Patient is scheduled for the following lab(s): fasting blood work    There is no order available. Please review and place either future orders or HMPO (Review of Health Maintenance Protocol Orders), as appropriate.    There are no preventive care reminders to display for this patient.  Stormy Silva

## 2023-12-15 ENCOUNTER — LAB (OUTPATIENT)
Dept: LAB | Facility: CLINIC | Age: 73
End: 2023-12-15
Payer: MEDICARE

## 2023-12-15 DIAGNOSIS — Z79.01 CHRONIC ANTICOAGULATION: ICD-10-CM

## 2023-12-15 DIAGNOSIS — Z13.6 CARDIOVASCULAR SCREENING; LDL GOAL LESS THAN 130: ICD-10-CM

## 2023-12-15 DIAGNOSIS — R73.9 ELEVATED BLOOD SUGAR: ICD-10-CM

## 2023-12-15 DIAGNOSIS — Z13.1 SCREENING FOR DIABETES MELLITUS: ICD-10-CM

## 2023-12-15 DIAGNOSIS — R31.0 GROSS HEMATURIA: ICD-10-CM

## 2023-12-15 DIAGNOSIS — F51.01 PRIMARY INSOMNIA: ICD-10-CM

## 2023-12-15 LAB
CHOLEST SERPL-MCNC: 257 MG/DL
FASTING STATUS PATIENT QL REPORTED: YES
HBA1C MFR BLD: 5.8 % (ref 0–5.6)
HDLC SERPL-MCNC: 52 MG/DL
HGB BLD-MCNC: 11.9 G/DL (ref 11.7–15.7)
LDLC SERPL CALC-MCNC: 178 MG/DL
NONHDLC SERPL-MCNC: 205 MG/DL
TRIGL SERPL-MCNC: 137 MG/DL

## 2023-12-15 PROCEDURE — 85018 HEMOGLOBIN: CPT

## 2023-12-15 PROCEDURE — 83036 HEMOGLOBIN GLYCOSYLATED A1C: CPT

## 2023-12-15 PROCEDURE — 36415 COLL VENOUS BLD VENIPUNCTURE: CPT

## 2023-12-15 PROCEDURE — 80061 LIPID PANEL: CPT

## 2023-12-15 RX ORDER — LORAZEPAM 1 MG/1
1 TABLET ORAL
Qty: 30 TABLET | Refills: 5 | Status: SHIPPED | OUTPATIENT
Start: 2023-12-15 | End: 2024-06-26

## 2023-12-18 ENCOUNTER — ANCILLARY PROCEDURE (OUTPATIENT)
Dept: CARDIOLOGY | Facility: CLINIC | Age: 73
End: 2023-12-18
Attending: INTERNAL MEDICINE
Payer: MEDICARE

## 2023-12-18 DIAGNOSIS — I49.5 SSS (SICK SINUS SYNDROME) (H): ICD-10-CM

## 2023-12-18 DIAGNOSIS — Z95.0 CARDIAC PACEMAKER IN SITU: ICD-10-CM

## 2023-12-18 PROCEDURE — 93296 REM INTERROG EVL PM/IDS: CPT | Performed by: INTERNAL MEDICINE

## 2023-12-18 PROCEDURE — 93294 REM INTERROG EVL PM/LDLS PM: CPT | Performed by: INTERNAL MEDICINE

## 2023-12-18 NOTE — PROGRESS NOTES
*** Remote PPM Device Check  AP: *** %    : *** %    Mode: ***    Presenting Rhythm: ***    Historical Underlying Rhythm: ***  Heart Rate: ***    Sensing: ***    Pacing Threshold: ***    Impedance: ***    Battery Status: ***    Atrial Arrhythmia: ***    Ventricular Arrhythmia: ***    Care Plan: ***      I have reviewed and interpreted the device interrogation, settings, programming and nurse's summary. The device is functioning within normal device parameters. I agree with the current findings, assessment and plan.

## 2024-01-01 LAB
MDC_IDC_EPISODE_DTM: NORMAL
MDC_IDC_EPISODE_ID: NORMAL
MDC_IDC_EPISODE_TYPE: NORMAL
MDC_IDC_LEAD_CONNECTION_STATUS: NORMAL
MDC_IDC_LEAD_CONNECTION_STATUS: NORMAL
MDC_IDC_LEAD_IMPLANT_DT: NORMAL
MDC_IDC_LEAD_IMPLANT_DT: NORMAL
MDC_IDC_LEAD_LOCATION: NORMAL
MDC_IDC_LEAD_LOCATION: NORMAL
MDC_IDC_LEAD_LOCATION_DETAIL_1: NORMAL
MDC_IDC_LEAD_LOCATION_DETAIL_1: NORMAL
MDC_IDC_LEAD_MFG: NORMAL
MDC_IDC_LEAD_MFG: NORMAL
MDC_IDC_LEAD_MODEL: NORMAL
MDC_IDC_LEAD_MODEL: NORMAL
MDC_IDC_LEAD_POLARITY_TYPE: NORMAL
MDC_IDC_LEAD_POLARITY_TYPE: NORMAL
MDC_IDC_LEAD_SERIAL: NORMAL
MDC_IDC_LEAD_SERIAL: NORMAL
MDC_IDC_MSMT_BATTERY_DTM: NORMAL
MDC_IDC_MSMT_BATTERY_REMAINING_LONGEVITY: 98 MO
MDC_IDC_MSMT_BATTERY_REMAINING_PERCENTAGE: 78 %
MDC_IDC_MSMT_BATTERY_RRT_TRIGGER: NORMAL
MDC_IDC_MSMT_BATTERY_STATUS: NORMAL
MDC_IDC_MSMT_BATTERY_VOLTAGE: 3.01 V
MDC_IDC_MSMT_LEADCHNL_RA_IMPEDANCE_VALUE: 390 OHM
MDC_IDC_MSMT_LEADCHNL_RA_LEAD_CHANNEL_STATUS: NORMAL
MDC_IDC_MSMT_LEADCHNL_RA_PACING_THRESHOLD_AMPLITUDE: 0.5 V
MDC_IDC_MSMT_LEADCHNL_RA_PACING_THRESHOLD_PULSEWIDTH: 0.4 MS
MDC_IDC_MSMT_LEADCHNL_RA_SENSING_INTR_AMPL: 3.2 MV
MDC_IDC_MSMT_LEADCHNL_RV_IMPEDANCE_VALUE: 460 OHM
MDC_IDC_MSMT_LEADCHNL_RV_LEAD_CHANNEL_STATUS: NORMAL
MDC_IDC_MSMT_LEADCHNL_RV_PACING_THRESHOLD_AMPLITUDE: 1 V
MDC_IDC_MSMT_LEADCHNL_RV_PACING_THRESHOLD_PULSEWIDTH: 0.4 MS
MDC_IDC_MSMT_LEADCHNL_RV_SENSING_INTR_AMPL: 5.9 MV
MDC_IDC_PG_IMPLANT_DTM: NORMAL
MDC_IDC_PG_MFG: NORMAL
MDC_IDC_PG_MODEL: NORMAL
MDC_IDC_PG_SERIAL: NORMAL
MDC_IDC_PG_TYPE: NORMAL
MDC_IDC_SESS_CLINIC_NAME: NORMAL
MDC_IDC_SESS_DTM: NORMAL
MDC_IDC_SESS_REPROGRAMMED: NO
MDC_IDC_SESS_TYPE: NORMAL
MDC_IDC_SET_BRADY_AT_MODE_SWITCH_MODE: NORMAL
MDC_IDC_SET_BRADY_AT_MODE_SWITCH_RATE: 180 {BEATS}/MIN
MDC_IDC_SET_BRADY_LOWRATE: 60 {BEATS}/MIN
MDC_IDC_SET_BRADY_MAX_SENSOR_RATE: 130 {BEATS}/MIN
MDC_IDC_SET_BRADY_MAX_TRACKING_RATE: 130 {BEATS}/MIN
MDC_IDC_SET_BRADY_MODE: NORMAL
MDC_IDC_SET_BRADY_PAV_DELAY_LOW: 200 MS
MDC_IDC_SET_BRADY_SAV_DELAY_LOW: 150 MS
MDC_IDC_SET_LEADCHNL_RA_PACING_AMPLITUDE: 2 V
MDC_IDC_SET_LEADCHNL_RA_PACING_ANODE_ELECTRODE_1: NORMAL
MDC_IDC_SET_LEADCHNL_RA_PACING_ANODE_LOCATION_1: NORMAL
MDC_IDC_SET_LEADCHNL_RA_PACING_CAPTURE_MODE: NORMAL
MDC_IDC_SET_LEADCHNL_RA_PACING_CATHODE_ELECTRODE_1: NORMAL
MDC_IDC_SET_LEADCHNL_RA_PACING_CATHODE_LOCATION_1: NORMAL
MDC_IDC_SET_LEADCHNL_RA_PACING_POLARITY: NORMAL
MDC_IDC_SET_LEADCHNL_RA_PACING_PULSEWIDTH: 0.4 MS
MDC_IDC_SET_LEADCHNL_RA_SENSING_ADAPTATION_MODE: NORMAL
MDC_IDC_SET_LEADCHNL_RA_SENSING_ANODE_ELECTRODE_1: NORMAL
MDC_IDC_SET_LEADCHNL_RA_SENSING_ANODE_LOCATION_1: NORMAL
MDC_IDC_SET_LEADCHNL_RA_SENSING_CATHODE_ELECTRODE_1: NORMAL
MDC_IDC_SET_LEADCHNL_RA_SENSING_CATHODE_LOCATION_1: NORMAL
MDC_IDC_SET_LEADCHNL_RA_SENSING_POLARITY: NORMAL
MDC_IDC_SET_LEADCHNL_RA_SENSING_SENSITIVITY: 0.5 MV
MDC_IDC_SET_LEADCHNL_RV_PACING_AMPLITUDE: 1.25 V
MDC_IDC_SET_LEADCHNL_RV_PACING_ANODE_ELECTRODE_1: NORMAL
MDC_IDC_SET_LEADCHNL_RV_PACING_ANODE_LOCATION_1: NORMAL
MDC_IDC_SET_LEADCHNL_RV_PACING_CAPTURE_MODE: NORMAL
MDC_IDC_SET_LEADCHNL_RV_PACING_CATHODE_ELECTRODE_1: NORMAL
MDC_IDC_SET_LEADCHNL_RV_PACING_CATHODE_LOCATION_1: NORMAL
MDC_IDC_SET_LEADCHNL_RV_PACING_POLARITY: NORMAL
MDC_IDC_SET_LEADCHNL_RV_PACING_PULSEWIDTH: 0.4 MS
MDC_IDC_SET_LEADCHNL_RV_SENSING_ADAPTATION_MODE: NORMAL
MDC_IDC_SET_LEADCHNL_RV_SENSING_ANODE_ELECTRODE_1: NORMAL
MDC_IDC_SET_LEADCHNL_RV_SENSING_ANODE_LOCATION_1: NORMAL
MDC_IDC_SET_LEADCHNL_RV_SENSING_CATHODE_ELECTRODE_1: NORMAL
MDC_IDC_SET_LEADCHNL_RV_SENSING_CATHODE_LOCATION_1: NORMAL
MDC_IDC_SET_LEADCHNL_RV_SENSING_POLARITY: NORMAL
MDC_IDC_SET_LEADCHNL_RV_SENSING_SENSITIVITY: 2 MV
MDC_IDC_STAT_AT_BURDEN_PERCENT: 1 %
MDC_IDC_STAT_AT_DTM_END: NORMAL
MDC_IDC_STAT_AT_DTM_START: NORMAL
MDC_IDC_STAT_AT_MODE_SW_COUNT: 0
MDC_IDC_STAT_AT_MODE_SW_COUNT_PER_DAY: 0
MDC_IDC_STAT_AT_MODE_SW_PERCENT_TIME: 0 %
MDC_IDC_STAT_BRADY_AP_VP_PERCENT: 1 %
MDC_IDC_STAT_BRADY_AP_VS_PERCENT: 44 %
MDC_IDC_STAT_BRADY_AS_VP_PERCENT: 1 %
MDC_IDC_STAT_BRADY_AS_VS_PERCENT: 56 %
MDC_IDC_STAT_BRADY_DTM_END: NORMAL
MDC_IDC_STAT_BRADY_DTM_START: NORMAL
MDC_IDC_STAT_BRADY_RA_PERCENT_PACED: 43 %
MDC_IDC_STAT_BRADY_RV_PERCENT_PACED: 1 %
MDC_IDC_STAT_CRT_DTM_END: NORMAL
MDC_IDC_STAT_CRT_DTM_START: NORMAL
MDC_IDC_STAT_HEART_RATE_ATRIAL_MAX: 210 {BEATS}/MIN
MDC_IDC_STAT_HEART_RATE_ATRIAL_MEAN: 69 {BEATS}/MIN
MDC_IDC_STAT_HEART_RATE_ATRIAL_MIN: 50 {BEATS}/MIN
MDC_IDC_STAT_HEART_RATE_DTM_END: NORMAL
MDC_IDC_STAT_HEART_RATE_DTM_START: NORMAL
MDC_IDC_STAT_HEART_RATE_VENTRICULAR_MAX: 220 {BEATS}/MIN
MDC_IDC_STAT_HEART_RATE_VENTRICULAR_MEAN: 69 {BEATS}/MIN
MDC_IDC_STAT_HEART_RATE_VENTRICULAR_MIN: 50 {BEATS}/MIN

## 2024-01-08 ENCOUNTER — TELEPHONE (OUTPATIENT)
Dept: CARDIOLOGY | Facility: CLINIC | Age: 74
End: 2024-01-08
Payer: MEDICARE

## 2024-01-08 ENCOUNTER — MYC MEDICAL ADVICE (OUTPATIENT)
Dept: FAMILY MEDICINE | Facility: CLINIC | Age: 74
End: 2024-01-08
Payer: MEDICARE

## 2024-01-08 DIAGNOSIS — I48.0 PAROXYSMAL ATRIAL FIBRILLATION (H): Primary | ICD-10-CM

## 2024-01-08 NOTE — TELEPHONE ENCOUNTER
Reviewed with patient that MRA not due until 09/2024.  Patient verbalized understanding.  Kyra Ram RN on 1/8/2024 at 12:19 PM

## 2024-01-08 NOTE — TELEPHONE ENCOUNTER
Please see patient's mychart message and advise:    Dr. Kaur s,     I had an issue with blood in my urine twice over the past couple of months. The first time Urology scheduled a bunch of test and everything was fine, I messaged you at the time and talked to Bill and he said after talking with you it was up to  Urology to make the call.  They said to wait until I stopped bleeding and then go back on the blood thinners which I did and after I started the medication I started bleeding again. The second time I messaged you again and that time I never heard back from your office.     Again, I waited until the bleeding stopped and went back on the blood thinner for about 6 weeks and then tonight started to have a lot of blood in my urine.  I need to know if I should go off them and stay off them or what should I do?? I need   some direction at this point.

## 2024-01-08 NOTE — TELEPHONE ENCOUNTER
Reggie King MD Lidke, Jen M RN  Caller: Unspecified (Today,  9:19 AM)  With recurrence of hematuria while being on anticoagulation agree with withholding anticoagulation till more definite evaluation of hematuria by urology.  Given he is having recurrent hematuria while being on anticoagulation recommend review in structural heart clinic for consideration of Watchman procedure.    Discussed with patient the recommendation to hold blood thinner.  Patient is also working with urology.  Patient agreeable to structural heart consult to discuss watchman procedure.  RN will place referral.  Kyra Ram RN on 1/8/2024 at 12:15 PM

## 2024-01-08 NOTE — TELEPHONE ENCOUNTER
Hi Team,    [facility] center called and stated this pt called them to schedule a MRA at  with Pacemaker- the pt order I not due until 9/2024 the pt said someone told her to do it. Please review did team want that done now or should pt be waiting for annual pt aware to wait for response from her team as she does not know which RN/Person she spoke to.    Mario

## 2024-01-11 ENCOUNTER — VIRTUAL VISIT (OUTPATIENT)
Dept: DERMATOLOGY | Facility: CLINIC | Age: 74
End: 2024-01-11
Payer: MEDICARE

## 2024-01-11 DIAGNOSIS — C44.319 BASAL CELL CARCINOMA (BCC) OF LEFT LATERAL CHEEK: Primary | ICD-10-CM

## 2024-01-11 PROCEDURE — 99441 PR PHYSICIAN TELEPHONE EVALUATION 5-10 MIN: CPT | Mod: 93 | Performed by: DERMATOLOGY

## 2024-01-11 RX ORDER — MULTIVITAMIN WITH IRON
1 TABLET ORAL DAILY
COMMUNITY

## 2024-01-11 RX ORDER — TURMERIC ROOT EXTRACT 500 MG
TABLET ORAL DAILY
COMMUNITY

## 2024-01-11 NOTE — PROGRESS NOTES
[facility] Dermatology Note  Encounter Date: Jan 11, 2024  Store-and-Forward and Telephone. Location of teledermatologist: [facility].  Start time: 1057. End time: 1104.    Dermatologic Surgery Telemedicine Consult Note    Dermatology Problem List:  Last FBSC performed on 12/4/23  1. AKs - cryo  2. BCC, L preauricular, s/p bx 12/4/23, pending MMS     Social: Lived in [place] for 8 years    CC: Consult For (Mohs BCC left preauricular)    Subjective: Lauren Nick is a 73 year old female who presents today for Mohs micrographic surgery consultation for a recent diagnosis of skin cancer.  - Skin cancer(s): BCC  - Location(s): Left preauricular  - bx by Lidya Martinez PA-C at [facility]  - this is her first skin cancer. Hasn't had mohs before  - planning to leave for [place] on 2/13/23. Mohs surgery currently scheduled for 1/25/24  - for exercise, she does a barre class and light weight lifting (5-8 lbs) and swimming  - no other concerns today    Objective:   Skin: Focused examination of the left preauricular within the teledermatology photograph(s) on 12/4/23 was performed.   - roughly 1 cm oval-shaped brown-pink patch on L preauricular cheek       Path report:   [id number]  A(1). Left preauricular:  - Basal cell carcinoma, superficial and nodular types    Assessment and Plan:     1. Plan for Mohs micrographic surgery for skin cancer(s) above:  *Review lab result(s): Dermpath report   - We discussed the nature of the diagnosis/condition above. We discussed the treatment options, including the risks benefits and expectations of these options. We recommend micrographic surgery as the most effective and most tissue sparing option for treatment, and the patient agrees to proceed with this.  The patient is aware of the risks, benefits and expectations of this procedure. The patient will be scheduled for this procedure, if not already done so.  - discussed post-operative exercise  restrictions   - We anticipate the following closure type: Linear closure, such as complex linear closure (CLC)    The patient was discussed with and evaluated by attending physician, Сергей Parry DO.    Edwige Terry MD  Micrographic Surgery and Dermatologic Oncology (MSDO) Fellow, PGY-5      Staff Physician Comments:   I saw the photos and evaluated the patient with the fellow (Dr. Edwige Terry) and I agree with the assessment and plan. I was present for the key portions of the telephone call.    Сергей Parry DO    Department of Dermatology  [facility]: Phone: [phone], Fax:[phone]  [facility]: Phone: [phone], Fax: [phone]

## 2024-01-11 NOTE — LETTER
1/11/2024         RE: Lauren Nick  [address]  [place] [address]        Dear Colleague,    Thank you for referring your patient, Lauren Nick, to the [facility]. Please see a copy of my visit note below.    [facility] Dermatology Note  Encounter Date: Jan 11, 2024  Store-and-Forward and Telephone. Location of teledermatologist: [facility].  Start time: 1057. End time: 1104.    Dermatologic Surgery Telemedicine Consult Note    Dermatology Problem List:  Last FBSC performed on 12/4/23  1. AKs - cryo  2. BCC, L preauricular, s/p bx 12/4/23, pending MMS     Social: Lived in [place] for 8 years    CC: Consult For (Mohs BCC left preauricular)    Subjective: Lauren Nick is a 73 year old female who presents today for Mohs micrographic surgery consultation for a recent diagnosis of skin cancer.  - Skin cancer(s): BCC  - Location(s): Left preauricular  - bx by Lidya Martinez PA-C at [facility]  - this is her first skin cancer. Hasn't had mohs before  - planning to leave for [place] on 2/13/23. Mohs surgery currently scheduled for 1/25/24  - for exercise, she does a barre class and light weight lifting (5-8 lbs) and swimming  - no other concerns today    Objective:   Skin: Focused examination of the left preauricular within the teledermatology photograph(s) on 12/4/23 was performed.   - roughly 1 cm oval-shaped brown-pink patch on L preauricular cheek       Path report:   [id number]  A(1). Left preauricular:  - Basal cell carcinoma, superficial and nodular types    Assessment and Plan:     1. Plan for Mohs micrographic surgery for skin cancer(s) above:  *Review lab result(s): Dermpath report   - We discussed the nature of the diagnosis/condition above. We discussed the treatment options, including the risks benefits and expectations of these options. We recommend micrographic surgery as the most effective and most tissue sparing option for  treatment, and the patient agrees to proceed with this.  The patient is aware of the risks, benefits and expectations of this procedure. The patient will be scheduled for this procedure, if not already done so.  - discussed post-operative exercise restrictions   - We anticipate the following closure type: Linear closure, such as complex linear closure (CLC)    The patient was discussed with and evaluated by attending physician, Сергей Parry DO.    Edwige Terry MD  Micrographic Surgery and Dermatologic Oncology (MSDO) Fellow, PGY-5      Staff Physician Comments:   I saw the photos and evaluated the patient with the fellow (Dr. Edwige Terry) and I agree with the assessment and plan. I was present for the key portions of the telephone call.    Сергей Parry DO    Department of Dermatology  [facility]: Phone: [phone], Fax:[phone]  [facility]: Phone: [phone], Fax: [phone]       Again, thank you for allowing me to participate in the care of your patient.        Sincerely,        Сергей Parry MD

## 2024-01-11 NOTE — NURSING NOTE
Excision/Mohs previsit information                                                    Diagnosis: basal cell carcinoma  Site(s): left preauricular    Over the counter Chlorhexidine surgical soap to wash all skin below the belly button twice before surgery should be recommended for the following:  - Surgical sites below the waist  - Immunosuppressed  - Previous surgical site infection  - Anticipated wound care challenges    Medication & Allergy Information                                                      Review and update allergy and medication list.    Do you take the following medications:  Coumadin, Eliquis, Pradaxa, Xarelto:  YES - Xarelto - but on hold    -If on Coumadin, INR should be checked within 7 days of surgery.  Range should be 3.5 or less or within therapeutic range.    Past Medical History                                                    Do you currently or have you previously had any of the following conditions:    Hepatitis:  NO  HIV/AIDS:  NO  Prolonged bleeding or bleeding disorder:  NO  Pacemaker or Defibrillator:  YES.  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  Endocarditis (inflammation of the inner lining of the heart's chambers and valves):  NO  Have you ever had a prosthetic joint infection:  N/A  Pregnant or Breastfeeding:  N/A  Mobility device (wheelchair, transfer difficulty): NO    Important Reminders:                                                      Ok to take all of their medications as prescribed  Patients can eat, no need to be fasting  Patient will not be able to get the site wet for 48 hrs  No submerging wound in standing water (lake, pool, bathtub, hot tub) for 2 weeks  No physical activity for 48 hrs (further restrictions will be discussed by MD at time of visit)    If any positives, send to RN for further review  Yuliya Cruz CMA

## 2024-01-19 DIAGNOSIS — Z87.74 H/O BICUSPID AORTIC VALVE: ICD-10-CM

## 2024-01-19 DIAGNOSIS — Z95.2 H/O AORTIC VALVE REPLACEMENT: ICD-10-CM

## 2024-01-19 DIAGNOSIS — I10 HYPERTENSION GOAL BP (BLOOD PRESSURE) < 130/80: Primary | ICD-10-CM

## 2024-01-19 RX ORDER — METOPROLOL SUCCINATE 25 MG/1
25 TABLET, EXTENDED RELEASE ORAL 2 TIMES DAILY
Qty: 180 TABLET | Refills: 1 | Status: SHIPPED | OUTPATIENT
Start: 2024-01-19 | End: 2024-07-16

## 2024-01-25 ENCOUNTER — OFFICE VISIT (OUTPATIENT)
Dept: DERMATOLOGY | Facility: CLINIC | Age: 74
End: 2024-01-25
Payer: MEDICARE

## 2024-01-25 VITALS — OXYGEN SATURATION: 93 % | SYSTOLIC BLOOD PRESSURE: 132 MMHG | DIASTOLIC BLOOD PRESSURE: 68 MMHG | HEART RATE: 60 BPM

## 2024-01-25 DIAGNOSIS — C44.319 BASAL CELL CARCINOMA (BCC) OF LEFT LATERAL CHEEK: Primary | ICD-10-CM

## 2024-01-25 PROCEDURE — 12052 INTMD RPR FACE/MM 2.6-5.0 CM: CPT | Mod: GC | Performed by: DERMATOLOGY

## 2024-01-25 PROCEDURE — 17311 MOHS 1 STAGE H/N/HF/G: CPT | Mod: GC | Performed by: DERMATOLOGY

## 2024-01-25 NOTE — LETTER
1/25/2024         RE: Lauren Nick  [address]  [place] [address]        Dear Colleague,    Thank you for referring your patient, Lauren Nick, to the [facility]. Please see a copy of my visit note below.    Dermatology Problem List:  Last FBSC performed on 12/4/23  1. AKs - cryo  2. BCC, L preauricular, s/p MMS 01/25/24     Social: Lived in [place] for 8 years  ________________________________________________________    [facility] Procedure Note    Date of Service:  Jan 25, 2024  Surgery: Mohs micrographic surgery    Case 1  Repair Type: intermediate  Repair Size: 3.0 cm  Suture Material: Monocryl 4-0 & 5-0, Fast Absorbing Gut 5-0  Tumor Type: BCC - Basal cell carcinoma  Location: Left Preauricular  Derm-Path Accession #: [id number]  PreOp Size: 0.9 x 0.5 cm  PostOp Size: 1.8 x 0.9 cm  Mohs Accession #: [id number]  Level of Defect: Fascia      Procedure:  We discussed the principles of treatment and most likely complications including scarring, bleeding, infection, swelling, pain, crusting, nerve damage, large wound,  incomplete excision, wound dehiscence,  nerve damage, recurrence, and a second procedure may be recommended to obtain the best cosmetic or functional result.    Informed consent was obtained and the patient underwent the procedure as follows:  The patient was placed supine on the operating table.  The cancer was identified, outlined with a marker, and verified by the patient.  The entire surgical field was prepped with Hibiclens.  The surgical site was anesthetized using Lidocaine 1% with epi 1:100,000.      The area of clinically apparent tumor was debulked. The layer of tissue was then surgically excised using a #15 blade and was then transferred onto a specimen sheet maintaining the orientation of the specimen. Hemostasis was obtained using bipolar electrocoagulation. The wound site was then covered with a dressing  while the tissue samples were processed for examination.    The excised tissue was transported to the Mohs histology laboratory maintaining the tissue orientation.  The tissue specimen was relaxed so that the entire surgical margin was in a a single horizontal plane for sectioning and inked for precise mapping.  A precise reference map was drawn to reflect the sectioning of the specimen, colored inking of the margins, and orientation on the patient. The tissue was processed using horizontal sectioning of the base and continuous peripheral margins.  The histopathologic sections were reviewed in conjunction with the reference map.    Total blocks: 1    Total slides:  2    There were no cancer cells visualized on examination, therefore Mohs surgery was complete.    Reconstruction: Intermediate Linear Closure    The patient was taken to the operative suite and placed supine on the operating room table.  The defect was identified.  Appropriate markings were made with a marking pen to plan the repair.  The area was infiltrated with Lidocaine 1% with epi 1:100,000 and prepped with Hibiclens and draped with sterile towels.     The wound was debeveled and undermined widely.  Cones were excised within relaxed skin tension lines on both sides of the defect.  Hemostasis was obtained using electrofulguration.  Deep subcutaneous tissues were then approximated using 4-0 & 5-0 Monocrcyl buried vertical mattress sutures.  The wound edges were then approximated additional  buried sutures were placed in a similar fashion where needed.  Percutaneous simple running 5-0 Fast absorbing gut sutures were carefully placed for maximum eversion and meticulous approximation.    Repair Size: 3.0 cm    The wound was cleansed with saline and ointment was applied along the wound surface.     A sterile pressure dressing was applied.  Wound care instructions were given verbally and in writing.  The patient left the operating suite in stable  condition.  Patient was informed that additional refinement of the resulting surgical scar may be used as a second stage of this reconstruction.     The attending surgeon was present for the entire procedure and always immediately available.    Scribe Disclosure:   I, DEBBIE RAMIREZ, am serving as a scribe; to document services personally performed by Сергей Parry MD -based on data collection and the provider's statements to me.    Staff Physician Comments:   I saw and evaluated the patient with the Plastic Surgery Resident ( Dr. Musa Hutchinson) and I agree with the assessment and plan and the above description of the procedure as documented by the scribe. I was present for the entire procedure and entire exam.    Сергей Parry DO    Department of Dermatology  [facility]: Phone: [phone], Fax:[phone]  [facility]: Phone: [phone], Fax: [phone]    Care and Laboratory Testing Performed at:  [facility]   Dermatology Clinic  [address] [place] [address]      Again, thank you for allowing me to participate in the care of your patient.        Sincerely,        Сергей Parry MD

## 2024-01-25 NOTE — PROGRESS NOTES
Dermatology Problem List:  Last FBSC performed on 12/4/23  1. AKs - cryo  2. BCC, L preauricular, s/p MMS 01/25/24     Social: Lived in [place] for 8 years  ________________________________________________________    [facility] [place] Procedure Note    Date of Service:  Jan 25, 2024  Surgery: Mohs micrographic surgery    Case 1  Repair Type: intermediate  Repair Size: 3.0 cm  Suture Material: Monocryl 4-0 & 5-0, Fast Absorbing Gut 5-0  Tumor Type: BCC - Basal cell carcinoma  Location: Left Preauricular  Derm-Path Accession #: [id number]  PreOp Size: 0.9 x 0.5 cm  PostOp Size: 1.8 x 0.9 cm  Mohs Accession #: [id number]  Level of Defect: Fascia      Procedure:  We discussed the principles of treatment and most likely complications including scarring, bleeding, infection, swelling, pain, crusting, nerve damage, large wound,  incomplete excision, wound dehiscence,  nerve damage, recurrence, and a second procedure may be recommended to obtain the best cosmetic or functional result.    Informed consent was obtained and the patient underwent the procedure as follows:  The patient was placed supine on the operating table.  The cancer was identified, outlined with a marker, and verified by the patient.  The entire surgical field was prepped with Hibiclens.  The surgical site was anesthetized using Lidocaine 1% with epi 1:100,000.      The area of clinically apparent tumor was debulked. The layer of tissue was then surgically excised using a #15 blade and was then transferred onto a specimen sheet maintaining the orientation of the specimen. Hemostasis was obtained using bipolar electrocoagulation. The wound site was then covered with a dressing while the tissue samples were processed for examination.    The excised tissue was transported to the Mohs histology laboratory maintaining the tissue orientation.  The tissue specimen was relaxed so that the entire surgical margin was in a a single  horizontal plane for sectioning and inked for precise mapping.  A precise reference map was drawn to reflect the sectioning of the specimen, colored inking of the margins, and orientation on the patient. The tissue was processed using horizontal sectioning of the base and continuous peripheral margins.  The histopathologic sections were reviewed in conjunction with the reference map.    Total blocks: 1    Total slides:  2    There were no cancer cells visualized on examination, therefore Mohs surgery was complete.    Reconstruction: Intermediate Linear Closure    The patient was taken to the operative suite and placed supine on the operating room table.  The defect was identified.  Appropriate markings were made with a marking pen to plan the repair.  The area was infiltrated with Lidocaine 1% with epi 1:100,000 and prepped with Hibiclens and draped with sterile towels.     The wound was debeveled and undermined widely.  Cones were excised within relaxed skin tension lines on both sides of the defect.  Hemostasis was obtained using electrofulguration.  Deep subcutaneous tissues were then approximated using 4-0 & 5-0 Monocrcyl buried vertical mattress sutures.  The wound edges were then approximated additional  buried sutures were placed in a similar fashion where needed.  Percutaneous simple running 5-0 Fast absorbing gut sutures were carefully placed for maximum eversion and meticulous approximation.    Repair Size: 3.0 cm    The wound was cleansed with saline and ointment was applied along the wound surface.     A sterile pressure dressing was applied.  Wound care instructions were given verbally and in writing.  The patient left the operating suite in stable condition.  Patient was informed that additional refinement of the resulting surgical scar may be used as a second stage of this reconstruction.     The attending surgeon was present for the entire procedure and always immediately available.    Yadiel  Disclosure:   I, DEBBIE RAMIREZ, am serving as a scribe; to document services personally performed by Сергей Parry MD -based on data collection and the provider's statements to me.    Staff Physician Comments:   I saw and evaluated the patient with the Plastic Surgery Resident ( Dr. Musa Hutchinson) and I agree with the assessment and plan and the above description of the procedure as documented by the scribe. I was present for the entire procedure and entire exam.    Сергей Parry DO    Department of Dermatology  [facility]: Phone: [phone], Fax:[phone]  [facility]: Phone: [phone], Fax: [phone]    Care and Laboratory Testing Performed at:  [facility]   Dermatology Clinic  [address] [place] [address]

## 2024-01-25 NOTE — NURSING NOTE
Lauren Nick's goals for this visit include:   Chief Complaint   Patient presents with    Procedure     BCC left preauricular       She requests these members of her care team be copied on today's visit information:     PCP: Wegener, Joel Daniel Irwin    Referring Provider:  Milka Martinez PA-C  [address] [place] [address]    /68   Pulse 60   LMP  (LMP Unknown)   SpO2 93%     Do you need any medication refills at today's visit?       Nery Rincon EMT    The following medication was given:     MEDICATION:  Lidocaine with epinephrine 1% 1:873224  ROUTE: SQ  SITE: see procedure note  DOSE: 6ml  LOT #: [id number]  : [employer]  EXPIRATION DATE: 3/31/2025  NDC#: 09937-536-56  Was there drug waste? no  Multi-dose vial: Yes    Nery Rincon  January 25, 2024       Vaseline and pressure dressing applied to Mohs site on L preauricular.  Wound care instructions reviewed with patient and AVS provided.  Patient verbalized understanding.  Patient will follow up for suture removal: N/A.  No further questions or concerns at this time.

## 2024-01-25 NOTE — PATIENT INSTRUCTIONS
Excision/Mohs Wound Care Instructions  I will experience scar, altered skin color, bleeding, swelling, pain, crusting and redness. I may experience altered sensation. Risks are excessive bleeding, infection, muscle weakness, thick (hypertrophic or keloidal) scar, and recurrence. A second procedure may be recommended to obtain the best cosmetic or functional result.  Possible complications of any surgical procedure are bleeding, infection, scarring, alteration in skin color and sensation, muscle weakness in the area, wound dehiscence or seperation, or recurrence of the lesion or disease. On occasion, after healing, a secondary procedure or revision may be recommended in order to obtain the best cosmetic or functional result.   After your surgery, a pressure bandage will be placed over the area that has sutures. This will help prevent bleeding. Please follow these instructions until  as they will help you to prevent complications as your wound heals.  For the First 48 hours After Surgery:  Leave the pressure bandage on and keep it dry. If it should come loose, you may retape it, but do not take it off.  Relax and take it easy. Do not do any vigorous exercise, heavy lifting, or bending forward. This could cause the wound to bleed.  Post-operative pain is usually mild. You may alternate between 1000 mg of Tylenol (acetaminophen) and 400 mg of Ibuprofen every 4 hours.  Do not take more than 4,000mg of acetaminophen in a 24 hour period or 3200 mg of Ibuprofen in a 24 hr period.  Avoid alcohol and vitamin E as these may increase your tendency to bleed.  You may put an ice pack around the bandaged area for 20 minutes every 2-3 hours. This may help reduce swelling, bruising, and pain. Make sure the ice pack is waterproof so that the pressure bandage does not get wet.   You may see a small amount of drainage or blood on your pressure bandage. This is normal. However, if drainage or bleeding continues or saturates the bandage,  you will need to apply firm pressure over the bandage with a washcloth for 15 minutes. If bleeding continues after applying pressure for 15 minutes then go to the nearest emergency room.  48 Hours After Surgery  Carefully remove the bandage and start daily wound care and dressing changes. You may also now shower and get the wound wet.  Daily Wound Care:  Wash wound with a mild soap and water.  Use caution when washing the wound, be gentle and do not let the forceful shower stream hit the wound directly.  Pat dry.  Apply Vaseline (from a new container or tube) over the suture line with a Q-tip. It is very important to keep the wound continuously moist, as wounds heal best in a moist environment.  Keep the site covered until sutures have either been removed or dissolved.  You can cover it with a Telfa (non-stick) dressing and tape or a band-aid.    If you are unable to keep wound covered, you must apply Vaseline every 2-3 hours (while awake) to ensure it is being kept moist for optimal healing. A dressing overnight is recommended to keep the area moist.  Call Us If:  You have pain that is not controlled with Tylenol/Ibuprofen  You have signs or symptoms of an infection, such as: fever over 100 degrees F, redness, warmth, or foul-smelling or yellow drainage from the wound.  Who should I call with questions?  [facility]: [phone]   [facility]: [phone]  For urgent needs outside of business hours call the [facility] at [phone] and ask to speak with the dermatology resident on call

## 2024-01-31 ENCOUNTER — OFFICE VISIT (OUTPATIENT)
Dept: CARDIOLOGY | Facility: CLINIC | Age: 74
End: 2024-01-31
Payer: MEDICARE

## 2024-01-31 VITALS
HEIGHT: 63 IN | OXYGEN SATURATION: 97 % | DIASTOLIC BLOOD PRESSURE: 76 MMHG | BODY MASS INDEX: 25.48 KG/M2 | WEIGHT: 143.8 LBS | HEART RATE: 73 BPM | SYSTOLIC BLOOD PRESSURE: 132 MMHG

## 2024-01-31 DIAGNOSIS — Z95.2 ENCOUNTER FOR FOLLOW-UP FOR AORTIC VALVE REPLACEMENT: Primary | ICD-10-CM

## 2024-01-31 DIAGNOSIS — Z09 ENCOUNTER FOR FOLLOW-UP FOR AORTIC VALVE REPLACEMENT: Primary | ICD-10-CM

## 2024-01-31 DIAGNOSIS — I48.0 PAROXYSMAL ATRIAL FIBRILLATION (H): ICD-10-CM

## 2024-01-31 PROCEDURE — 99204 OFFICE O/P NEW MOD 45 MIN: CPT | Mod: 24 | Performed by: INTERNAL MEDICINE

## 2024-01-31 PROCEDURE — 93005 ELECTROCARDIOGRAM TRACING: CPT | Performed by: INTERNAL MEDICINE

## 2024-01-31 RX ORDER — ASPIRIN 81 MG/1
81 TABLET ORAL DAILY
COMMUNITY

## 2024-01-31 NOTE — PROGRESS NOTES
Cardiology Consultation     Assessment & Plan     1.  Paroxysmal atrial fibrillation noted on pacer check  2.  History of bicuspid aortic valve with placement of a 23 mm Inspira valve 2021 outside facility  3.  Postoperative bradycardia placement of a pacemaker  4.  Patient had lived in [facility] however moved to [place] recently to help care for grand child with autism  5.  Mild to moderately dilated proximal ascending aorta on echocardiogram however subsequent MR a demonstrates measurement of 3.9 x 3.8 cm  6.  Hematuria noted after starting anticoagulation.  Evaluation by urology is unrevealing for any pathology      Recommendation:    1.  Watchman consideration: Patient has a [id number]-[id number] 2 score of 3.  She has been off of anticoagulation due to hematuria.  Urologic workup was unrevealing.  She was advised by her primary care for resume her anticoagulation as this was not deemed a major difficulty.  This is certainly a reasonable approach.  We discussed the Watchman procedure in detail with the patient and a booklet was given to patient and her .  They will think about wanting to proceed with this or not.    2.  Patient is in need of orthopedic surgery in the next few months.  We have advised her to go ahead and pursue this.    3.  After her surgery, if she is still unable to tolerate her anticoagulation therapy we are happy to proceed with workup for watchman which would include a CTA to assess the anatomy and feasibility.    4.  We have given her contact information should she wish to reconsider.  Thank you kindly for consult        Sherlyn Bejarano MD, MD      HPI:    Patient is a 73-year-old woman who is accompanied by her  to the cardiology structural heart clinic for evaluation for Watchman procedure.  She has the above past medical history.  Recently relocated from [place] to [place].  She by device interrogation was found to have paroxysmal atrial fibrillation.  She was  placed on anticoagulation last summer and has had episodes of hematuria several times with reinitiation of this medication.  She has had a full urologic workup and apparently there is no pathology or obvious cause of her hematuria.  Here to discuss potential Watchman procedure.        Patient Active Problem List   Diagnosis    H/O aortic valve replacement    H/O bicuspid aortic valve    Thoracic aortic aneurysm without rupture (H24)    H/O malignant neoplasm of breast    H/O lumpectomy    History of colonic polyps    Recurrent major depressive disorder, in partial remission (H24)    Myalgia due to statin    Hip pain, right    Osteopenia, unspecified location    Hypothyroidism, unspecified type    Ascending aorta dilatation (H24)    Mild intermittent asthma with acute exacerbation    Infection due to 2019 novel coronavirus    Uncontrolled hypertension    Tongue lump    Recurrent major depression in complete remission (H24)    Right ear pain    Pelvic floor dysfunction in female    Nocturia    Feeling of incomplete bladder emptying    Primary osteoarthritis of both hips       Past Medical History   I have reviewed this patient's medical history and updated it with pertinent information if needed.   Past Medical History:   Diagnosis Date    Arthritis 11/16    Saw a doctor received a shot in [place].    Cancer (H) 09/1989    breast cancer, surgery, and radiation    Heart disease 03/21/2021    Heart valve replacement    Thyroid disease 02/21 diagnosed    Uncomplicated asthma A few years ago    Uncontrolled hypertension 02/20/2023       Past Surgical History   I have reviewed this patient's surgical history and updated it with pertinent information if needed.  Past Surgical History:   Procedure Laterality Date    ABDOMEN SURGERY  Gallbladder    removed in 2010    BIOPSY  10/2018    BREAST SURGERY  09/25/1989    CARDIAC SURGERY  03/22/2021    CHOLECYSTECTOMY  03/2010    COLONOSCOPY  03/2019    COLONOSCOPY N/A 11/10/2022     Procedure: COLONOSCOPY, WITH POLYPECTOMY AND BIOPSY;  Surgeon: Rafa Parks MD;  Location:  GI    ENT SURGERY  2011    EYE SURGERY  2015    THORACIC SURGERY      valve replacement       Prior to Admission Medications   Cannot display prior to admission medications because the patient has not been admitted in this contact.     [unfilled]  [unfilled]  Allergies   Allergies   Allergen Reactions    Penicillins Hives    Ambien [Zolpidem]      Complex behaviors    Statins [Statins]      Myalgias to multiple statins       Social History    reports that she quit smoking about 44 years ago. Her smoking use included cigarettes. She started smoking about 54 years ago. She has a 10 pack-year smoking history. She has never used smokeless tobacco. She reports current alcohol use. She reports that she does not use drugs.    Family History   Family History   Problem Relation Age of Onset    Hypertension Mother     Cancer Mother     Other Cancer Mother         kidney cancer 85    Other Cancer Father          of stomach cancer age 43    Heart Disease Paternal Grandmother     Hypertension Paternal Grandmother     Obesity Paternal Grandmother     Hypertension Sister     Hyperlipidemia Sister     Heart Disease Sister     Hypertension Sister     Hyperlipidemia Sister     Colon Cancer Sister     Diabetes Sister     Breast Cancer Sister     Skin Cancer Sister     Coronary Artery Disease Sister     Hyperlipidemia Sister     Thyroid Disease Sister     Asthma Sister     Skin Cancer Sister     Anesthesia Reaction Son     Hypertension Son     Heart Disease Daughter     Other Cancer Other         Neuroblastoma age 5       Review of Systems   The comprehensive 10 point Review of Systems is negative other than noted in the HPI or here.     Physical Exam   Vital Signs with Ranges  Pulse:  [73] 73  BP: (132)/(76) 132/76  SpO2:  [97 %] 97 %  Wt Readings from Last 4 Encounters:   24 65.2 kg (143 lb 12.8 oz)   23  "64.9 kg (143 lb)   11/16/23 64 kg (141 lb)   10/17/23 66 kg (145 lb 8 oz)     [unfilled]      Vitals: /76 (BP Location: Right arm, Patient Position: Sitting, Cuff Size: Adult Regular)   Pulse 73   Ht 1.6 m (5' 3\")   Wt 65.2 kg (143 lb 12.8 oz)   LMP  (LMP Unknown)   SpO2 97%   BMI 25.47 kg/m      General patient appears comfortable  Neck normal JVP  Cardiovascular system S1-S2 normal there is grade 2 x 6 ejection systolic murmur with early systolic peaking over the right upper sternal border, no S3-S4 rub or gallop  Respiratory system clear to auscultation  Extremities no edema    "

## 2024-01-31 NOTE — LETTER
1/31/2024    Joel Daniel Wegener, MD  [address]  [facility] [address]    RE: Lauren Grecokasey       Dear Colleague,     I had the pleasure of seeing Lauren MARITO Nick in the [facility].    Cardiology Consultation     Assessment & Plan    1.  Paroxysmal atrial fibrillation noted on pacer check  2.  History of bicuspid aortic valve with placement of a 23 mm Inspira valve 2021 outside facility  3.  Postoperative bradycardia placement of a pacemaker  4.  Patient had lived in [facility] however moved to [place] recently to help care for grand child with autism  5.  Mild to moderately dilated proximal ascending aorta on echocardiogram however subsequent MR a demonstrates measurement of 3.9 x 3.8 cm  6.  Hematuria noted after starting anticoagulation.  Evaluation by urology is unrevealing for any pathology      Recommendation:    1.  Watchman consideration: Patient has a [id number]-PGIt 2 score of 3.  She has been off of anticoagulation due to hematuria.  Urologic workup was unrevealing.  She was advised by her primary care for resume her anticoagulation as this was not deemed a major difficulty.  This is certainly a reasonable approach.  We discussed the Watchman procedure in detail with the patient and a booklet was given to patient and her .  They will think about wanting to proceed with this or not.    2.  Patient is in need of orthopedic surgery in the next few months.  We have advised her to go ahead and pursue this.    3.  After her surgery, if she is still unable to tolerate her anticoagulation therapy we are happy to proceed with workup for watchman which would include a CTA to assess the anatomy and feasibility.    4.  We have given her contact information should she wish to reconsider.  Thank you kindly for consult        Sherlyn Bejarano MD, MD      HPI:    Patient is a 73-year-old woman who is accompanied by her  to the cardiology structural heart  clinic for evaluation for Watchman procedure.  She has the above past medical history.  Recently relocated from [place] to [place].  She by device interrogation was found to have paroxysmal atrial fibrillation.  She was placed on anticoagulation last summer and has had episodes of hematuria several times with reinitiation of this medication.  She has had a full urologic workup and apparently there is no pathology or obvious cause of her hematuria.  Here to discuss potential Watchman procedure.        Patient Active Problem List   Diagnosis    H/O aortic valve replacement    H/O bicuspid aortic valve    Thoracic aortic aneurysm without rupture (H24)    H/O malignant neoplasm of breast    H/O lumpectomy    History of colonic polyps    Recurrent major depressive disorder, in partial remission (H24)    Myalgia due to statin    Hip pain, right    Osteopenia, unspecified location    Hypothyroidism, unspecified type    Ascending aorta dilatation (H24)    Mild intermittent asthma with acute exacerbation    Infection due to 2019 novel coronavirus    Uncontrolled hypertension    Tongue lump    Recurrent major depression in complete remission (H24)    Right ear pain    Pelvic floor dysfunction in female    Nocturia    Feeling of incomplete bladder emptying    Primary osteoarthritis of both hips       Past Medical History  I have reviewed this patient's medical history and updated it with pertinent information if needed.   Past Medical History:   Diagnosis Date    Arthritis 11/16    Saw a doctor received a shot in [place].    Cancer (H) 09/1989    breast cancer, surgery, and radiation    Heart disease 03/21/2021    Heart valve replacement    Thyroid disease 02/21 diagnosed    Uncomplicated asthma A few years ago    Uncontrolled hypertension 02/20/2023       Past Surgical History  I have reviewed this patient's surgical history and updated it with pertinent information if needed.  Past Surgical History:   Procedure Laterality Date     ABDOMEN SURGERY  Gallbladder    removed in     BIOPSY  10/2018    BREAST SURGERY  1989    CARDIAC SURGERY  2021    CHOLECYSTECTOMY  2010    COLONOSCOPY  2019    COLONOSCOPY N/A 11/10/2022    Procedure: COLONOSCOPY, WITH POLYPECTOMY AND BIOPSY;  Surgeon: Rafa Parks MD;  Location:  GI    ENT SURGERY  2011    EYE SURGERY  2015    THORACIC SURGERY      valve replacement       Prior to Admission Medications  Cannot display prior to admission medications because the patient has not been admitted in this contact.     [unfilled]  [unfilled]  Allergies  Allergies   Allergen Reactions    Penicillins Hives    Ambien [Zolpidem]      Complex behaviors    Statins [Statins]      Myalgias to multiple statins       Social History   reports that she quit smoking about 44 years ago. Her smoking use included cigarettes. She started smoking about 54 years ago. She has a 10 pack-year smoking history. She has never used smokeless tobacco. She reports current alcohol use. She reports that she does not use drugs.    Family History  Family History   Problem Relation Age of Onset    Hypertension Mother     Cancer Mother     Other Cancer Mother         kidney cancer 85    Other Cancer Father          of stomach cancer age 43    Heart Disease Paternal Grandmother     Hypertension Paternal Grandmother     Obesity Paternal Grandmother     Hypertension Sister     Hyperlipidemia Sister     Heart Disease Sister     Hypertension Sister     Hyperlipidemia Sister     Colon Cancer Sister     Diabetes Sister     Breast Cancer Sister     Skin Cancer Sister     Coronary Artery Disease Sister     Hyperlipidemia Sister     Thyroid Disease Sister     Asthma Sister     Skin Cancer Sister     Anesthesia Reaction Son     Hypertension Son     Heart Disease Daughter     Other Cancer Other         Neuroblastoma age 5       Review of Systems  The comprehensive 10 point Review of Systems is negative other than noted  "in the HPI or here.     Physical Exam  Vital Signs with Ranges  Pulse:  [73] 73  BP: (132)/(76) 132/76  SpO2:  [97 %] 97 %  Wt Readings from Last 4 Encounters:   01/31/24 65.2 kg (143 lb 12.8 oz)   11/21/23 64.9 kg (143 lb)   11/16/23 64 kg (141 lb)   10/17/23 66 kg (145 lb 8 oz)     [unfilled]      Vitals: /76 (BP Location: Right arm, Patient Position: Sitting, Cuff Size: Adult Regular)   Pulse 73   Ht 1.6 m (5' 3\")   Wt 65.2 kg (143 lb 12.8 oz)   LMP  (LMP Unknown)   SpO2 97%   BMI 25.47 kg/m      General patient appears comfortable  Neck normal JVP  Cardiovascular system S1-S2 normal there is grade 2 x 6 ejection systolic murmur with early systolic peaking over the right upper sternal border, no S3-S4 rub or gallop  Respiratory system clear to auscultation  Extremities no edema      Thank you for allowing me to participate in the care of your patient.      Sincerely,     Sherlyn Bejarano MD     [facility]  cc:   Reggie King MD  [address]  [place] [address]      "

## 2024-02-02 ENCOUNTER — TELEPHONE (OUTPATIENT)
Dept: ORTHOPEDICS | Facility: CLINIC | Age: 74
End: 2024-02-02
Payer: MEDICARE

## 2024-02-02 DIAGNOSIS — E03.9 HYPOTHYROIDISM, UNSPECIFIED TYPE: ICD-10-CM

## 2024-02-02 RX ORDER — LEVOTHYROXINE SODIUM 50 UG/1
50 TABLET ORAL DAILY
Qty: 90 TABLET | Refills: 2 | Status: SHIPPED | OUTPATIENT
Start: 2024-02-02

## 2024-02-02 NOTE — TELEPHONE ENCOUNTER
Called and talked with patient- she is interested in the anterior approach only and would like to cancel surgery at this time. Gave her the recommendation of Dr. Lacey as he can use the anterior approach if the patient qualifies. She understood and will make an appointment.  Manda Ward RN

## 2024-02-02 NOTE — TELEPHONE ENCOUNTER
[facility] Call Center    Phone Message    May a detailed message be left on voicemail: yes     Reason for Call: Other: patient calling with questions about her upcoming surgery.      Action Taken: Other: TE     Travel Screening: Not Applicable

## 2024-02-05 NOTE — TELEPHONE ENCOUNTER
Surgery cancelled per patients.     Rosie Mcclellan  Surgery Scheduling Coordinator  Ph: [phone]

## 2024-02-08 ENCOUNTER — VIRTUAL VISIT (OUTPATIENT)
Dept: DERMATOLOGY | Facility: CLINIC | Age: 74
End: 2024-02-08
Payer: MEDICARE

## 2024-02-08 ENCOUNTER — MYC MEDICAL ADVICE (OUTPATIENT)
Dept: DERMATOLOGY | Facility: CLINIC | Age: 74
End: 2024-02-08

## 2024-02-08 DIAGNOSIS — Z85.828 HISTORY OF BASAL CELL CARCINOMA OF SKIN: Primary | ICD-10-CM

## 2024-02-08 DIAGNOSIS — Z51.89 VISIT FOR WOUND CHECK: ICD-10-CM

## 2024-02-08 PROCEDURE — 99207 PR NO CHARGE LOS: CPT | Mod: 93 | Performed by: DERMATOLOGY

## 2024-02-08 NOTE — NURSING NOTE
Lauren Nick's goals for this visit include:   Chief Complaint   Patient presents with    RECHECK     Mohs post op left preauricular. DOS 1/25 scab has fallen off and healing great.       She requests these members of her care team be copied on today's visit information:     PCP: Wegener, Joel Daniel Irwin    Referring Provider:  No referring provider defined for this encounter.    LMP  (LMP Unknown)     Do you need any medication refills at today's visit?       Nery Rincon EMT      Teledermatology Nurse Call Patients:     Are you in the state [facility] at the time of the encounter? yes    Today's visit will be billed to you and your insurance.    A teledermatology visit is not as thorough as an in-person visit and the quality of the photograph sent may not be of the same quality as that taken by the dermatology clinic.

## 2024-02-08 NOTE — LETTER
2/8/2024         RE: Lauren Nick  [address]  [place] [address]        Dear Colleague,    Thank you for referring your patient, Lauren Nick, to the [facility]. Please see a copy of my visit note below.    [facility] Dermatology Note  Encounter Date: Feb 8, 2024  Store-and-Forward and Telephone. Location of teledermatologist: [facility].  Start time: 11:21 AM. End time: 11:29 AM.    Dermatology Problem List:  Last FBSC performed on 12/4/23  1. AKs - cryo  2. 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 DOS 1/25 scab has fallen off and healing great.)    Subjective: Lauren Nick is a 73 year old female who presents today for wound check after Mohs surgery on 1/25/24.   - Patient is happy with the healing process thus far and reports the scab has fallen off.   - She was unsure how to send photos through [employer].  - no other concerns today    Objective: No pictures received.   - focused exam of left cheek using pictures from 2/8/24  - Linear surgical scar with some puckering of adjacent tissue, minimal adjacent erythema and edema. No visible ulceration or erosion.     Assessment and Plan:     1. Post-op wound evaluation status post Mohs and Intermediate closure of BCC on L preauricular.  - Wound healing appropriately, but some puckering of adjacent tissue. Will allow scar to mature, but will consider intralesional steroid treatment.    - Ok to return to swimming.   - Will schedule 3 months scar evaluation face to face.   - Ok to start scar massage one month after surgery.      Scribe Disclosure:   I, Valerie Shepard, am serving as a scribe; to document services personally performed by Dr. Сергей Parry - -based on data collection and the provider's statements to me.     Provider Disclosure:   The documentation recorded by the scribe accurately reflects  the services I personally performed and the decisions made by me.    Сергей Parry DO    Department of Dermatology  [facility]: Phone: [phone], Fax:[phone]  [facility]: Phone: [phone], Fax: [phone]      Again, thank you for allowing me to participate in the care of your patient.        Sincerely,        Сергей Parry MD

## 2024-02-08 NOTE — PROGRESS NOTES
[facility] Dermatology Note  Encounter Date: Feb 8, 2024  Store-and-Forward and Telephone. Location of teledermatologist: [facility].  Start time: 11:21 AM. End time: 11:29 AM.    Dermatology Problem List:  Last FBSC performed on 12/4/23  1. AKs - cryo  2. 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 DOS 1/25 scab has fallen off and healing great.)    Subjective: Lauren Nick is a 73 year old female who presents today for wound check after Mohs surgery on 1/25/24.   - Patient is happy with the healing process thus far and reports the scab has fallen off.   - She was unsure how to send photos through [employer].  - no other concerns today    Objective: No pictures received.   - focused exam of left cheek using pictures from 2/8/24  - Linear surgical scar with some puckering of adjacent tissue, minimal adjacent erythema and edema. No visible ulceration or erosion.     Assessment and Plan:     1. Post-op wound evaluation status post Mohs and Intermediate closure of BCC on L preauricular.  - Wound healing appropriately, but some puckering of adjacent tissue. Will allow scar to mature, but will consider intralesional steroid treatment.    - Ok to return to swimming.   - Will schedule 3 months scar evaluation face to face.   - Ok to start scar massage one month after surgery.      Scribe Disclosure:   I, Valerie Shepard, am serving as a scribe; to document services personally performed by Dr. Сергей Parry - -based on data collection and the provider's statements to me.     Provider Disclosure:   The documentation recorded by the scribe accurately reflects the services I personally performed and the decisions made by me.    Сергей Parry DO    Department of Dermatology  [facility]: Phone: [phone],  Fax:[phone]  [facility]: Phone: [phone], Fax: [phone]

## 2024-02-09 NOTE — TELEPHONE ENCOUNTER
PT returning call for 3 month check-up appointment with Dr Parry.    Yazan Clemente on 2/9/2024 at 8:03 AM

## 2024-03-29 DIAGNOSIS — F33.41 RECURRENT MAJOR DEPRESSIVE DISORDER, IN PARTIAL REMISSION (H): ICD-10-CM

## 2024-03-29 RX ORDER — ESCITALOPRAM OXALATE 20 MG/1
20 TABLET ORAL DAILY
Qty: 90 TABLET | Refills: 1 | Status: SHIPPED | OUTPATIENT
Start: 2024-03-29 | End: 2024-09-23

## 2024-04-09 ENCOUNTER — MYC MEDICAL ADVICE (OUTPATIENT)
Dept: FAMILY MEDICINE | Facility: CLINIC | Age: 74
End: 2024-04-09
Payer: MEDICARE

## 2024-04-09 DIAGNOSIS — M16.0 PRIMARY OSTEOARTHRITIS OF BOTH HIPS: Primary | ICD-10-CM

## 2024-04-11 ENCOUNTER — ANCILLARY PROCEDURE (OUTPATIENT)
Dept: CARDIOLOGY | Facility: CLINIC | Age: 74
End: 2024-04-11
Attending: INTERNAL MEDICINE
Payer: MEDICARE

## 2024-04-11 DIAGNOSIS — I49.5 SSS (SICK SINUS SYNDROME) (H): ICD-10-CM

## 2024-04-11 DIAGNOSIS — Z95.0 CARDIAC PACEMAKER IN SITU: ICD-10-CM

## 2024-04-11 LAB
MDC_IDC_EPISODE_DTM: NORMAL
MDC_IDC_EPISODE_DTM: NORMAL
MDC_IDC_EPISODE_ID: NORMAL
MDC_IDC_EPISODE_ID: NORMAL
MDC_IDC_EPISODE_TYPE: NORMAL
MDC_IDC_EPISODE_TYPE: NORMAL
MDC_IDC_LEAD_CONNECTION_STATUS: NORMAL
MDC_IDC_LEAD_CONNECTION_STATUS: NORMAL
MDC_IDC_LEAD_IMPLANT_DT: NORMAL
MDC_IDC_LEAD_IMPLANT_DT: NORMAL
MDC_IDC_LEAD_LOCATION: NORMAL
MDC_IDC_LEAD_LOCATION: NORMAL
MDC_IDC_LEAD_LOCATION_DETAIL_1: NORMAL
MDC_IDC_LEAD_LOCATION_DETAIL_1: NORMAL
MDC_IDC_LEAD_MFG: NORMAL
MDC_IDC_LEAD_MFG: NORMAL
MDC_IDC_LEAD_MODEL: NORMAL
MDC_IDC_LEAD_MODEL: NORMAL
MDC_IDC_LEAD_POLARITY_TYPE: NORMAL
MDC_IDC_LEAD_POLARITY_TYPE: NORMAL
MDC_IDC_LEAD_SERIAL: NORMAL
MDC_IDC_LEAD_SERIAL: NORMAL
MDC_IDC_MSMT_BATTERY_DTM: NORMAL
MDC_IDC_MSMT_BATTERY_REMAINING_LONGEVITY: 94 MO
MDC_IDC_MSMT_BATTERY_REMAINING_PERCENTAGE: 75 %
MDC_IDC_MSMT_BATTERY_RRT_TRIGGER: NORMAL
MDC_IDC_MSMT_BATTERY_STATUS: NORMAL
MDC_IDC_MSMT_BATTERY_VOLTAGE: 3.01 V
MDC_IDC_MSMT_LEADCHNL_RA_IMPEDANCE_VALUE: 390 OHM
MDC_IDC_MSMT_LEADCHNL_RA_LEAD_CHANNEL_STATUS: NORMAL
MDC_IDC_MSMT_LEADCHNL_RA_PACING_THRESHOLD_AMPLITUDE: 0.5 V
MDC_IDC_MSMT_LEADCHNL_RA_PACING_THRESHOLD_PULSEWIDTH: 0.4 MS
MDC_IDC_MSMT_LEADCHNL_RA_SENSING_INTR_AMPL: 3.1 MV
MDC_IDC_MSMT_LEADCHNL_RV_IMPEDANCE_VALUE: 460 OHM
MDC_IDC_MSMT_LEADCHNL_RV_LEAD_CHANNEL_STATUS: NORMAL
MDC_IDC_MSMT_LEADCHNL_RV_PACING_THRESHOLD_AMPLITUDE: 1 V
MDC_IDC_MSMT_LEADCHNL_RV_PACING_THRESHOLD_PULSEWIDTH: 0.4 MS
MDC_IDC_MSMT_LEADCHNL_RV_SENSING_INTR_AMPL: 5.6 MV
MDC_IDC_PG_IMPLANT_DTM: NORMAL
MDC_IDC_PG_MFG: NORMAL
MDC_IDC_PG_MODEL: NORMAL
MDC_IDC_PG_SERIAL: NORMAL
MDC_IDC_PG_TYPE: NORMAL
MDC_IDC_SESS_CLINIC_NAME: NORMAL
MDC_IDC_SESS_DTM: NORMAL
MDC_IDC_SESS_REPROGRAMMED: NO
MDC_IDC_SESS_TYPE: NORMAL
MDC_IDC_SET_BRADY_AT_MODE_SWITCH_MODE: NORMAL
MDC_IDC_SET_BRADY_AT_MODE_SWITCH_RATE: 180 {BEATS}/MIN
MDC_IDC_SET_BRADY_LOWRATE: 60 {BEATS}/MIN
MDC_IDC_SET_BRADY_MAX_SENSOR_RATE: 130 {BEATS}/MIN
MDC_IDC_SET_BRADY_MAX_TRACKING_RATE: 130 {BEATS}/MIN
MDC_IDC_SET_BRADY_MODE: NORMAL
MDC_IDC_SET_BRADY_PAV_DELAY_LOW: 200 MS
MDC_IDC_SET_BRADY_SAV_DELAY_LOW: 150 MS
MDC_IDC_SET_LEADCHNL_RA_PACING_AMPLITUDE: 2 V
MDC_IDC_SET_LEADCHNL_RA_PACING_ANODE_ELECTRODE_1: NORMAL
MDC_IDC_SET_LEADCHNL_RA_PACING_ANODE_LOCATION_1: NORMAL
MDC_IDC_SET_LEADCHNL_RA_PACING_CAPTURE_MODE: NORMAL
MDC_IDC_SET_LEADCHNL_RA_PACING_CATHODE_ELECTRODE_1: NORMAL
MDC_IDC_SET_LEADCHNL_RA_PACING_CATHODE_LOCATION_1: NORMAL
MDC_IDC_SET_LEADCHNL_RA_PACING_POLARITY: NORMAL
MDC_IDC_SET_LEADCHNL_RA_PACING_PULSEWIDTH: 0.4 MS
MDC_IDC_SET_LEADCHNL_RA_SENSING_ADAPTATION_MODE: NORMAL
MDC_IDC_SET_LEADCHNL_RA_SENSING_ANODE_ELECTRODE_1: NORMAL
MDC_IDC_SET_LEADCHNL_RA_SENSING_ANODE_LOCATION_1: NORMAL
MDC_IDC_SET_LEADCHNL_RA_SENSING_CATHODE_ELECTRODE_1: NORMAL
MDC_IDC_SET_LEADCHNL_RA_SENSING_CATHODE_LOCATION_1: NORMAL
MDC_IDC_SET_LEADCHNL_RA_SENSING_POLARITY: NORMAL
MDC_IDC_SET_LEADCHNL_RA_SENSING_SENSITIVITY: 0.5 MV
MDC_IDC_SET_LEADCHNL_RV_PACING_AMPLITUDE: 1.25 V
MDC_IDC_SET_LEADCHNL_RV_PACING_ANODE_ELECTRODE_1: NORMAL
MDC_IDC_SET_LEADCHNL_RV_PACING_ANODE_LOCATION_1: NORMAL
MDC_IDC_SET_LEADCHNL_RV_PACING_CAPTURE_MODE: NORMAL
MDC_IDC_SET_LEADCHNL_RV_PACING_CATHODE_ELECTRODE_1: NORMAL
MDC_IDC_SET_LEADCHNL_RV_PACING_CATHODE_LOCATION_1: NORMAL
MDC_IDC_SET_LEADCHNL_RV_PACING_POLARITY: NORMAL
MDC_IDC_SET_LEADCHNL_RV_PACING_PULSEWIDTH: 0.4 MS
MDC_IDC_SET_LEADCHNL_RV_SENSING_ADAPTATION_MODE: NORMAL
MDC_IDC_SET_LEADCHNL_RV_SENSING_ANODE_ELECTRODE_1: NORMAL
MDC_IDC_SET_LEADCHNL_RV_SENSING_ANODE_LOCATION_1: NORMAL
MDC_IDC_SET_LEADCHNL_RV_SENSING_CATHODE_ELECTRODE_1: NORMAL
MDC_IDC_SET_LEADCHNL_RV_SENSING_CATHODE_LOCATION_1: NORMAL
MDC_IDC_SET_LEADCHNL_RV_SENSING_POLARITY: NORMAL
MDC_IDC_SET_LEADCHNL_RV_SENSING_SENSITIVITY: 2 MV
MDC_IDC_STAT_AT_BURDEN_PERCENT: 0 %
MDC_IDC_STAT_AT_DTM_END: NORMAL
MDC_IDC_STAT_AT_DTM_START: NORMAL
MDC_IDC_STAT_AT_MODE_SW_COUNT: 0
MDC_IDC_STAT_AT_MODE_SW_COUNT_PER_DAY: 0
MDC_IDC_STAT_AT_MODE_SW_PERCENT_TIME: 0 %
MDC_IDC_STAT_BRADY_AP_VP_PERCENT: 1 %
MDC_IDC_STAT_BRADY_AP_VS_PERCENT: 46 %
MDC_IDC_STAT_BRADY_AS_VP_PERCENT: 1 %
MDC_IDC_STAT_BRADY_AS_VS_PERCENT: 54 %
MDC_IDC_STAT_BRADY_DTM_END: NORMAL
MDC_IDC_STAT_BRADY_DTM_START: NORMAL
MDC_IDC_STAT_BRADY_RA_PERCENT_PACED: 46 %
MDC_IDC_STAT_BRADY_RV_PERCENT_PACED: 1 %
MDC_IDC_STAT_CRT_DTM_END: NORMAL
MDC_IDC_STAT_CRT_DTM_START: NORMAL
MDC_IDC_STAT_HEART_RATE_ATRIAL_MAX: 200 {BEATS}/MIN
MDC_IDC_STAT_HEART_RATE_ATRIAL_MEAN: 69 {BEATS}/MIN
MDC_IDC_STAT_HEART_RATE_ATRIAL_MIN: 50 {BEATS}/MIN
MDC_IDC_STAT_HEART_RATE_DTM_END: NORMAL
MDC_IDC_STAT_HEART_RATE_DTM_START: NORMAL
MDC_IDC_STAT_HEART_RATE_VENTRICULAR_MAX: 220 {BEATS}/MIN
MDC_IDC_STAT_HEART_RATE_VENTRICULAR_MEAN: 69 {BEATS}/MIN
MDC_IDC_STAT_HEART_RATE_VENTRICULAR_MIN: 50 {BEATS}/MIN

## 2024-04-11 PROCEDURE — 93294 REM INTERROG EVL PM/LDLS PM: CPT | Performed by: INTERNAL MEDICINE

## 2024-04-11 PROCEDURE — 93296 REM INTERROG EVL PM/IDS: CPT | Performed by: INTERNAL MEDICINE

## 2024-04-12 ENCOUNTER — MYC MEDICAL ADVICE (OUTPATIENT)
Dept: FAMILY MEDICINE | Facility: CLINIC | Age: 74
End: 2024-04-12
Payer: MEDICARE

## 2024-04-14 ENCOUNTER — E-VISIT (OUTPATIENT)
Dept: URGENT CARE | Facility: CLINIC | Age: 74
End: 2024-04-14
Payer: MEDICARE

## 2024-04-14 DIAGNOSIS — R30.0 DYSURIA: Primary | ICD-10-CM

## 2024-04-14 PROCEDURE — 99207 PR NON-BILLABLE SERV PER CHARTING: CPT | Performed by: FAMILY MEDICINE

## 2024-04-14 NOTE — TELEPHONE ENCOUNTER
"              After Visit Summary   2018    Santosh Riggs    MRN: [id number]           Patient Information     Date Of Birth          1945        Visit Information        Provider Department      2018 2:00 PM Anam Sinha MD [facility]        Today's Diagnoses     Person with feared complaint, no diagnosis made    -  1       Follow-ups after your visit        Who to contact     If you have questions or need follow up information about today's clinic visit or your schedule please contact [facility] directly at [phone].  Normal or non-critical lab and imaging results will be communicated to you by "[employer]hart, letter or phone within 4 business days after the clinic has received the results. If you do not hear from us within 7 days, please contact the clinic through "[employer]hart or phone. If you have a critical or abnormal lab result, we will notify you by phone as soon as possible.  Submit refill requests through [employer] or call your pharmacy and they will forward the refill request to us. Please allow 3 business days for your refill to be completed.          Additional Information About Your Visit        MyChart Information     [employer] lets you send messages to your doctor, view your test results, renew your prescriptions, schedule appointments and more. To sign up, go to [email or web address] . Click on \"Log in\" on the left side of the screen, which will take you to the Welcome page. Then click on \"Sign up Now\" on the right side of the page.     You will be asked to enter the access code listed below, as well as some personal information. Please follow the directions to create your username and password.     Your access code is: [email or web address]  Expires: 2018  2:32 PM     Your access code will  in 90 days. If you need help or a new code, please call your [place] clinic or [phone].        Care EveryWhere ID     This is your Care EveryWhere ID. This could " Provider E-Visit time total (minutes): 3   be used by other organizations to access your [place] medical records  [id number]        Your Vitals Were     Pulse BMI (Body Mass Index)                76 35.53 kg/m2           Blood Pressure from Last 3 Encounters:   08/22/18 128/78   08/09/18 140/80   07/09/18 130/80    Weight from Last 3 Encounters:   08/22/18 247 lb 9.6 oz (112.3 kg)   08/09/18 247 lb (112 kg)   07/09/18 246 lb 9.6 oz (111.9 kg)              Today, you had the following     No orders found for display       Primary Care Provider Office Phone # Fax #    Fede Morton [phone]       [address] [facility] [address]        Equal Access to Services     MEKHI CLEVEALND : Hadii kenroy carrollo Sokenny, waaxda luqadaha, qaybta kaalmada adeegyada, kushal alexis . So [facility] [phone].    ATENCIÓN: Si habla español, tiene a hernandez disposición servicios gratuitos de asistencia lingüística. Yeseniaame al [phone].    We comply with applicable federal civil rights laws and [place] laws. We do not discriminate on the basis of race, color, national origin, age, disability, sex, sexual orientation, or gender identity.            Thank you!     Thank you for choosing [facility]  for your care. Our goal is always to provide you with excellent care. Hearing back from our patients is one way we can continue to improve our services. Please take a few minutes to complete the written survey that you may receive in the mail after your visit with us. Thank you!             Your Updated Medication List - Protect others around you: Learn how to safely use, store and throw away your medicines at www.disposemymeds.org.          This list is accurate as of 8/22/18 11:59 PM.  Always use your most recent med list.                   Brand Name Dispense Instructions for use Diagnosis    amLODIPine 10 MG tablet    NORVASC    90 tablet    Take 1 tablet (10 mg) by mouth daily    Hypertension, unspecified  type       aspirin 81 MG EC tablet      Take 81 mg by mouth        levothyroxine 25 MCG tablet    SYNTHROID/LEVOTHROID     Take 25 mcg by mouth        * metoprolol succinate 100 MG 24 hr tablet    TOPROL-XL    90 tablet    Take 1 tablet (100 mg) by mouth daily    Hypertension, unspecified type       * metoprolol succinate 50 MG 24 hr tablet    TOPROL-XL    90 tablet    Take 1 tablet (50 mg) by mouth daily    Hypertension, unspecified type       montelukast 10 MG tablet    SINGULAIR    90 tablet    TAKE ONE TABLET BY MOUTH AT BEDTIME FOR  ALLERGY  SYMPTOMS    Allergic rhinoconjunctivitis of both eyes       MULTI-VITAMINS Tabs      Take 1 tablet by mouth        NASONEX 50 MCG/ACT spray   Generic drug:  mometasone      Spray 2 sprays in nostril daily        nexIUM 40 MG CR capsule   Generic drug:  esomeprazole      Take 1 tablet by mouth daily        Omega-3 1000 MG capsule      Take 2,000 mg by mouth        ranitidine 150 MG tablet    ZANTAC    180 tablet    Take 1 tablet (150 mg) by mouth 2 times daily    Gastroesophageal reflux disease, esophagitis presence not specified       Saw Palmetto 160 MG Caps           tamsulosin 0.4 MG capsule    FLOMAX    90 capsule    TAKE 1 CAPSULE BY MOUTH ONCE DAILY    Urinary hesitancy       traMADol 50 MG tablet    ULTRAM    100 tablet    Take 1 tablet (50 mg) by mouth every 6 hours as needed for moderate pain    Episodic tension-type headache, not intractable       triamcinolone 55 MCG/ACT inhaler    NASACORT     1 spray        * Notice:  This list has 2 medication(s) that are the same as other medications prescribed for you. Read the directions carefully, and ask your doctor or other care provider to review them with you.       No

## 2024-04-14 NOTE — PATIENT INSTRUCTIONS
Dear Lauren Nick,    We are sorry you are not feeling well. Based on the responses you provided, it is recommended that you be seen in-person in urgent care so we can better evaluate your symptoms. Please click here to find the nearest urgent care location to you.   You will not be charged for this Visit. Thank you for trusting us with your care.    Judy Clay MD

## 2024-04-15 ENCOUNTER — OFFICE VISIT (OUTPATIENT)
Dept: FAMILY MEDICINE | Facility: CLINIC | Age: 74
End: 2024-04-15
Payer: MEDICARE

## 2024-04-15 VITALS
RESPIRATION RATE: 16 BRPM | BODY MASS INDEX: 25.34 KG/M2 | DIASTOLIC BLOOD PRESSURE: 74 MMHG | OXYGEN SATURATION: 98 % | HEIGHT: 63 IN | WEIGHT: 143 LBS | SYSTOLIC BLOOD PRESSURE: 110 MMHG | TEMPERATURE: 97.9 F | HEART RATE: 60 BPM

## 2024-04-15 DIAGNOSIS — M16.11 PRIMARY OSTEOARTHRITIS OF RIGHT HIP: ICD-10-CM

## 2024-04-15 DIAGNOSIS — R82.90 URINE MALODOR: Primary | ICD-10-CM

## 2024-04-15 PROBLEM — K14.8 TONGUE LUMP: Status: RESOLVED | Noted: 2023-02-20 | Resolved: 2024-04-15

## 2024-04-15 PROBLEM — M16.9 OSTEOARTHRITIS OF HIP: Status: ACTIVE | Noted: 2024-02-27

## 2024-04-15 PROBLEM — H92.01 RIGHT EAR PAIN: Status: RESOLVED | Noted: 2023-03-20 | Resolved: 2024-04-15

## 2024-04-15 LAB
ALBUMIN UR-MCNC: NEGATIVE MG/DL
APPEARANCE UR: CLEAR
BILIRUB UR QL STRIP: NEGATIVE
COLOR UR AUTO: YELLOW
GLUCOSE UR STRIP-MCNC: NEGATIVE MG/DL
HGB UR QL STRIP: NEGATIVE
KETONES UR STRIP-MCNC: NEGATIVE MG/DL
LEUKOCYTE ESTERASE UR QL STRIP: NEGATIVE
NITRATE UR QL: NEGATIVE
PH UR STRIP: 6 [PH] (ref 5–7)
SP GR UR STRIP: 1.01 (ref 1–1.03)
UROBILINOGEN UR STRIP-ACNC: 0.2 E.U./DL

## 2024-04-15 PROCEDURE — 81003 URINALYSIS AUTO W/O SCOPE: CPT

## 2024-04-15 PROCEDURE — 99212 OFFICE O/P EST SF 10 MIN: CPT

## 2024-04-15 ASSESSMENT — ANXIETY QUESTIONNAIRES
IF YOU CHECKED OFF ANY PROBLEMS ON THIS QUESTIONNAIRE, HOW DIFFICULT HAVE THESE PROBLEMS MADE IT FOR YOU TO DO YOUR WORK, TAKE CARE OF THINGS AT HOME, OR GET ALONG WITH OTHER PEOPLE: NOT DIFFICULT AT ALL
8. IF YOU CHECKED OFF ANY PROBLEMS, HOW DIFFICULT HAVE THESE MADE IT FOR YOU TO DO YOUR WORK, TAKE CARE OF THINGS AT HOME, OR GET ALONG WITH OTHER PEOPLE?: NOT DIFFICULT AT ALL
GAD7 TOTAL SCORE: 0
5. BEING SO RESTLESS THAT IT IS HARD TO SIT STILL: NOT AT ALL
2. NOT BEING ABLE TO STOP OR CONTROL WORRYING: NOT AT ALL
GAD7 TOTAL SCORE: 0
4. TROUBLE RELAXING: NOT AT ALL
3. WORRYING TOO MUCH ABOUT DIFFERENT THINGS: NOT AT ALL
GAD7 TOTAL SCORE: 0
6. BECOMING EASILY ANNOYED OR IRRITABLE: NOT AT ALL
1. FEELING NERVOUS, ANXIOUS, OR ON EDGE: NOT AT ALL
7. FEELING AFRAID AS IF SOMETHING AWFUL MIGHT HAPPEN: NOT AT ALL
7. FEELING AFRAID AS IF SOMETHING AWFUL MIGHT HAPPEN: NOT AT ALL

## 2024-04-15 ASSESSMENT — PATIENT HEALTH QUESTIONNAIRE - PHQ9
10. IF YOU CHECKED OFF ANY PROBLEMS, HOW DIFFICULT HAVE THESE PROBLEMS MADE IT FOR YOU TO DO YOUR WORK, TAKE CARE OF THINGS AT HOME, OR GET ALONG WITH OTHER PEOPLE: NOT DIFFICULT AT ALL
SUM OF ALL RESPONSES TO PHQ QUESTIONS 1-9: 4
SUM OF ALL RESPONSES TO PHQ QUESTIONS 1-9: 4

## 2024-04-15 NOTE — PROGRESS NOTES
Assessment & Plan  1. Urine malodor  Patient is a 73 year-old female with a history of hypothyroidism, asthma, HTN, ascending aorta dilation, and osteopenia presenting with concerns of resolved urine malodor. UA did not show any signs of acute infection. Absence of vaginal symptoms and abdominal tenderness, no further labs or imaging indicated. Patient to follow-up if symptoms represent. Patient understands and is agreeable to plan as discussed in clinic.   - UA Macroscopic with reflex to Microscopic and Culture - Lab Collect    2. Primary osteoarthritis of right hip  Pain to palpation over right hip joint. Scheduled for right arthroplasty on 4/23/24.      Subjective   Pat is a 73 year old, presenting for the following health issues:  UTI        4/15/2024    12:58 PM   Additional Questions   Roomed by YK   Accompanied by self     UTI    History of Present Illness       Reason for visit:  Possible UTI  Symptom onset:  1-3 days ago  Symptoms include:  Smell in urine, stomach tendenesd  Symptom intensity:  Mild  Symptom progression:  Improving  Had these symptoms before:  Yes  Has tried/received treatment for these symptoms:  Yes  Previous treatment was successful:  Yes  Prior treatment description:  Antibiotics  What makes it better:  Drinking lots of water    She eats 0-1 servings of fruits and vegetables daily.She consumes 0 sweetened beverage(s) daily.She exercises with enough effort to increase her heart rate 30 to 60 minutes per day.  She exercises with enough effort to increase her heart rate 5 days per week.   She is taking medications regularly.     Presents with concerns of foul smelling urine that started on Sunday. Odor has since resolved. Was concerned due to some mild RLQ pain. However, pt is due to have right hip replaced at the end of the month due to osteoporosis. Has increased fluid intake and has noticed symptoms improvement.     Denies irritative voiding symptoms including dysuria, frequency, and  "urgency. Denies hematuria. Denies vaginal discharge, irritation, or odor. Denies nausea and vomiting.       Objective    /74   Pulse 60   Temp 97.9  F (36.6  C) (Temporal)   Resp 16   Ht 1.6 m (5' 2.99\")   Wt 64.9 kg (143 lb)   LMP  (LMP Unknown)   SpO2 98%   BMI 25.34 kg/m    Body mass index is 25.34 kg/m .  Physical Exam   GENERAL: alert and no distress  NECK: no cervical adenopathy, supple, full ROM.   RESP: lungs clear to auscultation - no rales, rhonchi or wheezes  CV: regular rate and rhythm, normal S1 S2 with systolic murmur. Mo S3 or S4, click or rub, no peripheral edema  ABDOMEN: soft, nontender, no hepatosplenomegaly, no masses and bowel sounds normal  MS: extremities normal- no gross deformities noted. Pain to palpation over right hip joint.   PSYCH: mentation appears normal, affect normal/bright    Results for orders placed or performed in visit on 04/15/24 (from the past 24 hour(s))   UA Macroscopic with reflex to Microscopic and Culture - Lab Collect    Specimen: Urine, Midstream   Result Value Ref Range    Color Urine Yellow Colorless, Straw, Light Yellow, Yellow    Appearance Urine Clear Clear    Glucose Urine Negative Negative mg/dL    Bilirubin Urine Negative Negative    Ketones Urine Negative Negative mg/dL    Specific Gravity Urine 1.010 1.003 - 1.035    Blood Urine Negative Negative    pH Urine 6.0 5.0 - 7.0    Protein Albumin Urine Negative Negative mg/dL    Urobilinogen Urine 0.2 0.2, 1.0 E.U./dL    Nitrite Urine Negative Negative    Leukocyte Esterase Urine Negative Negative    Narrative    Microscopic not indicated       Signed Electronically by: NELSON Cee CNP    "

## 2024-04-17 DIAGNOSIS — I77.810 ASCENDING AORTA DILATATION (H): ICD-10-CM

## 2024-04-17 RX ORDER — LOSARTAN POTASSIUM 25 MG/1
25 TABLET ORAL DAILY
Qty: 90 TABLET | Refills: 1 | Status: SHIPPED | OUTPATIENT
Start: 2024-04-17 | End: 2024-10-07

## 2024-05-09 ENCOUNTER — TELEPHONE (OUTPATIENT)
Dept: CARDIOLOGY | Facility: CLINIC | Age: 74
End: 2024-05-09

## 2024-05-09 ENCOUNTER — OFFICE VISIT (OUTPATIENT)
Dept: DERMATOLOGY | Facility: CLINIC | Age: 74
End: 2024-05-09
Payer: MEDICARE

## 2024-05-09 DIAGNOSIS — L90.5 SCAR: Primary | ICD-10-CM

## 2024-05-09 DIAGNOSIS — D48.9 NEOPLASM OF UNCERTAIN BEHAVIOR: ICD-10-CM

## 2024-05-09 PROCEDURE — 11102 TANGNTL BX SKIN SINGLE LES: CPT | Mod: GC | Performed by: DERMATOLOGY

## 2024-05-09 PROCEDURE — 88305 TISSUE EXAM BY PATHOLOGIST: CPT | Performed by: DERMATOLOGY

## 2024-05-09 NOTE — TELEPHONE ENCOUNTER
"Team received notification from patient, he had Echo done at [facility] on 4/22/24 and wanted Dr. King to be aware of results.  She is very concerned because the report said that \"There were some issues on it referring to my atria. \".  The report below says nothing about the atria.  However, in the text of the document it did say that the Left atrium was severely enlarged.    Final Impressions   1. Normal left ventricular chamber size. Calculated ejection fraction 55%   2. Normal right ventricular chamber size and systolic function.   3. Estimated right ventricular systolic pressure 28 mmHg (right atrial pressure of 5 mmHg).   4. 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   5. Mildly thickened aortic prosthesis leaflets. Aortic valve prosthesis systolic mean Doppler gradient 14 mmHg.   6. Aortic valve prosthesis systolic mean Doppler gradient 14 mmHg.   7. No aortic valve prosthetic regurgitation and no periprosthetic regurgitation   8. Mildly enlarged mid ascending aorta diameter of 39 mm.   9. Normal inferior vena cava size with normal inspiratory collapse (>50%).   10. No  pericardial effusion.     Writer spoke with Pat.  Informed her that her last Echo in 2022 also showed L atrial enlargement, which she was not aware of.   Educated about results, there is nothing she needs to do or worry about at this time.  She is asking appropriate questions and was able to verbalize back teaching.   She states she will check with Dr. King about this at the next office appointment.  She has a follow up MRI in September, ordered by Dr. King as a 2 year follow up.    Lucia Mcdonnell RN on 5/9/2024 at 12:11 PM          "

## 2024-05-09 NOTE — NURSING NOTE
Lauren Nick's goals for this visit include:   Chief Complaint   Patient presents with    Scar Management     3 month scar eval, left cheek       She requests these members of her care team be copied on today's visit information: n/a    PCP: Wegener, Joel Daniel Irwin    Referring Provider:  No referring provider defined for this encounter.    LMP  (LMP Unknown)     Do you need any medication refills at today's visit? No  Jackelyn Mesa RN

## 2024-05-09 NOTE — LETTER
5/9/2024         RE: Lauren Nick  [address]  [place] [address]        Dear Colleague,    Thank you for referring your patient, Lauren Nick, to the [facility]. Please see a copy of my visit note below.    Dermatologic Surgery Post-Op Scar Check     CC: Scar Management (3 month scar eval, left cheek)      Dermatology Problem List:  Last FBSC performed on 12/4/23  0. NUB - central upper forehead, bx'd 05/10/24, R/O BCC vs AK vs other   1. AKs - cryo  2. 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   - feels like the scar looks pink and bumpy. Doesn't like the appearance of the scar  - also reports a bump on her nose  - also reports a lesion on her central upper forehead that has been frozen before but has now come back      Objective: An exam of the face was performed today   - healed scar on the left preauricular cheek with mild erythema and mild scar hypertrophy  - 5 mm pink macule on central upper forehead  - open comedone on nose      Assessment and Plan:     1. BCC, L preauricular, s/p MMS 01/25/24  - Surgical site healed well but does have some scar hypertrophy and erythema. Discussed options including PDL and ILK. Patient interested in pursuing both therapies today.   - PDL performed today (see separate note)  - Procedure: ILK Injection  Verbal consent was obtained. 0.2 of kenalog 10 mg/mL was used to inject into 3 site(s) on the left preauricular cheek. Patient tolerated procedure well.     2. # NUB - central upper forehead, bx'd 05/10/24, R/O BCC vs AK vs other   - Shave biopsy procedure note, location(s): central upper forehead. Verbal informed consent and photographs were obtained. The area was cleaned with isopropyl alcohol. 0.5 mL of 1% lidocaine with epinephrine was injected to obtain adequate anesthesia  of lesion(s). Shave biopsy at site(s) performed. Hemostasis was achieved with aluminium chloride. Petrolatum ointment and a sterile dressing were applied. The patient tolerated the procedure and no complications were noted.     3. Open comedone on nose  - discussed she could try an OTC salicylic acid wash daily prn    Patient was discussed with and evaluated by attending physician Dr. Parry.    Edwige Terry MD  PGY-5, Micrographic Surgery and Dermatologic Oncology (MSDO) Fellow              Laser- VBeam(Pulsed Dye Laser) Procedure Note: Medical    Dermatology Problem List:  Last FBSC performed on 23  0. NUB - central upper forehead, bx'd 05/10/24, R/O BCC vs AK vs other   1. AKs - cryo  2. BCC, L preauricular, s/p MMS 24  - PDL and ILK-10 5/10/24      Procedure Date: May 9, 2024    Attending Staff Surgeon: Сергей Parry DO    Fellow Surgeon: Edwige Terry MD    Operating Room Data:     Surgery/Procedure Date:    SAME     Pre-operative Diagnosis:   Scar  Location: left preauricular cheek  Size(cm2): 7    Operation/Procedure    Vbeam pulsed dye laser treatment#: 1     Post-operative Diagnosis:  SAME    Laser Settings:  Energy:7.25 J/cm2  Spot size: 10mm  Pulse width:  10 mS (0.45 thru 40 mS)  Dynamic cooling spray settin mS  Dynamic cooling device delay:  20 mS    Anesthesia:  None    Description of Operation/Procedure:   The nature and purpose of the procedure, associated risks, possible consequences, complications and alternative methods of treatment were explained in detail, this includes but is not limited to dyspigmentation, scarring, bruising, pain/discomfort, eye injury, and blister. Multiple treatments may be recommended. A photo and operative consent were obtained. Time-out was performed.The patient was positioned to optimally expose the area treated. Protective eyewear was worn by the patient and goggles on all personnel in the treatment room. The patient confirmed the site to be  treated. The laser energy output was verified by meter reading.      The clinically evident lesion(s) was/were treated with Joceline Vbeam pulsed dye laser (595 nm) beam as above. A total of 20 pulses were used. The patient tolerated the procedure well and no complications were noted. Post operative instructions were provided. The total laser operation and preparation time was 5 minutes.     The patient will follow-up in 6 weeks.    The patient will not pay the cosmetic fee today (post-operative scar).     Dr. Parry staffed the patient.    Edwige Terry MD  Micrographic Surgery and Dermatologic Oncology (MSDO) Fellow, PGY-5    Staff Physician Comments:   I saw and evaluated the patient with the Mohs Surgery Fellow (Dr. Edwige Terry) and I agree with the assessment and plan and the above description of the procedure. I was present for the entire procedure and entire exam.    Сергей Parry DO    Department of Dermatology  [facility]: Phone: [phone], Fax:[phone]  [facility]: Phone: [phone], Fax: [phone]      Again, thank you for allowing me to participate in the care of your patient.        Sincerely,        Сергей Parry MD

## 2024-05-09 NOTE — PATIENT INSTRUCTIONS
Acne cleanser with salicylic acid can be helpful clearing pores, but it takes several weeks of use. It can be purchased in most pharmacies without a prescription. It is safe to use daily on your face.             Wound Care After a Biopsy    What is a skin biopsy?  A skin biopsy allows the doctor to examine a very small piece of tissue under the microscope to determine the diagnosis and the best treatment for the skin condition. A local anesthetic (numbing medicine) is injected with a very small needle into the skin area to be tested. A small piece of skin is taken from the area. Sometimes a suture (stitch) is used.     What are the risks of a skin biopsy?  I will experience scar, bleeding, swelling, pain, crusting and redness. I may experience incomplete removal or recurrence. Risks of this procedure are excessive bleeding, bruising, infection, nerve damage, numbness, thick (hypertrophic or keloidal) scar and non-diagnostic biopsy.    How should I care for my wound for the first 24 hours?  Keep the wound dry and covered for 24 hours  If it bleeds, hold direct pressure on the area for 15 minutes. If bleeding does not stop, call us or go to the emergency room  Avoid strenuous exercise the first 1-2 days or as your doctor instructs you    How should I care for the wound after 24 hours?  After 24 hours, remove the bandage  You may bathe or shower as normal  If you had a scalp biopsy, you can shampoo as usual and can use shower water to clean the biopsy site daily  Clean the wound once a day with gentle soap and water  Do not scrub, be gentle  Apply white petroleum/Vaseline after cleaning the wound with a cotton swab or a clean finger, and keep the site covered with a Bandaid /bandage. Bandages are not necessary with a scalp biopsy  If you are unable to cover the site with a Bandaid /bandage, re-apply ointment 2-3 times a day to keep the site moist. Moisture will help with healing  Avoid strenuous activity for first 1-2  days  Avoid lakes, rivers, pools, and oceans until the stitches are removed or the site is healed    How do I clean my wound?  Wash hands thoroughly with soap or use hand  before all wound care  Clean the wound with gentle soap and water  Apply white petroleum/Vaseline  to wound after it is clean  Replace the Bandaid /bandage to keep the wound covered for the first few days or as instructed by your doctor  If you had a scalp biopsy, warm shower water to the area on a daily basis should suffice    What should I use to clean my wound?   Cotton-tipped applicators (Qtips )  White petroleum jelly (Vaseline ). Use a clean new container and use Q-tips to apply.  Bandaids  as needed  Gentle soap     How should I care for my wound long term?  Do not get your wound dirty  Keep up with wound care for one week or until the area is healed.  If you have stitches, stitches need to be removed in (No sutures) days. You may return to our clinic for this or you may have it done locally at your doctor s office.  A small scab will form and fall off by itself when the area is completely healed. The area will be red and will become pink in color as it heals. Sun protection is very important for how your scar will turn out. Sunscreen with an SPF 30 or greater is recommended once the area is healed.  You should have some soreness but it should be mild and slowly go away over several days. Talk to your doctor about using tylenol for pain,    When should I call my doctor?  If you have increased:   Pain or swelling  Pus or drainage (clear or slightly yellow drainage is ok)  Temperature over 100F  Spreading redness or warmth around wound    When will I hear about my results?  The biopsy results can take 2 weeks to come back.  Your results will automatically release to [employer] before your provider has even reviewed them.  The clinic will call you with the results, send you a [employer] message, or have you schedule a follow-up clinic or  phone time to discuss the results.  Contact our clinics if you do not hear from us in 2 weeks.    Who should I call with questions?  [facility]: [phone]  [facility]: [phone]  For urgent needs outside of business hours call the [facility] at [phone] and ask for the dermatology resident on call     Pulsed Dye Laser (PDL)    I will experience redness, swelling, pain, and heat sensation. I may experience bruising, itching, or acne. Risks are blistering, oozing, permanent scarring, hair loss, temporary or permanent skin lightening or darkening, infection, and eye injury. I understand my outcome could be no improvement, slight improvement. Multiple treatments may be required.    After treatment, Do Not:  Rub, scratch, or put weight on the site for 2 weeks  Wear tight fitting clothing or jewelry over the site  [place]. Keep the site out of sunlight. Use sunscreen of 30 SPF or greater when in the sun. Use sunscreen 30 minutes before going out and reapply if sweating. Tanning decreases the success of the treatment    How do I care for the treated site?  Use ice packs for 10-20 minutes after the procedure for swelling   If the site is on your face, use ice again 1 hour after treatment for ten minutes and repeat again before bed. Do not burn the skin with the ice.   If a scab or crust forms, gently cleanse the site with water. Then put on Vaseline  ointment 3 times a day and contact the clinic   If a blister forms, contact the clinic by phone  If you have concerns about swelling, call the clinic  Avoid sun exposure and do not get tan as this can darken the treated area, use sunscreen  Do not use makeup on any open wound  Do not come to your next treatment with a tan    What should I expect?  Mild swelling  Blue-purple color that may take 2 to 3 weeks to go away  Redness may also last a week or longer  Results may take up to 3 or 4 months after  treatment  More procedures may be needed    Who should I call with questions?  [facility]: [phone]  [facility]: [phone]  For urgent needs outside of business hours call the [facility] at [phone] and ask for the dermatology resident on call  [employer] messaging response may be delayed, please call for urgent issues

## 2024-05-10 NOTE — PROGRESS NOTES
Dermatologic Surgery Post-Op Scar Check     CC: Scar Management (3 month scar eval, left cheek)      Dermatology Problem List:  Last FBSC performed on 12/4/23  0. NUB - central upper forehead, bx'd 05/10/24, R/O BCC vs AK vs other   1. AKs - cryo  2. 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   - feels like the scar looks pink and bumpy. Doesn't like the appearance of the scar  - also reports a bump on her nose  - also reports a lesion on her central upper forehead that has been frozen before but has now come back      Objective: An exam of the face was performed today   - healed scar on the left preauricular cheek with mild erythema and mild scar hypertrophy  - 5 mm pink macule on central upper forehead  - open comedone on nose      Assessment and Plan:     1. BCC, L preauricular, s/p MMS 01/25/24  - Surgical site healed well but does have some scar hypertrophy and erythema. Discussed options including PDL and ILK. Patient interested in pursuing both therapies today.   - PDL performed today (see separate note)  - Procedure: ILK Injection  Verbal consent was obtained. 0.2 of kenalog 10 mg/mL was used to inject into 3 site(s) on the left preauricular cheek. Patient tolerated procedure well.     2. # NUB - central upper forehead, bx'd 05/10/24, R/O BCC vs AK vs other   - Shave biopsy procedure note, location(s): central upper forehead. Verbal informed consent and photographs were obtained. The area was cleaned with isopropyl alcohol. 0.5 mL of 1% lidocaine with epinephrine was injected to obtain adequate anesthesia of lesion(s). Shave biopsy at site(s) performed. Hemostasis was achieved with aluminium chloride. Petrolatum ointment and a sterile dressing were applied. The patient tolerated the procedure and no complications were noted.     3. Open comedone on  nose  - discussed she could try an OTC salicylic acid wash daily prn    Patient was discussed with and evaluated by attending physician Dr. Parry.    Edwige Terry MD  PGY-5, Micrographic Surgery and Dermatologic Oncology (MSDO) Fellow

## 2024-05-10 NOTE — NURSING NOTE
Late entry from 5/9/24.    The following medication was given:     MEDICATION:  Lidocaine with epinephrine 1% 1:[id number]  ROUTE: SQ  SITE: see procedure note  DOSE: 1.0 mL  LOT #: [id number]  : [employer]  EXPIRATION DATE: 4/30/25  NDC#: 99258-256-00  Was there drug waste? No  Multi-dose vial: Yes      Drug Administration Record    Drug Name: triamcinolone acetonide (kenalog)  Dose: 0.3 mL of triamcinolone 10mg/mL  Route administered: see procedure note  NDC #: 2476-7444-47  Amount of waste(mL):see procedure note  Reason for waste: As per MD    LOT #: [id number]  SITE: see procedure note  : [employer]  EXPIRATION DATE: 12/2025    Jackelyn Mesa RN

## 2024-05-10 NOTE — PROGRESS NOTES
Laser- VBeam(Pulsed Dye Laser) Procedure Note: Medical    Dermatology Problem List:  Last FBSC performed on 23  0. NUB - central upper forehead, bx'd 05/10/24, R/O BCC vs AK vs other   1. AKs - cryo  2. BCC, L preauricular, s/p MMS 24  - PDL and ILK-10 5/10/24      Procedure Date: May 9, 2024    Attending Staff Surgeon: Сергей Parry DO    Fellow Surgeon: Edwige Terry MD    Operating Room Data:     Surgery/Procedure Date:    SAME     Pre-operative Diagnosis:   Scar  Location: left preauricular cheek  Size(cm2): 7    Operation/Procedure    Vbeam pulsed dye laser treatment#: 1     Post-operative Diagnosis:  SAME    Laser Settings:  Energy:7.25 J/cm2  Spot size: 10mm  Pulse width:  10 mS (0.45 thru 40 mS)  Dynamic cooling spray settin mS  Dynamic cooling device delay:  20 mS    Anesthesia:  None    Description of Operation/Procedure:   The nature and purpose of the procedure, associated risks, possible consequences, complications and alternative methods of treatment were explained in detail, this includes but is not limited to dyspigmentation, scarring, bruising, pain/discomfort, eye injury, and blister. Multiple treatments may be recommended. A photo and operative consent were obtained. Time-out was performed.The patient was positioned to optimally expose the area treated. Protective eyewear was worn by the patient and goggles on all personnel in the treatment room. The patient confirmed the site to be treated. The laser energy output was verified by meter reading.      The clinically evident lesion(s) was/were treated with Joceline Vbeam pulsed dye laser (595 nm) beam as above. A total of 20 pulses were used. The patient tolerated the procedure well and no complications were noted. Post operative instructions were provided. The total laser operation and preparation time was 5 minutes.     The patient will follow-up in 6 weeks.    The patient will not pay the cosmetic fee today (post-operative  scar).     Dr. Parry staffed the patient.    Edwige Terry MD  Micrographic Surgery and Dermatologic Oncology (MSDO) Fellow, PGY-5    Staff Physician Comments:   I saw and evaluated the patient with the Mohs Surgery Fellow (Dr. Edwige Terry) and I agree with the assessment and plan and the above description of the procedure. I was present for the entire procedure and entire exam.    Сергей Parry DO    Department of Dermatology  [facility]: Phone: [phone], Fax:[phone]  [facility]: Phone: [phone], Fax: [phone]

## 2024-05-13 LAB
PATH REPORT.COMMENTS IMP SPEC: ABNORMAL
PATH REPORT.COMMENTS IMP SPEC: ABNORMAL
PATH REPORT.COMMENTS IMP SPEC: YES
PATH REPORT.FINAL DX SPEC: ABNORMAL
PATH REPORT.GROSS SPEC: ABNORMAL
PATH REPORT.MICROSCOPIC SPEC OTHER STN: ABNORMAL
PATH REPORT.RELEVANT HX SPEC: ABNORMAL

## 2024-05-14 ENCOUNTER — TELEPHONE (OUTPATIENT)
Dept: CARDIOLOGY | Facility: CLINIC | Age: 74
End: 2024-05-14
Payer: MEDICARE

## 2024-05-14 ENCOUNTER — TELEPHONE (OUTPATIENT)
Dept: DERMATOLOGY | Facility: CLINIC | Age: 74
End: 2024-05-14
Payer: MEDICARE

## 2024-05-14 DIAGNOSIS — Q22.9: ICD-10-CM

## 2024-05-14 DIAGNOSIS — Z95.2 ENCOUNTER FOR FOLLOW-UP FOR AORTIC VALVE REPLACEMENT: ICD-10-CM

## 2024-05-14 DIAGNOSIS — I77.810 ASCENDING AORTA DILATATION (H): Primary | ICD-10-CM

## 2024-05-14 DIAGNOSIS — Z09 ENCOUNTER FOR FOLLOW-UP FOR AORTIC VALVE REPLACEMENT: ICD-10-CM

## 2024-05-14 DIAGNOSIS — Z95.2 H/O AORTIC VALVE REPLACEMENT: ICD-10-CM

## 2024-05-14 NOTE — TELEPHONE ENCOUNTER
"Team received instructions from Dr. King today:    \"Lucia/Slick can we get a CT heart to evaluate if the left atrial appendage is fully excluded. Recommend patient not to re start apixaban till we can evaluate the left atrial appendage with the CT heart.   Thank you,  Reggie \"  "

## 2024-05-14 NOTE — TELEPHONE ENCOUNTER
Excision/Mohs previsit information                                                    Diagnosis: basal cell carcinoma  Site(s): Central upper forehead    Is the surgical site below the waist?  NO  If yes, instruct the patient to purchase over the counter chlorhexidine surgical soap and wash all skin below the belly button twice before surgery    Allergies   Allergen Reactions    Penicillins Hives    Ambien [Zolpidem]      Complex behaviors    Statins [Statins]      Myalgias to multiple statins       Review and update allergy and medication list    Do you take the following medications:  Coumadin, Eliquis, Pradaxa, Xarelto:  NO.  If on Coumadin, INR should be checked within 7 days of surgery.  Range should be 3.5 or less or within therapeutic range.    Do you currently or have you previously had any of the following conditions:  Hepatitis:  NO  HIV/AIDS:  NO  Prolonged bleeding or bleeding disorder:  NO  Pacemaker: YES  Defibrillator:  NO  History of artificial or heart valve replacement:  YES. Aorta  Endocarditis (inflammation of the inner lining of the heart's chambers and valves):  NO  Have you ever had a prosthetic joint infection:  NO  Pregnant or Breastfeeding:  NO  Mobility device (wheelchair, transfer difficulty): NO    Important Reminders:                                                      -Ok to take all of their medications as prescribed  -Patients can eat, no need to be fasting  -If face is being treated, please come with a make-up free face  -If scalp is being treated, please come with clean hair free from product  -Patient will not be able to get the site wet for 48 hrs  -No submerging wound in standing water (lake, pool, bathtub, hot tub) for 2 weeks  -No physical activity for 48 hrs (further restrictions will be discussed by MD at time of visit)    If any positives, send to RN for further review  Coco Barba RN       Writer called pt to discuss pathology results. Pt scheduled for mohs  procedure. Excision checklist complete: pt has a pacemaker, has had her aortic valve replaced, and just had a hip replacement but denied any infection with this. Pt denied having any questions or concerns at this time.      Coco Barba RN on 5/14/2024 at 11:49 AM      Final Diagnosis  Central upper forehead:  - Basal cell carcinoma, nodular type - (see description)    Electronically signed by Stan Hackett MD on 5/13/2024 at  7:00 PM      Result Notes     Сергей Parry MD  5/14/2024  9:10 AM CDT Back to Top    Please call the patient with pathology results.     The pathologist confirmed a BCC on her forehead. My treatment recommendation is Mohs. Ok to schedule after telephone screening.     Thank you.

## 2024-05-15 NOTE — TELEPHONE ENCOUNTER
Writer has entered the order for CT, after calling and talking with Katie BURROWS in the CT department to confirm which type of CT we would choose that would be able to verify the exclusion of the atrial appendage.    Orders placed.    Call to Pat, she is happy to hear this is being set up.  Instructed her to call if she has not heard from  by noon on Friday, she should call us.    Pat verbalized understanding and agreement to the plan.    Lucia Mcdonnell RN on 5/15/2024 at 10:43 AM

## 2024-05-21 ENCOUNTER — MYC MEDICAL ADVICE (OUTPATIENT)
Dept: DERMATOLOGY | Facility: CLINIC | Age: 74
End: 2024-05-21
Payer: MEDICARE

## 2024-05-22 ENCOUNTER — TELEPHONE (OUTPATIENT)
Dept: DERMATOLOGY | Facility: CLINIC | Age: 74
End: 2024-05-22
Payer: MEDICARE

## 2024-05-22 NOTE — TELEPHONE ENCOUNTER
[facility] Call Center    Phone Message    May a detailed message be left on voicemail: yes     Reason for Call: Other: Pt called and will need to r/s her 6/13 PDL appt due to going out of town. Please call her back. Thanks      Action Taken: Message routed to:  Adult Clinics: Dermatology p [phone]    Travel Screening: Not Applicable

## 2024-05-22 NOTE — TELEPHONE ENCOUNTER
Called and talked with patient in regard to needing to reschedule her appointment with Dr. Parry.     We were able to get her rescheduled on July 31st at 2pm for DEANA White LPN on 5/22/2024 at 12:17 PM

## 2024-05-29 NOTE — TELEPHONE ENCOUNTER
Per Owen request, pathology report, pictures and insurance card faxed to [facility], attn: Faye @ fax [phone].    Altagracia Oliveira  HIM Rep. II  [facility]

## 2024-05-30 ENCOUNTER — OFFICE VISIT (OUTPATIENT)
Dept: FAMILY MEDICINE | Facility: CLINIC | Age: 74
End: 2024-05-30
Payer: MEDICARE

## 2024-05-30 VITALS
RESPIRATION RATE: 13 BRPM | TEMPERATURE: 96.9 F | OXYGEN SATURATION: 97 % | HEART RATE: 61 BPM | DIASTOLIC BLOOD PRESSURE: 80 MMHG | BODY MASS INDEX: 25.91 KG/M2 | HEIGHT: 62 IN | WEIGHT: 140.8 LBS | SYSTOLIC BLOOD PRESSURE: 138 MMHG

## 2024-05-30 DIAGNOSIS — Z96.641 S/P TOTAL RIGHT HIP ARTHROPLASTY: ICD-10-CM

## 2024-05-30 DIAGNOSIS — M25.561 ACUTE PAIN OF RIGHT KNEE: Primary | ICD-10-CM

## 2024-05-30 PROCEDURE — 99212 OFFICE O/P EST SF 10 MIN: CPT | Performed by: NURSE PRACTITIONER

## 2024-05-30 ASSESSMENT — PAIN SCALES - GENERAL: PAINLEVEL: MILD PAIN (2)

## 2024-05-30 NOTE — PROGRESS NOTES
"  Assessment & Plan     Acute pain of right knee  Suspect pain is d/t trauma from falling on the right knee. There is a large old bruise. Potentially could also be r/t her CELESTE. We will continue to watch and wait. Continue RICE. Follow-up in a few weeks if not improved     S/P total right hip arthroplasty          MED REC REQUIRED  Post Medication Reconciliation Status: discharge medications reconciled, continue medications without change      Subjective   Pat is a 73 year old, presenting for the following health issues:  Knee Pain    HPI     5 weeks post CELESTE, anterior approach   Now is having knee pain for a couple weeks   Feels there is more swelling   A little uncomfortable just when she is sitting   Fell a couple times after surgery onto the knee going up the stairs. She has a bruise today         Review of Systems  Detailed as above         Objective    /80 (BP Location: Left arm, Patient Position: Sitting, Cuff Size: Adult Regular)   Pulse 61   Temp 96.9  F (36.1  C) (Tympanic)   Resp 13   Ht 1.575 m (5' 2\")   Wt 63.9 kg (140 lb 12.8 oz)   LMP  (LMP Unknown)   SpO2 97%   BMI 25.75 kg/m    Body mass index is 25.75 kg/m .  Physical Exam  Constitutional:       Appearance: Normal appearance.   Pulmonary:      Effort: Pulmonary effort is normal.   Skin:     General: Skin is warm and dry.      Findings: Bruising present.      Comments: Bruise of right medial knee   Slight swelling noted of right knee.    Neurological:      Mental Status: She is alert.   Psychiatric:         Mood and Affect: Mood normal.                    Signed Electronically by: NELSON Patel CNP    "

## 2024-05-31 DIAGNOSIS — J30.2 SEASONAL ALLERGIC RHINITIS, UNSPECIFIED TRIGGER: ICD-10-CM

## 2024-06-02 ENCOUNTER — MYC MEDICAL ADVICE (OUTPATIENT)
Dept: FAMILY MEDICINE | Facility: CLINIC | Age: 74
End: 2024-06-02
Payer: MEDICARE

## 2024-06-03 RX ORDER — FLUTICASONE PROPIONATE 50 MCG
2 SPRAY, SUSPENSION (ML) NASAL DAILY
Qty: 48 ML | Refills: 3 | Status: SHIPPED | OUTPATIENT
Start: 2024-06-03

## 2024-06-04 ENCOUNTER — APPOINTMENT (OUTPATIENT)
Dept: URBAN - METROPOLITAN AREA CLINIC 255 | Age: 74
Setting detail: DERMATOLOGY
End: 2024-06-05

## 2024-06-04 DIAGNOSIS — L72.0 EPIDERMAL CYST: ICD-10-CM

## 2024-06-04 PROBLEM — C44.41 BASAL CELL CARCINOMA OF SKIN OF SCALP AND NECK: Status: ACTIVE | Noted: 2024-06-04

## 2024-06-04 PROBLEM — C44.319 BASAL CELL CARCINOMA OF SKIN OF OTHER PARTS OF FACE: Status: ACTIVE | Noted: 2024-06-04

## 2024-06-04 PROCEDURE — OTHER DEFER: OTHER

## 2024-06-04 PROCEDURE — OTHER CONSULTATION FOR RADIOTHERAPY: OTHER

## 2024-06-04 PROCEDURE — OTHER MIPS QUALITY: OTHER

## 2024-06-04 PROCEDURE — 99202 OFFICE O/P NEW SF 15 MIN: CPT

## 2024-06-04 PROCEDURE — OTHER COUNSELING: OTHER

## 2024-06-04 ASSESSMENT — LOCATION SIMPLE DESCRIPTION DERM
LOCATION SIMPLE: NOSE
LOCATION SIMPLE: CHIN
LOCATION SIMPLE: RIGHT LIP

## 2024-06-04 ASSESSMENT — LOCATION ZONE DERM
LOCATION ZONE: LIP
LOCATION ZONE: FACE
LOCATION ZONE: NOSE

## 2024-06-04 ASSESSMENT — LOCATION DETAILED DESCRIPTION DERM
LOCATION DETAILED: RIGHT ORAL COMMISSURE
LOCATION DETAILED: RIGHT CHIN
LOCATION DETAILED: NASAL TIP

## 2024-06-04 NOTE — PROCEDURE: CONSULTATION FOR RADIOTHERAPY
Detail Level: Detailed
Anatomic Location From Referring Provider: Central Upper Forehead
Referring Provider: Dr Obed Henry MD
X Size Of Lesion In Cm (Optional): 0
Other Plan: Patient is deciding if she is ok with permanently loosing a small portion of her front hairline. Patient will contact clinic once she has made a decision.

## 2024-06-04 NOTE — PROCEDURE: MIPS QUALITY
Quality 143: Oncology: Medical And Radiation- Pain Intensity Quantified: Pain severity quantified, no pain present
Quality 130: Documentation Of Current Medications In The Medical Record: Current Medications Documented
Quality 431: Preventive Care And Screening: Unhealthy Alcohol Use - Screening: Patient not identified as an unhealthy alcohol user when screened for unhealthy alcohol use using a systematic screening method
Detail Level: Detailed
Quality 226: Preventive Care And Screening: Tobacco Use: Screening And Cessation Intervention: Patient screened for tobacco use and is an ex/non-smoker

## 2024-06-04 NOTE — PROCEDURE: DEFER
Size Of Lesion In Cm (Optional): 0
Introduction Text (Please End With A Colon): The following procedure was deferred:
Other Procedure: cosmetic extraction
Procedure To Be Performed At Next Visit: Other
Detail Level: Detailed

## 2024-06-04 NOTE — HPI: SKIN LESION (BASAL CELL CARCINOMA)
How Severe Is Your Skin Cancer?: mild
Is This A New Presentation, Or A Follow-Up?: Basal Cell Carcinoma
Location From Outside Provider (Will Override Previously Chosen Location): Central Upper Forehead
When Was Basal Cell Biopsied? (Optional): 05/09/2024
Accession # (Optional): [id number]

## 2024-06-06 ENCOUNTER — OFFICE VISIT (OUTPATIENT)
Dept: URGENT CARE | Facility: URGENT CARE | Age: 74
End: 2024-06-06
Payer: MEDICARE

## 2024-06-06 ENCOUNTER — LAB (OUTPATIENT)
Dept: LAB | Facility: CLINIC | Age: 74
End: 2024-06-06
Payer: MEDICARE

## 2024-06-06 VITALS
DIASTOLIC BLOOD PRESSURE: 80 MMHG | TEMPERATURE: 97.6 F | HEART RATE: 60 BPM | OXYGEN SATURATION: 97 % | SYSTOLIC BLOOD PRESSURE: 128 MMHG

## 2024-06-06 DIAGNOSIS — R31.0 GROSS HEMATURIA: ICD-10-CM

## 2024-06-06 DIAGNOSIS — H69.93 DYSFUNCTION OF BOTH EUSTACHIAN TUBES: Primary | ICD-10-CM

## 2024-06-06 PROCEDURE — 99213 OFFICE O/P EST LOW 20 MIN: CPT | Performed by: PHYSICIAN ASSISTANT

## 2024-06-06 PROCEDURE — 88112 CYTOPATH CELL ENHANCE TECH: CPT | Performed by: PATHOLOGY

## 2024-06-06 ASSESSMENT — ENCOUNTER SYMPTOMS
FEVER: 0
SORE THROAT: 1

## 2024-06-06 NOTE — PROGRESS NOTES
SUBJECTIVE:   Lauren Nick is a 73 year old female presenting with a chief complaint of   Chief Complaint   Patient presents with    Urgent Care    Ear Problem     Left ear pain for on and off a week. Pt does wear hearing aid daily on both ears. Tx- tylenol and ibuprofen       She is an established patient of [facility] [facility].  Patient presents with complaints of left ear pain on and off for a week.  Leaving for [place] and is concerned pain may worsen.  Small ST.  No fevers or other symptoms.  Wears hearing aids.        Review of Systems   Constitutional:  Negative for fever.   HENT:  Positive for ear pain and sore throat.    All other systems reviewed and are negative.      Past Medical History:   Diagnosis Date    Arthritis     Saw a doctor received a shot in [place].    Cancer (H) 1989    breast cancer, surgery, and radiation    Heart disease 2021    Heart valve replacement    Thyroid disease  diagnosed    Uncomplicated asthma A few years ago    Uncontrolled hypertension 2023     Family History   Problem Relation Age of Onset    Hypertension Mother     Cancer Mother     Other Cancer Mother         kidney cancer 85    Other Cancer Father          of stomach cancer age 43    Heart Disease Paternal Grandmother     Hypertension Paternal Grandmother     Obesity Paternal Grandmother     Hypertension Sister     Hyperlipidemia Sister     Heart Disease Sister     Hypertension Sister     Hyperlipidemia Sister     Colon Cancer Sister     Diabetes Sister     Breast Cancer Sister     Skin Cancer Sister     Coronary Artery Disease Sister     Hyperlipidemia Sister     Thyroid Disease Sister     Asthma Sister     Skin Cancer Sister     Anesthesia Reaction Son     Hypertension Son     Heart Disease Daughter     Other Cancer Other         Neuroblastoma age 5     Current Outpatient Medications   Medication Sig Dispense Refill    albuterol (PROAIR HFA/PROVENTIL HFA/VENTOLIN HFA) 108 (90 Base) MCG/ACT inhaler  Inhale 2 puffs into the lungs every 6 hours 8.5 g 3    aspirin 81 MG EC tablet Take 81 mg by mouth daily      escitalopram (LEXAPRO) 20 MG tablet TAKE 1 TABLET BY MOUTH EVERY DAY 90 tablet 1    fluticasone (FLONASE) 50 MCG/ACT nasal spray INSTILL 2 SPRAYS INTO BOTH NOSTRILS DAILY 48 mL 3    levothyroxine (SYNTHROID/LEVOTHROID) 50 MCG tablet TAKE 1 TABLET BY MOUTH EVERY DAY 90 tablet 2    LORazepam (ATIVAN) 1 MG tablet Take 1 tablet (1 mg) by mouth nightly as needed for sleep 30 tablet 5    losartan (COZAAR) 25 MG tablet TAKE 1 TABLET BY MOUTH EVERY DAY 90 tablet 1    magnesium 250 MG tablet Take 1 tablet by mouth daily Unknown dose      metoprolol succinate ER (TOPROL XL) 25 MG 24 hr tablet TAKE 1 TABLET BY MOUTH TWICE A  tablet 1    psyllium (METAMUCIL/KONSYL) capsule Take 1 capsule by mouth daily      STATIN NOT PRESCRIBED (INTENTIONAL) Please choose reason not prescribed from choices below.      Turmeric 500 MG TABS Take by mouth daily       Social History     Tobacco Use    Smoking status: Former     Current packs/day: 0.00     Average packs/day: 1 pack/day for 10.4 years (10.4 ttl pk-yrs)     Types: Cigarettes     Start date: 1969     Quit date: 10/1/1979     Years since quittin.7    Smokeless tobacco: Never   Substance Use Topics    Alcohol use: Yes     Comment: occ       OBJECTIVE  /80   Pulse 60   Temp 97.6  F (36.4  C) (Tympanic)   LMP  (LMP Unknown)   SpO2 97%     Physical Exam  Vitals and nursing note reviewed.   Constitutional:       Appearance: Normal appearance. She is normal weight.   HENT:      Head: Normocephalic and atraumatic.      Right Ear: Tympanic membrane, ear canal and external ear normal.      Left Ear: Tympanic membrane, ear canal and external ear normal.      Ears:      Comments: Bilateral white fluid behind TM.  TM is not erythematous     Nose: Nose normal.      Mouth/Throat:      Mouth: Mucous membranes are moist.      Pharynx: Oropharynx is clear.   Eyes:       Extraocular Movements: Extraocular movements intact.      Conjunctiva/sclera: Conjunctivae normal.   Cardiovascular:      Rate and Rhythm: Normal rate.   Musculoskeletal:      Cervical back: Normal range of motion.   Skin:     General: Skin is warm and dry.      Findings: No rash.   Neurological:      General: No focal deficit present.      Mental Status: She is alert.   Psychiatric:         Mood and Affect: Mood normal.         Behavior: Behavior normal.         Labs:  No results found for this or any previous visit (from the past 24 hour(s)).    ASSESSMENT:      ICD-10-CM    1. Dysfunction of both eustachian tubes  H69.93            Medical Decision Making:    Differential Diagnosis:  URI, Om, Oe, eustachian tube dysfunction    Serious Comorbid Conditions:  Adult:   reviewed    PLAN:    Discussed SE of sudafed.  Recommended sudafed and flonase.  Discussed likely course.  Discussed reasons to seek immediate medical attention.  Additionally if no improvement or worsening in one week, may follow up with PCP and/or UC.        Followup:    If not improving or if condition worsens, follow up with your Primary Care Provider, If not improving or if conditions worsens over the next 12-24 hours, go to the Emergency Department    There are no Patient Instructions on file for this visit.

## 2024-06-07 LAB
PATH REPORT.COMMENTS IMP SPEC: NORMAL
PATH REPORT.FINAL DX SPEC: NORMAL
PATH REPORT.GROSS SPEC: NORMAL
PATH REPORT.MICROSCOPIC SPEC OTHER STN: NORMAL
PATH REPORT.RELEVANT HX SPEC: NORMAL

## 2024-06-16 ENCOUNTER — MYC MEDICAL ADVICE (OUTPATIENT)
Dept: FAMILY MEDICINE | Facility: CLINIC | Age: 74
End: 2024-06-16
Payer: MEDICARE

## 2024-06-16 DIAGNOSIS — N63.21 MASS OF UPPER OUTER QUADRANT OF LEFT BREAST: ICD-10-CM

## 2024-06-16 DIAGNOSIS — Z12.31 ENCOUNTER FOR SCREENING MAMMOGRAM FOR BREAST CANCER: Primary | ICD-10-CM

## 2024-06-17 ENCOUNTER — TELEPHONE (OUTPATIENT)
Dept: PODIATRY | Facility: CLINIC | Age: 74
End: 2024-06-17

## 2024-06-17 ENCOUNTER — OFFICE VISIT (OUTPATIENT)
Dept: PODIATRY | Facility: CLINIC | Age: 74
End: 2024-06-17
Payer: MEDICARE

## 2024-06-17 ENCOUNTER — ANCILLARY PROCEDURE (OUTPATIENT)
Dept: GENERAL RADIOLOGY | Facility: CLINIC | Age: 74
End: 2024-06-17
Attending: PODIATRIST
Payer: MEDICARE

## 2024-06-17 VITALS — SYSTOLIC BLOOD PRESSURE: 126 MMHG | DIASTOLIC BLOOD PRESSURE: 66 MMHG | WEIGHT: 140 LBS | BODY MASS INDEX: 25.61 KG/M2

## 2024-06-17 DIAGNOSIS — S99.911A INJURY OF RIGHT ANKLE, INITIAL ENCOUNTER: ICD-10-CM

## 2024-06-17 DIAGNOSIS — S99.911A INJURY OF RIGHT ANKLE, INITIAL ENCOUNTER: Primary | ICD-10-CM

## 2024-06-17 PROCEDURE — 27786 TREATMENT OF ANKLE FRACTURE: CPT | Mod: RT | Performed by: PODIATRIST

## 2024-06-17 PROCEDURE — 73610 X-RAY EXAM OF ANKLE: CPT | Mod: TC | Performed by: RADIOLOGY

## 2024-06-17 RX ORDER — ACETAMINOPHEN AND CODEINE PHOSPHATE 300; 30 MG/1; MG/1
1 TABLET ORAL EVERY 6 HOURS PRN
Qty: 15 TABLET | Refills: 0 | COMMUNITY
Start: 2024-06-17 | End: 2024-07-30

## 2024-06-17 NOTE — PROGRESS NOTES
"Foot & Ankle Surgery  June 17, 2024    CC: \"Sprained ankle\"    I was asked to see Lauren Nick regarding the chief complaint by: Self    HPI:  Pt is a 73 year old female who presents with above complaint.  Multiple day history of right ankle pain.  She was at the [facility] on 6/8/2024 after slipping on her steps.  \"My ankle was twisted\".  After experiencing worsening pain, she was seen in the ER where x-rays were obtained and she indicates they were not reviewed with her.  She continues to have pain.  \"Ice, rest\" for treatment    ROS:   Pos for CC.  The patient denies current nausea, vomiting, chills, fevers, belly pain, calf pain, chest pain or SOB.  Complete remainder of ROS is otherwise neg.    VITALS:  There were no vitals filed for this visit.    PMH:    Past Medical History:   Diagnosis Date    Arthritis 11/16    Saw a doctor received a shot in [place].    Cancer (H) 09/1989    breast cancer, surgery, and radiation    Heart disease 03/21/2021    Heart valve replacement    Thyroid disease 02/21 diagnosed    Uncomplicated asthma A few years ago    Uncontrolled hypertension 02/20/2023       SXHX:    Past Surgical History:   Procedure Laterality Date    ABDOMEN SURGERY  Gallbladder    removed in 2010    BIOPSY  10/2018    BREAST SURGERY  09/25/1989    CARDIAC SURGERY  03/22/2021    CHOLECYSTECTOMY  03/2010    COLONOSCOPY  03/2019    COLONOSCOPY N/A 11/10/2022    Procedure: COLONOSCOPY, WITH POLYPECTOMY AND BIOPSY;  Surgeon: Rafa Parks MD;  Location:  GI    ENT SURGERY  06/2011    EYE SURGERY  09/2015    THORACIC SURGERY  03/21    valve replacement        MEDS:    Current Outpatient Medications   Medication Sig Dispense Refill    albuterol (PROAIR HFA/PROVENTIL HFA/VENTOLIN HFA) 108 (90 Base) MCG/ACT inhaler Inhale 2 puffs into the lungs every 6 hours 8.5 g 3    aspirin 81 MG EC tablet Take 81 mg by mouth daily      escitalopram (LEXAPRO) 20 MG tablet TAKE 1 TABLET BY MOUTH EVERY DAY 90 tablet 1    " fluticasone (FLONASE) 50 MCG/ACT nasal spray INSTILL 2 SPRAYS INTO BOTH NOSTRILS DAILY 48 mL 3    levothyroxine (SYNTHROID/LEVOTHROID) 50 MCG tablet TAKE 1 TABLET BY MOUTH EVERY DAY 90 tablet 2    LORazepam (ATIVAN) 1 MG tablet Take 1 tablet (1 mg) by mouth nightly as needed for sleep 30 tablet 5    losartan (COZAAR) 25 MG tablet TAKE 1 TABLET BY MOUTH EVERY DAY 90 tablet 1    magnesium 250 MG tablet Take 1 tablet by mouth daily Unknown dose      metoprolol succinate ER (TOPROL XL) 25 MG 24 hr tablet TAKE 1 TABLET BY MOUTH TWICE A  tablet 1    psyllium (METAMUCIL/KONSYL) capsule Take 1 capsule by mouth daily      STATIN NOT PRESCRIBED (INTENTIONAL) Please choose reason not prescribed from choices below.      Turmeric 500 MG TABS Take by mouth daily       Current Facility-Administered Medications   Medication Dose Route Frequency Provider Last Rate Last Admin    triamcinolone acetonide (KENALOG-10) injection 3 mg  3 mg Intra-Lesional Once            ALL:     Allergies   Allergen Reactions    Penicillins Hives    Ambien [Zolpidem]      Complex behaviors    Statins [Statins]      Myalgias to multiple statins       FMH:    Family History   Problem Relation Age of Onset    Hypertension Mother     Cancer Mother     Other Cancer Mother         kidney cancer 85    Other Cancer Father          of stomach cancer age 43    Heart Disease Paternal Grandmother     Hypertension Paternal Grandmother     Obesity Paternal Grandmother     Hypertension Sister     Hyperlipidemia Sister     Heart Disease Sister     Hypertension Sister     Hyperlipidemia Sister     Colon Cancer Sister     Diabetes Sister     Breast Cancer Sister     Skin Cancer Sister     Coronary Artery Disease Sister     Hyperlipidemia Sister     Thyroid Disease Sister     Asthma Sister     Skin Cancer Sister     Anesthesia Reaction Son     Hypertension Son     Heart Disease Daughter     Other Cancer Other         Neuroblastoma age 5       SocHx:    Social  History     Socioeconomic History    Marital status:      Spouse name: Not on file    Number of children: Not on file    Years of education: Not on file    Highest education level: Not on file   Occupational History    Not on file   Tobacco Use    Smoking status: Former     Current packs/day: 0.00     Average packs/day: 1 pack/day for 10.4 years (10.4 ttl pk-yrs)     Types: Cigarettes     Start date: 1969     Quit date: 10/1/1979     Years since quittin.7    Smokeless tobacco: Never   Vaping Use    Vaping status: Never Used   Substance and Sexual Activity    Alcohol use: Yes     Comment: occ    Drug use: Never    Sexual activity: Not Currently     Partners: Male     Birth control/protection: Post-menopausal   Other Topics Concern    Parent/sibling w/ CABG, MI or angioplasty before 65F 55M? No   Social History Narrative    Not on file     Social Determinants of Health     Financial Resource Strain: Low Risk  (2023)    Financial Resource Strain     Within the past 12 months, have you or your family members you live with been unable to get utilities (heat, electricity) when it was really needed?: No   Food Insecurity: No Food Insecurity (2024)    Received from [facility]    Hunger Vital Sign     Worried About Running Out of Food in the Last Year: Never true     Ran Out of Food in the Last Year: Never true   Transportation Needs: No Transportation Needs (2024)    Received from [facility]    PRAPARE - Transportation     Lack of Transportation (Medical): No     Lack of Transportation (Non-Medical): No   Physical Activity: Sufficiently Active (2024)    Received from [facility]    Exercise Vital Sign     Days of Exercise per Week: 4 days     Minutes of Exercise per Session: 40 min   Stress: Not on file   Social Connections: Not on file   Interpersonal Safety: Low Risk  (4/15/2024)    Interpersonal Safety     Do you feel physically and emotionally  safe where you currently live?: Yes     Within the past 12 months, have you been hit, slapped, kicked or otherwise physically hurt by someone?: No     Within the past 12 months, have you been humiliated or emotionally abused in other ways by your partner or ex-partner?: No   Housing Stability: Low Risk  (2/23/2024)    [facility]    Housing Stability     What is your living situation today?: I have a steady place to live           EXAMINATION:  Gen:   No apparent distress  Neuro:   A&Ox3, no deficits  Psych:    Answering questions appropriately for age and situation with normal affect  Head:    NCAT  Eye:    Visual scanning without deficit  Ear:    Response to auditory stimuli wnl  Lung:    Non-labored breathing on RA noted  Abd:    NTND per patient report  Lymph:    Neg for pitting/non-pitting edema BLE  Vasc:    Pulses palpable, CFT minimally delayed  Neuro:    Light touch sensation intact to all sensory nerve distributions without paresthesias  Derm:    Neg for nodules, lesions or ulcerations  MSK:    Right lower extremity -pain on palpation along the distal fibula.  The knee to ankle examination and rear foot are otherwise negative today  Calf:    Neg for redness, swelling or tenderness      Imaging: 3 views weightbearing right ankle - IMPRESSION: Soft tissue swelling over the lateral malleolus. Subtle  obliquely oriented linear lucency in the lateral malleolus may  represent an acute nondisplaced fracture. Recommend follow-up  radiographs to evaluate for healing response. There is normal joint  spacing and alignment. The ankle mortise is congruent. The talar dome  is unremarkable.      Assessment:  73 year old female with nondisplaced right distal fibular fracture      Medical Decision Making/Plan:  Discussed etiologies, anatomy and options  1.  Nondisplaced right distal fibular fracture  -I personally reviewed and interpreted the patient's lower extremity history pertinent to today's  visit, including imaging/labs, in preparation for initiating a treatment program.  -I personally interpreted and reviewed today's x-rays.  Nondisplaced right distal fibular fracture.  The mortise is anatomic  -Weightbearing as tolerated in boot, dispensed.  Utilize weightbearing assistive device as needed  -RICE/Tylenol as needed based on pain  -Tensogrip dispensed for swelling and pain control  -T#3 Rx  -Fibular fracture billing performed 6/17/2024      Follow up:  4 weeks or sooner with acute issues      Patient's medical history was reviewed today      Thuan Adan DPM FACFAS FACFAOM  Podiatric Foot & Ankle Surgeon  [facility]  [phone]    Disclaimer: This note consists of symbols derived from keyboarding, dictation and/or voice recognition software. As a result, there may be errors in the script that have gone undetected. Please consider this when interpreting information found in this chart.

## 2024-06-17 NOTE — PATIENT INSTRUCTIONS
Thank you for choosing [facility] Podiatry / Foot & Ankle Surgery!    DR. HILTON'S CLINIC LOCATIONS:     [facility] (Friday) TRIAGE LINE: [phone] [address]  APPOINTMENTS: [phone]   [place] [address] RADIOLOGY: [phone]    PHYSICAL THERAPY: [phone]    SET UP SURGERY: [phone]   [place] (Mon-Tues AM-Thurs) BILLING QUESTIONS: [phone]   [address] FAX: [phone]   [place] [address] [place] [facility]: [phone]     He was seen today for follow-up on the right ankle injury.  X-rays demonstrated nondisplaced distal fibular fracture.  Recommendations:    1.  Weightbearing as tolerated and dispensed boot, using a weightbearing assistive device as needed.  Boot rules: 1.  You do not have to wear this to bed at night; 2.  Do not wear when driving or traveling; 3.  Remove throughout the day when you are off your feet; 4.  This should be on when you are up and about    2.  Rest, ice, elevate and utilize Tylenol as needed for pain control;    3.  You are given a compression sleeve to help with swelling and pain control    Follow-up in 4 weeks for your next recheck.  Arrive 30 minutes early as we will be getting updated x-rays.  Call prior with any questions.      ANKLE FRACTURES  A fracture is a partial or complete break in a bone. Fractures in the ankle can range from the less serious avulsion injuries (small pieces of bone that have been pulled off) to severe shattering-type breaks of the tibia, fibula or both.  Ankle fractures are common injuries most often caused by the ankle rolling inward or outward. Many people mistake an ankle fracture for an ankle sprain, but they are quite different and therefore require an accurate and early diagnosis. They sometimes occur simultaneously.    SYMPTOMS  An ankle fracture is accompanied by one or all of these symptoms:  Pain at the site of the fracture, which in some cases can extend from the foot to  the knee.   Significant swelling, which may occur along the length of the leg or may be more localized.   Blisters may occur over the fracture site. These should be promptly treated by a foot and ankle surgeon.   Bruising that develops soon after the injury.   Inability to walk; however, it is possible to walk with less severe breaks, so never rely on walking as a test of whether or not a bone has been fractured.   Change in the appearance of the ankle--it will look different from the other ankle.   Bone protruding through the skin--a sign that immediate care is needed. Fractures that cruz the skin require immediate attention because they can lead to severe infection and prolonged recovery     DIAGNOSIS  Following an ankle injury, it is important to have the ankle evaluated by a foot and ankle surgeon for proper diagnosis and treatment. If you are unable to do so right away, go to the emergency room and then follow up with a foot and ankle surgeon as soon as possible for a more thorough assessment.  The affected limb will be examined by the foot and ankle surgeon who will touch specific areas to evaluate the injury. In addition, the surgeon may order x-rays and other imaging studies, as necessary.    NONSURGICAL TREATMENT  Treatment of ankle fractures depends on the type and severity of the injury. At first, the foot and ankle surgeon will want you to follow the RICE protocol:  Rest: Stay off the injured ankle. Walking may cause further injury.   Ice: Apply an ice pack to the injured area, placing a thin towel between the ice and the skin. Use ice for 20 minutes and then wait at least 40 minutes before icing again.   Compression: An elastic wrap should be used to control swelling.   Elevation: The ankle should be raised slightly above the level of your heart to reduce swelling.    Additional treatment options include:  Immobilization. Certain fractures are treated by protecting and restricting the ankle and foot  "in a cast or splint. This allows the bone to heal.   Prescription medications. To help relieve the pain, the surgeon may prescribe pain medications or anti-inflammatory drugs.    SURGICAL TREATMENT  For some ankle fractures, surgery is needed to repair the fracture and other soft tissue-related injuries, if present. The foot and ankle surgeon will select the procedure that is appropriate for your injury.    FOLLOW-UP  It is important to follow your surgeon s instructions after treatment. Failure to do so can lead to infection, deformity, arthritis and chronic pain.      PRICE Therapy    Many aches and pains throughout the foot and ankle can be helped with many simple treatments.  This is usually described as PRICE Therapy.      P - Protection - often times, inflammation/pain in the lower extremity is not able to improve simply because the areas involved are never allowed to rest.  Every step we take can bother the problematic area.  Protecting those areas is an important step in the healing process.  This may involve a walking cast boot, a special insert/orthotic device, an ankle brace, or simply avoiding barefoot walking.    R - Rest - in addition to protecting the foot/ankle, resting is an important, but often times difficult, treatment option.  Getting off your feet when they bother you, and specifically avoiding activities that cause pain/discomfort, are very beneficial to prevent, and treat, foot/ankle pain.  \"If there's something that makes it hurt(eg activities, shoe gear), and you keep doing the thing that makes it hurt, it's just going to keep hurting\".      I - Ice - icing regularly can help to decrease inflammation and swelling in the foot, thus decreasing pain.  Using an ice pack or a bag of frozen peas works very well.  Ice for 20 minutes multiple times per day as needed.  Do not place the ice directly on the skin as this can cause tissue damage.    C - Compression - using a compression wrap or an ACE " wrap can help to decrease swelling, which can help to decrease pain.  Wearing the wraps is generally not needed at night, but they should be worn on a regular basis when you are going to be on your feet for prolonged periods as gravity tends to pull fluids down to your feet/ankles.    E - Elevation - elevating your lower extremities multiple times daily for 15-20 minutes can help to decrease swelling, which works well in decreasing pain levels.      NSAID/Tylenol - An anti-inflammatory, like Aleve or ibuprofen, and/or a pain medication, such as Tylenol, can help to improve pain levels and get the issue resolved sooner rather than later.  Also, topical anti-inflammatory medications like Voltaren gel can be used for local treatment, with the benefit of avoiding system issues with oral medications.  Anyone with liver issues should be careful with Tylenol, and anyone with high blood pressure or heart, stomach or kidney issues should be careful with anti-inflammatories.  Please ask if you have questions about these medications, including dosage.

## 2024-06-17 NOTE — TELEPHONE ENCOUNTER
Phone call to patient and she was informed that Dr. Adan called a prescription to the pharmacy. She verbalized understanding and was appreciative of assistance.     ABIEL Wiggins RN

## 2024-06-17 NOTE — TELEPHONE ENCOUNTER
Patient calls stating she just saw Dr. Adan and he was going to send a mild pain reliever to [facility] in [employer] [facility] [address] in [place]. She is at the pharmacy now.   Writer will check with provider and call her back shortly.     Ok to leave message : YES    ABIEL Wiggins RN

## 2024-06-17 NOTE — LETTER
"6/17/2024      Lauren Nick  [address] [place] [address]      Dear Colleague,    Thank you for referring your patient, Lauren Nick, to the [facility]. Please see a copy of my visit note below.    Foot & Ankle Surgery  June 17, 2024    CC: \"Sprained ankle\"    I was asked to see Lauren Nick regarding the chief complaint by: Self    HPI:  Pt is a 73 year old female who presents with above complaint.  Multiple day history of right ankle pain.  She was at the [facility] on 6/8/2024 after slipping on her steps.  \"My ankle was twisted\".  After experiencing worsening pain, she was seen in the ER where x-rays were obtained and she indicates they were not reviewed with her.  She continues to have pain.  \"Ice, rest\" for treatment    ROS:   Pos for CC.  The patient denies current nausea, vomiting, chills, fevers, belly pain, calf pain, chest pain or SOB.  Complete remainder of ROS is otherwise neg.    VITALS:  There were no vitals filed for this visit.    PMH:    Past Medical History:   Diagnosis Date     Arthritis 11/16    Saw a doctor received a shot in [place].     Cancer (H) 09/1989    breast cancer, surgery, and radiation     Heart disease 03/21/2021    Heart valve replacement     Thyroid disease 02/21 diagnosed     Uncomplicated asthma A few years ago     Uncontrolled hypertension 02/20/2023       SXHX:    Past Surgical History:   Procedure Laterality Date     ABDOMEN SURGERY  Gallbladder    removed in 2010     BIOPSY  10/2018     BREAST SURGERY  09/25/1989     CARDIAC SURGERY  03/22/2021     CHOLECYSTECTOMY  03/2010     COLONOSCOPY  03/2019     COLONOSCOPY N/A 11/10/2022    Procedure: COLONOSCOPY, WITH POLYPECTOMY AND BIOPSY;  Surgeon: Rafa Parks MD;  Location:  GI     ENT SURGERY  06/2011     EYE SURGERY  09/2015     THORACIC SURGERY  03/21    valve replacement        MEDS:    Current Outpatient Medications   Medication Sig Dispense Refill     albuterol (PROAIR " HFA/PROVENTIL HFA/VENTOLIN HFA) 108 (90 Base) MCG/ACT inhaler Inhale 2 puffs into the lungs every 6 hours 8.5 g 3     aspirin 81 MG EC tablet Take 81 mg by mouth daily       escitalopram (LEXAPRO) 20 MG tablet TAKE 1 TABLET BY MOUTH EVERY DAY 90 tablet 1     fluticasone (FLONASE) 50 MCG/ACT nasal spray INSTILL 2 SPRAYS INTO BOTH NOSTRILS DAILY 48 mL 3     levothyroxine (SYNTHROID/LEVOTHROID) 50 MCG tablet TAKE 1 TABLET BY MOUTH EVERY DAY 90 tablet 2     LORazepam (ATIVAN) 1 MG tablet Take 1 tablet (1 mg) by mouth nightly as needed for sleep 30 tablet 5     losartan (COZAAR) 25 MG tablet TAKE 1 TABLET BY MOUTH EVERY DAY 90 tablet 1     magnesium 250 MG tablet Take 1 tablet by mouth daily Unknown dose       metoprolol succinate ER (TOPROL XL) 25 MG 24 hr tablet TAKE 1 TABLET BY MOUTH TWICE A  tablet 1     psyllium (METAMUCIL/KONSYL) capsule Take 1 capsule by mouth daily       STATIN NOT PRESCRIBED (INTENTIONAL) Please choose reason not prescribed from choices below.       Turmeric 500 MG TABS Take by mouth daily       Current Facility-Administered Medications   Medication Dose Route Frequency Provider Last Rate Last Admin     triamcinolone acetonide (KENALOG-10) injection 3 mg  3 mg Intra-Lesional Once            ALL:     Allergies   Allergen Reactions     Penicillins Hives     Ambien [Zolpidem]      Complex behaviors     Statins [Statins]      Myalgias to multiple statins       FMH:    Family History   Problem Relation Age of Onset     Hypertension Mother      Cancer Mother      Other Cancer Mother         kidney cancer 85     Other Cancer Father          of stomach cancer age 43     Heart Disease Paternal Grandmother      Hypertension Paternal Grandmother      Obesity Paternal Grandmother      Hypertension Sister      Hyperlipidemia Sister      Heart Disease Sister      Hypertension Sister      Hyperlipidemia Sister      Colon Cancer Sister      Diabetes Sister      Breast Cancer Sister      Skin Cancer  Sister      Coronary Artery Disease Sister      Hyperlipidemia Sister      Thyroid Disease Sister      Asthma Sister      Skin Cancer Sister      Anesthesia Reaction Son      Hypertension Son      Heart Disease Daughter      Other Cancer Other         Neuroblastoma age 5       SocHx:    Social History     Socioeconomic History     Marital status:      Spouse name: Not on file     Number of children: Not on file     Years of education: Not on file     Highest education level: Not on file   Occupational History     Not on file   Tobacco Use     Smoking status: Former     Current packs/day: 0.00     Average packs/day: 1 pack/day for 10.4 years (10.4 ttl pk-yrs)     Types: Cigarettes     Start date: 1969     Quit date: 10/1/1979     Years since quittin.7     Smokeless tobacco: Never   Vaping Use     Vaping status: Never Used   Substance and Sexual Activity     Alcohol use: Yes     Comment: occ     Drug use: Never     Sexual activity: Not Currently     Partners: Male     Birth control/protection: Post-menopausal   Other Topics Concern     Parent/sibling w/ CABG, MI or angioplasty before 65F 55M? No   Social History Narrative     Not on file     Social Determinants of Health     Financial Resource Strain: Low Risk  (2023)    Financial Resource Strain      Within the past 12 months, have you or your family members you live with been unable to get utilities (heat, electricity) when it was really needed?: No   Food Insecurity: No Food Insecurity (2024)    Received from [facility]    Hunger Vital Sign      Worried About Running Out of Food in the Last Year: Never true      Ran Out of Food in the Last Year: Never true   Transportation Needs: No Transportation Needs (2024)    Received from [facility]    PRAPARE - Transportation      Lack of Transportation (Medical): No      Lack of Transportation (Non-Medical): No   Physical Activity: Sufficiently Active (2024)     Received from [facility]    Exercise Vital Sign      Days of Exercise per Week: 4 days      Minutes of Exercise per Session: 40 min   Stress: Not on file   Social Connections: Not on file   Interpersonal Safety: Low Risk  (4/15/2024)    Interpersonal Safety      Do you feel physically and emotionally safe where you currently live?: Yes      Within the past 12 months, have you been hit, slapped, kicked or otherwise physically hurt by someone?: No      Within the past 12 months, have you been humiliated or emotionally abused in other ways by your partner or ex-partner?: No   Housing Stability: Low Risk  (2/23/2024)    Received from [facility]    Housing Stability      What is your living situation today?: I have a steady place to live           EXAMINATION:  Gen:   No apparent distress  Neuro:   A&Ox3, no deficits  Psych:    Answering questions appropriately for age and situation with normal affect  Head:    NCAT  Eye:    Visual scanning without deficit  Ear:    Response to auditory stimuli wnl  Lung:    Non-labored breathing on RA noted  Abd:    NTND per patient report  Lymph:    Neg for pitting/non-pitting edema BLE  Vasc:    Pulses palpable, CFT minimally delayed  Neuro:    Light touch sensation intact to all sensory nerve distributions without paresthesias  Derm:    Neg for nodules, lesions or ulcerations  MSK:    Right lower extremity -pain on palpation along the distal fibula.  The knee to ankle examination and rear foot are otherwise negative today  Calf:    Neg for redness, swelling or tenderness      Imaging: 3 views weightbearing right ankle - IMPRESSION: Soft tissue swelling over the lateral malleolus. Subtle  obliquely oriented linear lucency in the lateral malleolus may  represent an acute nondisplaced fracture. Recommend follow-up  radiographs to evaluate for healing response. There is normal joint  spacing and alignment. The ankle mortise is congruent. The talar dome  is  unremarkable.      Assessment:  73 year old female with nondisplaced right distal fibular fracture      Medical Decision Making/Plan:  Discussed etiologies, anatomy and options  1.  Nondisplaced right distal fibular fracture  -I personally reviewed and interpreted the patient's lower extremity history pertinent to today's visit, including imaging/labs, in preparation for initiating a treatment program.  -I personally interpreted and reviewed today's x-rays.  Nondisplaced right distal fibular fracture.  The mortise is anatomic  -Weightbearing as tolerated in boot, dispensed.  Utilize weightbearing assistive device as needed  -RICE/Tylenol as needed based on pain  -Tensogrip dispensed for swelling and pain control  -T#3 Rx  -Fibular fracture billing performed 6/17/2024      Follow up:  4 weeks or sooner with acute issues      Patient's medical history was reviewed today      Thuan Adan DPM FACFAS FACFAOM  Podiatric Foot & Ankle Surgeon  [facility]  [phone]    Disclaimer: This note consists of symbols derived from keyboarding, dictation and/or voice recognition software. As a result, there may be errors in the script that have gone undetected. Please consider this when interpreting information found in this chart.          Again, thank you for allowing me to participate in the care of your patient.        Sincerely,        Thuan Adan DPM, JELENA

## 2024-06-19 ENCOUNTER — OFFICE VISIT (OUTPATIENT)
Dept: DERMATOLOGY | Facility: CLINIC | Age: 74
End: 2024-06-19
Payer: MEDICARE

## 2024-06-19 VITALS — SYSTOLIC BLOOD PRESSURE: 123 MMHG | DIASTOLIC BLOOD PRESSURE: 69 MMHG | HEART RATE: 60 BPM | OXYGEN SATURATION: 97 %

## 2024-06-19 DIAGNOSIS — C44.319 BASAL CELL CARCINOMA (BCC) OF FOREHEAD: Primary | ICD-10-CM

## 2024-06-19 DIAGNOSIS — L90.5 FACIAL SCAR: ICD-10-CM

## 2024-06-19 PROCEDURE — 99207 PR NO BILLABLE SERVICE THIS VISIT: CPT | Performed by: DERMATOLOGY

## 2024-06-19 PROCEDURE — 17311 MOHS 1 STAGE H/N/HF/G: CPT | Performed by: DERMATOLOGY

## 2024-06-19 PROCEDURE — 12052 INTMD RPR FACE/MM 2.6-5.0 CM: CPT | Performed by: DERMATOLOGY

## 2024-06-19 PROCEDURE — 17312 MOHS ADDL STAGE: CPT | Performed by: DERMATOLOGY

## 2024-06-19 ASSESSMENT — PAIN SCALES - GENERAL: PAINLEVEL: MODERATE PAIN (4)

## 2024-06-19 NOTE — PROGRESS NOTES
[facility] [place] Procedure Note    Dermatology Problem List:  Last FBSC performed on 12/4/23    1. AKs - cryo  2. Hx of NMSC  - BCC, central upper forehead, s/p Mohs 6/19/24   - BCC, L preauricular, s/p MMS 01/25/24         - PDL and ILK-10 5/10/24  _____________________________________________    Date of Service:  Jun 19, 2024  Surgery: Mohs micrographic surgery    Case 1  Repair Type: intermediate  Repair Size: 3.0 cm  Suture Material: 4-0 monocryl, Fast Absorbing Gut 5-0  Tumor Type: BCC - Basal cell carcinoma (nodular)  Location: central upper forehead  Derm-Path Accession #: [id number]  PreOp Size: 0.6x0.5 cm  PostOp Size: 1.1x1.1 cm  Mohs Accession #: [id number]  Level of Defect: fascia      Procedure:  We discussed the principles of treatment and most likely complications including scarring, bleeding, infection, swelling, pain, crusting, nerve damage, large wound,  incomplete excision, wound dehiscence,  nerve damage, recurrence, and a second procedure may be recommended to obtain the best cosmetic or functional result.    Informed consent was obtained and the patient underwent the procedure as follows:  The patient was placed supine on the operating table.  The cancer was identified, outlined with a marker, and verified by the patient.  The entire surgical field was prepped with ChoraPrep.  The surgical site was anesthetized using Lidocaine 1% with epi 1:100,000.      The area of clinically apparent tumor was debulked. The layer of tissue was then surgically excised using a #15 blade and was then transferred onto a specimen sheet maintaining the orientation of the specimen. Hemostasis was obtained using bipolar electrocoagulation. The wound site was then covered with a dressing while the tissue samples were processed for examination.    The excised tissue was transported to the Mohs histology laboratory maintaining the tissue orientation.  The tissue specimen was  relaxed so that the entire surgical margin was in a a single horizontal plane for sectioning and inked for precise mapping.  A precise reference map was drawn to reflect the sectioning of the specimen, colored inking of the margins, and orientation on the patient.  The tissue was processed using horizontal sectioning of the base and continuous peripheral margins.  The histopathologic sections were reviewed in conjunction with the reference map.    Total blocks: 1    Total slides:  1    Residual tumor was identified and indicated in red on the reference map, identifying the location where further tissue excision was necessary. Therefore, an additional stage of Mohs Micrographic surgery was deemed necessary.     Stage II   The patient was returned to the operating room, and the area prepped in the usual manner. The residual tumor was excised using the reference map as a guide. The specimen was transfered to a labeled specimen sheet maintaining the orientation of the specimen. Hemostasis was obtained and the wound site was covered with a dressing while the tissue was processed for examination.     The excised tissue was transported to the Mohs histology laboratory maintaining orientation. The specimen margins were inked for precise mapping and a reference map was prepared for the is additional stage to maintain precise orientation as described above. The tissue was processed using horizontal sectioning of the base and continuous peripheral margins. The histopathologic sections were reviewed in conjunction with the reference map.     Total blocks: 1    Total slides:  1    There were no cancer cells visualized on examination, therefore Mohs surgery was complete.     Reconstruction: Intermediate Linear Closure      The patient was taken to the operative suite and placed supine on the operating room table. The defect was identified.  Appropriate markings were made with a marking pen to plan the repair. The area was  infiltrated with Lidocaine 1% with epi 1:100,000 and prepped with ChoraPrep and draped with sterile towels.     The wound was debeveled and undermined widely. Cones were excised within relaxed skin tension lines on both sides of the defect. Hemostasis was obtained using bipolar electrocoagulation. Deep subcutaneous tissues were then approximated using monocryl 4-0 buried vertical mattress sutures. The wound edges were then approximated additional  buried sutures were placed in a similar fashion where needed. Fast Absorbing Gut 5-0 simple running sutures were carefully placed for maximum eversion and meticulous approximation.    Repair Size: 3.0 cm    The wound was cleansed with saline and ointment was applied along the wound surface.     A sterile pressure dressing was applied.  Wound care instructions were given verbally and in writing.  The patient left the operating suite in stable condition.  Patient was informed that additional refinement of the resulting surgical scar may be used as a second stage of this reconstruction.     The attending surgeon was present for entire minor procedure and examination.        Laser- VBeam(Pulsed Dye Laser) Procedure Note: Medical    Procedure Date: 2024     Attending Staff Surgeon: Сергей Parry DO     Fellow Surgeon: None     Operating Room Data:     Surgery/Procedure Date:    SAME      Pre-operative Diagnosis:   Scar  Location: left preauricular cheek  Size(cm2): 7     Operation/Procedure    Vbeam pulsed dye laser treatment#: 2     Post-operative Diagnosis:  SAME     Laser Settings:  Energy:7.25 J/cm2  Spot size: 10mm  Pulse width:  6 mS (0.45 thru 40 mS)  Dynamic cooling spray settin mS  Dynamic cooling device delay:  20 mS     Anesthesia:  None     Description of Operation/Procedure:   The nature and purpose of the procedure, associated risks, possible consequences, complications and alternative methods of treatment were explained in detail, this includes but  is not limited to dyspigmentation, scarring, bruising, pain/discomfort, eye injury, and blister. Multiple treatments may be recommended. A photo and operative consent were obtained. Time-out was performed.The patient was positioned to optimally expose the area treated. Protective eyewear was worn by the patient and goggles on all personnel in the treatment room. The patient confirmed the site to be treated. The laser energy output was verified by meter reading.       The clinically evident lesion(s) was/were treated with Joceline Vbeam pulsed dye laser (595 nm) beam as above. A total of 4 pulses were used. The patient tolerated the procedure well and no complications were noted. Post operative instructions were provided. The total laser operation and preparation time was 5 minutes.      The patient will follow-up in 6 weeks.    Staff Involved:  Scribe/Staff    Scribe Disclosure:   Valerie CRANE, am serving as a scribe; to document services personally performed by Dr. Сергей Parry - -based on data collection and the provider's statements to me.     Provider Disclosure:   The documentation recorded by the scribe accurately reflects the services I personally performed and the decisions made by me. I personally performed the procedures today.    Сергей Parry DO    Department of Dermatology  [facility]: Phone: [phone], Fax:[phone]  [facility]: Phone: [phone], Fax: [phone]    Care and Laboratory Testing Performed at:  [facility]   Dermatology Clinic  [address] [place] [address]

## 2024-06-19 NOTE — LETTER
6/19/2024      Lauren Nick  [address] [place] [address]      Dear Colleague,    Thank you for referring your patient, Lauren Nick, to the [facility]. Please see a copy of my visit note below.    [facility] [place] Procedure Note    Dermatology Problem List:  Last FBSC performed on 12/4/23    1. AKs - cryo  2. Hx of NMSC  - BCC, central upper forehead, s/p Mohs 6/19/24   - BCC, L preauricular, s/p MMS 01/25/24         - PDL and ILK-10 5/10/24  _____________________________________________    Date of Service:  Jun 19, 2024  Surgery: Mohs micrographic surgery    Case 1  Repair Type: intermediate  Repair Size: 3.0 cm  Suture Material: 4-0 monocryl, Fast Absorbing Gut 5-0  Tumor Type: BCC - Basal cell carcinoma (nodular)  Location: central upper forehead  Derm-Path Accession #: [id number]  PreOp Size: 0.6x0.5 cm  PostOp Size: 1.1x1.1 cm  Mohs Accession #: [id number]  Level of Defect: fascia      Procedure:  We discussed the principles of treatment and most likely complications including scarring, bleeding, infection, swelling, pain, crusting, nerve damage, large wound,  incomplete excision, wound dehiscence,  nerve damage, recurrence, and a second procedure may be recommended to obtain the best cosmetic or functional result.    Informed consent was obtained and the patient underwent the procedure as follows:  The patient was placed supine on the operating table.  The cancer was identified, outlined with a marker, and verified by the patient.  The entire surgical field was prepped with ChoraPrep.  The surgical site was anesthetized using Lidocaine 1% with epi 1:100,000.      The area of clinically apparent tumor was debulked. The layer of tissue was then surgically excised using a #15 blade and was then transferred onto a specimen sheet maintaining the orientation of the specimen. Hemostasis was obtained using bipolar electrocoagulation. The  wound site was then covered with a dressing while the tissue samples were processed for examination.    The excised tissue was transported to the Mohs histology laboratory maintaining the tissue orientation.  The tissue specimen was relaxed so that the entire surgical margin was in a a single horizontal plane for sectioning and inked for precise mapping.  A precise reference map was drawn to reflect the sectioning of the specimen, colored inking of the margins, and orientation on the patient.  The tissue was processed using horizontal sectioning of the base and continuous peripheral margins.  The histopathologic sections were reviewed in conjunction with the reference map.    Total blocks: 1    Total slides:  1    Residual tumor was identified and indicated in red on the reference map, identifying the location where further tissue excision was necessary. Therefore, an additional stage of Mohs Micrographic surgery was deemed necessary.     Stage II   The patient was returned to the operating room, and the area prepped in the usual manner. The residual tumor was excised using the reference map as a guide. The specimen was transfered to a labeled specimen sheet maintaining the orientation of the specimen. Hemostasis was obtained and the wound site was covered with a dressing while the tissue was processed for examination.     The excised tissue was transported to the Mohs histology laboratory maintaining orientation. The specimen margins were inked for precise mapping and a reference map was prepared for the is additional stage to maintain precise orientation as described above. The tissue was processed using horizontal sectioning of the base and continuous peripheral margins. The histopathologic sections were reviewed in conjunction with the reference map.     Total blocks: 1    Total slides:  1    There were no cancer cells visualized on examination, therefore Mohs surgery was complete.     Reconstruction:  Intermediate Linear Closure      The patient was taken to the operative suite and placed supine on the operating room table. The defect was identified.  Appropriate markings were made with a marking pen to plan the repair. The area was infiltrated with Lidocaine 1% with epi 1:100,000 and prepped with ChoraPrep and draped with sterile towels.     The wound was debeveled and undermined widely. Cones were excised within relaxed skin tension lines on both sides of the defect. Hemostasis was obtained using bipolar electrocoagulation. Deep subcutaneous tissues were then approximated using monocryl 4-0 buried vertical mattress sutures. The wound edges were then approximated additional  buried sutures were placed in a similar fashion where needed. Fast Absorbing Gut 5-0 simple running sutures were carefully placed for maximum eversion and meticulous approximation.    Repair Size: 3.0 cm    The wound was cleansed with saline and ointment was applied along the wound surface.     A sterile pressure dressing was applied.  Wound care instructions were given verbally and in writing.  The patient left the operating suite in stable condition.  Patient was informed that additional refinement of the resulting surgical scar may be used as a second stage of this reconstruction.     The attending surgeon was present for entire minor procedure and examination.        Laser- VBeam(Pulsed Dye Laser) Procedure Note: Medical    Procedure Date: 2024     Attending Staff Surgeon: Сергей Parry DO     Fellow Surgeon: None     Operating Room Data:     Surgery/Procedure Date:    SAME      Pre-operative Diagnosis:   Scar  Location: left preauricular cheek  Size(cm2): 7     Operation/Procedure    Vbeam pulsed dye laser treatment#: 2     Post-operative Diagnosis:  SAME     Laser Settings:  Energy:7.25 J/cm2  Spot size: 10mm  Pulse width:  6 mS (0.45 thru 40 mS)  Dynamic cooling spray settin mS  Dynamic cooling device delay:  20 mS      Anesthesia:  None     Description of Operation/Procedure:   The nature and purpose of the procedure, associated risks, possible consequences, complications and alternative methods of treatment were explained in detail, this includes but is not limited to dyspigmentation, scarring, bruising, pain/discomfort, eye injury, and blister. Multiple treatments may be recommended. A photo and operative consent were obtained. Time-out was performed.The patient was positioned to optimally expose the area treated. Protective eyewear was worn by the patient and goggles on all personnel in the treatment room. The patient confirmed the site to be treated. The laser energy output was verified by meter reading.       The clinically evident lesion(s) was/were treated with Joceline Vbeam pulsed dye laser (595 nm) beam as above. A total of 4 pulses were used. The patient tolerated the procedure well and no complications were noted. Post operative instructions were provided. The total laser operation and preparation time was 5 minutes.      The patient will follow-up in 6 weeks.    Staff Involved:  Scribe/Staff    Scribe Disclosure:   IValerie, am serving as a scribe; to document services personally performed by Dr. Сергей Parry - -based on data collection and the provider's statements to me.     Provider Disclosure:   The documentation recorded by the scribe accurately reflects the services I personally performed and the decisions made by me. I personally performed the procedures today.    Сергей Parry DO    Department of Dermatology  [facility]: Phone: [phone], Fax:[phone]  [facility]: Phone: [phone], Fax: [phone]    Care and Laboratory Testing Performed at:  [facility]   Dermatology Clinic  [address] [place] [address]      Again, thank you for  allowing me to participate in the care of your patient.        Sincerely,        Сергей Parry MD

## 2024-06-19 NOTE — PATIENT INSTRUCTIONS
Caring for your skin after surgery    After your surgery, a pressure bandage will be placed over the area. This will prevent bleeding. Please follow these instructions over the next 1 to 2 weeks. Following this regimen will help to prevent complications as your wound heals.     For the first 48 hours after your surgery:    Leave the pressure dressing on and keep it dry. If it should come loose, you may re-tape it, but do not take it off.  Relax and take it easy. Do not do any vigorous exercise, heavy lifting or bending forward. This could cause the wound to bleed.  Post-operative pain is usually mild. You may alternate between 1000 mg of Tylenol (acetaminophen) and 400 mg of Ibuprofen every 4 hours.  Do not take more than 4,000 mg of acetaminophen in a 24-hour period or 3200 mg of Ibuprofen in a 24-hr period.  Avoid alcohol and vitamin E as these may increase your tendency to bleed.  You may put an ice pack around the bandaged area for 20 minutes at a time as needed. This may help reduce swelling, bruising, and pain. Make sure the ice pack is waterproof so that the pressure bandage doesn't get wet.  You may see a small amount of drainage or blood on your pressure bandage. This is normal. However:  If drainage or bleeding continues or saturates the bandage, you will need to apply firm pressure over the bandage with a clean washcloth for 15 minutes.  If bleeding continues after applying pressure for 15 minutes, apply an ice pack with gentle pressure to the bandaged area for another 15 minutes.  If bleeding still continues, call our office or go to the nearest emergency room.    48 Hours After Surgery:  Carefully remove the pressure bandage. If it seems sticky or too difficult to get off, you may need to soak it off in the shower.  Wash wound with a mild soap and water.  Use caution when washing the wound, be gentle and do not let the forceful shower stream hit the wound directly.  Pat dry.  Apply Vaseline (from a new  container or tube) over the suture line with a Q-tip.  Cover the site with a bandage.  Do this daily until the sutures have dissolved.      What to expect:    The first couple of days your wound may be tender and may bleed slightly when doing wound care.  There may be swelling and bruising around the wound, especially if it is near the eyes. For your comfort, you may apply ice or cold compresses to the area.  The area around your wound may be numb for several weeks or even months.  You may experience periodic sharp pain or mild itching around the wound as it heals.   The suture line will look dark pink at first and the edges of the wound will be reddened. This will lighten up each day.    Call Us If:    You have bleeding that will not stop after applying pressure and ice.  You have pain that is not controlled with Tylenol and Ibuprofen.  You have signs or symptoms of an infection such as fever over 100 degrees Fahrenheit, redness, warmth or foul-smelling drainage from the wound    [facility]: [phone]   [facility]: [phone]  For urgent needs outside of business hours call the [facility] at [phone] and ask to speak with the dermatology resident on call       Pulsed Dye Laser (PDL)    I will experience redness, swelling, pain, and heat sensation. I may experience bruising, itching, or acne. Risks are blistering, oozing, permanent scarring, hair loss, temporary or permanent skin lightening or darkening, infection, and eye injury. I understand my outcome could be no improvement, slight improvement. Multiple treatments may be required.    After treatment, Do Not:  Rub, scratch, or put weight on the site for 2 weeks  Wear tight fitting clothing or jewelry over the site  [place]. Keep the site out of sunlight. Use sunscreen of 30 SPF or greater when in the sun. Use sunscreen 30 minutes before going out and reapply if sweating. Tanning decreases  the success of the treatment    How do I care for the treated site?  Use ice packs for 10-20 minutes after the procedure for swelling   If the site is on your face, use ice again 1 hour after treatment for ten minutes and repeat again before bed. Do not burn the skin with the ice.   If a scab or crust forms, gently cleanse the site with water. Then put on Vaseline  ointment 3 times a day and contact the clinic   If a blister forms, contact the clinic by phone  If you have concerns about swelling, call the clinic  Avoid sun exposure and do not get tan as this can darken the treated area, use sunscreen  Do not use makeup on any open wound  Do not come to your next treatment with a tan    What should I expect?  Mild swelling  Blue-purple color that may take 2 to 3 weeks to go away  Redness may also last a week or longer  Results may take up to 3 or 4 months after treatment  More procedures may be needed    Who should I call with questions?  [facility]: [phone]  [facility]: [phone]  For urgent needs outside of business hours call the [facility] at [phone] and ask for the dermatology resident on call  [place] messaging response may be delayed, please call for urgent issues

## 2024-06-20 ENCOUNTER — TELEPHONE (OUTPATIENT)
Dept: DERMATOLOGY | Facility: CLINIC | Age: 74
End: 2024-06-20
Payer: MEDICARE

## 2024-06-20 NOTE — TELEPHONE ENCOUNTER
Pat is 1 day s/p Mohs to central upper forehead.  I called to follow up on how she is doing post-procedure.  I left a detailed message requesting a call back if the patient had any questions or concerns.        Jackelyn Mesa RN

## 2024-06-24 ENCOUNTER — TELEPHONE (OUTPATIENT)
Dept: CARDIOLOGY | Facility: CLINIC | Age: 74
End: 2024-06-24

## 2024-06-24 ENCOUNTER — HOSPITAL ENCOUNTER (OUTPATIENT)
Dept: CARDIOLOGY | Facility: CLINIC | Age: 74
Discharge: HOME | End: 2024-06-24
Attending: INTERNAL MEDICINE | Admitting: INTERNAL MEDICINE
Payer: MEDICARE

## 2024-06-24 DIAGNOSIS — I77.810 ASCENDING AORTA DILATATION (H): ICD-10-CM

## 2024-06-24 DIAGNOSIS — Z95.2 ENCOUNTER FOR FOLLOW-UP FOR AORTIC VALVE REPLACEMENT: ICD-10-CM

## 2024-06-24 DIAGNOSIS — Z09 ENCOUNTER FOR FOLLOW-UP FOR AORTIC VALVE REPLACEMENT: ICD-10-CM

## 2024-06-24 DIAGNOSIS — Q22.9: ICD-10-CM

## 2024-06-24 DIAGNOSIS — Z95.2 H/O AORTIC VALVE REPLACEMENT: ICD-10-CM

## 2024-06-24 LAB
CREAT BLD-MCNC: 0.9 MG/DL (ref 0.5–1)
EGFRCR SERPLBLD CKD-EPI 2021: >60 ML/MIN/1.73M2

## 2024-06-24 PROCEDURE — 82565 ASSAY OF CREATININE: CPT

## 2024-06-24 PROCEDURE — 75572 CT HRT W/3D IMAGE: CPT | Mod: 26 | Performed by: INTERNAL MEDICINE

## 2024-06-24 PROCEDURE — G1010 CDSM STANSON: HCPCS | Performed by: INTERNAL MEDICINE

## 2024-06-24 PROCEDURE — 75572 CT HRT W/3D IMAGE: CPT | Mod: MG

## 2024-06-24 PROCEDURE — 250N000011 HC RX IP 250 OP 636: Performed by: INTERNAL MEDICINE

## 2024-06-24 RX ORDER — LIDOCAINE 40 MG/G
CREAM TOPICAL
OUTPATIENT
Start: 2024-06-24

## 2024-06-24 RX ORDER — ONDANSETRON 2 MG/ML
4 INJECTION INTRAMUSCULAR; INTRAVENOUS
Status: DISCONTINUED | OUTPATIENT
Start: 2024-06-24 | End: 2024-06-25 | Stop reason: HOSPADM

## 2024-06-24 RX ORDER — IOPAMIDOL 755 MG/ML
500 INJECTION, SOLUTION INTRAVASCULAR ONCE
Status: COMPLETED | OUTPATIENT
Start: 2024-06-24 | End: 2024-06-24

## 2024-06-24 RX ORDER — METHYLPREDNISOLONE SODIUM SUCCINATE 125 MG/2ML
125 INJECTION, POWDER, LYOPHILIZED, FOR SOLUTION INTRAMUSCULAR; INTRAVENOUS
Status: DISCONTINUED | OUTPATIENT
Start: 2024-06-24 | End: 2024-06-25 | Stop reason: HOSPADM

## 2024-06-24 RX ORDER — NITROGLYCERIN 0.4 MG/1
0.4 TABLET SUBLINGUAL
Status: DISCONTINUED | OUTPATIENT
Start: 2024-06-24 | End: 2024-06-25 | Stop reason: HOSPADM

## 2024-06-24 RX ORDER — DIPHENHYDRAMINE HYDROCHLORIDE 50 MG/ML
25-50 INJECTION INTRAMUSCULAR; INTRAVENOUS
Status: DISCONTINUED | OUTPATIENT
Start: 2024-06-24 | End: 2024-06-25 | Stop reason: HOSPADM

## 2024-06-24 RX ORDER — DIPHENHYDRAMINE HCL 25 MG
25 CAPSULE ORAL
Status: DISCONTINUED | OUTPATIENT
Start: 2024-06-24 | End: 2024-06-25 | Stop reason: HOSPADM

## 2024-06-24 RX ADMIN — IOPAMIDOL 100 ML: 755 INJECTION, SOLUTION INTRAVENOUS at 14:20

## 2024-06-24 NOTE — TELEPHONE ENCOUNTER
Please see CTA heart structure result and advise on any new recommendations.  Next OV 11/11/24 with Dr. King.    IMPRESSION:  1.  The patient is status post aortic valve replacement with a 23 mm  Inspiris Resilia bioprosthesis as well as left atrial appendage  ligation with a 35mm AtriClip in 03/2021.  2.  The AtriClip appears well seated with complete exclusion of the  left atrial appendage.  3.  No intracardiac thrombus is noted.  4.  There is mild dilatation of the ascending thoracic aorta, which  measures 43 mm x 40 mm at the level of the main pulmonary artery.  5.  Please review Radiology report from the original study date from  the original site for incidental non-cardiac findings.

## 2024-06-25 ENCOUNTER — HOSPITAL ENCOUNTER (OUTPATIENT)
Dept: MAMMOGRAPHY | Facility: CLINIC | Age: 74
Discharge: HOME | End: 2024-06-25
Attending: FAMILY MEDICINE
Payer: MEDICARE

## 2024-06-25 DIAGNOSIS — Z12.31 ENCOUNTER FOR SCREENING MAMMOGRAM FOR BREAST CANCER: ICD-10-CM

## 2024-06-25 DIAGNOSIS — N63.21 MASS OF UPPER OUTER QUADRANT OF LEFT BREAST: ICD-10-CM

## 2024-06-25 DIAGNOSIS — F51.01 PRIMARY INSOMNIA: ICD-10-CM

## 2024-06-25 PROCEDURE — 77065 DX MAMMO INCL CAD UNI: CPT | Mod: LT

## 2024-06-25 NOTE — TELEPHONE ENCOUNTER
Reggie King MD Lidke, Kyra LORA RN  Caller: Unspecified (Yesterday,  4:02 PM)  Left atrial appendage is well excluded by the atrial clip.  No need of anticoagulation in the setting of paroxysmal atrial fibrillation as left atrial appendage is successfully excluded by the device     MyChart message sent to patient.  Kyra Ram, STORMY on 6/25/2024 at 2:06 PM

## 2024-06-26 RX ORDER — LORAZEPAM 1 MG/1
1 TABLET ORAL
Qty: 30 TABLET | Refills: 5 | Status: SHIPPED | OUTPATIENT
Start: 2024-06-26

## 2024-07-02 NOTE — PROGRESS NOTES
[facility] Dermatology Note  Encounter Date: Jul 3, 2024  Store-and-Forward and Telephone. Location of teledermatologist: [facility].  Start time: 9:59 am. End time: 10:01 am .    Dermatology Problem List:  Last FBSC performed on 12/4/23     1. AKs - cryo  2. Hx of NMSC  - BCC, central upper forehead, s/p Mohs 6/19/24   - BCC, L preauricular, s/p MMS 01/25/24         - PDL and ILK-10 5/10/24    CC: Surgical Followup (Wound check S/p Mohs BCC central upper forehead DOS: 06/19/2024 using Serica on incision - healing well and no concerns)    Subjective: Lauren Nick is a 73 year old female who presents today for wound check after Mohs surgery on 6/19/24.   - has healed well over the past several weeks. No concerns. No longer crusted.   - no other concerns today    Objective:   Focused examination of the central upper forehead within the teledermatology photograph(s) on 7/2/24 was performed.   - The surgical site noted above is clean, dry, and intact. There is no surrounding erythema or purulence. No clinical evidence of infection noted today.          Assessment and Plan:     1. Post-op wound evaluation status post Mohs and intermediate closure of BCC on central upper forehead.   - The patient's surgery site(s) is/are healing very well. No evidence of infection on examination today.  - The patient was told to continue with wound cares until the area(s) is/are no longer crusted.   - The patient should follow up with dermatologic surgery PRN, as well as continue with regular skin exams in general dermatology clinic.    Patient was discussed with and evaluated by attending physician Dr. Сергей Parry.    Staff Involved:  Staff/Scribe  Scribe Disclosure:   Valerie CRANE, am serving as a scribe; to document services personally performed by Dr. Сергей Parry - -based on data collection and the provider's statements to me.     Sarah Dennis PA-C  [facility]    Dermatology    Staff Physician Comments:   I saw the photos and evaluated the patient with the physician assistant (Sarah Dennis PA-C) and I agree with the assessment and plan as documented by the scribe. I was present for the key portions of the telephone call.    Сергей Parry DO    Department of Dermatology  [facility]: Phone: [phone], Fax:[phone]  [facility]: Phone: [phone], Fax: [phone]

## 2024-07-03 ENCOUNTER — VIRTUAL VISIT (OUTPATIENT)
Dept: DERMATOLOGY | Facility: CLINIC | Age: 74
End: 2024-07-03
Payer: MEDICARE

## 2024-07-03 DIAGNOSIS — Z51.89 VISIT FOR WOUND CHECK: Primary | ICD-10-CM

## 2024-07-03 PROCEDURE — 99207 PR NO CHARGE LOS: CPT | Mod: 93 | Performed by: DERMATOLOGY

## 2024-07-03 NOTE — NURSING NOTE
Teledermatology Nurse Call Patients:     Are you in the [facility] at the time of the encounter? yes    Today's visit will be billed to you and your insurance.    A teledermatology visit is not as thorough as an in-person visit and the quality of the photograph sent may not be of the same quality as that taken by the dermatology clinic.

## 2024-07-03 NOTE — LETTER
7/3/2024      Lauren Nick  [address] [place] [address]      Dear Colleague,    Thank you for referring your patient, Lauren Nick, to the [facility]. Please see a copy of my visit note below.    [facility] Dermatology Note  Encounter Date: Jul 3, 2024  Store-and-Forward and Telephone. Location of teledermatologist: [facility].  Start time: 9:59 am. End time: 10:01 am .    Dermatology Problem List:  Last FBSC performed on 12/4/23     1. AKs - cryo  2. Hx of NMSC  - BCC, central upper forehead, s/p Mohs 6/19/24   - BCC, L preauricular, s/p MMS 01/25/24         - PDL and ILK-10 5/10/24    CC: Surgical Followup (Wound check S/p Mohs BCC central upper forehead DOS: 06/19/2024 using Serica on incision - healing well and no concerns)    Subjective: Lauren Nick is a 73 year old female who presents today for wound check after Mohs surgery on 6/19/24.   - has healed well over the past several weeks. No concerns. No longer crusted.   - no other concerns today    Objective:   Focused examination of the central upper forehead within the teledermatology photograph(s) on 7/2/24 was performed.   - The surgical site noted above is clean, dry, and intact. There is no surrounding erythema or purulence. No clinical evidence of infection noted today.          Assessment and Plan:     1. Post-op wound evaluation status post Mohs and intermediate closure of BCC on central upper forehead.   - The patient's surgery site(s) is/are healing very well. No evidence of infection on examination today.  - The patient was told to continue with wound cares until the area(s) is/are no longer crusted.   - The patient should follow up with dermatologic surgery PRN, as well as continue with regular skin exams in general dermatology clinic.    Patient was discussed with and evaluated by attending physician Dr. Сергей Parry.    Staff Involved:  Staff/Scribe  Scribe  Disclosure:   I, Valerie Shepard, am serving as a scribe; to document services personally performed by Dr. Сергей Parry - -based on data collection and the provider's statements to me.     Sarah Dennis PA-C  [facility]   Dermatology    Staff Physician Comments:   I saw the photos and evaluated the patient with the physician assistant (Sarah Dennis PA-C) and I agree with the assessment and plan as documented by the scribe. I was present for the key portions of the telephone call.    Сергей Parry DO    Department of Dermatology  [facility]: Phone: [phone], Fax:[phone]  [facility]: Phone: [phone], Fax: [phone]      Again, thank you for allowing me to participate in the care of your patient.        Sincerely,        Сергей Parry MD

## 2024-07-16 DIAGNOSIS — I10 HYPERTENSION GOAL BP (BLOOD PRESSURE) < 130/80: ICD-10-CM

## 2024-07-16 DIAGNOSIS — Z95.2 H/O AORTIC VALVE REPLACEMENT: ICD-10-CM

## 2024-07-16 DIAGNOSIS — Z87.74 H/O BICUSPID AORTIC VALVE: ICD-10-CM

## 2024-07-16 RX ORDER — METOPROLOL SUCCINATE 25 MG/1
25 TABLET, EXTENDED RELEASE ORAL 2 TIMES DAILY
Qty: 180 TABLET | Refills: 2 | Status: SHIPPED | OUTPATIENT
Start: 2024-07-16

## 2024-07-18 ENCOUNTER — OFFICE VISIT (OUTPATIENT)
Dept: PODIATRY | Facility: CLINIC | Age: 74
End: 2024-07-18
Payer: MEDICARE

## 2024-07-18 ENCOUNTER — ANCILLARY PROCEDURE (OUTPATIENT)
Dept: GENERAL RADIOLOGY | Facility: CLINIC | Age: 74
End: 2024-07-18
Attending: PODIATRIST
Payer: MEDICARE

## 2024-07-18 VITALS — WEIGHT: 140 LBS | DIASTOLIC BLOOD PRESSURE: 81 MMHG | BODY MASS INDEX: 25.61 KG/M2 | SYSTOLIC BLOOD PRESSURE: 131 MMHG

## 2024-07-18 DIAGNOSIS — S99.911A INJURY OF RIGHT ANKLE, INITIAL ENCOUNTER: Primary | ICD-10-CM

## 2024-07-18 DIAGNOSIS — S99.911A INJURY OF RIGHT ANKLE, INITIAL ENCOUNTER: ICD-10-CM

## 2024-07-18 PROCEDURE — 99207 PR FRACTURE CARE IN GLOBAL PERIOD: CPT | Performed by: PODIATRIST

## 2024-07-18 PROCEDURE — 73610 X-RAY EXAM OF ANKLE: CPT | Mod: TC | Performed by: RADIOLOGY

## 2024-07-18 NOTE — PROGRESS NOTES
Foot & Ankle Surgery   July 18, 2024    S:  Pt is seen today for evaluation of recent right ankle injury with x-rays suggesting a nondisplaced distal fibular fracture.  I last saw her 6/17/2024 and recommended weightbearing as tolerated in a boot using crutches as needed.  She presents today stating that the boot was more difficult to wear than helpful.  She has been using an ankle brace and the ankle feels much better with this.    There were no vitals filed for this visit.'      ROS - Pos for CC.  Patient denies current nausea, vomiting, chills, fevers, belly pain, calf pain, chest pain or SOB.  Complete remainder of ROS it otherwise neg.      PE:  Gen:   No apparent distress  Eye:    Visual scanning without deficit  Ear:    Response to auditory stimuli wnl  Lung:    Non-labored breathing on RA noted  Abd:    NTND per patient report  Lymph:    Neg for pitting/non-pitting edema BLE  Vasc:    Pulses palpable, CFT minimally delayed  Neuro:    Light touch sensation intact to all sensory nerve distributions without paresthesias  Derm:    Neg for nodules, lesions or ulcerations  MSK:    Right lower extremity -there is some continued discomfort on palpation of the distal fibula but this is improved compared to last examination.  The knee to ankle and rear foot examinations are otherwise negative  Calf:    Neg for redness, swelling or tenderness      Imaging: 3 views weightbearing right ankle -the nondisplaced right distal fibular fracture is more readily identified compared to recent x-rays.  There is mild adjacent callus formation suggestive of healing.  The ankle mortise is anatomic    Assessment:  74 year old female with nondisplaced right distal fibular fracture      Medical Decision Making/Plan:  Discussed etiologies, anatomy and options  1.  Nondisplaced right distal fibular fracture  -I personally interpreted and reviewed the x-rays.  Progressive healing is seen  -Clinically the patient is doing very well and  states the boot is more cumbersome the ankle feels better with the brace.  Continue weightbearing as tolerated using the brace and her cane  -RICE/Tylenol as needed based on pain  -Modify activities based on pain levels.  She recently had hip surgery and would like to get back to moving, including walking and swimming.  I advise she use pain as her guide.  We discussed bike and swimming as better alternatives, but cautioned her with swimming as she will be ambulating on a wet /slippery pool deck  -Fibular fracture billing performed 6/17/2024    Follow up: 4 weeks or sooner with acute issues           Thuan Adan DPM [facility] FACFAOM  Podiatric Foot & Ankle Surgeon  [facility]  [phone]    Disclaimer: This note consists of symbols derived from keyboarding, dictation and/or voice recognition software. As a result, there may be errors in the script that have gone undetected. Please consider this when interpreting information found in this chart.

## 2024-07-18 NOTE — LETTER
7/18/2024      Lauren Nick  [address]  [place] [address]      Dear Colleague,    Thank you for referring your patient, Lauren Nick, to the [facility]OD[facility]. Please see a copy of my visit note below.    Foot & Ankle Surgery   July 18, 2024    S:  Pt is seen today for evaluation of recent right ankle injury with x-rays suggesting a nondisplaced distal fibular fracture.  I last saw her 6/17/2024 and recommended weightbearing as tolerated in a boot using crutches as needed.  She presents today stating that the boot was more difficult to wear than helpful.  She has been using an ankle brace and the ankle feels much better with this.    There were no vitals filed for this visit.'      ROS - Pos for CC.  Patient denies current nausea, vomiting, chills, fevers, belly pain, calf pain, chest pain or SOB.  Complete remainder of ROS it otherwise neg.      PE:  Gen:   No apparent distress  Eye:    Visual scanning without deficit  Ear:    Response to auditory stimuli wnl  Lung:    Non-labored breathing on RA noted  Abd:    NTND per patient report  Lymph:    Neg for pitting/non-pitting edema BLE  Vasc:    Pulses palpable, CFT minimally delayed  Neuro:    Light touch sensation intact to all sensory nerve distributions without paresthesias  Derm:    Neg for nodules, lesions or ulcerations  MSK:    Right lower extremity -there is some continued discomfort on palpation of the distal fibula but this is improved compared to last examination.  The knee to ankle and rear foot examinations are otherwise negative  Calf:    Neg for redness, swelling or tenderness      Imaging: 3 views weightbearing right ankle -the nondisplaced right distal fibular fracture is more readily identified compared to recent x-rays.  There is mild adjacent callus formation suggestive of healing.  The ankle mortise is anatomic    Assessment:  74 year old female with nondisplaced right distal fibular fracture      Medical  Decision Making/Plan:  Discussed etiologies, anatomy and options  1.  Nondisplaced right distal fibular fracture  -I personally interpreted and reviewed the x-rays.  Progressive healing is seen  -Clinically the patient is doing very well and states the boot is more cumbersome the ankle feels better with the brace.  Continue weightbearing as tolerated using the brace and her cane  -RICE/Tylenol as needed based on pain  -Modify activities based on pain levels.  She recently had hip surgery and would like to get back to moving, including walking and swimming.  I advise she use pain as her guide.  We discussed bike and swimming as better alternatives, but cautioned her with swimming as she will be ambulating on a wet /slippery pool deck  -Fibular fracture billing performed 6/17/2024    Follow up: 4 weeks or sooner with acute issues           Thuan Adan DPM FACFAS FACFAOM  Podiatric Foot & Ankle Surgeon  [facility]  [phone]    Disclaimer: This note consists of symbols derived from keyboarding, dictation and/or voice recognition software. As a result, there may be errors in the script that have gone undetected. Please consider this when interpreting information found in this chart.        Again, thank you for allowing me to participate in the care of your patient.        Sincerely,        Thuan Adan DPM, JELENA

## 2024-07-19 ENCOUNTER — APPOINTMENT (OUTPATIENT)
Dept: URBAN - METROPOLITAN AREA CLINIC 255 | Age: 74
Setting detail: DERMATOLOGY
End: 2024-07-22

## 2024-07-19 DIAGNOSIS — L72.0 EPIDERMAL CYST: ICD-10-CM

## 2024-07-19 PROCEDURE — OTHER MIPS QUALITY: OTHER

## 2024-07-19 PROCEDURE — OTHER BENIGN DESTRUCTION COSMETIC: OTHER

## 2024-07-19 PROCEDURE — OTHER COUNSELING: OTHER

## 2024-07-19 ASSESSMENT — LOCATION DETAILED DESCRIPTION DERM
LOCATION DETAILED: RIGHT LOWER CUTANEOUS LIP
LOCATION DETAILED: RIGHT ORAL COMMISSURE

## 2024-07-19 ASSESSMENT — LOCATION ZONE DERM: LOCATION ZONE: LIP

## 2024-07-19 ASSESSMENT — LOCATION SIMPLE DESCRIPTION DERM: LOCATION SIMPLE: RIGHT LIP

## 2024-07-30 ENCOUNTER — OFFICE VISIT (OUTPATIENT)
Dept: FAMILY MEDICINE | Facility: CLINIC | Age: 74
End: 2024-07-30
Payer: MEDICARE

## 2024-07-30 VITALS
TEMPERATURE: 100.3 F | BODY MASS INDEX: 25.76 KG/M2 | WEIGHT: 140 LBS | RESPIRATION RATE: 16 BRPM | OXYGEN SATURATION: 95 % | HEIGHT: 62 IN | DIASTOLIC BLOOD PRESSURE: 68 MMHG | SYSTOLIC BLOOD PRESSURE: 124 MMHG | HEART RATE: 68 BPM

## 2024-07-30 DIAGNOSIS — J06.9 VIRAL URI WITH COUGH: Primary | ICD-10-CM

## 2024-07-30 PROCEDURE — 87635 SARS-COV-2 COVID-19 AMP PRB: CPT | Performed by: INTERNAL MEDICINE

## 2024-07-30 PROCEDURE — 99213 OFFICE O/P EST LOW 20 MIN: CPT | Performed by: INTERNAL MEDICINE

## 2024-07-30 ASSESSMENT — PAIN SCALES - GENERAL: PAINLEVEL: NO PAIN (0)

## 2024-07-30 NOTE — PROGRESS NOTES
This is a 74-year-old non-smoker with a history of asthma who presents with a cough and a cold that started 2 days ago.  Her 9-year-old granddaughter had been visiting and left on  and that is when her symptoms began.  She has had a cough with occasional green phlegm.  She does not have a sore throat or earache.  She has not noted a fever.  Some nasal congestion.  No night sweats, or GI symptoms.  May be slightly more short of breath.  She has been asthmatic for quite some time.  This really has not worsened.    Past Medical History:   Diagnosis Date    Arthritis     Saw a doctor received a shot in [place].    Cancer (H) 1989    breast cancer, surgery, and radiation    Heart disease 2021    Heart valve replacement    Thyroid disease  diagnosed    Uncomplicated asthma A few years ago    Uncontrolled hypertension 2023     Past Surgical History:   Procedure Laterality Date    ABDOMEN SURGERY  Gallbladder    removed in     BIOPSY  10/2018    BREAST SURGERY  1989    CARDIAC SURGERY  2021    CHOLECYSTECTOMY  2010    COLONOSCOPY  2019    COLONOSCOPY N/A 11/10/2022    Procedure: COLONOSCOPY, WITH POLYPECTOMY AND BIOPSY;  Surgeon: Rafa Parks MD;  Location:  GI    ENT SURGERY  2011    EYE SURGERY  2015    THORACIC SURGERY      valve replacement     Social History     Socioeconomic History    Marital status:      Spouse name: Not on file    Number of children: Not on file    Years of education: Not on file    Highest education level: Not on file   Occupational History    Not on file   Tobacco Use    Smoking status: Former     Current packs/day: 0.00     Average packs/day: 1 pack/day for 10.4 years (10.4 ttl pk-yrs)     Types: Cigarettes     Start date: 1969     Quit date: 10/1/1979     Years since quittin.8    Smokeless tobacco: Never   Vaping Use    Vaping status: Never Used   Substance and Sexual Activity    Alcohol use: Yes     Comment: occ     Drug use: Never    Sexual activity: Not Currently     Partners: Male     Birth control/protection: Post-menopausal   Other Topics Concern    Parent/sibling w/ CABG, MI or angioplasty before 65F 55M? No   Social History Narrative    Not on file     Social Determinants of Health     Financial Resource Strain: Low Risk  (11/21/2023)    Financial Resource Strain     Within the past 12 months, have you or your family members you live with been unable to get utilities (heat, electricity) when it was really needed?: No   Food Insecurity: No Food Insecurity (2/23/2024)    Received from [facility]    Hunger Vital Sign     Worried About Running Out of Food in the Last Year: Never true     Ran Out of Food in the Last Year: Never true   Transportation Needs: No Transportation Needs (2/23/2024)    Received from [facility]    PRAPARE - Transportation     Lack of Transportation (Medical): No     Lack of Transportation (Non-Medical): No   Physical Activity: Sufficiently Active (2/23/2024)    Received from [facility]    Exercise Vital Sign     Days of Exercise per Week: 4 days     Minutes of Exercise per Session: 40 min   Stress: Not on file   Social Connections: Not on file   Interpersonal Safety: Low Risk  (4/15/2024)    Interpersonal Safety     Do you feel physically and emotionally safe where you currently live?: Yes     Within the past 12 months, have you been hit, slapped, kicked or otherwise physically hurt by someone?: No     Within the past 12 months, have you been humiliated or emotionally abused in other ways by your partner or ex-partner?: No   Housing Stability: Low Risk  (2/23/2024)    Received from [facility]    Housing Stability     What is your living situation today?: I have a steady place to live     Current Outpatient Medications   Medication Sig Dispense Refill    albuterol (PROAIR HFA/PROVENTIL HFA/VENTOLIN HFA) 108 (90 Base) MCG/ACT inhaler Inhale 2 puffs  "into the lungs every 6 hours 8.5 g 3    aspirin 81 MG EC tablet Take 81 mg by mouth daily      escitalopram (LEXAPRO) 20 MG tablet TAKE 1 TABLET BY MOUTH EVERY DAY 90 tablet 1    fluticasone (FLONASE) 50 MCG/ACT nasal spray INSTILL 2 SPRAYS INTO BOTH NOSTRILS DAILY 48 mL 3    levothyroxine (SYNTHROID/LEVOTHROID) 50 MCG tablet TAKE 1 TABLET BY MOUTH EVERY DAY 90 tablet 2    LORazepam (ATIVAN) 1 MG tablet Take 1 tablet (1 mg) by mouth nightly as needed for sleep 30 tablet 5    losartan (COZAAR) 25 MG tablet TAKE 1 TABLET BY MOUTH EVERY DAY 90 tablet 1    magnesium 250 MG tablet Take 1 tablet by mouth daily Unknown dose      metoprolol succinate ER (TOPROL XL) 25 MG 24 hr tablet TAKE 1 TABLET BY MOUTH TWICE A  tablet 2    psyllium (METAMUCIL/KONSYL) capsule Take 1 capsule by mouth daily      STATIN NOT PRESCRIBED (INTENTIONAL) Please choose reason not prescribed from choices below.      Turmeric 500 MG TABS Take by mouth daily       Allergies   Allergen Reactions    Penicillins Hives    Ambien [Zolpidem]      Complex behaviors    Statins [Statins]      Myalgias to multiple statins     FAMILY HISTORY NOTED AND REVIEWED    REVIEW OF SYSTEMS: above    PHYSICAL EXAM    /68 (BP Location: Right arm, Patient Position: Sitting, Cuff Size: Adult Regular)   Pulse 68   Temp 100.3  F (37.9  C)   Resp 16   Ht 1.575 m (5' 2\")   Wt 63.5 kg (140 lb)   LMP  (LMP Unknown)   SpO2 95%   BMI 25.61 kg/m      Patient appears non toxic  Tympanic membranes and canals: within normal limits bilaterally.   Mouth: Posterior pharynx, mucous membranes and tongue exam within normal limits.  Neck: supple, no nuchal rigidity or masses.  No anterior or posterior cervical adenopathy.    Lungs: clear, normal flow and effort.  Cv reglar rate and rhythm, 2/6 sm, no jvp  Abdomen non-tender    Covid swab sent    ASSESSMENT:  Probable viral illness, rule out COVID.  Lung sounds are clear, I doubt pneumonia and there are no signs of asthma " exacerbation.  I do not think this is heart failure or bacterial sinusitis, PE or other respiratory illness      PLAN:  COVID swab and if positive would consider treating with Paxlovid.  If negative over-the-counter medications as needed but told to call if her symptoms worsen or she develops a fever or shortness of breath    Cleveland Garcia M.D.

## 2024-07-31 ENCOUNTER — MYC MEDICAL ADVICE (OUTPATIENT)
Dept: FAMILY MEDICINE | Facility: CLINIC | Age: 74
End: 2024-07-31

## 2024-07-31 DIAGNOSIS — U07.1 INFECTION DUE TO 2019 NOVEL CORONAVIRUS: Primary | ICD-10-CM

## 2024-07-31 LAB — SARS-COV-2 RNA RESP QL NAA+PROBE: POSITIVE

## 2024-07-31 NOTE — TELEPHONE ENCOUNTER
I called patient re covid positive.  She is feeling about the same today.  She would like paxlovid.  Discussed with patient drug interactions and side effects.  Send in prescription.    To call if chest pain or shortness of breath    Cleveland Garcia M.D.

## 2024-08-01 ENCOUNTER — ANCILLARY PROCEDURE (OUTPATIENT)
Dept: CARDIOLOGY | Facility: CLINIC | Age: 74
End: 2024-08-01
Attending: INTERNAL MEDICINE
Payer: MEDICARE

## 2024-08-01 DIAGNOSIS — I49.5 SSS (SICK SINUS SYNDROME) (H): ICD-10-CM

## 2024-08-01 DIAGNOSIS — Z95.0 CARDIAC PACEMAKER IN SITU: ICD-10-CM

## 2024-08-01 PROCEDURE — 93296 REM INTERROG EVL PM/IDS: CPT | Performed by: INTERNAL MEDICINE

## 2024-08-01 PROCEDURE — 93294 REM INTERROG EVL PM/LDLS PM: CPT | Performed by: INTERNAL MEDICINE

## 2024-08-14 ENCOUNTER — MYC MEDICAL ADVICE (OUTPATIENT)
Dept: CARDIOLOGY | Facility: CLINIC | Age: 74
End: 2024-08-14
Payer: MEDICARE

## 2024-08-14 LAB
MDC_IDC_EPISODE_DTM: NORMAL
MDC_IDC_EPISODE_ID: NORMAL
MDC_IDC_EPISODE_TYPE: NORMAL
MDC_IDC_LEAD_CONNECTION_STATUS: NORMAL
MDC_IDC_LEAD_CONNECTION_STATUS: NORMAL
MDC_IDC_LEAD_IMPLANT_DT: NORMAL
MDC_IDC_LEAD_IMPLANT_DT: NORMAL
MDC_IDC_LEAD_LOCATION: NORMAL
MDC_IDC_LEAD_LOCATION: NORMAL
MDC_IDC_LEAD_LOCATION_DETAIL_1: NORMAL
MDC_IDC_LEAD_LOCATION_DETAIL_1: NORMAL
MDC_IDC_LEAD_MFG: NORMAL
MDC_IDC_LEAD_MFG: NORMAL
MDC_IDC_LEAD_MODEL: NORMAL
MDC_IDC_LEAD_MODEL: NORMAL
MDC_IDC_LEAD_POLARITY_TYPE: NORMAL
MDC_IDC_LEAD_POLARITY_TYPE: NORMAL
MDC_IDC_LEAD_SERIAL: NORMAL
MDC_IDC_LEAD_SERIAL: NORMAL
MDC_IDC_MSMT_BATTERY_DTM: NORMAL
MDC_IDC_MSMT_BATTERY_REMAINING_LONGEVITY: 91 MO
MDC_IDC_MSMT_BATTERY_REMAINING_PERCENTAGE: 72 %
MDC_IDC_MSMT_BATTERY_RRT_TRIGGER: NORMAL
MDC_IDC_MSMT_BATTERY_STATUS: NORMAL
MDC_IDC_MSMT_BATTERY_VOLTAGE: 3.01 V
MDC_IDC_MSMT_LEADCHNL_RA_IMPEDANCE_VALUE: 360 OHM
MDC_IDC_MSMT_LEADCHNL_RA_LEAD_CHANNEL_STATUS: NORMAL
MDC_IDC_MSMT_LEADCHNL_RA_PACING_THRESHOLD_AMPLITUDE: 0.5 V
MDC_IDC_MSMT_LEADCHNL_RA_PACING_THRESHOLD_PULSEWIDTH: 0.4 MS
MDC_IDC_MSMT_LEADCHNL_RA_SENSING_INTR_AMPL: 3.1 MV
MDC_IDC_MSMT_LEADCHNL_RV_IMPEDANCE_VALUE: 430 OHM
MDC_IDC_MSMT_LEADCHNL_RV_LEAD_CHANNEL_STATUS: NORMAL
MDC_IDC_MSMT_LEADCHNL_RV_PACING_THRESHOLD_AMPLITUDE: 1.12 V
MDC_IDC_MSMT_LEADCHNL_RV_PACING_THRESHOLD_PULSEWIDTH: 0.4 MS
MDC_IDC_MSMT_LEADCHNL_RV_SENSING_INTR_AMPL: 6.6 MV
MDC_IDC_PG_IMPLANT_DTM: NORMAL
MDC_IDC_PG_MFG: NORMAL
MDC_IDC_PG_MODEL: NORMAL
MDC_IDC_PG_SERIAL: NORMAL
MDC_IDC_PG_TYPE: NORMAL
MDC_IDC_SESS_CLINIC_NAME: NORMAL
MDC_IDC_SESS_DTM: NORMAL
MDC_IDC_SESS_REPROGRAMMED: NO
MDC_IDC_SESS_TYPE: NORMAL
MDC_IDC_SET_BRADY_AT_MODE_SWITCH_MODE: NORMAL
MDC_IDC_SET_BRADY_AT_MODE_SWITCH_RATE: 180 {BEATS}/MIN
MDC_IDC_SET_BRADY_LOWRATE: 60 {BEATS}/MIN
MDC_IDC_SET_BRADY_MAX_SENSOR_RATE: 130 {BEATS}/MIN
MDC_IDC_SET_BRADY_MAX_TRACKING_RATE: 130 {BEATS}/MIN
MDC_IDC_SET_BRADY_MODE: NORMAL
MDC_IDC_SET_BRADY_PAV_DELAY_LOW: 200 MS
MDC_IDC_SET_BRADY_SAV_DELAY_LOW: 150 MS
MDC_IDC_SET_LEADCHNL_RA_PACING_AMPLITUDE: 2 V
MDC_IDC_SET_LEADCHNL_RA_PACING_ANODE_ELECTRODE_1: NORMAL
MDC_IDC_SET_LEADCHNL_RA_PACING_ANODE_LOCATION_1: NORMAL
MDC_IDC_SET_LEADCHNL_RA_PACING_CAPTURE_MODE: NORMAL
MDC_IDC_SET_LEADCHNL_RA_PACING_CATHODE_ELECTRODE_1: NORMAL
MDC_IDC_SET_LEADCHNL_RA_PACING_CATHODE_LOCATION_1: NORMAL
MDC_IDC_SET_LEADCHNL_RA_PACING_POLARITY: NORMAL
MDC_IDC_SET_LEADCHNL_RA_PACING_PULSEWIDTH: 0.4 MS
MDC_IDC_SET_LEADCHNL_RA_SENSING_ADAPTATION_MODE: NORMAL
MDC_IDC_SET_LEADCHNL_RA_SENSING_ANODE_ELECTRODE_1: NORMAL
MDC_IDC_SET_LEADCHNL_RA_SENSING_ANODE_LOCATION_1: NORMAL
MDC_IDC_SET_LEADCHNL_RA_SENSING_CATHODE_ELECTRODE_1: NORMAL
MDC_IDC_SET_LEADCHNL_RA_SENSING_CATHODE_LOCATION_1: NORMAL
MDC_IDC_SET_LEADCHNL_RA_SENSING_POLARITY: NORMAL
MDC_IDC_SET_LEADCHNL_RA_SENSING_SENSITIVITY: 0.5 MV
MDC_IDC_SET_LEADCHNL_RV_PACING_AMPLITUDE: 1.38
MDC_IDC_SET_LEADCHNL_RV_PACING_ANODE_ELECTRODE_1: NORMAL
MDC_IDC_SET_LEADCHNL_RV_PACING_ANODE_LOCATION_1: NORMAL
MDC_IDC_SET_LEADCHNL_RV_PACING_CAPTURE_MODE: NORMAL
MDC_IDC_SET_LEADCHNL_RV_PACING_CATHODE_ELECTRODE_1: NORMAL
MDC_IDC_SET_LEADCHNL_RV_PACING_CATHODE_LOCATION_1: NORMAL
MDC_IDC_SET_LEADCHNL_RV_PACING_POLARITY: NORMAL
MDC_IDC_SET_LEADCHNL_RV_PACING_PULSEWIDTH: 0.4 MS
MDC_IDC_SET_LEADCHNL_RV_SENSING_ADAPTATION_MODE: NORMAL
MDC_IDC_SET_LEADCHNL_RV_SENSING_ANODE_ELECTRODE_1: NORMAL
MDC_IDC_SET_LEADCHNL_RV_SENSING_ANODE_LOCATION_1: NORMAL
MDC_IDC_SET_LEADCHNL_RV_SENSING_CATHODE_ELECTRODE_1: NORMAL
MDC_IDC_SET_LEADCHNL_RV_SENSING_CATHODE_LOCATION_1: NORMAL
MDC_IDC_SET_LEADCHNL_RV_SENSING_POLARITY: NORMAL
MDC_IDC_SET_LEADCHNL_RV_SENSING_SENSITIVITY: 2 MV
MDC_IDC_STAT_AT_BURDEN_PERCENT: 0 %
MDC_IDC_STAT_AT_DTM_END: NORMAL
MDC_IDC_STAT_AT_DTM_START: NORMAL
MDC_IDC_STAT_AT_MODE_SW_COUNT: 0
MDC_IDC_STAT_AT_MODE_SW_COUNT_PER_DAY: 0
MDC_IDC_STAT_AT_MODE_SW_PERCENT_TIME: 0 %
MDC_IDC_STAT_BRADY_AP_VP_PERCENT: 1 %
MDC_IDC_STAT_BRADY_AP_VS_PERCENT: 27 %
MDC_IDC_STAT_BRADY_AS_VP_PERCENT: 1 %
MDC_IDC_STAT_BRADY_AS_VS_PERCENT: 73 %
MDC_IDC_STAT_BRADY_DTM_END: NORMAL
MDC_IDC_STAT_BRADY_DTM_START: NORMAL
MDC_IDC_STAT_BRADY_RA_PERCENT_PACED: 27 %
MDC_IDC_STAT_BRADY_RV_PERCENT_PACED: 1 %
MDC_IDC_STAT_CRT_DTM_END: NORMAL
MDC_IDC_STAT_CRT_DTM_START: NORMAL
MDC_IDC_STAT_HEART_RATE_ATRIAL_MAX: 270 {BEATS}/MIN
MDC_IDC_STAT_HEART_RATE_ATRIAL_MEAN: 71 {BEATS}/MIN
MDC_IDC_STAT_HEART_RATE_ATRIAL_MIN: 50 {BEATS}/MIN
MDC_IDC_STAT_HEART_RATE_DTM_END: NORMAL
MDC_IDC_STAT_HEART_RATE_DTM_START: NORMAL
MDC_IDC_STAT_HEART_RATE_VENTRICULAR_MAX: 220 {BEATS}/MIN
MDC_IDC_STAT_HEART_RATE_VENTRICULAR_MEAN: 71 {BEATS}/MIN
MDC_IDC_STAT_HEART_RATE_VENTRICULAR_MIN: 50 {BEATS}/MIN

## 2024-08-15 ENCOUNTER — OFFICE VISIT (OUTPATIENT)
Dept: PODIATRY | Facility: CLINIC | Age: 74
End: 2024-08-15
Payer: MEDICARE

## 2024-08-15 ENCOUNTER — E-VISIT (OUTPATIENT)
Dept: FAMILY MEDICINE | Facility: CLINIC | Age: 74
End: 2024-08-15
Payer: MEDICARE

## 2024-08-15 ENCOUNTER — ANCILLARY PROCEDURE (OUTPATIENT)
Dept: GENERAL RADIOLOGY | Facility: CLINIC | Age: 74
End: 2024-08-15
Attending: PODIATRIST
Payer: MEDICARE

## 2024-08-15 VITALS — DIASTOLIC BLOOD PRESSURE: 80 MMHG | BODY MASS INDEX: 25.61 KG/M2 | WEIGHT: 140 LBS | SYSTOLIC BLOOD PRESSURE: 123 MMHG

## 2024-08-15 DIAGNOSIS — S99.911A INJURY OF RIGHT ANKLE, INITIAL ENCOUNTER: ICD-10-CM

## 2024-08-15 DIAGNOSIS — N39.0 ACUTE UTI (URINARY TRACT INFECTION): Primary | ICD-10-CM

## 2024-08-15 DIAGNOSIS — S99.911A INJURY OF RIGHT ANKLE, INITIAL ENCOUNTER: Primary | ICD-10-CM

## 2024-08-15 PROCEDURE — 73610 X-RAY EXAM OF ANKLE: CPT | Mod: RT | Performed by: STUDENT IN AN ORGANIZED HEALTH CARE EDUCATION/TRAINING PROGRAM

## 2024-08-15 PROCEDURE — 99207 PR NO CHARGE LOS: CPT | Performed by: FAMILY MEDICINE

## 2024-08-15 PROCEDURE — 73630 X-RAY EXAM OF FOOT: CPT | Mod: RT | Performed by: STUDENT IN AN ORGANIZED HEALTH CARE EDUCATION/TRAINING PROGRAM

## 2024-08-15 PROCEDURE — 99213 OFFICE O/P EST LOW 20 MIN: CPT | Mod: 24 | Performed by: PODIATRIST

## 2024-08-15 RX ORDER — SULFAMETHOXAZOLE/TRIMETHOPRIM 800-160 MG
1 TABLET ORAL 2 TIMES DAILY
Qty: 6 TABLET | Refills: 0 | Status: SHIPPED | OUTPATIENT
Start: 2024-08-15 | End: 2024-08-18

## 2024-08-15 NOTE — LETTER
8/15/2024      Lauren Nick  [address]  [place] [address]      Dear Colleague,    Thank you for referring your patient, Lauren Nick, to the [facility] PODI[facility]. Please see a copy of my visit note below.    Foot & Ankle Surgery   August 15, 2024    S:  Pt is seen today for evaluation of 2 issues.  We have been treating her for a nondisplaced right distal fibular fracture.  She also has complained of pain in toes on the right foot.  She is not sure if she injured the toes during the initial fall.  With respect to the ankle, she is wearing regular shoes and has been back to regular shoes for a few weeks.  She states the ankle is doing much better    Vitals:    08/15/24 1011   BP: 123/80   Weight: 63.5 kg (140 lb)   '      ROS - Pos for CC.  Patient denies current nausea, vomiting, chills, fevers, belly pain, calf pain, chest pain or SOB.  Complete remainder of ROS it otherwise neg.      PE:  Gen:   No apparent distress  Eye:    Visual scanning without deficit  Ear:    Response to auditory stimuli wnl  Lung:    Non-labored breathing on RA noted  Abd:    NTND per patient report  Lymph:    Neg for pitting/non-pitting edema BLE  Vasc:    Pulses palpable, CFT minimally delayed  Neuro:    Light touch sensation intact to all sensory nerve distributions without paresthesias  Derm:    Neg for nodules, lesions or ulcerations  MSK:    Right lower extremity -there is minimal discomfort today on palpation of the distal fibula.  There is some tenderness on palpation of the right third and fourth toes without obvious deformity.  Calf:    Neg for redness, swelling or tenderness      Imaging:  IMPRESSION: Subtle nondisplaced fracture of the lateral malleolus  extending to the level of the tibial plafonds, best seen on the  lateral view with small amount of adjacent callus formation. No  additional fractures identified about the right ankle or right foot.  The mortise is congruent. The talar dome is  intact. Mild degenerative  arthritis of the first MTP joint. Demineralization. Soft tissue  swelling overlying the lateral malleolus.          Assessment:  74 year old female with healing right distal fibular fracture; right third and fourth toe pain      Medical Decision Making/Plan:  Discussed etiologies, anatomy and options  1.  Healing right distal fibular fracture  -I personally interpreted and reviewed today's x-rays.  Progressive healing is noted.  Clinically, she is doing very well and back to regular shoes.  -She asked about walking to rehab her hip.  I think walking is certainly reasonable but I encouraged her to modify activities based on pain level; RICE/Tylenol as needed based on pain  -She will follow-up in 4 weeks for reassessment and final films.  If she is otherwise doing well at that point, I do not anticipate further treatment will be needed    2.  Right third and fourth toe pain  -She does not recall a specific injury but landed awkwardly on the foot during her fall and thinks this is what caused her pain.  She thinks that as the fibula pain is improving, the toe pain is becoming more pronounced.  -She is wearing regular shoes today.  RICE/Tylenol as needed based on pain.  1 inch Coban was dispensed for buddy splinting, use based on symptoms  -I personally interpreted and reviewed today's x-rays.  No clear third or fourth toe fractures are seen.  -We discussed that with toe injuries, pain levels can persist for some time.  However, as there are no clear fractures and no obvious clinical deformity, no further follow-up or intervention is needed.  Consider further imaging should symptoms persist    Follow up: 4 weeks for right ankle fracture or sooner with acute issues           Thuan Adan DPM FACFAS FACFAOM  Podiatric Foot & Ankle Surgeon  [facility]  [phone]    Disclaimer: This note consists of symbols derived from keyboarding, dictation and/or voice recognition  software. As a result, there may be errors in the script that have gone undetected. Please consider this when interpreting information found in this chart.        Again, thank you for allowing me to participate in the care of your patient.        Sincerely,        Thuan Adan DPM, JELENA

## 2024-08-15 NOTE — PATIENT INSTRUCTIONS
Dear Lauren Nick    After reviewing your responses, I've been able to diagnose you with a urinary tract infection, which is a common infection of the bladder with bacteria.  This is not a sexually transmitted infection, though urinating immediately after intercourse can help prevent infections.  Drinking lots of fluids is also helpful to clear your current infection and prevent the next one.      I have sent a prescription for antibiotics to your pharmacy to treat this infection.    It is important that you take all of your prescribed medication even if your symptoms are improving after a few doses.  Taking all of your medicine helps prevent the symptoms from returning.     If your symptoms worsen, you develop pain in your back or stomach, develop fevers, or are not improving in 5 days, please contact your primary care provider for an appointment or visit any of our convenient Walk-in or Urgent Care Centers to be seen, which can be found on our website here.    Thanks again for choosing us as your health care partner,    Joel Daniel Wegener, MD

## 2024-08-15 NOTE — PROGRESS NOTES
Foot & Ankle Surgery   August 15, 2024    S:  Pt is seen today for evaluation of 2 issues.  We have been treating her for a nondisplaced right distal fibular fracture.  She also has complained of pain in toes on the right foot.  She is not sure if she injured the toes during the initial fall.  With respect to the ankle, she is wearing regular shoes and has been back to regular shoes for a few weeks.  She states the ankle is doing much better    Vitals:    08/15/24 1011   BP: 123/80   Weight: 63.5 kg (140 lb)   '      ROS - Pos for CC.  Patient denies current nausea, vomiting, chills, fevers, belly pain, calf pain, chest pain or SOB.  Complete remainder of ROS it otherwise neg.      PE:  Gen:   No apparent distress  Eye:    Visual scanning without deficit  Ear:    Response to auditory stimuli wnl  Lung:    Non-labored breathing on RA noted  Abd:    NTND per patient report  Lymph:    Neg for pitting/non-pitting edema BLE  Vasc:    Pulses palpable, CFT minimally delayed  Neuro:    Light touch sensation intact to all sensory nerve distributions without paresthesias  Derm:    Neg for nodules, lesions or ulcerations  MSK:    Right lower extremity -there is minimal discomfort today on palpation of the distal fibula.  There is some tenderness on palpation of the right third and fourth toes without obvious deformity.  Calf:    Neg for redness, swelling or tenderness      Imaging:  IMPRESSION: Subtle nondisplaced fracture of the lateral malleolus  extending to the level of the tibial plafonds, best seen on the  lateral view with small amount of adjacent callus formation. No  additional fractures identified about the right ankle or right foot.  The mortise is congruent. The talar dome is intact. Mild degenerative  arthritis of the first MTP joint. Demineralization. Soft tissue  swelling overlying the lateral malleolus.          Assessment:  74 year old female with healing right distal fibular fracture; right third and fourth  toe pain      Medical Decision Making/Plan:  Discussed etiologies, anatomy and options  1.  Healing right distal fibular fracture  -I personally interpreted and reviewed today's x-rays.  Progressive healing is noted.  Clinically, she is doing very well and back to regular shoes.  -She asked about walking to rehab her hip.  I think walking is certainly reasonable but I encouraged her to modify activities based on pain level; RICE/Tylenol as needed based on pain  -She will follow-up in 4 weeks for reassessment and final films.  If she is otherwise doing well at that point, I do not anticipate further treatment will be needed    2.  Right third and fourth toe pain  -She does not recall a specific injury but landed awkwardly on the foot during her fall and thinks this is what caused her pain.  She thinks that as the fibula pain is improving, the toe pain is becoming more pronounced.  -She is wearing regular shoes today.  RICE/Tylenol as needed based on pain.  1 inch Coban was dispensed for buddy splinting, use based on symptoms  -I personally interpreted and reviewed today's x-rays.  No clear third or fourth toe fractures are seen.  -We discussed that with toe injuries, pain levels can persist for some time.  However, as there are no clear fractures and no obvious clinical deformity, no further follow-up or intervention is needed.  Consider further imaging should symptoms persist    Follow up: 4 weeks for right ankle fracture or sooner with acute issues           Thuan Adan DPM FACFAS FACFAOM  Podiatric Foot & Ankle Surgeon  [facility]  [phone]    Disclaimer: This note consists of symbols derived from keyboarding, dictation and/or voice recognition software. As a result, there may be errors in the script that have gone undetected. Please consider this when interpreting information found in this chart.

## 2024-08-19 ENCOUNTER — OFFICE VISIT (OUTPATIENT)
Dept: DERMATOLOGY | Facility: CLINIC | Age: 74
End: 2024-08-19
Payer: MEDICARE

## 2024-08-19 DIAGNOSIS — L90.5 FACIAL SCAR: Primary | ICD-10-CM

## 2024-08-19 PROCEDURE — 99207 PR NO CHARGE LOS: CPT | Performed by: DERMATOLOGY

## 2024-08-19 NOTE — LETTER
8/19/2024      Lauren Nick  [address] [address]      Dear Colleague,    Thank you for referring your patient, Lauren Nick, to the [facility]. Please see a copy of my visit note below.    No notes on file    Again, thank you for allowing me to participate in the care of your patient.        Sincerely,        Сергей Parry MD

## 2024-08-19 NOTE — PROGRESS NOTES
Laser- VBeam(Pulsed Dye Laser) Procedure Note: Cosmetic     Dermatology Problem List:  Last FBSC performed on 23     1. AKs - cryo  2. Hx of NMSC  - BCC, central upper forehead, s/p Mohs 24   - BCC, L preauricular, s/p MMS 24         - PDL and ILK-10 5/10/24         - PDL: 24, 24  ______________________________________     Procedure Date: 2024      Attending Staff Surgeon: Сергей Parry DO     Fellow Surgeon: None     Operating Room Data:     Surgery/Procedure Date:    SAME      Pre-operative Diagnosis:   Scar  Location: left preauricular cheek  Size(cm2): 7     Operation/Procedure    Vbeam pulsed dye laser treatment#: 3     Post-operative Diagnosis:  SAME     Laser Settings:  Energy:7.25 J/cm2  Spot size: 10mm  Pulse width:  6 mS (0.45 thru 40 mS)  Dynamic cooling spray settin mS  Dynamic cooling device delay:  20 mS     Anesthesia:  None     Description of Operation/Procedure:   The nature and purpose of the procedure, associated risks, possible consequences, complications and alternative methods of treatment were explained in detail, this includes but is not limited to dyspigmentation, scarring, bruising, pain/discomfort, eye injury, and blister. Multiple treatments may be recommended. A photo and operative consent were obtained. Time-out was performed.The patient was positioned to optimally expose the area treated. Protective eyewear was worn by the patient and goggles on all personnel in the treatment room. The patient confirmed the site to be treated. The laser energy output was verified by meter reading.       The clinically evident lesion(s) was/were treated with Joceline Vbeam pulsed dye laser (595 nm) beam as above. A total of 10 pulses were used. The patient tolerated the procedure well and no complications were noted. Post operative instructions were provided. The total laser operation and preparation time was 5 minutes.      The patient will follow-up in 6 weeks.      Staff Involved:  Scribe/Staff     Scribe Disclosure:   I, Valerie Shepard, am serving as a scribe; to document services personally performed by Dr. Сергей Parry - -based on data collection and the provider's statements to me.      Provider Disclosure:   The documentation recorded by the scribe accurately reflects the services I personally performed and the decisions made by me. I personally performed the procedures today.     Сергей Parry DO    Department of Dermatology  [facility]: Phone: [phone], Fax:[phone]  [facility]: Phone: [phone], Fax: [phone]     Care and Laboratory Testing Performed at:  [facility]   Dermatology Clinic  [address] [place] [address]

## 2024-08-19 NOTE — NURSING NOTE
Lauren Nick's goals for this visit include:   Chief Complaint   Patient presents with    Scar Management     PDL scar on left cheek       She requests these members of her care team be copied on today's visit information: n/a    PCP: Wegener, Joel Daniel Irwin    Referring Provider:  Сергей Parry MD  [address] [place] [address]    LMP  (LMP Unknown)     Do you need any medication refills at today's visit? No  Jackelyn Mesa RN

## 2024-08-27 DIAGNOSIS — J41.0 SIMPLE CHRONIC BRONCHITIS (H): ICD-10-CM

## 2024-08-27 RX ORDER — ALBUTEROL SULFATE 90 UG/1
2 AEROSOL, METERED RESPIRATORY (INHALATION) EVERY 6 HOURS
Qty: 8.5 G | Refills: 3 | Status: SHIPPED | OUTPATIENT
Start: 2024-08-27

## 2024-09-12 ENCOUNTER — OFFICE VISIT (OUTPATIENT)
Dept: DERMATOLOGY | Facility: CLINIC | Age: 74
End: 2024-09-12
Payer: MEDICARE

## 2024-09-12 DIAGNOSIS — L57.0 AK (ACTINIC KERATOSIS): Primary | ICD-10-CM

## 2024-09-12 PROCEDURE — 17000 DESTRUCT PREMALG LESION: CPT | Performed by: PHYSICIAN ASSISTANT

## 2024-09-12 NOTE — PATIENT INSTRUCTIONS
Cryotherapy    What is it?  Use of a very cold liquid, such as liquid nitrogen, to freeze and destroy abnormal skin cells that need to be removed    What should I expect?  Tenderness and redness  A small blister that might grow and fill with dark purple blood. There may be crusting.  More than one treatment may be needed if the lesions do not go away.    How do I care for the treated area?  Gently wash the area with your hands when bathing.  Use a thin layer of Vaseline to help with healing. You may use a Band-Aid.   The area should heal within 7-10 days and may leave behind a pink or lighter color.   Do not use an antibiotic or Neosporin ointment.   You may take acetaminophen (Tylenol) for pain.     Call your Doctor if you have:  Severe pain  Signs of infection (warmth, redness, cloudy yellow drainage, and or a bad smell)  Questions or concerns    Who should I call with questions?      [facility]: [phone]      [facility]: [phone]      For urgent needs outside of business hours call the [facility] at [phone]        and ask for the dermatology resident on call            Proper skin care from [facility]:    -Eliminate harsh soaps as they strip the natural oils from the skin, often resulting in dry itchy skin ( i.e. Dial, Zest, Chastity Spring)  -Use mild soaps such as Cetaphil or Dove Sensitive Skin in the shower. You do not need to use soap on arms, legs, and trunk every time you shower unless visibly soiled.   -Avoid hot or cold showers.  -After showering, lightly dry off and apply moisturizing within 2-3 minutes. This will help trap moisture in the skin.   -Aggressive use of a moisturizer at least 1-2 times a day to the entire body (including -Vanicream, Cetaphil, Aquaphor or Cerave) and moisturize hands after every washing.  -We recommend using moisturizers that come in a tub that needs to be scooped out, not a pump. This  has more of an oil base. It will hold moisture in your skin much better than a water base moisturizer. The above recommended are non-pore clogging.      Wear a sunscreen with at least SPF 30 on your face, ears, neck and V of the chest daily. Wear sunscreen on other areas of the body if those areas are exposed to the sun throughout the day. Sunscreens can contain physical and/or chemical blockers. Physical blockers are less likely to clog pores, these include zinc oxide and titanium dioxide. Reapply every two hour and after swimming.     Sunscreen examples: https://www.ewg.org/sunscreen/    UV radiation  UVA radiation remains constant throughout the day and throughout the year. It is a longer wavelength than UVB and therefore penetrates deeper into the skin leading to immediate and delayed tanning, photoaging, and skin cancer. 70-80% of UVA and UVB radiation occurs between the hours of 10am-2pm.  UVB radiation  UVB radiation causes the most harmful effects and is more significant during the summer months. However, snow and ice can reflect UVB radiation leading to skin damage during the winter months as well. UVB radiation is responsible for tanning, burning, inflammation, delayed erythema (pinkness), pigmentation (brown spots), and skin cancer.     I recommend self monthly full body exams and yearly full body exams with a dermatology provider. If you develop a new or changing lesion please follow up for examination. Most skin cancers are pink and scaly or pink and pearly. However, we do see blue/brown/black skin cancers.  Consider the ABCDEs of melanoma when giving yourself your monthly full body exam ( don't forget the groin, buttocks, feet, toes, etc). A-asymmetry, B-borders, C-color, D-diameter, E-elevation or evolving. If you see any of these changes please follow up in clinic. If you cannot see your back I recommend purchasing a hand held mirror to use with a larger wall mirror.       Checking for Skin  Cancer  You can find cancer early by checking your skin each month. There are 3 kinds of skin cancer. They are melanoma, basal cell carcinoma, and squamous cell carcinoma. Doing monthly skin checks is the best way to find new marks or skin changes. Follow the instructions below for checking your skin.   The ABCDEs of checking moles for melanoma   Check your moles or growths for signs of melanoma using ABCDE:   Asymmetry: the sides of the mole or growth don t match  Border: the edges are ragged, notched, or blurred  Color: the color within the mole or growth varies  Diameter: the mole or growth is larger than 6 mm (size of a pencil eraser)  Evolving: the size, shape, or color of the mole or growth is changing (evolving is not shown in the images below)    Checking for other types of skin cancer  Basal cell carcinoma or squamous cell carcinoma have symptoms such as:     A spot or mole that looks different from all other marks on your skin  Changes in how an area feels, such as itching, tenderness, or pain  Changes in the skin's surface, such as oozing, bleeding, or scaliness  A sore that does not heal  New swelling or redness beyond the border of a mole    Who s at risk?  Anyone can get skin cancer. But you are at greater risk if you have:   Fair skin, light-colored hair, or light-colored eyes  Many moles or abnormal moles on your skin  A history of sunburns from sunlight or tanning beds  A family history of skin cancer  A history of exposure to radiation or chemicals  A weakened immune system  If you have had skin cancer in the past, you are at risk for recurring skin cancer.   How to check your skin  Do your monthly skin checkups in front of a full-length mirror. Check all parts of your body, including your:   Head (ears, face, neck, and scalp)  Torso (front, back, and sides)  Arms (tops, undersides, upper, and lower armpits)  Hands (palms, backs, and fingers, including under the nails)  Buttocks and genitals  Legs  (front, back, and sides)  Feet (tops, soles, toes, including under the nails, and between toes)  If you have a lot of moles, take digital photos of them each month. Make sure to take photos both up close and from a distance. These can help you see if any moles change over time.   Most skin changes are not cancer. But if you see any changes in your skin, call your doctor right away. Only he or she can diagnose a problem. If you have skin cancer, seeing your doctor can be the first step toward getting the treatment that could save your life.   [employer] last reviewed this educational content on 4/1/2019 2000-2020 [employer]. [address] [place] [address], [place] [address]. All rights reserved. This information is not intended as a substitute for professional medical care. Always follow your healthcare professional's instructions.       When should I call my doctor?  If you are worsening or not improving, please, contact us or seek urgent care as noted below.     Who should I call with questions (adults)?    [facility] [phone]  For urgent needs outside of business hours call the [facility] at [phone] and ask for the dermatology resident on call to be paged  If this is a medical emergency and you are unable to reach an ER, Call [phone]      If you need a prescription refill, please contact your pharmacy. Refills are approved or denied by our Physicians during normal business hours, Monday through Fridays  Per office policy, refills will not be granted if you have not been seen within the past year (or sooner depending on your child's condition)

## 2024-09-12 NOTE — PROGRESS NOTES
[facility] Dermatology Note  Encounter Date: Sep 12, 2024  Office Visit      Dermatology Problem List:    1. AKs - cryo  2. Hx of NMSC  - BCC, central upper forehead, s/p Mohs 6/19/24   - BCC, L preauricular, s/p MMS 01/25/24         - PDL and ILK-10 5/10/24         - PDL: 6/19/24, 8/19/24  ____________________________________________    Assessment & Plan:  # Actinic keratosis. X 1 mid upper lip along the vermilion border  - Cryotherapy performed today, see procedure note below.    Procedures Performed:   CRYOTHERAPY PROCEDURE NOTE: 1 lesions in the above locations were treated with liquid nitrogen utilizing a 5-10sec thaw time. Patient was advised that the treated areas will become red, swollen, may develop a blister and then should crust and peal off in the next 1-2 weeks. Post-procedure instructions were provided.      Follow-up: prn for new or changing lesions    Staff and scribe:    Scribe Disclosure:   I, WILLIAM HOWARD, am serving as a scribe; to document services personally performed by Milka Martinez PA-C -based on data collection and the provider's statements to me.     Provider Disclosure:  I agree with above History, Review of Systems, Physical exam and Plan.  I have reviewed the content of the documentation and have edited it as needed. I have personally performed the services documented here and the documentation accurately represents those services and the decisions I have made.      Electronically signed by:    All risks, benefits and alternatives were discussed with patient.  Patient is in agreement and understands the assessment and plan.  All questions were answered.    Milka Martinez PA-C, MPAS  [facility]: Phone: [phone], Fax: [phone]  [facility]: Phone: [phone],  Fax: [phone]  [facility]: Phone: [phone], Fax:  [phone]  ____________________________________________    CC: Derm Problem (Spot check on top lip)      Reviewed patients past medical history and pertinent chart review prior to patient's visit today.     HPI:  Ms. Lauren Nick is a 74 year old female who presents today as a return patient for a spot check. Today patient reported a spot of concern on her upper lip.     Has a hx of NMSC    Patient is otherwise feeling well, without additional concerns.    Labs:  N/A    Physical Exam:  Vitals: LMP  (LMP Unknown)   SKIN: Focused examination of areas noted below was performed.   - There is an erythematous macule with overyling adherent scale on the: x 1 mid upper lip on the [place] border  - No other lesions of concern on areas examined.     Medications:  Current Outpatient Medications   Medication Sig Dispense Refill    albuterol (PROAIR HFA/PROVENTIL HFA/VENTOLIN HFA) 108 (90 Base) MCG/ACT inhaler INHALE 2 PUFFS INTO THE LUNGS EVERY 6 HOURS 8.5 g 3    aspirin 81 MG EC tablet Take 81 mg by mouth daily      escitalopram (LEXAPRO) 20 MG tablet TAKE 1 TABLET BY MOUTH EVERY DAY 90 tablet 1    fluticasone (FLONASE) 50 MCG/ACT nasal spray INSTILL 2 SPRAYS INTO BOTH NOSTRILS DAILY 48 mL 3    levothyroxine (SYNTHROID/LEVOTHROID) 50 MCG tablet TAKE 1 TABLET BY MOUTH EVERY DAY 90 tablet 2    LORazepam (ATIVAN) 1 MG tablet Take 1 tablet (1 mg) by mouth nightly as needed for sleep 30 tablet 5    losartan (COZAAR) 25 MG tablet TAKE 1 TABLET BY MOUTH EVERY DAY 90 tablet 1    magnesium 250 MG tablet Take 1 tablet by mouth daily Unknown dose      metoprolol succinate ER (TOPROL XL) 25 MG 24 hr tablet TAKE 1 TABLET BY MOUTH TWICE A  tablet 2    psyllium (METAMUCIL/KONSYL) capsule Take 1 capsule by mouth daily      Turmeric 500 MG TABS Take by mouth daily      STATIN NOT PRESCRIBED (INTENTIONAL) Please choose reason not prescribed from choices below. (Patient not taking: Reported on 9/12/2024)       Current  Facility-Administered Medications   Medication Dose Route Frequency Provider Last Rate Last Admin    triamcinolone acetonide (KENALOG-10) injection 3 mg  3 mg Intra-Lesional Once           Past Medical/Surgical History:   Patient Active Problem List   Diagnosis    H/O aortic valve replacement    H/O bicuspid aortic valve    Thoracic aortic aneurysm without rupture (H24)    H/O malignant neoplasm of breast    H/O lumpectomy    History of colonic polyps    Recurrent major depressive disorder, in partial remission (H24)    Myalgia due to statin    Hip pain, right    Osteopenia, unspecified location    Hypothyroidism, unspecified type    Ascending aorta dilatation (H24)    Mild intermittent asthma with acute exacerbation    Infection due to 2019 novel coronavirus    Uncontrolled hypertension    Recurrent major depression in complete remission (H24)    Pelvic floor dysfunction in female    Nocturia    Feeling of incomplete bladder emptying    Primary osteoarthritis of both hips    Osteoarthritis of hip     Past Medical History:   Diagnosis Date    Arthritis 11/16    Saw a doctor received a shot in [place].    Basal cell carcinoma     Cancer (H) 09/1989    breast cancer, surgery, and radiation    Heart disease 03/21/2021    Heart valve replacement    Thyroid disease 02/21 diagnosed    Uncomplicated asthma A few years ago    Uncontrolled hypertension 02/20/2023

## 2024-09-12 NOTE — LETTER
9/12/2024      Lauren Nick  [address] [place] [address]      Dear Colleague,    Thank you for referring your patient, Lauren Nick, to the [facility]. Please see a copy of my visit note below.    [facility] Dermatology Note  Encounter Date: Sep 12, 2024  Office Visit      Dermatology Problem List:    1. AKs - cryo  2. Hx of NMSC  - BCC, central upper forehead, s/p Mohs 6/19/24   - BCC, L preauricular, s/p MMS 01/25/24         - PDL and ILK-10 5/10/24         - PDL: 6/19/24, 8/19/24  ____________________________________________    Assessment & Plan:  # Actinic keratosis. X 1 mid upper lip along the vermilion border  - Cryotherapy performed today, see procedure note below.    Procedures Performed:   CRYOTHERAPY PROCEDURE NOTE: 1 lesions in the above locations were treated with liquid nitrogen utilizing a 5-10sec thaw time. Patient was advised that the treated areas will become red, swollen, may develop a blister and then should crust and peal off in the next 1-2 weeks. Post-procedure instructions were provided.      Follow-up: prn for new or changing lesions    Staff and scribe:    Scribe Disclosure:   I, WILLIAM HOWARD, am serving as a scribe; to document services personally performed by Milka Martinez PA-C -based on data collection and the provider's statements to me.     Provider Disclosure:  I agree with above History, Review of Systems, Physical exam and Plan.  I have reviewed the content of the documentation and have edited it as needed. I have personally performed the services documented here and the documentation accurately represents those services and the decisions I have made.      Electronically signed by:    All risks, benefits and alternatives were discussed with patient.  Patient is in agreement and understands the assessment and plan.  All questions were answered.    Milka Martinez PA-C, [facility]  [facility]  [facility]: Phone: [phone], Fax: [phone]  [facility]: Phone: [phone],  Fax: [phone]  [facility]: Phone: [phone], Fax: [phone]  ____________________________________________    CC: Derm Problem (Spot check on top lip)      Reviewed patients past medical history and pertinent chart review prior to patient's visit today.     HPI:  Ms. Lauren Nick is a 74 year old female who presents today as a return patient for a spot check. Today patient reported a spot of concern on her upper lip.     Has a hx of NMSC    Patient is otherwise feeling well, without additional concerns.    Labs:  N/A    Physical Exam:  Vitals: LMP  (LMP Unknown)   SKIN: Focused examination of areas noted below was performed.   - There is an erythematous macule with overyling adherent scale on the: x 1 mid upper lip on the [place] border  - No other lesions of concern on areas examined.     Medications:  Current Outpatient Medications   Medication Sig Dispense Refill     albuterol (PROAIR HFA/PROVENTIL HFA/VENTOLIN HFA) 108 (90 Base) MCG/ACT inhaler INHALE 2 PUFFS INTO THE LUNGS EVERY 6 HOURS 8.5 g 3     aspirin 81 MG EC tablet Take 81 mg by mouth daily       escitalopram (LEXAPRO) 20 MG tablet TAKE 1 TABLET BY MOUTH EVERY DAY 90 tablet 1     fluticasone (FLONASE) 50 MCG/ACT nasal spray INSTILL 2 SPRAYS INTO BOTH NOSTRILS DAILY 48 mL 3     levothyroxine (SYNTHROID/LEVOTHROID) 50 MCG tablet TAKE 1 TABLET BY MOUTH EVERY DAY 90 tablet 2     LORazepam (ATIVAN) 1 MG tablet Take 1 tablet (1 mg) by mouth nightly as needed for sleep 30 tablet 5     losartan (COZAAR) 25 MG tablet TAKE 1 TABLET BY MOUTH EVERY DAY 90 tablet 1     magnesium 250 MG tablet Take 1 tablet by mouth daily Unknown dose       metoprolol succinate ER (TOPROL XL) 25 MG 24 hr tablet TAKE 1 TABLET BY MOUTH TWICE A  tablet 2     psyllium (METAMUCIL/KONSYL) capsule Take 1 capsule by  mouth daily       Turmeric 500 MG TABS Take by mouth daily       STATIN NOT PRESCRIBED (INTENTIONAL) Please choose reason not prescribed from choices below. (Patient not taking: Reported on 9/12/2024)       Current Facility-Administered Medications   Medication Dose Route Frequency Provider Last Rate Last Admin     triamcinolone acetonide (KENALOG-10) injection 3 mg  3 mg Intra-Lesional Once           Past Medical/Surgical History:   Patient Active Problem List   Diagnosis     H/O aortic valve replacement     H/O bicuspid aortic valve     Thoracic aortic aneurysm without rupture (H24)     H/O malignant neoplasm of breast     H/O lumpectomy     History of colonic polyps     Recurrent major depressive disorder, in partial remission (H24)     Myalgia due to statin     Hip pain, right     Osteopenia, unspecified location     Hypothyroidism, unspecified type     Ascending aorta dilatation (H24)     Mild intermittent asthma with acute exacerbation     Infection due to 2019 novel coronavirus     Uncontrolled hypertension     Recurrent major depression in complete remission (H24)     Pelvic floor dysfunction in female     Nocturia     Feeling of incomplete bladder emptying     Primary osteoarthritis of both hips     Osteoarthritis of hip     Past Medical History:   Diagnosis Date     Arthritis 11/16    Saw a doctor received a shot in [place].     Basal cell carcinoma      Cancer (H) 09/1989    breast cancer, surgery, and radiation     Heart disease 03/21/2021    Heart valve replacement     Thyroid disease 02/21 diagnosed     Uncomplicated asthma A few years ago     Uncontrolled hypertension 02/20/2023                        Again, thank you for allowing me to participate in the care of your patient.        Sincerely,        Milka Martinez PA-C

## 2024-09-17 ENCOUNTER — HOSPITAL ENCOUNTER (OUTPATIENT)
Dept: CARDIOLOGY | Facility: CLINIC | Age: 74
Discharge: HOME | End: 2024-09-17
Attending: INTERNAL MEDICINE | Admitting: INTERNAL MEDICINE
Payer: MEDICARE

## 2024-09-17 ENCOUNTER — TELEPHONE (OUTPATIENT)
Dept: CARDIOLOGY | Facility: CLINIC | Age: 74
End: 2024-09-17

## 2024-09-17 DIAGNOSIS — I77.810 ASCENDING AORTA DILATATION (H): ICD-10-CM

## 2024-09-17 PROCEDURE — 255N000002 HC RX 255 OP 636: Performed by: INTERNAL MEDICINE

## 2024-09-17 PROCEDURE — A9585 GADOBUTROL INJECTION: HCPCS | Performed by: INTERNAL MEDICINE

## 2024-09-17 PROCEDURE — 71555 MRI ANGIO CHEST W OR W/O DYE: CPT | Mod: MG

## 2024-09-17 PROCEDURE — 71555 MRI ANGIO CHEST W OR W/O DYE: CPT | Mod: 26 | Performed by: INTERNAL MEDICINE

## 2024-09-17 PROCEDURE — G1010 CDSM STANSON: HCPCS | Performed by: INTERNAL MEDICINE

## 2024-09-17 RX ORDER — GADOBUTROL 604.72 MG/ML
13 INJECTION INTRAVENOUS ONCE
Status: COMPLETED | OUTPATIENT
Start: 2024-09-17 | End: 2024-09-17

## 2024-09-17 RX ORDER — LORAZEPAM 0.5 MG/1
0.5 TABLET ORAL EVERY 10 MIN PRN
Status: DISCONTINUED | OUTPATIENT
Start: 2024-09-17 | End: 2024-09-18 | Stop reason: HOSPADM

## 2024-09-17 RX ORDER — SODIUM CHLORIDE 9 MG/ML
INJECTION, SOLUTION INTRAVENOUS CONTINUOUS
Status: ACTIVE | OUTPATIENT
Start: 2024-09-17 | End: 2024-09-17

## 2024-09-17 RX ORDER — LIDOCAINE 40 MG/G
CREAM TOPICAL
Status: DISCONTINUED | OUTPATIENT
Start: 2024-09-17 | End: 2024-09-18 | Stop reason: HOSPADM

## 2024-09-17 RX ORDER — NITROGLYCERIN 0.4 MG/1
0.4 TABLET SUBLINGUAL EVERY 5 MIN PRN
Status: ACTIVE | OUTPATIENT
Start: 2024-09-17 | End: 2024-09-17

## 2024-09-17 RX ORDER — DIAZEPAM 5 MG
5 TABLET ORAL EVERY 30 MIN PRN
Status: DISCONTINUED | OUTPATIENT
Start: 2024-09-17 | End: 2024-09-18 | Stop reason: HOSPADM

## 2024-09-17 RX ADMIN — GADOBUTROL 13 ML: 604.72 INJECTION INTRAVENOUS at 11:42

## 2024-09-17 NOTE — TELEPHONE ENCOUNTER
Please see results, due to mild increase in ascending aortic dimensions. Please advise if any recommendations based off findings. Patient does have follow-up appointment scheduled with Dr. King 11/2024      Comparison MRA: 07/28/2022.      1. The patient is status post aortic valve replacement with a 23 mm Inspiris Resilia bioprosthesis as well  as left atrial appendage ligation with a 35mm AtriClip in 03/2021.  2.  The aortic root is normal in size. There is mild dilatation of the ascending thoracic aorta, which  measures 4.2 cm x 4.0 cm at the level of the main pulmonary artery. The transverse arch and descending  thoracic aorta are normal in size without an aneurysm or dissection.   Aortic Size Index: 2.6 cm/m2  Aortic Height Index: 2.6 cm/m  3. The aortic arch is left sided. There is normal branching of the arch vessels. There is no coarctation.  4. The main and proximal branch pulmonary arteries are normal in size.   5. The systemic venous connections are normal.         CONCLUSIONS:  Mild dilatation of the ascending thoracic aorta. In comparison to the previous report dated  07/28/2022, there has been a mild increase in ascending aortic dimensions.     Martinez Balbuena RN on 9/17/2024 at 2:07 PM

## 2024-09-18 ENCOUNTER — ANCILLARY PROCEDURE (OUTPATIENT)
Dept: GENERAL RADIOLOGY | Facility: CLINIC | Age: 74
End: 2024-09-18
Attending: PODIATRIST
Payer: MEDICARE

## 2024-09-18 ENCOUNTER — OFFICE VISIT (OUTPATIENT)
Dept: PODIATRY | Facility: CLINIC | Age: 74
End: 2024-09-18
Payer: MEDICARE

## 2024-09-18 VITALS
HEIGHT: 62 IN | WEIGHT: 140 LBS | DIASTOLIC BLOOD PRESSURE: 80 MMHG | BODY MASS INDEX: 25.76 KG/M2 | SYSTOLIC BLOOD PRESSURE: 126 MMHG

## 2024-09-18 DIAGNOSIS — S82.64XD CLOSED NONDISPLACED FRACTURE OF LATERAL MALLEOLUS OF RIGHT FIBULA WITH ROUTINE HEALING, SUBSEQUENT ENCOUNTER: ICD-10-CM

## 2024-09-18 DIAGNOSIS — M25.571 ACUTE RIGHT ANKLE PAIN: Primary | ICD-10-CM

## 2024-09-18 DIAGNOSIS — M25.571 ACUTE RIGHT ANKLE PAIN: ICD-10-CM

## 2024-09-18 DIAGNOSIS — Q66.72 CONGENITAL BILATERAL PES CAVUS: ICD-10-CM

## 2024-09-18 DIAGNOSIS — Q66.71 CONGENITAL BILATERAL PES CAVUS: ICD-10-CM

## 2024-09-18 DIAGNOSIS — R29.898 RIGHT LEG WEAKNESS: ICD-10-CM

## 2024-09-18 PROCEDURE — 73610 X-RAY EXAM OF ANKLE: CPT | Mod: TC | Performed by: RADIOLOGY

## 2024-09-18 PROCEDURE — 99213 OFFICE O/P EST LOW 20 MIN: CPT | Performed by: PODIATRIST

## 2024-09-18 NOTE — PATIENT INSTRUCTIONS
Thank you for choosing [facility] Podiatry / Foot & Ankle Surgery!    DR THORNE[facility]S CLINIC:  [place]   [address]   [place] [address]   (Tues, Wed, Thurs AM, Fri PM)       [facility]  [address], [place] [address]  (Fri AM)       TRIAGE LINE: [phone]  APPOINTMENTS: [phone]  RADIOLOGY: [phone]  SET UP SURGERY: [phone]  PHYSICAL THERAPY: [phone]   BILLING QUESTIONS: [phone]  FAX: [phone]       Follow up: As needed

## 2024-09-18 NOTE — LETTER
"9/18/2024      Lauren Nick  [address]  [place] [address]      Dear Colleague,    Thank you for referring your patient, Lauren Nick, to the [facility]. Please see a copy of my visit note below.    Podiatry / Foot and Ankle Surgery Progress Note    September 18, 2024    Subject: Patient was seen for follow-up on right fibular fracture.  She is 10 weeks status post.  Notes that overall she is doing well.  Has been walking in shoes.  States that her right leg feels a little weak compared to her left.  Minimal pain to the ball of the foot at the second and third toe but it is improving.  No other concerns today.    Objective:  Vitals: /80   Ht 1.575 m (5' 2\")   Wt 63.5 kg (140 lb)   LMP  (LMP Unknown)   BMI 25.61 kg/m    BMI= Body mass index is 25.61 kg/m .    General:  Patient is alert and orientated.  NAD.    Vascular:  DP and PT pulses are palpable.  No edema or varicosities noted.  CFT's < 3secs.  Skin temp is normal.    Neuro:  Light and gross touch sensation intact to digits, dorsum, and plantar aspects of the feet.    Derm:  Skin is supple.  No rashes, lesions, or ulcerations noted.    Musculoskeletal:  Increased arch height bilaterally.  No pain on palpation to the right still fibula.    Imaging: right ankle xray - I personally reviewed the xrays -fibular fracture appears well-healed.  Joint spaces are well up aligned.  Otherwise unremarkable.    Assessment:    Acute right ankle pain  Right leg weakness  Closed nondisplaced fracture of lateral malleolus of right fibula with routine healing, subsequent encounter  Congenital bilateral pes cavus      Medical Decision Making/Plan: At this time patient will continue increasing activity.  We talked about physical therapy to help with strengthening and range of motion of the right foot and ankle.  She would like to proceed with this.  She was given an order.  We will have her follow-up as needed.    All questions " were answered to patient satisfaction and she will call further questions or concerns.    Patient Risk Factor:  Patient is a low risk factor for infection.     Jayne Hardin DPM, Podiatry/Foot and Ankle Surgery      Again, thank you for allowing me to participate in the care of your patient.        Sincerely,        Jayne Hardin DPM, Podiatry/Foot and Ankle Surgery

## 2024-09-18 NOTE — TELEPHONE ENCOUNTER
Reggie King MD  You53 minutes ago (12:31 PM)       Mild increase in size of aortic root from 3.9 to 4.2 cm in 2 years time.  Recommend keep the appointment as already scheduled in November.  Will discuss in detail with patient about MRA and likely would recommend repeating MRA in 1 year to make sure that aorta size remains stable.    Thank you  Reggie     Noted to patient on results  Martinez Balbuena RN on 9/18/2024 at 1:25 PM

## 2024-09-18 NOTE — PROGRESS NOTES
"Podiatry / Foot and Ankle Surgery Progress Note    September 18, 2024    Subject: Patient was seen for follow-up on right fibular fracture.  She is 10 weeks status post.  Notes that overall she is doing well.  Has been walking in shoes.  States that her right leg feels a little weak compared to her left.  Minimal pain to the ball of the foot at the second and third toe but it is improving.  No other concerns today.    Objective:  Vitals: /80   Ht 1.575 m (5' 2\")   Wt 63.5 kg (140 lb)   LMP  (LMP Unknown)   BMI 25.61 kg/m    BMI= Body mass index is 25.61 kg/m .    General:  Patient is alert and orientated.  NAD.    Vascular:  DP and PT pulses are palpable.  No edema or varicosities noted.  CFT's < 3secs.  Skin temp is normal.    Neuro:  Light and gross touch sensation intact to digits, dorsum, and plantar aspects of the feet.    Derm:  Skin is supple.  No rashes, lesions, or ulcerations noted.    Musculoskeletal:  Increased arch height bilaterally.  No pain on palpation to the right still fibula.    Imaging: right ankle xray - I personally reviewed the xrays -fibular fracture appears well-healed.  Joint spaces are well up aligned.  Otherwise unremarkable.    Assessment:    Acute right ankle pain  Right leg weakness  Closed nondisplaced fracture of lateral malleolus of right fibula with routine healing, subsequent encounter  Congenital bilateral pes cavus      Medical Decision Making/Plan: At this time patient will continue increasing activity.  We talked about physical therapy to help with strengthening and range of motion of the right foot and ankle.  She would like to proceed with this.  She was given an order.  We will have her follow-up as needed.    All questions were answered to patient satisfaction and she will call further questions or concerns.    Patient Risk Factor:  Patient is a low risk factor for infection.     Jayne Hardin DPM, Podiatry/Foot and Ankle Surgery    "

## 2024-09-23 DIAGNOSIS — F33.41 RECURRENT MAJOR DEPRESSIVE DISORDER, IN PARTIAL REMISSION (H): ICD-10-CM

## 2024-09-23 RX ORDER — ESCITALOPRAM OXALATE 20 MG/1
20 TABLET ORAL DAILY
Qty: 90 TABLET | Refills: 1 | Status: SHIPPED | OUTPATIENT
Start: 2024-09-23

## 2024-09-28 ASSESSMENT — ACTIVITIES OF DAILY LIVING (ADL)
LEFS_SCORE(%): INCOMPLETE
RUNNING_ON_EVEN_GROUND: A LITTLE BIT OF DIFFICULTY
GETTING_INTO_OR_OUT_OF_A_CAR: A LITTLE BIT OF DIFFICULTY
SQUATTING: NO DIFFICULTY
STANDING_FOR_1_HOUR: A LITTLE BIT OF DIFFICULTY
LEFS_RAW_SCORE: INCOMPLETE
PLEASE_INDICATE_YOR_PRIMARY_REASON_FOR_REFERRAL_TO_THERAPY:: FOOT AND/OR ANKLE
LIFTING_AN_OBJECT,_LIKE_A_BAG_OF_GROCERIES_FROM_THE_FLOOR: A LITTLE BIT OF DIFFICULTY
GOING_UP_OR_DOWN_10_STAIRS: A LITTLE BIT OF DIFFICULTY
PERFORMING_LIGHT_ACTIVITIES_AROUND_YOUR_HOME: A LITTLE BIT OF DIFFICULTY
SITTING_FOR_1_HOUR: NO DIFFICULTY
ROLLING_OVER_IN_BED: NO DIFFICULTY
PERFORMING_HEAVY_ACTIVITIES_AROUND_YOUR_HOME: A LITTLE BIT OF DIFFICULTY
WALKING_A_MILE: NO DIFFICULTY
WALKING_2_BLOCKS: NO DIFFICULTY
GETTING_INTO_AND_OUT_OF_A_BATH: NO DIFFICULTY
PUTTING_ON_YOUR_SHOES_OR_SOCKS: A LITTLE BIT OF DIFFICULTY
ANY_OF_YOUR_USUAL_WORK,_HOUSEWORK_OR_SCHOOL_ACTIVITIES: A LITTLE BIT OF DIFFICULTY
WALKING_BETWEEN_ROOMS: NO DIFFICULTY
YOUR_USUAL_HOBBIES,_RECREATIONAL_OR_SPORTING_ACTIVITIES: A LITTLE BIT OF DIFFICULTY

## 2024-10-03 ENCOUNTER — THERAPY VISIT (OUTPATIENT)
Dept: PHYSICAL THERAPY | Facility: CLINIC | Age: 74
End: 2024-10-03
Attending: PODIATRIST
Payer: MEDICARE

## 2024-10-03 DIAGNOSIS — M25.571 ACUTE RIGHT ANKLE PAIN: ICD-10-CM

## 2024-10-03 DIAGNOSIS — S82.64XD CLOSED NONDISPLACED FRACTURE OF LATERAL MALLEOLUS OF RIGHT FIBULA WITH ROUTINE HEALING, SUBSEQUENT ENCOUNTER: ICD-10-CM

## 2024-10-03 DIAGNOSIS — R29.898 RIGHT LEG WEAKNESS: ICD-10-CM

## 2024-10-03 PROCEDURE — 97110 THERAPEUTIC EXERCISES: CPT | Mod: GP | Performed by: PHYSICAL THERAPIST

## 2024-10-03 PROCEDURE — 97161 PT EVAL LOW COMPLEX 20 MIN: CPT | Mod: GP | Performed by: PHYSICAL THERAPIST

## 2024-10-03 NOTE — PROGRESS NOTES
"PHYSICAL THERAPY EVALUATION  Type of Visit: Evaluation             Subjective       Presenting condition or subjective complaint: Fractured bone in ankle. I woukd like to make it stronger. Also notes she fell on her R hand.   Date of onset: 06/10/24 (date of injury)    Relevant medical history: Asthma; Cancer; Heart problems; Implanted device; Pacemaker; Radiation treatment; Thyroid problems   Dates & types of surgery: hip replacement ( right side) on 04/24/24 seven weeks later fell down the steps and fractured a bome in my right ankle    Prior diagnostic imaging/testing results: X-ray     Prior therapy history for the same diagnosis, illness or injury: No      Living Environment  Social support: With a significant other or spouse   Type of home: [facility]   Stairs to enter the home: No       Ramp: No   Stairs inside the home: Yes 16 Is there a railing: Yes     Help at home: None  Equipment owned: Straight Cane; Walker     Employment: No    Hobbies/Interests: swimming and walking, time with grandchildren; started back at the gym 2 wks ago- strength training and weight training. Felt good    Patient goals for therapy: Make it stronger    Pain assessment: See objective evaluation for additional pain details     Objective   PAIN:   Pain Location: R lateral ankle  Pain Quality: Aching and Weakness; \"doesn't feel like the other ankle\"  Pain Frequency: intermittent  Pain is Exacerbated By: weight bearing, extended exercise  Pain is Relieved By: use  Standing Alignment:        Lumbar:  Anterior pelvic tilt                     Ankle/Foot Evaluation  ROM:  Arom ankle eval: comparable knee to wall CKC DF B.      Strength:        Inversion:Left:  5-/5   Pain:    Right:  4-/5   Pain:  Eversion:Left:  5-/5   Pain:  Right:  5- and 4-/5  Weak/painful  Pain:                    PALPATION:     Right ankle tenderness present at:   anterior talofibular ligament and lateral malleolus  EDEMA:     Right ankle edema present at:  lateral " (mild diffuse anterolateral edema noted R ankle)          FUNCTIONAL TESTS:         Quad:      Bilateral Leg Squat:   Control is mild loss of control    Proprioception:  Stork Balance Test:   Right: inc pronation, inc dynamic sway                                                       Assessment & Plan   CLINICAL IMPRESSIONS  Medical Diagnosis: R ankle fracture    Treatment Diagnosis: R ankle distal fib fracture   Impression/Assessment: Patient is a 74 year old female with R ankle complaints.  The following significant findings have been identified: Pain, Decreased ROM/flexibility, Decreased joint mobility, Decreased strength, Decreased proprioception, Impaired gait, and Impaired muscle performance. These impairments interfere with their ability to perform self care tasks, recreational activities, household chores, household mobility, and community mobility as compared to previous level of function.     Of note- Patient also c/o wrist pain after the fall. Pt presents with dec R wrist ROM, and CMC pain. Pat would benefit from hand therapy/OT for further evaluation and skilled rehab to restore ROM and strength.    Clinical Decision Making (Complexity):  Clinical Presentation: Stable/Uncomplicated  Clinical Presentation Rationale: based on medical and personal factors listed in PT evaluation  Clinical Decision Making (Complexity): Low complexity    PLAN OF CARE  Treatment Interventions:  Interventions: Gait Training, Manual Therapy, Neuromuscular Re-education, Therapeutic Activity, Therapeutic Exercise, Self-Care/Home Management    Long Term Goals     PT Goal 1  Goal Identifier: Ambulation  Goal Description: ambulate on uneven ground  Rationale: to maximize safety and independence within the home;to maximize safety and independence within the community;to maximize safety and independence with self cares  Target Date: 01/01/25      Frequency of Treatment: wkly for 4-6 wks tapering to every other month  Duration of  Treatment: up to 90 d  Education Assessment:   Learner/Method: Patient  Education Comments: edu on plan of care, prognosis    Risks and benefits of evaluation/treatment have been explained.   Patient/Family/caregiver agrees with Plan of Care.     Evaluation Time:     PT Eval, Low Complexity Minutes (38316): 15  Signing Clinician: DUSTIN Taylor [facility]                                                                                   OUTPATIENT PHYSICAL THERAPY      PLAN OF TREATMENT FOR OUTPATIENT REHABILITATION   Patient's Last Name, First Name, Lauren Mendez YOB: 1950   Provider's Name   [facility]   Medical Record No.  [id number]     Onset Date: 06/10/24 (date of injury)  Start of Care Date: 10/03/24     Medical Diagnosis:  R ankle fracture      PT Treatment Diagnosis:  R ankle distal fib fracture Plan of Treatment  Frequency/Duration: wkly for 4-6 wks tapering to every other month/ up to 90 d    Certification date from 10/03/24 to 01/01/25         See note for plan of treatment details and functional goals     Nathalie Small PT                         I CERTIFY THE NEED FOR THESE SERVICES FURNISHED UNDER        THIS PLAN OF TREATMENT AND WHILE UNDER MY CARE     (Physician attestation of this document indicates review and certification of the therapy plan).              Referring Provider:  Jayne Hardin    Initial Assessment  See Epic Evaluation- Start of Care Date: 10/03/24

## 2024-10-05 DIAGNOSIS — I77.810 ASCENDING AORTA DILATATION (H): ICD-10-CM

## 2024-10-06 ENCOUNTER — MYC MEDICAL ADVICE (OUTPATIENT)
Dept: FAMILY MEDICINE | Facility: CLINIC | Age: 74
End: 2024-10-06
Payer: MEDICARE

## 2024-10-07 ENCOUNTER — VIRTUAL VISIT (OUTPATIENT)
Dept: FAMILY MEDICINE | Facility: CLINIC | Age: 74
End: 2024-10-07
Payer: MEDICARE

## 2024-10-07 DIAGNOSIS — N30.00 ACUTE CYSTITIS WITHOUT HEMATURIA: Primary | ICD-10-CM

## 2024-10-07 PROBLEM — Z95.0 HISTORY OF CARDIAC PACEMAKER IN SITU: Status: ACTIVE | Noted: 2024-04-23

## 2024-10-07 PROBLEM — I48.0 PAROXYSMAL ATRIAL FIBRILLATION (H): Status: ACTIVE | Noted: 2024-04-23

## 2024-10-07 PROCEDURE — 99213 OFFICE O/P EST LOW 20 MIN: CPT | Mod: 95 | Performed by: PHYSICIAN ASSISTANT

## 2024-10-07 RX ORDER — LOSARTAN POTASSIUM 25 MG/1
25 TABLET ORAL DAILY
Qty: 90 TABLET | Refills: 1 | Status: SHIPPED | OUTPATIENT
Start: 2024-10-07

## 2024-10-07 RX ORDER — NITROFURANTOIN 25; 75 MG/1; MG/1
100 CAPSULE ORAL 2 TIMES DAILY
Qty: 10 CAPSULE | Refills: 0 | Status: SHIPPED | OUTPATIENT
Start: 2024-10-07 | End: 2024-10-12

## 2024-10-07 ASSESSMENT — PATIENT HEALTH QUESTIONNAIRE - PHQ9
SUM OF ALL RESPONSES TO PHQ QUESTIONS 1-9: 0
10. IF YOU CHECKED OFF ANY PROBLEMS, HOW DIFFICULT HAVE THESE PROBLEMS MADE IT FOR YOU TO DO YOUR WORK, TAKE CARE OF THINGS AT HOME, OR GET ALONG WITH OTHER PEOPLE: NOT DIFFICULT AT ALL
SUM OF ALL RESPONSES TO PHQ QUESTIONS 1-9: 0

## 2024-10-07 NOTE — PROGRESS NOTES
"Nicolette is a 74 year old who is being evaluated via a billable video visit.    How would you like to obtain your AVS? Memolanehart  If the video visit is dropped, the invitation should be resent by: Text to cell phone: [phone]  Will anyone else be joining your video visit? No      Assessment & Plan     Acute cystitis without hematuria  Prescription for oral antibiotic sent to pharmacy; over the counter and supportive care discussed with patient; patient to update via Memolanehart if no improvement with above and may need a lab only appointment for urinalysis sample. Return to clinic with any worsening or changes in symptoms and follow up with PCP for routine care.   - nitroFURantoin macrocrystal-monohydrate (MACROBID) 100 MG capsule; Take 1 capsule (100 mg) by mouth 2 times daily for 5 days.        BMI  Estimated body mass index is 25.61 kg/m  as calculated from the following:    Height as of 9/18/24: 1.575 m (5' 2\").    Weight as of 9/18/24: 63.5 kg (140 lb).         See Patient Instructions    Subjective   Nicolette is a 74 year old, presenting for the following health issues:  No chief complaint on file.      Video Start Time: 11:19 AM    History of Present Illness       Reason for visit:  Urinary track infection    She eats 2-3 servings of fruits and vegetables daily.She consumes 0 sweetened beverage(s) daily.She exercises with enough effort to increase her heart rate 20 to 29 minutes per day.  She exercises with enough effort to increase her heart rate 5 days per week.   She is taking medications regularly.       Genitourinary - Female  Onset/Duration: a few weeks  Description:   Painful urination (Dysuria): YES           Frequency: YES  Blood in urine (Hematuria): No  Delay in urine (Hesitency): YES  Intensity: moderate  Progression of Symptoms:  same  Accompanying Signs & Symptoms:  Fever/chills: No  Flank pain: No  Nausea and vomiting: No  Vaginal symptoms: none  Abdominal/Pelvic Pain: No  History:   History of frequent " UTI s: No  History of kidney stones: No  Sexually Active: No  Possibility of pregnancy: No  Precipitating or alleviating factors: None; maybe after swimming  Therapies tried and outcome:  none         Review of Systems  Constitutional, HEENT, cardiovascular, pulmonary, gi and gu systems are negative, except as otherwise noted.      Objective           Vitals:  No vitals were obtained today due to virtual visit.    Physical Exam   GENERAL: alert and no distress  EYES: Eyes grossly normal to inspection.  No discharge or erythema, or obvious scleral/conjunctival abnormalities.  RESP: No audible wheeze, cough, or visible cyanosis.    SKIN: Visible skin clear. No significant rash, abnormal pigmentation or lesions.  NEURO: Cranial nerves grossly intact.  Mentation and speech appropriate for age.  PSYCH: Appropriate affect, tone, and pace of words          Video-Visit Details    Type of service:  Video Visit   Video End Time:11:23 AM  Originating Location (pt. Location): Home    Distant Location (provider location):  Off-site  Platform used for Video Visit: Jerri  Signed Electronically by: J Luis Vergara PA-C

## 2024-10-11 ENCOUNTER — THERAPY VISIT (OUTPATIENT)
Dept: PHYSICAL THERAPY | Facility: CLINIC | Age: 74
End: 2024-10-11
Attending: PODIATRIST
Payer: MEDICARE

## 2024-10-11 DIAGNOSIS — R29.898 RIGHT LEG WEAKNESS: ICD-10-CM

## 2024-10-11 DIAGNOSIS — S82.64XD CLOSED NONDISPLACED FRACTURE OF LATERAL MALLEOLUS OF RIGHT FIBULA WITH ROUTINE HEALING, SUBSEQUENT ENCOUNTER: ICD-10-CM

## 2024-10-11 DIAGNOSIS — M25.571 ACUTE RIGHT ANKLE PAIN: Primary | ICD-10-CM

## 2024-10-11 PROCEDURE — 97110 THERAPEUTIC EXERCISES: CPT | Mod: GP | Performed by: PHYSICAL THERAPIST

## 2024-10-11 PROCEDURE — 97026 INFRARED THERAPY: CPT | Mod: GP | Performed by: PHYSICAL THERAPIST

## 2024-10-16 ENCOUNTER — TELEPHONE (OUTPATIENT)
Dept: FAMILY MEDICINE | Facility: CLINIC | Age: 74
End: 2024-10-16
Payer: MEDICARE

## 2024-10-16 DIAGNOSIS — M25.532 PAIN IN BOTH WRISTS: Primary | ICD-10-CM

## 2024-10-16 DIAGNOSIS — M25.531 PAIN IN BOTH WRISTS: Primary | ICD-10-CM

## 2024-10-16 NOTE — TELEPHONE ENCOUNTER
----- Message from Nathalie Small sent at 10/4/2024  4:02 PM CDT -----  Regarding: Hand Therapy Orders needed  Hi Dr Wegener-    I had the pleasure of evaluating Nicolette yesterday for her ankle injury after her fall earlier this Summer. She mentioned in PT that she also injured her wrist, but didn't pay mind to it because her ankle was the primary concern.    I took a quick look at it during her eval, and I think she'd benefit from OT with our hand therapists for this- could you put orders in for her to be seen?     Thank you!  Nathalie Small, PT, ATC

## 2024-10-22 ENCOUNTER — PATIENT OUTREACH (OUTPATIENT)
Dept: CARE COORDINATION | Facility: CLINIC | Age: 74
End: 2024-10-22

## 2024-10-25 DIAGNOSIS — E03.9 HYPOTHYROIDISM, UNSPECIFIED TYPE: ICD-10-CM

## 2024-10-25 RX ORDER — LEVOTHYROXINE SODIUM 50 UG/1
50 TABLET ORAL DAILY
Qty: 90 TABLET | Refills: 0 | Status: SHIPPED | OUTPATIENT
Start: 2024-10-25

## 2024-10-29 ENCOUNTER — ANCILLARY PROCEDURE (OUTPATIENT)
Dept: GENERAL RADIOLOGY | Facility: CLINIC | Age: 74
End: 2024-10-29
Attending: STUDENT IN AN ORGANIZED HEALTH CARE EDUCATION/TRAINING PROGRAM
Payer: MEDICARE

## 2024-10-29 ENCOUNTER — OFFICE VISIT (OUTPATIENT)
Dept: ORTHOPEDICS | Facility: CLINIC | Age: 74
End: 2024-10-29
Payer: MEDICARE

## 2024-10-29 VITALS — HEIGHT: 62 IN | BODY MASS INDEX: 25.76 KG/M2 | WEIGHT: 140 LBS

## 2024-10-29 DIAGNOSIS — M79.641 RIGHT HAND PAIN: ICD-10-CM

## 2024-10-29 DIAGNOSIS — M19.031 ARTHRITIS OF RIGHT WRIST: ICD-10-CM

## 2024-10-29 DIAGNOSIS — M79.641 RIGHT HAND PAIN: Primary | ICD-10-CM

## 2024-10-29 PROCEDURE — 99213 OFFICE O/P EST LOW 20 MIN: CPT | Performed by: STUDENT IN AN ORGANIZED HEALTH CARE EDUCATION/TRAINING PROGRAM

## 2024-10-29 PROCEDURE — 73130 X-RAY EXAM OF HAND: CPT | Mod: TC | Performed by: RADIOLOGY

## 2024-10-29 NOTE — LETTER
10/29/2024      Lauren Nick  [address]  [place] [address]      Dear Colleague,    Thank you for referring your patient, Lauren Nick, to the [facility] [place]. Please see a copy of my visit note below.    Orthopaedic Surgery Hand and Upper Extremity Clinic H&P Note:  Date: Oct 29, 2024     Patient Name: Lauren Nick  MRN: [id number]    Consult requested by: Referred Self    CHIEF COMPLAINT: Right volar radial wrist pain    Dominant Hand:right  Occupation: retired       HPI:  Ms. Lauren Nick is a 74 year old female right   hand dominant who presents with right volar radial wrist pain.  She fell down stairs 4 months ago in June.  She was rehabbing from a total hip arthroplasty and sustained an ankle fracture that was treated nonoperatively in a boot.  She did not pay much attention to her wrist at the time due to the other injuries.  However since then she has had constant pain at the base of the thumb and volar radial wrist. She has noticed increase pain while lifting. She has tried bracing and ice which give minor relief.  Patient also notes several nodules within the palm of the right hand.  These have been present for a year and a half.  She thought they were a ganglion cyst.  She states they are annoying.    PMH  Diabetes no   Thyroid Problems yes   Smoking Y/N non smoker       PAST MEDICAL HISTORY:  Past Medical History:   Diagnosis Date     Arthritis 11/16    Saw a doctor received a shot in [place].     Basal cell carcinoma      Cancer (H) 09/1989    breast cancer, surgery, and radiation     Heart disease 03/21/2021    Heart valve replacement     Thyroid disease 02/21 diagnosed     Uncomplicated asthma A few years ago     Uncontrolled hypertension 02/20/2023       PAST SURGICAL HISTORY:  Past Surgical History:   Procedure Laterality Date     ABDOMEN SURGERY  Gallbladder    removed in 2010     BIOPSY  10/2018     BREAST SURGERY  09/25/1989     CARDIAC SURGERY   03/22/2021     CHOLECYSTECTOMY  03/2010     COLONOSCOPY  03/2019     COLONOSCOPY N/A 11/10/2022    Procedure: COLONOSCOPY, WITH POLYPECTOMY AND BIOPSY;  Surgeon: Rafa Parks MD;  Location:  GI     ENT SURGERY  06/2011     EYE SURGERY  09/2015     THORACIC SURGERY  03/21    valve replacement       MEDICATIONS:  Current Outpatient Medications   Medication Sig Dispense Refill     losartan (COZAAR) 25 MG tablet TAKE 1 TABLET BY MOUTH EVERY DAY 90 tablet 1     albuterol (PROAIR HFA/PROVENTIL HFA/VENTOLIN HFA) 108 (90 Base) MCG/ACT inhaler INHALE 2 PUFFS INTO THE LUNGS EVERY 6 HOURS 8.5 g 3     aspirin 81 MG EC tablet Take 81 mg by mouth daily       escitalopram (LEXAPRO) 20 MG tablet TAKE 1 TABLET BY MOUTH EVERY DAY 90 tablet 1     fluticasone (FLONASE) 50 MCG/ACT nasal spray INSTILL 2 SPRAYS INTO BOTH NOSTRILS DAILY 48 mL 3     levothyroxine (SYNTHROID/LEVOTHROID) 50 MCG tablet Take 1 tablet (50 mcg) by mouth daily. 90 tablet 0     LORazepam (ATIVAN) 1 MG tablet Take 1 tablet (1 mg) by mouth nightly as needed for sleep 30 tablet 5     magnesium 250 MG tablet Take 1 tablet by mouth daily Unknown dose       metoprolol succinate ER (TOPROL XL) 25 MG 24 hr tablet TAKE 1 TABLET BY MOUTH TWICE A  tablet 2     psyllium (METAMUCIL/KONSYL) capsule Take 1 capsule by mouth daily       STATIN NOT PRESCRIBED (INTENTIONAL) Please choose reason not prescribed from choices below.       Turmeric 500 MG TABS Take by mouth daily       Current Facility-Administered Medications   Medication Dose Route Frequency Provider Last Rate Last Admin     triamcinolone acetonide (KENALOG-10) injection 3 mg  3 mg Intra-Lesional Once            ALLERGIES:     Allergies   Allergen Reactions     Penicillins Hives     Ambien [Zolpidem]      Complex behaviors     Statins [Statins]      Myalgias to multiple statins       FAMILY HISTORY:  No pertinent family history    SOCIAL HISTORY:  Social History     Tobacco Use     Smoking status: Former      Current packs/day: 0.00     Average packs/day: 1 pack/day for 10.4 years (10.4 ttl pk-yrs)     Types: Cigarettes     Start date: 1969     Quit date: 10/1/1979     Years since quittin.1     Smokeless tobacco: Never   Vaping Use     Vaping status: Never Used   Substance Use Topics     Alcohol use: Yes     Comment: occ     Drug use: Never       The patient's past medical, family, and social history was reviewed and confirmed.    REVIEW OF SYMPTOMS:      General: Negative   Eyes: Negative   Ear, Nose and Throat: Negative   Respiratory: Negative   Cardiovascular: Negative   Gastrointestinal: Negative   Genito-urinary: Negative   Musculoskeletal: see HPI  Neurological: Negative   Psychological: Negative  HEME: Negative   ENDO: Negative   SKIN: Negative    VITALS:  There were no vitals filed for this visit.    EXAM:  General: NAD, A&Ox3  HEENT: NC/AT  CV: RRR by peripheral pulse  Pulmonary: Non-labored breathing on RA  RUE:  Skin intact, no deformity, no atrophy  Dupuytren's nodules within the palm, no contractures  Tenderness to palpation over the STT joint.  No tenderness over the CMC joint, negative CMC grind  Intact EPL, FPL, hand intrinsics  Sensation intact to light touch all distributions  Well-perfused, capillary refill less than 2 seconds       IMAGING:  X-rays of the right hand obtained today demonstrates advanced degenerative change at the STT joint, mild degenerative change at the CMC joint.    I have personally reviewed the above images and labs.       IMPRESSION AND RECOMMENDATIONS:  Ms. Lauren Nick is a 74 year old female right hand dominant with right STT joint arthritis.    I showed the patient her radiographs and discussed the diagnosis, prognosis, and treatment options.  We discussed conservative management including hand therapy for strengthening and splinting, anti-inflammatories, and steroid injections.    Did briefly also discuss surgery either in the form of a distal pole excision or  trapeziectomy/claudio-trapezoidectomy.    She would like to proceed with conservative management at this time.  Referral to activation of therapy placed.  I have also placed a referral for an ultrasound-guided STT injection.  Advised that she can have these injections every 3 to 4 months.    All questions answered.  The patient voiced understanding and agreement.  Follow-up with me as needed.    Vinnie Patel MD    Hand, Upper Extremity & Microvascular Surgery  Department of Orthopaedic Surgery  [facility]          Again, thank you for allowing me to participate in the care of your patient.        Sincerely,        Vinnie Patel MD

## 2024-10-29 NOTE — PROGRESS NOTES
Orthopaedic Surgery Hand and Upper Extremity Clinic H&P Note:  Date: Oct 29, 2024     Patient Name: Lauren Nick  MRN: [id number]    Consult requested by: Referred Self    CHIEF COMPLAINT: Right volar radial wrist pain    Dominant Hand:right  Occupation: retired       HPI:  Ms. Lauren Nick is a 74 year old female right   hand dominant who presents with right volar radial wrist pain.  She fell down stairs 4 months ago in June.  She was rehabbing from a total hip arthroplasty and sustained an ankle fracture that was treated nonoperatively in a boot.  She did not pay much attention to her wrist at the time due to the other injuries.  However since then she has had constant pain at the base of the thumb and volar radial wrist. She has noticed increase pain while lifting. She has tried bracing and ice which give minor relief.  Patient also notes several nodules within the palm of the right hand.  These have been present for a year and a half.  She thought they were a ganglion cyst.  She states they are annoying.    PMH  Diabetes no   Thyroid Problems yes   Smoking Y/N non smoker       PAST MEDICAL HISTORY:  Past Medical History:   Diagnosis Date    Arthritis 11/16    Saw a doctor received a shot in [place].    Basal cell carcinoma     Cancer (H) 09/1989    breast cancer, surgery, and radiation    Heart disease 03/21/2021    Heart valve replacement    Thyroid disease 02/21 diagnosed    Uncomplicated asthma A few years ago    Uncontrolled hypertension 02/20/2023       PAST SURGICAL HISTORY:  Past Surgical History:   Procedure Laterality Date    ABDOMEN SURGERY  Gallbladder    removed in 2010    BIOPSY  10/2018    BREAST SURGERY  09/25/1989    CARDIAC SURGERY  03/22/2021    CHOLECYSTECTOMY  03/2010    COLONOSCOPY  03/2019    COLONOSCOPY N/A 11/10/2022    Procedure: COLONOSCOPY, WITH POLYPECTOMY AND BIOPSY;  Surgeon: Rafa Parks MD;  Location:  GI    ENT SURGERY  06/2011    EYE SURGERY  09/2015    THORACIC SURGERY       valve replacement       MEDICATIONS:  Current Outpatient Medications   Medication Sig Dispense Refill    losartan (COZAAR) 25 MG tablet TAKE 1 TABLET BY MOUTH EVERY DAY 90 tablet 1    albuterol (PROAIR HFA/PROVENTIL HFA/VENTOLIN HFA) 108 (90 Base) MCG/ACT inhaler INHALE 2 PUFFS INTO THE LUNGS EVERY 6 HOURS 8.5 g 3    aspirin 81 MG EC tablet Take 81 mg by mouth daily      escitalopram (LEXAPRO) 20 MG tablet TAKE 1 TABLET BY MOUTH EVERY DAY 90 tablet 1    fluticasone (FLONASE) 50 MCG/ACT nasal spray INSTILL 2 SPRAYS INTO BOTH NOSTRILS DAILY 48 mL 3    levothyroxine (SYNTHROID/LEVOTHROID) 50 MCG tablet Take 1 tablet (50 mcg) by mouth daily. 90 tablet 0    LORazepam (ATIVAN) 1 MG tablet Take 1 tablet (1 mg) by mouth nightly as needed for sleep 30 tablet 5    magnesium 250 MG tablet Take 1 tablet by mouth daily Unknown dose      metoprolol succinate ER (TOPROL XL) 25 MG 24 hr tablet TAKE 1 TABLET BY MOUTH TWICE A  tablet 2    psyllium (METAMUCIL/KONSYL) capsule Take 1 capsule by mouth daily      STATIN NOT PRESCRIBED (INTENTIONAL) Please choose reason not prescribed from choices below.      Turmeric 500 MG TABS Take by mouth daily       Current Facility-Administered Medications   Medication Dose Route Frequency Provider Last Rate Last Admin    triamcinolone acetonide (KENALOG-10) injection 3 mg  3 mg Intra-Lesional Once            ALLERGIES:     Allergies   Allergen Reactions    Penicillins Hives    Ambien [Zolpidem]      Complex behaviors    Statins [Statins]      Myalgias to multiple statins       FAMILY HISTORY:  No pertinent family history    SOCIAL HISTORY:  Social History     Tobacco Use    Smoking status: Former     Current packs/day: 0.00     Average packs/day: 1 pack/day for 10.4 years (10.4 ttl pk-yrs)     Types: Cigarettes     Start date: 1969     Quit date: 10/1/1979     Years since quittin.1    Smokeless tobacco: Never   Vaping Use    Vaping status: Never Used   Substance Use Topics     Alcohol use: Yes     Comment: occ    Drug use: Never       The patient's past medical, family, and social history was reviewed and confirmed.    REVIEW OF SYMPTOMS:      General: Negative   Eyes: Negative   Ear, Nose and Throat: Negative   Respiratory: Negative   Cardiovascular: Negative   Gastrointestinal: Negative   Genito-urinary: Negative   Musculoskeletal: see HPI  Neurological: Negative   Psychological: Negative  HEME: Negative   ENDO: Negative   SKIN: Negative    VITALS:  There were no vitals filed for this visit.    EXAM:  General: NAD, A&Ox3  HEENT: NC/AT  CV: RRR by peripheral pulse  Pulmonary: Non-labored breathing on RA  RUE:  Skin intact, no deformity, no atrophy  Dupuytren's nodules within the palm, no contractures  Tenderness to palpation over the STT joint.  No tenderness over the CMC joint, negative CMC grind  Intact EPL, FPL, hand intrinsics  Sensation intact to light touch all distributions  Well-perfused, capillary refill less than 2 seconds       IMAGING:  X-rays of the right hand obtained today demonstrates advanced degenerative change at the STT joint, mild degenerative change at the CMC joint.    I have personally reviewed the above images and labs.       IMPRESSION AND RECOMMENDATIONS:  Ms. Lauren Nick is a 74 year old female right hand dominant with right STT joint arthritis.    I showed the patient her radiographs and discussed the diagnosis, prognosis, and treatment options.  We discussed conservative management including hand therapy for strengthening and splinting, anti-inflammatories, and steroid injections.    Did briefly also discuss surgery either in the form of a distal pole excision or trapeziectomy/claudio-trapezoidectomy.    She would like to proceed with conservative management at this time.  Referral to activation of therapy placed.  I have also placed a referral for an ultrasound-guided STT injection.  Advised that she can have these injections every 3 to 4  months.    All questions answered.  The patient voiced understanding and agreement.  Follow-up with me as needed.    Vinnie Patel MD    Hand, Upper Extremity & Microvascular Surgery  Department [facility]  [facility]

## 2024-10-30 ENCOUNTER — THERAPY VISIT (OUTPATIENT)
Dept: PHYSICAL THERAPY | Facility: CLINIC | Age: 74
End: 2024-10-30
Payer: MEDICARE

## 2024-10-30 ENCOUNTER — PATIENT OUTREACH (OUTPATIENT)
Dept: CARE COORDINATION | Facility: CLINIC | Age: 74
End: 2024-10-30

## 2024-10-30 DIAGNOSIS — S82.64XD CLOSED NONDISPLACED FRACTURE OF LATERAL MALLEOLUS OF RIGHT FIBULA WITH ROUTINE HEALING, SUBSEQUENT ENCOUNTER: ICD-10-CM

## 2024-10-30 DIAGNOSIS — M25.571 ACUTE RIGHT ANKLE PAIN: Primary | ICD-10-CM

## 2024-10-30 PROCEDURE — 97110 THERAPEUTIC EXERCISES: CPT | Mod: GP | Performed by: PHYSICAL THERAPIST

## 2024-10-31 ENCOUNTER — MYC MEDICAL ADVICE (OUTPATIENT)
Dept: ORTHOPEDICS | Facility: CLINIC | Age: 74
End: 2024-10-31
Payer: MEDICARE

## 2024-11-10 ENCOUNTER — ANCILLARY PROCEDURE (OUTPATIENT)
Dept: CARDIOLOGY | Facility: CLINIC | Age: 74
End: 2024-11-10
Attending: INTERNAL MEDICINE
Payer: MEDICARE

## 2024-11-10 DIAGNOSIS — Z95.0 CARDIAC PACEMAKER IN SITU: ICD-10-CM

## 2024-11-10 DIAGNOSIS — I49.5 SSS (SICK SINUS SYNDROME) (H): ICD-10-CM

## 2024-11-10 PROCEDURE — 93294 REM INTERROG EVL PM/LDLS PM: CPT | Performed by: INTERNAL MEDICINE

## 2024-11-10 PROCEDURE — 93296 REM INTERROG EVL PM/IDS: CPT | Performed by: INTERNAL MEDICINE

## 2024-11-11 ENCOUNTER — TELEPHONE (OUTPATIENT)
Dept: CARDIOLOGY | Facility: CLINIC | Age: 74
End: 2024-11-11

## 2024-11-11 ENCOUNTER — OFFICE VISIT (OUTPATIENT)
Dept: CARDIOLOGY | Facility: CLINIC | Age: 74
End: 2024-11-11
Payer: MEDICARE

## 2024-11-11 VITALS
WEIGHT: 142.4 LBS | SYSTOLIC BLOOD PRESSURE: 107 MMHG | HEIGHT: 62 IN | DIASTOLIC BLOOD PRESSURE: 66 MMHG | OXYGEN SATURATION: 97 % | HEART RATE: 60 BPM | BODY MASS INDEX: 26.2 KG/M2

## 2024-11-11 DIAGNOSIS — Z95.2 S/P AVR: ICD-10-CM

## 2024-11-11 DIAGNOSIS — I48.0 PAROXYSMAL ATRIAL FIBRILLATION (H): ICD-10-CM

## 2024-11-11 DIAGNOSIS — E78.5 HYPERLIPIDEMIA LDL GOAL <70: ICD-10-CM

## 2024-11-11 DIAGNOSIS — I25.10 CORONARY ARTERY DISEASE INVOLVING NATIVE CORONARY ARTERY OF NATIVE HEART WITHOUT ANGINA PECTORIS: ICD-10-CM

## 2024-11-11 DIAGNOSIS — I77.810 ASCENDING AORTA DILATATION (H): Primary | ICD-10-CM

## 2024-11-11 LAB
MDC_IDC_EPISODE_DTM: NORMAL
MDC_IDC_EPISODE_DTM: NORMAL
MDC_IDC_EPISODE_ID: NORMAL
MDC_IDC_EPISODE_ID: NORMAL
MDC_IDC_EPISODE_TYPE: NORMAL
MDC_IDC_EPISODE_TYPE: NORMAL
MDC_IDC_LEAD_CONNECTION_STATUS: NORMAL
MDC_IDC_LEAD_CONNECTION_STATUS: NORMAL
MDC_IDC_LEAD_IMPLANT_DT: NORMAL
MDC_IDC_LEAD_IMPLANT_DT: NORMAL
MDC_IDC_LEAD_LOCATION: NORMAL
MDC_IDC_LEAD_LOCATION: NORMAL
MDC_IDC_LEAD_LOCATION_DETAIL_1: NORMAL
MDC_IDC_LEAD_LOCATION_DETAIL_1: NORMAL
MDC_IDC_LEAD_MFG: NORMAL
MDC_IDC_LEAD_MFG: NORMAL
MDC_IDC_LEAD_MODEL: NORMAL
MDC_IDC_LEAD_MODEL: NORMAL
MDC_IDC_LEAD_POLARITY_TYPE: NORMAL
MDC_IDC_LEAD_POLARITY_TYPE: NORMAL
MDC_IDC_LEAD_SERIAL: NORMAL
MDC_IDC_LEAD_SERIAL: NORMAL
MDC_IDC_MSMT_BATTERY_DTM: NORMAL
MDC_IDC_MSMT_BATTERY_REMAINING_LONGEVITY: 91 MO
MDC_IDC_MSMT_BATTERY_REMAINING_PERCENTAGE: 70 %
MDC_IDC_MSMT_BATTERY_RRT_TRIGGER: NORMAL
MDC_IDC_MSMT_BATTERY_STATUS: NORMAL
MDC_IDC_MSMT_BATTERY_VOLTAGE: 3.01 V
MDC_IDC_MSMT_LEADCHNL_RA_IMPEDANCE_VALUE: 430 OHM
MDC_IDC_MSMT_LEADCHNL_RA_LEAD_CHANNEL_STATUS: NORMAL
MDC_IDC_MSMT_LEADCHNL_RA_PACING_THRESHOLD_AMPLITUDE: 0.5 V
MDC_IDC_MSMT_LEADCHNL_RA_PACING_THRESHOLD_PULSEWIDTH: 0.4 MS
MDC_IDC_MSMT_LEADCHNL_RA_SENSING_INTR_AMPL: 3.6 MV
MDC_IDC_MSMT_LEADCHNL_RV_IMPEDANCE_VALUE: 560 OHM
MDC_IDC_MSMT_LEADCHNL_RV_LEAD_CHANNEL_STATUS: NORMAL
MDC_IDC_MSMT_LEADCHNL_RV_PACING_THRESHOLD_AMPLITUDE: 0.88 V
MDC_IDC_MSMT_LEADCHNL_RV_PACING_THRESHOLD_PULSEWIDTH: 0.4 MS
MDC_IDC_MSMT_LEADCHNL_RV_SENSING_INTR_AMPL: 4.9 MV
MDC_IDC_PG_IMPLANT_DTM: NORMAL
MDC_IDC_PG_MFG: NORMAL
MDC_IDC_PG_MODEL: NORMAL
MDC_IDC_PG_SERIAL: NORMAL
MDC_IDC_PG_TYPE: NORMAL
MDC_IDC_SESS_CLINIC_NAME: NORMAL
MDC_IDC_SESS_DTM: NORMAL
MDC_IDC_SESS_REPROGRAMMED: NO
MDC_IDC_SESS_TYPE: NORMAL
MDC_IDC_SET_BRADY_AT_MODE_SWITCH_MODE: NORMAL
MDC_IDC_SET_BRADY_AT_MODE_SWITCH_RATE: 180 {BEATS}/MIN
MDC_IDC_SET_BRADY_LOWRATE: 60 {BEATS}/MIN
MDC_IDC_SET_BRADY_MAX_SENSOR_RATE: 130 {BEATS}/MIN
MDC_IDC_SET_BRADY_MAX_TRACKING_RATE: 130 {BEATS}/MIN
MDC_IDC_SET_BRADY_MODE: NORMAL
MDC_IDC_SET_BRADY_PAV_DELAY_LOW: 200 MS
MDC_IDC_SET_BRADY_SAV_DELAY_LOW: 150 MS
MDC_IDC_SET_LEADCHNL_RA_PACING_AMPLITUDE: 2 V
MDC_IDC_SET_LEADCHNL_RA_PACING_ANODE_ELECTRODE_1: NORMAL
MDC_IDC_SET_LEADCHNL_RA_PACING_ANODE_LOCATION_1: NORMAL
MDC_IDC_SET_LEADCHNL_RA_PACING_CAPTURE_MODE: NORMAL
MDC_IDC_SET_LEADCHNL_RA_PACING_CATHODE_ELECTRODE_1: NORMAL
MDC_IDC_SET_LEADCHNL_RA_PACING_CATHODE_LOCATION_1: NORMAL
MDC_IDC_SET_LEADCHNL_RA_PACING_POLARITY: NORMAL
MDC_IDC_SET_LEADCHNL_RA_PACING_PULSEWIDTH: 0.4 MS
MDC_IDC_SET_LEADCHNL_RA_SENSING_ADAPTATION_MODE: NORMAL
MDC_IDC_SET_LEADCHNL_RA_SENSING_ANODE_ELECTRODE_1: NORMAL
MDC_IDC_SET_LEADCHNL_RA_SENSING_ANODE_LOCATION_1: NORMAL
MDC_IDC_SET_LEADCHNL_RA_SENSING_CATHODE_ELECTRODE_1: NORMAL
MDC_IDC_SET_LEADCHNL_RA_SENSING_CATHODE_LOCATION_1: NORMAL
MDC_IDC_SET_LEADCHNL_RA_SENSING_POLARITY: NORMAL
MDC_IDC_SET_LEADCHNL_RA_SENSING_SENSITIVITY: 0.5 MV
MDC_IDC_SET_LEADCHNL_RV_PACING_AMPLITUDE: 1.12
MDC_IDC_SET_LEADCHNL_RV_PACING_ANODE_ELECTRODE_1: NORMAL
MDC_IDC_SET_LEADCHNL_RV_PACING_ANODE_LOCATION_1: NORMAL
MDC_IDC_SET_LEADCHNL_RV_PACING_CAPTURE_MODE: NORMAL
MDC_IDC_SET_LEADCHNL_RV_PACING_CATHODE_ELECTRODE_1: NORMAL
MDC_IDC_SET_LEADCHNL_RV_PACING_CATHODE_LOCATION_1: NORMAL
MDC_IDC_SET_LEADCHNL_RV_PACING_POLARITY: NORMAL
MDC_IDC_SET_LEADCHNL_RV_PACING_PULSEWIDTH: 0.4 MS
MDC_IDC_SET_LEADCHNL_RV_SENSING_ADAPTATION_MODE: NORMAL
MDC_IDC_SET_LEADCHNL_RV_SENSING_ANODE_ELECTRODE_1: NORMAL
MDC_IDC_SET_LEADCHNL_RV_SENSING_ANODE_LOCATION_1: NORMAL
MDC_IDC_SET_LEADCHNL_RV_SENSING_CATHODE_ELECTRODE_1: NORMAL
MDC_IDC_SET_LEADCHNL_RV_SENSING_CATHODE_LOCATION_1: NORMAL
MDC_IDC_SET_LEADCHNL_RV_SENSING_POLARITY: NORMAL
MDC_IDC_SET_LEADCHNL_RV_SENSING_SENSITIVITY: 2 MV
MDC_IDC_STAT_AT_BURDEN_PERCENT: 0 %
MDC_IDC_STAT_AT_DTM_END: NORMAL
MDC_IDC_STAT_AT_DTM_START: NORMAL
MDC_IDC_STAT_AT_MODE_SW_COUNT: 0
MDC_IDC_STAT_AT_MODE_SW_COUNT_PER_DAY: 0
MDC_IDC_STAT_AT_MODE_SW_PERCENT_TIME: 0 %
MDC_IDC_STAT_BRADY_AP_VP_PERCENT: 1 %
MDC_IDC_STAT_BRADY_AP_VS_PERCENT: 28 %
MDC_IDC_STAT_BRADY_AS_VP_PERCENT: 1 %
MDC_IDC_STAT_BRADY_AS_VS_PERCENT: 72 %
MDC_IDC_STAT_BRADY_DTM_END: NORMAL
MDC_IDC_STAT_BRADY_DTM_START: NORMAL
MDC_IDC_STAT_BRADY_RA_PERCENT_PACED: 27 %
MDC_IDC_STAT_BRADY_RV_PERCENT_PACED: 1 %
MDC_IDC_STAT_CRT_DTM_END: NORMAL
MDC_IDC_STAT_CRT_DTM_START: NORMAL
MDC_IDC_STAT_HEART_RATE_ATRIAL_MAX: 330 {BEATS}/MIN
MDC_IDC_STAT_HEART_RATE_ATRIAL_MEAN: 71 {BEATS}/MIN
MDC_IDC_STAT_HEART_RATE_ATRIAL_MIN: 50 {BEATS}/MIN
MDC_IDC_STAT_HEART_RATE_DTM_END: NORMAL
MDC_IDC_STAT_HEART_RATE_DTM_START: NORMAL
MDC_IDC_STAT_HEART_RATE_VENTRICULAR_MAX: 220 {BEATS}/MIN
MDC_IDC_STAT_HEART_RATE_VENTRICULAR_MEAN: 71 {BEATS}/MIN
MDC_IDC_STAT_HEART_RATE_VENTRICULAR_MIN: 50 {BEATS}/MIN

## 2024-11-11 PROCEDURE — 99215 OFFICE O/P EST HI 40 MIN: CPT | Mod: 25 | Performed by: INTERNAL MEDICINE

## 2024-11-11 RX ORDER — ALIROCUMAB 75 MG/ML
75 INJECTION, SOLUTION SUBCUTANEOUS
Qty: 6 ML | Refills: 3 | Status: SHIPPED | OUTPATIENT
Start: 2024-11-11

## 2024-11-11 NOTE — TELEPHONE ENCOUNTER
Test claim 28 days supply   $441.35 copay    Will route to liaison team for possible octavio assistance

## 2024-11-11 NOTE — TELEPHONE ENCOUNTER
Central Prior Authorization Team   Phone: [phone]    PA Initiation    Medication: Praluent 75mg/ml  Insurance Company: [employer] - Phone [phone] Fax [phone]  Pharmacy Filling the Rx: [id number] [place] - [address]  Filling Pharmacy Phone: [phone]  Filling Pharmacy Fax:    Start Date: 11/11/2024

## 2024-11-11 NOTE — TELEPHONE ENCOUNTER
Prior Authorization Approval    Authorization Effective Date: 10/28/2024  Authorization Expiration Date: 10/28/2099  Medication: Praluent 75mg/ml  Approved Dose/Quantity:   Reference #:     Insurance Company: [employer] - [employer] [phone] Fax [phone]  Expected CoPay:       CoPay Card Available:      Foundation Assistance Needed:    Which Pharmacy is filling the prescription (Not needed for infusion/clinic administered): [id number], [place] - [address]  Pharmacy Notified:  yes  Patient Notified:  yes- Pharmacy will contact patient when ready to /ship

## 2024-11-11 NOTE — LETTER
11/11/2024    Joel Daniel Wegener, MD  [address] [facility] [address]  [facility] [address]    RE: Lauren Nick       Dear Colleague,     I had the pleasure of seeing Lauren Nick in the [facility].  HPI and Plan:   A very pleasant 74-year-old female s/p bioprosthetic aortic valve replacement with 23 mm Inspira valve for severe aortic stenosis in the setting of bicuspid aortic valve this was done in March 2021.  She also had pacemaker and plantation due to postoperative bradycardia.  This was done in [place].  Outside notes indicate no significant coronary disease.  She also has history of dyslipidemia with history of intolerance to several statins.  Today patient is coming for routine follow-up.  To be noted patient was diagnosed with paroxysmal atrial fibrillation episodes on device interrogation.  Due to elevated CHADS2 Vascor she was started on apixaban last year but then she had hematuria and anticoagulation was discontinued.  Further records were available which indicated that she had left atrial appendage ligation at the time of valve surgery and this prompted a CT of the heart in June that showed left atrial appendage was well excluded.  She also had hip replacement surgery done at [place] earlier this year.  Today she is coming for routine follow-up.  Patient is me overall she feels well.  She is able to walk without any issues.  No exertional chest discomfort shortness breath dizziness presyncope or syncope.  She also had MRA of the aorta that showed ascending aorta to be 4.2 cm, it was 3.9 cm 2 years ago.  She is on metoprolol and losartan.  Her LDL is elevated around 178.  Review of several CT imaging showed that she had coronary calcification as well as other vascular calcification.  Device interrogation showed 27% atrial pacing less than 1% ventricular pacing brief episode of SVT at least 6.9 years of battery longevity remaining.  Echocardiogram in April 2024 showed normal LVEF  mean gradients of 14 mmHg across the bioprosthetic aortic valve severe left atrial enlargement.    Assessment and plan  Status post bioprosthetic aortic valve replacement with 23 mm Inspira valve.  Normal gradients.  Faint ejection systolic murmur heard over the right upper sternal border.  Discussed with patient importance of predental workup antibiotic prophylaxis.  Status post pacemaker implantation.  Normal functioning pacemaker on recent device interrogation.  Paroxysmal atrial fibrillation status post left atrial appendage ligation, CT confirmed left atrial appendage is well excluded.  Did not tolerate anticoagulation due to hematuria.  On aspirin.  Severely enlarged left atrium.  On beta-blocker.  Asymptomatic carotid disease on the basis coronary calcification noted on CT chest and other vascular calcifications.  On aspirin beta-blocker angiotensin receptor blocker.  Intolerant to statins  Dyslipidemia with intolerance to statins.  Given she has coronary calcification and other vascular calcification would recommend PCSK9 inhibitor.  Common side effects were discussed with patient.  She is agreeable.  Recommend starting Praluent 75 mg subcutaneously every 2 weeks.  Recheck lipid panel with ALT in 2 months  Mildly dilated ascending aorta 4.2 cm.  Recommend repeat MRA in 1 year time.  On beta-blocker and losartan.    Recommendations  Start Praluent 75 mg subcutaneously every 2 weeks  Lipid panel with ALT in 2 months.  Continue aspirin, beta-blocker, angiotensin receptor blocker  Continue predental workup antibiotic prophylaxis  Continue device check through device clinic  MRA aorta and echocardiogram in 1 year time  Follow-up in cardiology clinic in 1 year, sooner if she notes any change in clinical status.    45 minutes of total time spent today.    Orders Placed This Encounter   Procedures     MRI Angiogram chest w & w/o contrast     Lipid Profile     ALT     Echocardiogram Complete       Orders Placed This  Encounter   Medications     alirocumab (PRALUENT) 75 MG/ML injectable pen     Sig: Inject 1 mL (75 mg) subcutaneously every 14 days.     Dispense:  6 mL     Refill:  3       Medications Discontinued During This Encounter   Medication Reason     triamcinolone acetonide (KENALOG-10) injection 3 mg Therapy completed (No AVS)         Encounter Diagnoses   Name Primary?     Ascending aorta dilatation (H) Yes     Paroxysmal atrial fibrillation (H)      Hyperlipidemia LDL goal <70      Coronary artery disease involving native coronary artery of native heart without angina pectoris      S/P AVR        CURRENT MEDICATIONS:  Current Outpatient Medications   Medication Sig Dispense Refill     albuterol (PROAIR HFA/PROVENTIL HFA/VENTOLIN HFA) 108 (90 Base) MCG/ACT inhaler INHALE 2 PUFFS INTO THE LUNGS EVERY 6 HOURS 8.5 g 3     alirocumab (PRALUENT) 75 MG/ML injectable pen Inject 1 mL (75 mg) subcutaneously every 14 days. 6 mL 3     aspirin 81 MG EC tablet Take 81 mg by mouth daily       escitalopram (LEXAPRO) 20 MG tablet TAKE 1 TABLET BY MOUTH EVERY DAY 90 tablet 1     fluticasone (FLONASE) 50 MCG/ACT nasal spray INSTILL 2 SPRAYS INTO BOTH NOSTRILS DAILY 48 mL 3     levothyroxine (SYNTHROID/LEVOTHROID) 50 MCG tablet Take 1 tablet (50 mcg) by mouth daily. 90 tablet 0     LORazepam (ATIVAN) 1 MG tablet Take 1 tablet (1 mg) by mouth nightly as needed for sleep 30 tablet 5     losartan (COZAAR) 25 MG tablet TAKE 1 TABLET BY MOUTH EVERY DAY 90 tablet 1     magnesium 250 MG tablet Take 1 tablet by mouth daily Unknown dose       metoprolol succinate ER (TOPROL XL) 25 MG 24 hr tablet TAKE 1 TABLET BY MOUTH TWICE A  tablet 2     psyllium (METAMUCIL/KONSYL) capsule Take 1 capsule by mouth daily       Turmeric 500 MG TABS Take by mouth daily       STATIN NOT PRESCRIBED (INTENTIONAL) Please choose reason not prescribed from choices below. (Patient not taking: Reported on 11/11/2024)         ALLERGIES     Allergies   Allergen  Reactions     Penicillins Hives     Ambien [Zolpidem]      Complex behaviors     Statins [Statins]      Myalgias to multiple statins       PAST MEDICAL HISTORY:  Past Medical History:   Diagnosis Date     Arthritis     Saw a doctor received a shot in [place].     Basal cell carcinoma      Cancer (H) 1989    breast cancer, surgery, and radiation     Heart disease 2021    Heart valve replacement     Thyroid disease  diagnosed     Uncomplicated asthma A few years ago     Uncontrolled hypertension 2023       PAST SURGICAL HISTORY:  Past Surgical History:   Procedure Laterality Date     ABDOMEN SURGERY  Gallbladder    removed in      BIOPSY  10/2018     BREAST SURGERY  1989     CARDIAC SURGERY  2021     CHOLECYSTECTOMY  2010     COLONOSCOPY  2019     COLONOSCOPY N/A 11/10/2022    Procedure: COLONOSCOPY, WITH POLYPECTOMY AND BIOPSY;  Surgeon: Rafa Parks MD;  Location:  GI     ENT SURGERY  2011     EYE SURGERY  2015     THORACIC SURGERY      valve replacement       FAMILY HISTORY:  Family History   Problem Relation Age of Onset     Hypertension Mother      Cancer Mother      Other Cancer Mother         kidney cancer 85     Other Cancer Father          of stomach cancer age 43     Hypertension Sister      Hyperlipidemia Sister      Heart Disease Sister      Hypertension Sister      Hyperlipidemia Sister      Colon Cancer Sister      Diabetes Sister      Breast Cancer Sister      Skin Cancer Sister      Coronary Artery Disease Sister      Hyperlipidemia Sister      Thyroid Disease Sister      Asthma Sister      Basal cell carcinoma Sister      Skin Cancer Sister      Heart Disease Paternal Grandmother      Hypertension Paternal Grandmother      Obesity Paternal Grandmother      Heart Disease Daughter      Anesthesia Reaction Son      Hypertension Son      Other Cancer Other         Neuroblastoma age 5       SOCIAL HISTORY:  Social History     Socioeconomic  History     Marital status:      Spouse name: None     Number of children: None     Years of education: None     Highest education level: None   Tobacco Use     Smoking status: Former     Current packs/day: 0.00     Average packs/day: 1 pack/day for 10.4 years (10.4 ttl pk-yrs)     Types: Cigarettes     Start date: 1969     Quit date: 10/1/1979     Years since quittin.1     Smokeless tobacco: Never   Vaping Use     Vaping status: Never Used   Substance and Sexual Activity     Alcohol use: Yes     Comment: occ     Drug use: Never     Sexual activity: Not Currently     Partners: Male     Birth control/protection: Post-menopausal   Other Topics Concern     Parent/sibling w/ CABG, MI or angioplasty before 65F 55M? No     Social Drivers of Health     Financial Resource Strain: Low Risk  (2023)    Financial Resource Strain      Within the past 12 months, have you or your family members you live with been unable to get utilities (heat, electricity) when it was really needed?: No   Food Insecurity: No Food Insecurity (2024)    Received from [facility]    Hunger Vital Sign      Worried About Running Out of Food in the Last Year: Never true      Ran Out of Food in the Last Year: Never true   Transportation Needs: No Transportation Needs (2024)    Received from [facility]    PRAPARE - Transportation      Lack of Transportation (Medical): No      Lack of Transportation (Non-Medical): No   Physical Activity: Sufficiently Active (2024)    Received from [facility]    Exercise Vital Sign      Days of Exercise per Week: 4 days      Minutes of Exercise per Session: 40 min   Interpersonal Safety: Low Risk  (4/15/2024)    Interpersonal Safety      Do you feel physically and emotionally safe where you currently live?: Yes      Within the past 12 months, have you been hit, slapped, kicked or otherwise physically hurt by someone?: No      Within the past 12  "months, have you been humiliated or emotionally abused in other ways by your partner or ex-partner?: No   Housing Stability: Low Risk  (2/23/2024)    [facility]    Housing Stability      What is your living situation today?: I have a steady place to live       Review of Systems:  Skin:          Eyes:         ENT:         Respiratory:  Negative       Cardiovascular:         Gastroenterology:        Genitourinary:         Musculoskeletal:         Neurologic:         Psychiatric:         Heme/Lymph/Imm:         Endocrine:           Physical Exam:  Vitals: /66   Pulse 60   Ht 1.575 m (5' 2\")   Wt 64.6 kg (142 lb 6.4 oz)   LMP  (LMP Unknown)   SpO2 97%   BMI 26.05 kg/m      General Patient appears comfortable  Neck normal JVP  Cardiovascular system grade 1 x 6 ejection systolic murmur heard over the right upper sternal border, no S3-S4 rub or gallop  Respiratory system clear to auscultation  Extremities no edema      CC  Reggie King MD  [address]  [place] [address]                      Thank you for allowing me to participate in the care of your patient.      Sincerely,     Reggie King MD     [facility]  cc:   Reggie King MD  [address]  [place] [address]      "

## 2024-11-11 NOTE — PROGRESS NOTES
HPI and Plan:   A very pleasant 74-year-old female s/p bioprosthetic aortic valve replacement with 23 mm Inspira valve for severe aortic stenosis in the setting of bicuspid aortic valve this was done in March 2021.  She also had pacemaker and plantation due to postoperative bradycardia.  This was done in [place].  Outside notes indicate no significant coronary disease.  She also has history of dyslipidemia with history of intolerance to several statins.  Today patient is coming for routine follow-up.  To be noted patient was diagnosed with paroxysmal atrial fibrillation episodes on device interrogation.  Due to elevated CHADS2 Vascor she was started on apixaban last year but then she had hematuria and anticoagulation was discontinued.  Further records were available which indicated that she had left atrial appendage ligation at the time of valve surgery and this prompted a CT of the heart in June that showed left atrial appendage was well excluded.  She also had hip replacement surgery done at [place] earlier this year.  Today she is coming for routine follow-up.  Patient is me overall she feels well.  She is able to walk without any issues.  No exertional chest discomfort shortness breath dizziness presyncope or syncope.  She also had MRA of the aorta that showed ascending aorta to be 4.2 cm, it was 3.9 cm 2 years ago.  She is on metoprolol and losartan.  Her LDL is elevated around 178.  Review of several CT imaging showed that she had coronary calcification as well as other vascular calcification.  Device interrogation showed 27% atrial pacing less than 1% ventricular pacing brief episode of SVT at least 6.9 years of battery longevity remaining.  Echocardiogram in April 2024 showed normal LVEF mean gradients of 14 mmHg across the bioprosthetic aortic valve severe left atrial enlargement.    Assessment and plan  Status post bioprosthetic aortic valve replacement with 23 mm Inspira valve.  Normal gradients.  Faint  ejection systolic murmur heard over the right upper sternal border.  Discussed with patient importance of predental workup antibiotic prophylaxis.  Status post pacemaker implantation.  Normal functioning pacemaker on recent device interrogation.  Paroxysmal atrial fibrillation status post left atrial appendage ligation, CT confirmed left atrial appendage is well excluded.  Did not tolerate anticoagulation due to hematuria.  On aspirin.  Severely enlarged left atrium.  On beta-blocker.  Asymptomatic carotid disease on the basis coronary calcification noted on CT chest and other vascular calcifications.  On aspirin beta-blocker angiotensin receptor blocker.  Intolerant to statins  Dyslipidemia with intolerance to statins.  Given she has coronary calcification and other vascular calcification would recommend PCSK9 inhibitor.  Common side effects were discussed with patient.  She is agreeable.  Recommend starting Praluent 75 mg subcutaneously every 2 weeks.  Recheck lipid panel with ALT in 2 months  Mildly dilated ascending aorta 4.2 cm.  Recommend repeat MRA in 1 year time.  On beta-blocker and losartan.    Recommendations  Start Praluent 75 mg subcutaneously every 2 weeks  Lipid panel with ALT in 2 months.  Continue aspirin, beta-blocker, angiotensin receptor blocker  Continue predental workup antibiotic prophylaxis  Continue device check through device clinic  MRA aorta and echocardiogram in 1 year time  Follow-up in cardiology clinic in 1 year, sooner if she notes any change in clinical status.    45 minutes of total time spent today.    Orders Placed This Encounter   Procedures    MRI Angiogram chest w & w/o contrast    Lipid Profile    ALT    Echocardiogram Complete       Orders Placed This Encounter   Medications    alirocumab (PRALUENT) 75 MG/ML injectable pen     Sig: Inject 1 mL (75 mg) subcutaneously every 14 days.     Dispense:  6 mL     Refill:  3       Medications Discontinued During This Encounter    Medication Reason    triamcinolone acetonide (KENALOG-10) injection 3 mg Therapy completed (No AVS)         Encounter Diagnoses   Name Primary?    Ascending aorta dilatation (H) Yes    Paroxysmal atrial fibrillation (H)     Hyperlipidemia LDL goal <70     Coronary artery disease involving native coronary artery of native heart without angina pectoris     S/P AVR        CURRENT MEDICATIONS:  Current Outpatient Medications   Medication Sig Dispense Refill    albuterol (PROAIR HFA/PROVENTIL HFA/VENTOLIN HFA) 108 (90 Base) MCG/ACT inhaler INHALE 2 PUFFS INTO THE LUNGS EVERY 6 HOURS 8.5 g 3    alirocumab (PRALUENT) 75 MG/ML injectable pen Inject 1 mL (75 mg) subcutaneously every 14 days. 6 mL 3    aspirin 81 MG EC tablet Take 81 mg by mouth daily      escitalopram (LEXAPRO) 20 MG tablet TAKE 1 TABLET BY MOUTH EVERY DAY 90 tablet 1    fluticasone (FLONASE) 50 MCG/ACT nasal spray INSTILL 2 SPRAYS INTO BOTH NOSTRILS DAILY 48 mL 3    levothyroxine (SYNTHROID/LEVOTHROID) 50 MCG tablet Take 1 tablet (50 mcg) by mouth daily. 90 tablet 0    LORazepam (ATIVAN) 1 MG tablet Take 1 tablet (1 mg) by mouth nightly as needed for sleep 30 tablet 5    losartan (COZAAR) 25 MG tablet TAKE 1 TABLET BY MOUTH EVERY DAY 90 tablet 1    magnesium 250 MG tablet Take 1 tablet by mouth daily Unknown dose      metoprolol succinate ER (TOPROL XL) 25 MG 24 hr tablet TAKE 1 TABLET BY MOUTH TWICE A  tablet 2    psyllium (METAMUCIL/KONSYL) capsule Take 1 capsule by mouth daily      Turmeric 500 MG TABS Take by mouth daily      STATIN NOT PRESCRIBED (INTENTIONAL) Please choose reason not prescribed from choices below. (Patient not taking: Reported on 11/11/2024)         ALLERGIES     Allergies   Allergen Reactions    Penicillins Hives    Ambien [Zolpidem]      Complex behaviors    Statins [Statins]      Myalgias to multiple statins       PAST MEDICAL HISTORY:  Past Medical History:   Diagnosis Date    Arthritis 11/16    Saw a doctor received a  shot in [place].    Basal cell carcinoma     Cancer (H) 1989    breast cancer, surgery, and radiation    Heart disease 2021    Heart valve replacement    Thyroid disease  diagnosed    Uncomplicated asthma A few years ago    Uncontrolled hypertension 2023       PAST SURGICAL HISTORY:  Past Surgical History:   Procedure Laterality Date    ABDOMEN SURGERY  Gallbladder    removed in     BIOPSY  10/2018    BREAST SURGERY  1989    CARDIAC SURGERY  2021    CHOLECYSTECTOMY  2010    COLONOSCOPY  2019    COLONOSCOPY N/A 11/10/2022    Procedure: COLONOSCOPY, WITH POLYPECTOMY AND BIOPSY;  Surgeon: Rafa Parks MD;  Location:  GI    ENT SURGERY  2011    EYE SURGERY  2015    THORACIC SURGERY      valve replacement       FAMILY HISTORY:  Family History   Problem Relation Age of Onset    Hypertension Mother     Cancer Mother     Other Cancer Mother         kidney cancer 85    Other Cancer Father          of stomach cancer age 43    Hypertension Sister     Hyperlipidemia Sister     Heart Disease Sister     Hypertension Sister     Hyperlipidemia Sister     Colon Cancer Sister     Diabetes Sister     Breast Cancer Sister     Skin Cancer Sister     Coronary Artery Disease Sister     Hyperlipidemia Sister     Thyroid Disease Sister     Asthma Sister     Basal cell carcinoma Sister     Skin Cancer Sister     Heart Disease Paternal Grandmother     Hypertension Paternal Grandmother     Obesity Paternal Grandmother     Heart Disease Daughter     Anesthesia Reaction Son     Hypertension Son     Other Cancer Other         Neuroblastoma age 5       SOCIAL HISTORY:  Social History     Socioeconomic History    Marital status:      Spouse name: None    Number of children: None    Years of education: None    Highest education level: None   Tobacco Use    Smoking status: Former     Current packs/day: 0.00     Average packs/day: 1 pack/day for 10.4 years (10.4 ttl pk-yrs)     Types:  Cigarettes     Start date: 1969     Quit date: 10/1/1979     Years since quittin.1    Smokeless tobacco: Never   Vaping Use    Vaping status: Never Used   Substance and Sexual Activity    Alcohol use: Yes     Comment: occ    Drug use: Never    Sexual activity: Not Currently     Partners: Male     Birth control/protection: Post-menopausal   Other Topics Concern    Parent/sibling w/ CABG, MI or angioplasty before 65F 55M? No     Social Drivers of Health     Financial Resource Strain: Low Risk  (2023)    Financial Resource Strain     Within the past 12 months, have you or your family members you live with been unable to get utilities (heat, electricity) when it was really needed?: No   Food Insecurity: No Food Insecurity (2024)    Received from [facility]    Hunger Vital Sign     Worried About Running Out of Food in the Last Year: Never true     Ran Out of Food in the Last Year: Never true   Transportation Needs: No Transportation Needs (2024)    Received from [facility]    PRAPARE - Transportation     Lack of Transportation (Medical): No     Lack of Transportation (Non-Medical): No   Physical Activity: Sufficiently Active (2024)    Received from [facility]    Exercise Vital Sign     Days of Exercise per Week: 4 days     Minutes of Exercise per Session: 40 min   Interpersonal Safety: Low Risk  (4/15/2024)    Interpersonal Safety     Do you feel physically and emotionally safe where you currently live?: Yes     Within the past 12 months, have you been hit, slapped, kicked or otherwise physically hurt by someone?: No     Within the past 12 months, have you been humiliated or emotionally abused in other ways by your partner or ex-partner?: No   Housing Stability: Low Risk  (2024)    Received from [facility]    Housing Stability     What is your living situation today?: I have a steady place to live       Review of Systems:  Skin:     "      Eyes:         ENT:         Respiratory:  Negative       Cardiovascular:         Gastroenterology:        Genitourinary:         Musculoskeletal:         Neurologic:         Psychiatric:         Heme/Lymph/Imm:         Endocrine:           Physical Exam:  Vitals: /66   Pulse 60   Ht 1.575 m (5' 2\")   Wt 64.6 kg (142 lb 6.4 oz)   LMP  (LMP Unknown)   SpO2 97%   BMI 26.05 kg/m      General Patient appears comfortable  Neck normal JVP  Cardiovascular system grade 1 x 6 ejection systolic murmur heard over the right upper sternal border, no S3-S4 rub or gallop  Respiratory system clear to auscultation  Extremities no edema      KENTON King MD  [address]  [place] [address]                    "

## 2024-11-13 ENCOUNTER — OFFICE VISIT (OUTPATIENT)
Dept: ORTHOPEDICS | Facility: CLINIC | Age: 74
End: 2024-11-13
Attending: STUDENT IN AN ORGANIZED HEALTH CARE EDUCATION/TRAINING PROGRAM
Payer: MEDICARE

## 2024-11-13 ENCOUNTER — THERAPY VISIT (OUTPATIENT)
Dept: PHYSICAL THERAPY | Facility: CLINIC | Age: 74
End: 2024-11-13
Payer: MEDICARE

## 2024-11-13 DIAGNOSIS — M25.571 ACUTE RIGHT ANKLE PAIN: Primary | ICD-10-CM

## 2024-11-13 DIAGNOSIS — S82.64XD CLOSED NONDISPLACED FRACTURE OF LATERAL MALLEOLUS OF RIGHT FIBULA WITH ROUTINE HEALING, SUBSEQUENT ENCOUNTER: ICD-10-CM

## 2024-11-13 DIAGNOSIS — M19.031 PRIMARY OSTEOARTHRITIS OF RIGHT WRIST: Primary | ICD-10-CM

## 2024-11-13 PROCEDURE — 20604 DRAIN/INJ JOINT/BURSA W/US: CPT | Mod: RT | Performed by: FAMILY MEDICINE

## 2024-11-13 PROCEDURE — 97110 THERAPEUTIC EXERCISES: CPT | Mod: GP | Performed by: PHYSICAL THERAPIST

## 2024-11-13 PROCEDURE — 97530 THERAPEUTIC ACTIVITIES: CPT | Mod: GP | Performed by: PHYSICAL THERAPIST

## 2024-11-13 RX ORDER — TRIAMCINOLONE ACETONIDE 40 MG/ML
20 INJECTION, SUSPENSION INTRA-ARTICULAR; INTRAMUSCULAR
Status: COMPLETED | OUTPATIENT
Start: 2024-11-13 | End: 2024-11-13

## 2024-11-13 RX ORDER — LIDOCAINE HYDROCHLORIDE 10 MG/ML
0.5 INJECTION, SOLUTION INFILTRATION; PERINEURAL
Status: COMPLETED | OUTPATIENT
Start: 2024-11-13 | End: 2024-11-13

## 2024-11-13 NOTE — LETTER
2024      Lauren Nick  [address] [address]      Dear Colleague,    Thank you for referring your patient, Lauren Nick, to the [facility] [place]. Please see a copy of my visit note below.    PROCEDURE ENCOUNTER    [facility]  Orthopedics  Marco A Santana DO  2024     Name: Lauren Nick  MRN: [id number]  Age: 74 year old  : 1950    Referring provider: Dr. Delvin Patel MD  Diagnosis: STT osteoarthritis advanced right wrist    Right wrist STT Injection - Ultrasound Guided   The patient was informed of the risks and the benefits of the procedure and a written consent was signed.  The patient s right wrist was prepped with chlorhexidine in sterile fashion.   20 mg of kenalog suspension was drawn up into a 3 mL syringe with 0.5 mL of 1% lidocaine.  Injection was performed using sterile technique.  Under ultrasound guidance a 1.5-inch 22-gauge needle was used to enter the STT joint of the wrist.  Needle placement was visualized and documented with ultrasound.  Needle placed in short axis to the probe.  Ultrasound visualization was necessary due to decreased joint space in the setting of osteoarthritis.  Images were permanently stored for the patient's record.  Pat has OT scheduled and I encouraged her to continue with occupational therapy. Follow up Dr. Patel at next scheduled appt.  Marco A Santana DO CAQSM  Primary Care Sports Medicine  [facility]      Small Joint Injection/Arthrocentesis    Date/Time: 2024 2:10 PM    Performed by: Marco A Santana DO  Authorized by: Marco A Santana DO    Needle Size:  25 G  Guidance: ultrasound     Approach:  Volar  Location:  Thumb   Location comment:  Right STT joint                      Medications:  20 mg triamcinolone 40 MG/ML; 0.5 mL lidocaine 1 %        Outcome:  Tolerated well, no immediate complications  Procedure discussed: discussed risks, benefits, and alternatives    Consent  Given by:  Patient  Timeout: timeout called immediately prior to procedure    Prep: patient was prepped and draped in usual sterile fashion       Ultrasound was used to ensure safe and accurate needle placement and injection. Ultrasound images of the procedure were permanently stored.          Again, thank you for allowing me to participate in the care of your patient.        Sincerely,        Marco A Santana, DO

## 2024-11-13 NOTE — PROGRESS NOTES
PROCEDURE ENCOUNTER    [facility]  Orthopedics  Marco A Santana DO  2024     Name: Lauren Nick  MRN: [id number]  Age: 74 year old  : 1950    Referring provider: Dr. Delvin Patel MD  Diagnosis: STT osteoarthritis advanced right wrist    Right wrist STT Injection - Ultrasound Guided   The patient was informed of the risks and the benefits of the procedure and a written consent was signed.  The patient s right wrist was prepped with chlorhexidine in sterile fashion.   20 mg of kenalog suspension was drawn up into a 3 mL syringe with 0.5 mL of 1% lidocaine.  Injection was performed using sterile technique.  Under ultrasound guidance a 1.5-inch 22-gauge needle was used to enter the STT joint of the wrist.  Needle placement was visualized and documented with ultrasound.  Needle placed in short axis to the probe.  Ultrasound visualization was necessary due to decreased joint space in the setting of osteoarthritis.  Images were permanently stored for the patient's record.  Pat has OT scheduled and I encouraged her to continue with occupational therapy. Follow up Dr. Patel at next scheduled appt.  Marco A Santana DO CAQSM  Primary Care Sports Medicine  [facility]      Small Joint Injection/Arthrocentesis    Date/Time: 2024 2:10 PM    Performed by: Marco A Santana DO  Authorized by: Marco A Santana DO    Needle Size:  25 G  Guidance: ultrasound     Approach:  Volar  Location:  Thumb   Location comment:  Right STT joint                      Medications:  20 mg triamcinolone 40 MG/ML; 0.5 mL lidocaine 1 %        Outcome:  Tolerated well, no immediate complications  Procedure discussed: discussed risks, benefits, and alternatives    Consent Given by:  Patient  Timeout: timeout called immediately prior to procedure    Prep: patient was prepped and draped in usual sterile fashion       Ultrasound was used to ensure safe and accurate needle placement and injection. Ultrasound images of  the procedure were permanently stored.

## 2024-11-14 ENCOUNTER — OFFICE VISIT (OUTPATIENT)
Dept: DERMATOLOGY | Facility: CLINIC | Age: 74
End: 2024-11-14
Payer: MEDICARE

## 2024-11-14 ENCOUNTER — THERAPY VISIT (OUTPATIENT)
Dept: OCCUPATIONAL THERAPY | Facility: CLINIC | Age: 74
End: 2024-11-14
Attending: STUDENT IN AN ORGANIZED HEALTH CARE EDUCATION/TRAINING PROGRAM
Payer: MEDICARE

## 2024-11-14 ENCOUNTER — TELEPHONE (OUTPATIENT)
Dept: CARDIOLOGY | Facility: CLINIC | Age: 74
End: 2024-11-14

## 2024-11-14 DIAGNOSIS — M25.531 PAIN IN BOTH WRISTS: ICD-10-CM

## 2024-11-14 DIAGNOSIS — D22.9 MULTIPLE BENIGN NEVI: Primary | ICD-10-CM

## 2024-11-14 DIAGNOSIS — D18.01 CHERRY ANGIOMA: ICD-10-CM

## 2024-11-14 DIAGNOSIS — M79.644 THUMB PAIN, RIGHT: Primary | ICD-10-CM

## 2024-11-14 DIAGNOSIS — L82.1 SEBORRHEIC KERATOSES: ICD-10-CM

## 2024-11-14 DIAGNOSIS — L81.4 LENTIGINES: ICD-10-CM

## 2024-11-14 DIAGNOSIS — D49.2 NEOPLASM OF SKIN: ICD-10-CM

## 2024-11-14 DIAGNOSIS — Z85.828 HISTORY OF BASAL CELL CARCINOMA OF SKIN: ICD-10-CM

## 2024-11-14 DIAGNOSIS — M25.532 PAIN IN BOTH WRISTS: ICD-10-CM

## 2024-11-14 DIAGNOSIS — M19.031 ARTHRITIS OF RIGHT WRIST: ICD-10-CM

## 2024-11-14 PROCEDURE — 97165 OT EVAL LOW COMPLEX 30 MIN: CPT | Mod: GO | Performed by: OCCUPATIONAL THERAPIST

## 2024-11-14 PROCEDURE — 97760 ORTHOTIC MGMT&TRAING 1ST ENC: CPT | Mod: GO | Performed by: OCCUPATIONAL THERAPIST

## 2024-11-14 PROCEDURE — 97140 MANUAL THERAPY 1/> REGIONS: CPT | Mod: GO | Performed by: OCCUPATIONAL THERAPIST

## 2024-11-14 NOTE — TELEPHONE ENCOUNTER
[facility] Call Center    Phone Message    May a detailed message be left on voicemail: yes     Reason for Call: Other: Patient received a report that a high ventricular rate has been detected. She wants to know if this is something she should be concerned with      Action Taken: Other: cardio    Travel Screening: Not Applicable     Date of Service:

## 2024-11-14 NOTE — LETTER
11/14/2024      Lauren Nick  [address]  [place] [address]      Dear Colleague,    [facility] Please see a copy of my visit note below.    [facility] Dermatology Note  Encounter Date: Nov 14, 2024  Office Visit      Dermatology Problem List:  Waist up skin exam 11/14/24    0. NUB, left scapula, s/p shave bx 11/14/24, pending path results  1. AKs - cryo  - Cryo to mid upper lip along vermilion border on 09/12/24  2. Hx of NMSC  - BCC, central upper forehead, s/p MMS 6/19/24   - BCC, L preauricular, s/p MMS 01/25/24         - PDL and ILK: 10 5/10/24         - PDL: 6/19/24, 8/19/24  ____________________________________________    Assessment & Plan:  # History of nonmelanoma skin cancer, no clincial evidence of recurrence.   - Well-healed scars on the forehead and left preauricular area, NERD.  - Sunscreen: Apply 20 minutes prior to going outdoors and reapply every two hours, when wet or sweating. We recommend using an SPF 30 or higher, and to use one that is water resistant.     - Advised to monitor for changing, non-healing, bleeding, painful, changing, or otherwise symptomatic lesions  - Continue annual skin exams    # Neoplasm of unspecified behavior of the skin (D49.2) on the left scapula. The differential diagnosis includes BCC vs BLK vs other.   - Shave biopsy performed today, see procedure note below.  - Photo taken today.     # Benign findings: multiple benign nevi, lentigines, cherry angiomas, SKs  - edu on benign etiology  - Signs and Symptoms of non-melanoma skin cancer and ABCDEs of melanoma reviewed with patient. Patient encouraged to perform monthly self skin exams and educated on how to perform them. UV precautions reviewed with patient. Patient was asked about new or changing moles/lesions on body.   - Sunscreen: Apply 20 minutes prior to going outdoors and reapply every two hours, when  wet or sweating. We recommend using an SPF 30 or higher, and to use one that is water resistant.     - RTC for changes     Procedures Performed:   - Shave biopsy procedure note, location(s): left scapula. After discussion of benefits and risks including but not limited to bleeding, infection, scar, incomplete removal, recurrence, and non-diagnostic biopsy, written consent and photographs were obtained. The area was cleaned with isopropyl alcohol. 0.5mL of 1% lidocaine with epinephrine was injected to obtain adequate anesthesia of lesion(s). Shave biopsy at site(s) performed. Hemostasis was achieved with aluminium chloride. Petrolatum ointment and a sterile dressing were applied. The patient tolerated the procedure and no complications were noted. The patient was provided with verbal and written post care instructions.      Follow-up: 1 year(s) in-person, or earlier for new or changing lesions    Staff and Scribe:     Scribe Disclosure:   I, Ayse Carter, am serving as a scribe to document services personally performed by Milka Martinez PA-C -based on data collection and the provider's statements to me.     Provider Disclosure:  I agree with above history, review of systems, physical exam, and plan. I have reviewed the content of the documentation and have edited it as needed. I have personally performed the services documented here and the documentation accurately represents those services and the decisions I have made.       All risks, benefits and alternatives were discussed with patient.  Patient is in agreement and understands the assessment and plan.  All questions were answered.    Milka Martinez PA-C, [facility]  [facility] [place]: Phone: [phone], Fax: [phone]  [facility]: Phone: [phone],  Fax: [phone]  [facility]: Phone: [phone], Fax:  [phone]  ____________________________________________    CC: Skin Check (Check lesions upper lip - Hx of BCC/Check red sop on back.)      Reviewed patients past medical history and pertinent chart review prior to patient's visit today.     HPI:  Ms. Lauren Nick is a 74 year old female who presents today as a return patient for waist up skin exam. Today, she reports spots of concern on her upper lip and back. She has a history of BCC.    Patient is otherwise feeling well, without additional concerns.    Labs:  N/A    Physical Exam:  Vitals: LMP  (LMP Unknown)   SKIN: Waist-up skin, which includes the head/face, neck, both arms, chest, back, abdomen, digits and/or nails was examined.   - Ragsdale's skin type II, <100 nevi  - There is a 7mm pink scaly papule with a rolled border on the left scapula.  - There are dome shaped bright red papules on the trunk.   - Multiple regular brown pigmented macules and papules are identified on the trunk and extremities.   - Scattered brown macules on sun exposed areas.  - There are waxy stuck on tan to brown papules on the trunk.     - No other lesions of concern on areas examined.         Medications:  Current Outpatient Medications   Medication Sig Dispense Refill     albuterol (PROAIR HFA/PROVENTIL HFA/VENTOLIN HFA) 108 (90 Base) MCG/ACT inhaler INHALE 2 PUFFS INTO THE LUNGS EVERY 6 HOURS 8.5 g 3     alirocumab (PRALUENT) 75 MG/ML injectable pen Inject 1 mL (75 mg) subcutaneously every 14 days. 6 mL 3     aspirin 81 MG EC tablet Take 81 mg by mouth daily       escitalopram (LEXAPRO) 20 MG tablet TAKE 1 TABLET BY MOUTH EVERY DAY 90 tablet 1     fluticasone (FLONASE) 50 MCG/ACT nasal spray INSTILL 2 SPRAYS INTO BOTH NOSTRILS DAILY 48 mL 3     levothyroxine (SYNTHROID/LEVOTHROID) 50 MCG tablet Take 1 tablet (50 mcg) by mouth daily. 90 tablet 0     LORazepam (ATIVAN) 1 MG tablet Take 1 tablet (1 mg) by mouth nightly as needed for sleep 30 tablet 5     losartan (COZAAR)  25 MG tablet TAKE 1 TABLET BY MOUTH EVERY DAY 90 tablet 1     magnesium 250 MG tablet Take 1 tablet by mouth daily Unknown dose       metoprolol succinate ER (TOPROL XL) 25 MG 24 hr tablet TAKE 1 TABLET BY MOUTH TWICE A  tablet 2     psyllium (METAMUCIL/KONSYL) capsule Take 1 capsule by mouth daily       STATIN NOT PRESCRIBED (INTENTIONAL) Please choose reason not prescribed from choices below.       Turmeric 500 MG TABS Take by mouth daily       No current facility-administered medications for this visit.      Past Medical/Surgical History:   Patient Active Problem List   Diagnosis     H/O aortic valve replacement     H/O bicuspid aortic valve     Thoracic aortic aneurysm without rupture (H)     H/O malignant neoplasm of breast     H/O lumpectomy     History of colonic polyps     Recurrent major depressive disorder, in partial remission (H)     Myalgia due to statin     Hip pain, right     Osteopenia, unspecified location     Hypothyroidism, unspecified type     Ascending aorta dilatation (H)     Mild intermittent asthma with acute exacerbation     Infection due to 2019 novel coronavirus     Uncontrolled hypertension     Recurrent major depression in complete remission (H)     Pelvic floor dysfunction in female     Nocturia     Feeling of incomplete bladder emptying     Primary osteoarthritis of both hips     Osteoarthritis of hip     Paroxysmal atrial fibrillation (H)     History of cardiac pacemaker in situ     Past Medical History:   Diagnosis Date     Arthritis 11/16    Saw a doctor received a shot in [place].     Basal cell carcinoma      Cancer (H) 09/1989    breast cancer, surgery, and radiation     Heart disease 03/21/2021    Heart valve replacement     Thyroid disease 02/21 diagnosed     Uncomplicated asthma A few years ago     Uncontrolled hypertension 02/20/2023                         Again, thank you for allowing me to participate in the care of your patient.        Sincerely,        Milka Martinez  [place]

## 2024-11-14 NOTE — PROGRESS NOTES
[facility] Dermatology Note  Encounter Date: Nov 14, 2024  Office Visit      Dermatology Problem List:  Waist up skin exam 11/14/24    0. NUB, left scapula, s/p shave bx 11/14/24, pending path results  1. AKs - cryo  - Cryo to mid upper lip along vermilion border on 09/12/24  2. Hx of NMSC  - BCC, central upper forehead, s/p MMS 6/19/24   - BCC, L preauricular, s/p MMS 01/25/24         - PDL and ILK: 10 5/10/24         - PDL: 6/19/24, 8/19/24  ____________________________________________    Assessment & Plan:  # History of nonmelanoma skin cancer, no clincial evidence of recurrence.   - Well-healed scars on the forehead and left preauricular area, NERD.  - Sunscreen: Apply 20 minutes prior to going outdoors and reapply every two hours, when wet or sweating. We recommend using an SPF 30 or higher, and to use one that is water resistant.     - Advised to monitor for changing, non-healing, bleeding, painful, changing, or otherwise symptomatic lesions  - Continue annual skin exams    # Neoplasm of unspecified behavior of the skin (D49.2) on the left scapula. The differential diagnosis includes BCC vs BLK vs other.   - Shave biopsy performed today, see procedure note below.  - Photo taken today.     # Benign findings: multiple benign nevi, lentigines, cherry angiomas, SKs  - edu on benign etiology  - Signs and Symptoms of non-melanoma skin cancer and ABCDEs of melanoma reviewed with patient. Patient encouraged to perform monthly self skin exams and educated on how to perform them. UV precautions reviewed with patient. Patient was asked about new or changing moles/lesions on body.   - Sunscreen: Apply 20 minutes prior to going outdoors and reapply every two hours, when wet or sweating. We recommend using an SPF 30 or higher, and to use one that is water resistant.     - RTC for changes     Procedures Performed:   - Shave biopsy procedure note, location(s): left scapula. After discussion of benefits and  risks including but not limited to bleeding, infection, scar, incomplete removal, recurrence, and non-diagnostic biopsy, written consent and photographs were obtained. The area was cleaned with isopropyl alcohol. 0.5mL of 1% lidocaine with epinephrine was injected to obtain adequate anesthesia of lesion(s). Shave biopsy at site(s) performed. Hemostasis was achieved with aluminium chloride. Petrolatum ointment and a sterile dressing were applied. The patient tolerated the procedure and no complications were noted. The patient was provided with verbal and written post care instructions.      Follow-up: 1 year(s) in-person, or earlier for new or changing lesions    Staff and Scribe:     Scribe Disclosure:   I, Ayse aCrter, am serving as a scribe to document services personally performed by Milka Martinez PA-C -based on data collection and the provider's statements to me.     Provider Disclosure:  I agree with above history, review of systems, physical exam, and plan. I have reviewed the content of the documentation and have edited it as needed. I have personally performed the services documented here and the documentation accurately represents those services and the decisions I have made.       All risks, benefits and alternatives were discussed with patient.  Patient is in agreement and understands the assessment and plan.  All questions were answered.    Milka Martinez PA-C, MPAS  [facility] [place]: Phone: [phone], Fax: [phone]  [facility]: Phone: [phone],  Fax: [phone]  [facility]: Phone: [phone], Fax: [phone]  ____________________________________________    CC: Skin Check (Check lesions upper lip - Hx of BCC/Check red sop on back.)      Reviewed patients past medical history and pertinent chart review prior to patient's visit today.     HPI:  Ms. Lauren Nick is a 74 year  old female who presents today as a return patient for waist up skin exam. Today, she reports spots of concern on her upper lip and back. She has a history of BCC.    Patient is otherwise feeling well, without additional concerns.    Labs:  N/A    Physical Exam:  Vitals: LMP  (LMP Unknown)   SKIN: Waist-up skin, which includes the head/face, neck, both arms, chest, back, abdomen, digits and/or nails was examined.   - Ragsdale's skin type II, <100 nevi  - There is a 7mm pink scaly papule with a rolled border on the left scapula.  - There are dome shaped bright red papules on the trunk.   - Multiple regular brown pigmented macules and papules are identified on the trunk and extremities.   - Scattered brown macules on sun exposed areas.  - There are waxy stuck on tan to brown papules on the trunk.     - No other lesions of concern on areas examined.         Medications:  Current Outpatient Medications   Medication Sig Dispense Refill    albuterol (PROAIR HFA/PROVENTIL HFA/VENTOLIN HFA) 108 (90 Base) MCG/ACT inhaler INHALE 2 PUFFS INTO THE LUNGS EVERY 6 HOURS 8.5 g 3    alirocumab (PRALUENT) 75 MG/ML injectable pen Inject 1 mL (75 mg) subcutaneously every 14 days. 6 mL 3    aspirin 81 MG EC tablet Take 81 mg by mouth daily      escitalopram (LEXAPRO) 20 MG tablet TAKE 1 TABLET BY MOUTH EVERY DAY 90 tablet 1    fluticasone (FLONASE) 50 MCG/ACT nasal spray INSTILL 2 SPRAYS INTO BOTH NOSTRILS DAILY 48 mL 3    levothyroxine (SYNTHROID/LEVOTHROID) 50 MCG tablet Take 1 tablet (50 mcg) by mouth daily. 90 tablet 0    LORazepam (ATIVAN) 1 MG tablet Take 1 tablet (1 mg) by mouth nightly as needed for sleep 30 tablet 5    losartan (COZAAR) 25 MG tablet TAKE 1 TABLET BY MOUTH EVERY DAY 90 tablet 1    magnesium 250 MG tablet Take 1 tablet by mouth daily Unknown dose      metoprolol succinate ER (TOPROL XL) 25 MG 24 hr tablet TAKE 1 TABLET BY MOUTH TWICE A  tablet 2    psyllium (METAMUCIL/KONSYL) capsule Take 1 capsule by  mouth daily      STATIN NOT PRESCRIBED (INTENTIONAL) Please choose reason not prescribed from choices below.      Turmeric 500 MG TABS Take by mouth daily       No current facility-administered medications for this visit.      Past Medical/Surgical History:   Patient Active Problem List   Diagnosis    H/O aortic valve replacement    H/O bicuspid aortic valve    Thoracic aortic aneurysm without rupture (H)    H/O malignant neoplasm of breast    H/O lumpectomy    History of colonic polyps    Recurrent major depressive disorder, in partial remission (H)    Myalgia due to statin    Hip pain, right    Osteopenia, unspecified location    Hypothyroidism, unspecified type    Ascending aorta dilatation (H)    Mild intermittent asthma with acute exacerbation    Infection due to 2019 novel coronavirus    Uncontrolled hypertension    Recurrent major depression in complete remission (H)    Pelvic floor dysfunction in female    Nocturia    Feeling of incomplete bladder emptying    Primary osteoarthritis of both hips    Osteoarthritis of hip    Paroxysmal atrial fibrillation (H)    History of cardiac pacemaker in situ     Past Medical History:   Diagnosis Date    Arthritis 11/16    Saw a doctor received a shot in [place].    Basal cell carcinoma     Cancer (H) 09/1989    breast cancer, surgery, and radiation    Heart disease 03/21/2021    Heart valve replacement    Thyroid disease 02/21 diagnosed    Uncomplicated asthma A few years ago    Uncontrolled hypertension 02/20/2023

## 2024-11-14 NOTE — PROGRESS NOTES
OCCUPATIONAL THERAPY EVALUATION  Type of Visit: Evaluation              Subjective        Presenting condition or subjective complaint: (Patient-Rptd) arthiritis in my hand/ painful  Date of onset: 01/01/24    Relevant medical history: (Patient-Rptd) Arthritis; Heart problems; Pacemaker; Thyroid problems   Dates & types of surgery: (Patient-Rptd) 1989 breast cancer/radiation/cemo/surgery.  Valve replacement 2021    Prior diagnostic imaging/testing results: (Patient-Rptd) X-ray     Prior therapy history for the same diagnosis, illness or injury: (Patient-Rptd) No      Prior Level of Function  Transfers: Independent  Ambulation: Independent  ADL: Independent  IADL:     Living Environment  Social support: (Patient-Rptd) With a significant other or spouse   Type of home: (Patient-Rptd) [facility]; 2-story   Stairs to enter the home: (Patient-Rptd) No       Ramp:     Stairs inside the home: (Patient-Rptd) Yes (Patient-Rptd) 16 Is there a railing: (Patient-Rptd) Yes     Help at home: (Patient-Rptd) None  Equipment owned:       Employment: (Patient-Rptd) No    Hobbies/Interests:      Patient goals for therapy: (Patient-Rptd) lift weights with that hand, open lids.       Objective   ADDITIONAL HISTORY:  Right hand dominant  Patient reports symptoms of pain, stiffness/loss of motion, and weakness/loss of strength  Transportation: drives      Functional Outcome Measure:   Upper Extremity Functional Index Score:  SCORE:   Column Totals: /80: (Patient-Rptd) 51   (A lower score indicates greater disability.)    PAIN:  Pain Level at Rest: 0/10  Pain Level with Use: 5/10  Pain Location: base of L thumb  Pain Quality: Aching, Dull, and Sharp  Pain Frequency: intermittent  Pain is Worst: daytime, nighttime, or pt notes it can be hard to fall asleep d/t pain  Pain is Exacerbated By: using weights, picking things up  Pain is Relieved By: cold and creams  Pain Progression: Unchanged    SENSATION: WNL throughout all nerve distributions;  per patient report     ROM:   Thumb ROM  Left AROM Right AROM    MP Joint 5/47 10/70   IP Joint  /45 /54   Radial Abduction 29 34   Palmar Abduction 32 32   Kapandji Opposition Scale (0-10/10)  10/10       OBSERVATIONS/APPEARANCE:   Thumb Left Right   Shoulder deformity present at CMC joint - -   Edema over CMC joint - -   Noted collapse of MP Joint into hyperextension during pinch - -      STRENGTH:     Measured in pounds 11/14/2024 11/14/2024    Left Right   Trial 1 40 15 (before pain)   Trial 2     Trial 3     Average       Lateral Pinch  Measured in pounds 11/14/2024 11/14/2024    Left Right   Trial 1 10 6   Trial 2     Trial 3     Average       3 Point Pinch  Measured in pounds 11/14/2024 11/14/2024    Left Right   Trial 1 12 9.5   Trial 2     Trial 3     Average         PALPATION:   Thumb Palpation    Thumb CMC Joint +   Thenar McKee +   Web Space +   1st Dorsal Compartment +   Radial Styloid -   FCR Insertion +       Assessment & Plan   CLINICAL IMPRESSIONS  Medical Diagnosis: CMC OA    Treatment Diagnosis: CMC OA    Impression/Assessment: Pt is a 74 year old female presenting to Occupational Therapy due to R thumb pain.  The following significant findings have been identified: Impaired activity tolerance, Impaired strength, and Pain.  These identified deficits interfere with their ability to perform self care tasks, recreational activities, household chores, and meal planning and preparation as compared to previous level of function.   Patient's limitations or Problem List includes: Pain, Weakness, Decreased , Decreased pinch, and Tightness in musculature of the right wrist and thumb which interferes with the patient's ability to perform Self Care Tasks (dressing, eating, bathing, hygiene/toileting), Sleep Patterns, Recreational Activities, and Household Chores as compared to previous level of function.    Clinical Decision Making (Complexity):  Assessment of Occupational Performance: 3-5  Performance Deficits  Occupational Performance Limitations: bathing/showering, dressing, health management and maintenance, home establishment and management, meal preparation and cleanup, shopping, sleep, and leisure activities  Clinical Decision Making (Complexity): Low complexity    PLAN OF CARE  Treatment Interventions:  Modalities:  US, Paraffin, and Laser Light  Therapeutic Exercise:  AROM, Isotonics, Isometrics, and Stabilization  Neuromuscular re-education:  Kinesiotaping  Manual Techniques:  Joint mobilization and Myofascial release  Orthotic Fabrication:  Static  Self Care:  Ergonomic Considerations    Long Term Goals   OT Goal 1  Goal Identifier: Household chores  Goal Description: Pt will  to open a tight new jar with mild difficulty  Rationale: In order to maximize safety and independence with ADL/IADLs  Target Date: 12/26/24      Frequency of Treatment: 1x per week  Duration of Treatment: 6 weeks     Recommended Referrals to Other Professionals:  none  Education Assessment:       Risks and benefits of evaluation/treatment have been explained.   Patient/Family/caregiver agrees with Plan of Care.     Evaluation Time:    OT Eval, Low Complexity Minutes (77690): 24     Signing Clinician: ELÍAS [facility]                                                                                   OUTPATIENT OCCUPATIONAL THERAPY      PLAN OF TREATMENT FOR OUTPATIENT REHABILITATION   Patient's Last Name, First Name, GUIDOQamarROYCEQamar  SandhyaLauren caruso YOB: 1950   Provider's Name   [facility]   Medical Record No.  [id number]     Onset Date: 01/01/24 Start of Care Date: 11/14/24     Medical Diagnosis:  CMC OA      OT Treatment Diagnosis:  CMC OA Plan of Treatment  Frequency/Duration:1x per week/6 weeks    Certification date from 11/14/24   To 12/26/24        See note for plan of treatment details and functional goals     Marisela  ELÍAS Jackson                         I CERTIFY THE NEED FOR THESE SERVICES FURNISHED UNDER        THIS PLAN OF TREATMENT AND WHILE UNDER MY CARE     (Physician attestation of this document indicates review and certification of the therapy plan).              Referring Provider:  Vinnie Patel    Initial Assessment  See Epic Evaluation- 11/14/24

## 2024-11-15 ENCOUNTER — PATIENT OUTREACH (OUTPATIENT)
Dept: CARE COORDINATION | Facility: CLINIC | Age: 74
End: 2024-11-15
Payer: MEDICARE

## 2024-11-18 ENCOUNTER — TELEPHONE (OUTPATIENT)
Dept: DERMATOLOGY | Facility: CLINIC | Age: 74
End: 2024-11-18
Payer: MEDICARE

## 2024-11-18 NOTE — TELEPHONE ENCOUNTER
----- Message from Milka Martinez sent at 11/18/2024 12:48 PM CST -----  BCC - left scapula - she has 3 options, patient can choose:    1) WLE with derm surg - stiches - linear scar  2) ED&C with me- no stiches - round scar  3) imiquimod - but needs to be able to reach to apply this M-F for 6 consecutive weeks

## 2024-11-18 NOTE — TELEPHONE ENCOUNTER
S/w pt and went over Milka's message below about biopsy results.  Explained all three treatment options and pt chose to go with ED&C.  Scheduled with Milka Martinez on Monday December 2nd at 2 pm.      Marley REYNA RN  [facility] Shaneka Amador  [phone]

## 2024-11-19 ENCOUNTER — MYC MEDICAL ADVICE (OUTPATIENT)
Dept: DERMATOLOGY | Facility: CLINIC | Age: 74
End: 2024-11-19
Payer: MEDICARE

## 2024-11-27 ENCOUNTER — TELEPHONE (OUTPATIENT)
Dept: DERMATOLOGY | Facility: CLINIC | Age: 74
End: 2024-11-27
Payer: MEDICARE

## 2024-11-27 NOTE — TELEPHONE ENCOUNTER
S/w pt who states will be back December 5th and wants to schedule ED&C after that.  Scheduled ed&c on Monday 12/9 at 2 pm at  [facility].    Marley REYNA RN  [facility] Shaneka [place]  [phone]

## 2024-11-27 NOTE — TELEPHONE ENCOUNTER
[facility] Call Center    Phone Message    May a detailed message be left on voicemail: yes     Reason for Call: Other: Pat is calling to reschedule the ED&C left scapula bcc appt since she is now aware of when they will be coming back home     Action Taken: Other: Ec derm    Travel Screening: Not Applicable     Date of Service:

## 2024-12-09 ENCOUNTER — OFFICE VISIT (OUTPATIENT)
Dept: DERMATOLOGY | Facility: CLINIC | Age: 74
End: 2024-12-09
Payer: MEDICARE

## 2024-12-09 DIAGNOSIS — C44.519 BASAL CELL CARCINOMA OF TRUNCAL SKIN: Primary | ICD-10-CM

## 2024-12-09 NOTE — PROGRESS NOTES
Dermatology Procedure Note: Electrodesiccation and Curettage  Encounter date: 12/9/24    Dermatology Problem List:  Waist up skin exam 11/14/24     0. NUB, left scapula, s/p shave bx 11/14/24, pending path results  1. AKs - cryo  - Cryo to mid upper lip along vermilion border on 09/12/24  2. Hx of NMSC  - BCC, L scapula - s/p Ed&C 12/9/24  - BCC, central upper forehead, s/p MMS 6/19/24   - BCC, L preauricular, s/p MMS 01/25/24         - PDL and ILK: 10 5/10/24         - PDL: 6/19/24, 8/19/24    PREOPERATIVE DIAGNOSIS: BCC    POSTOPERATIVE DIAGNOSIS: same    LOCATION: L scapula     SIZE: original size:  7 mm     Treatment options including electrodessiccation and curettage (ED and C), excision and topicals were reviewed.  The expected cure rates, healing times and anticipated scars of each option were discussed and the patient elects to proceed with ED and C.     The risks and benefits of the procedure were described to the patient.  These include but are not limited to bleeding, infection, scar, incomplete removal, and recurrence. Written informed consent was obtained. Time-out was performed. The above site was cleansed with and injected with 4mL1% lidocaine with epinephrine. Once anesthesia was obtained, the site was prepped with Alcohol. The lesion was curetted with  in 3 directions with a 5 mm margin and this was followed by electrodessication.  This process was repeated three times. The defect measured 1.6 cm final size. Vaseline and a bandage were applied to the wound. The patient tolerated the procedure well and was given post care instructions.    Clinical Follow-up: PRN, within 6 months for skin check.    Milka Martinez PA-C staffed the patient.     Staff Involved:  Scribe Disclosure:   I, WILLIAM HOWARD, am serving as a scribe; to document services personally performed by Milka Martinez PA-C -based on data collection and the provider's statements to me.     Provider Disclosure:  I agree with above  History, Review of Systems, Physical exam and Plan.  I have reviewed the content of the documentation and have edited it as needed. I have personally performed the services documented here and the documentation accurately represents those services and the decisions I have made.      Electronically signed by:  All risks, benefits and alternatives were discussed with patient.  Patient is in agreement and understands the assessment and plan.  All questions were answered.    Milka Martinez PA-C, MPAS  [facility] [place]: Phone: [phone], Fax: [phone]  [facility]: Phone: [phone],  Fax: [phone]  [facility]: Phone: [phone], Fax: [phone]

## 2024-12-09 NOTE — LETTER
12/9/2024      Lauren Nick  [address] [place] [address]      Dear Colleague,    Thank you for referring your patient, Lauren Nick, to the [facility]. Please see a copy of my visit note below.    Dermatology Procedure Note: Electrodesiccation and Curettage  Encounter date: 12/9/24    Dermatology Problem List:  Waist up skin exam 11/14/24     0. NUB, left scapula, s/p shave bx 11/14/24, pending path results  1. AKs - cryo  - Cryo to mid upper lip along vermilion border on 09/12/24  2. Hx of NMSC  - BCC, L scapula - s/p Ed&C 12/9/24  - BCC, central upper forehead, s/p MMS 6/19/24   - BCC, L preauricular, s/p MMS 01/25/24         - PDL and ILK: 10 5/10/24         - PDL: 6/19/24, 8/19/24    PREOPERATIVE DIAGNOSIS: BCC    POSTOPERATIVE DIAGNOSIS: same    LOCATION: L scapula     SIZE: original size:  7 mm     Treatment options including electrodessiccation and curettage (ED and C), excision and topicals were reviewed.  The expected cure rates, healing times and anticipated scars of each option were discussed and the patient elects to proceed with ED and C.     The risks and benefits of the procedure were described to the patient.  These include but are not limited to bleeding, infection, scar, incomplete removal, and recurrence. Written informed consent was obtained. Time-out was performed. The above site was cleansed with and injected with 4mL1% lidocaine with epinephrine. Once anesthesia was obtained, the site was prepped with Alcohol. The lesion was curetted with  in 3 directions with a 5 mm margin and this was followed by electrodessication.  This process was repeated three times. The defect measured 1.6 cm final size. Vaseline and a bandage were applied to the wound. The patient tolerated the procedure well and was given post care instructions.    Clinical Follow-up: PRN, within 6 months for skin check.    Milka Martinez PA-C staffed the patient.     Staff Involved:  Yadiel  Disclosure:   I, WILLIAM ANITA, am serving as a scribe; to document services personally performed by Milka Martinez PA-C -based on data collection and the provider's statements to me.     Provider Disclosure:  I agree with above History, Review of Systems, Physical exam and Plan.  I have reviewed the content of the documentation and have edited it as needed. I have personally performed the services documented here and the documentation accurately represents those services and the decisions I have made.      Electronically signed by:  All risks, benefits and alternatives were discussed with patient.  Patient is in agreement and understands the assessment and plan.  All questions were answered.    Milka Martinez PA-C, [facility]  [facility] [place]: Phone: [phone], Fax: [phone]  [facility]: Phone: [phone],  Fax: [phone]  [facility]: Phone: [phone], Fax: [phone]      Again, thank you for allowing me to participate in the care of your patient.        Sincerely,        Milka Martinez PA-C

## 2024-12-09 NOTE — PATIENT INSTRUCTIONS
Caring for your skin after surgery    For the first 48 hours after your surgery:  Leave the pressure dressing on and keep it dry. If it should come loose, you may re-tape it, but do not take it off.  Relax and take it easy.  Do not do any vigorous exercise, heavy lifting or bending forward. This could cause the wound to bleed.  If the wound is on your head, sleeping with your head elevated for the first few nights will help with swelling and bleeding. (Use linens/pillow cases that would be ok to get blood on in the event there is some oozing from the bandage).  Post-operative pain is usually mild.  You may alternate between 1000 mg of Tylenol (acetaminophen) and 400 mg of Ibuprofen every 4 hours.  Do not take more than 4,000 mg of acetaminophen in a 24-hour period or 3200 mg of Ibuprofen in a 24-hr period.    Avoid alcohol and vitamin E as these may increase your tendency to bleed.  Apply an ice pack for 20 min every 2-3 hours while awake.  This may help reduce swelling, bruising, and pain.  Make sure the ice pack is waterproof so that the pressure bandage doesn't get wet.  You may see a small amount of drainage or blood on your pressure bandage. This is normal. However:  If drainage or bleeding continues or saturates the bandage, you will need to apply firm pressure over the bandage with a clean washcloth for 15 minutes.    If bleeding continues after applying pressure for 15 minutes, apply an ice pack with gentle pressure to the bandaged area for another 15 minutes.  Remove the saturated bandage.  If bleeding has stopped, apply Vaseline over the suture line and cover with a non-stick bandage.  To add some pressure over the wound, fold a piece of gauze and tape over the area.  If bleeding still continues, call our office or go to the nearest emergency room.    48 Hours After Surgery:  Remove the bandage.  If it seems sticky or too difficult to get off, you may need to soak it off in the shower.  Wash wound with a  mild soap and water.  Use caution when washing the wound, be gentle and do not let the forceful shower stream hit the wound directly.  Pat dry.  Apply Vaseline or Aquaphor ointment (from a new container or tube) over the suture line with a Q-tip.  Cover the site with a bandage.      What to expect:  The first couple of days your wound may be tender and may bleed slightly when doing wound care.  There may be swelling and bruising around the wound, especially if it is near the eyes. For your comfort, you may apply ice or cold compresses to the area.  The area around your wound may be numb for several weeks or even months.  You may experience periodic sharp pain or mild itching around the wound as it heals.   The suture line will look dark pink at first and the edges of the wound will be reddened. This will lighten up each day.    Call Us If:  You have bleeding that will not stop after applying pressure and ice.  You have pain that is not controlled with Tylenol and Ibuprofen.  You have signs or symptoms of an infection such as fever over 100 degrees Fahrenheit, redness, swelling, or warmth spreading from the wound, increasing pain after the first 48 hours, or white/yellow/green drainage from the wound (may or may not have a foul odor).    [facility]: [phone]   [facility]: [phone]  For urgent needs outside of business hours call the [facility] at [phone] and ask to speak with/page the dermatology resident on call     Proper skin care from [facility]:    -Eliminate harsh soaps as they strip the natural oils from the skin, often resulting in dry itchy skin ( i.e. Dial, Zest, Chastity Spring)  -Use mild soaps such as Cetaphil or Dove Sensitive Skin in the shower. You do not need to use soap on arms, legs, and trunk every time you shower unless visibly soiled.   -Avoid hot or cold showers.  -After showering, lightly dry off and  apply moisturizing within 2-3 minutes. This will help trap moisture in the skin.   -Aggressive use of a moisturizer at least 1-2 times a day to the entire body (including -Vanicream, Cetaphil, Aquaphor or Cerave) and moisturize hands after every washing.  -We recommend using moisturizers that come in a tub that needs to be scooped out, not a pump. This has more of an oil base. It will hold moisture in your skin much better than a water base moisturizer. The above recommended are non-pore clogging.      Wear a sunscreen with at least SPF 30 on your face, ears, neck and V of the chest daily. Wear sunscreen on other areas of the body if those areas are exposed to the sun throughout the day. Sunscreens can contain physical and/or chemical blockers. Physical blockers are less likely to clog pores, these include zinc oxide and titanium dioxide. Reapply every two hour and after swimming.     Sunscreen examples: https://www.ewg.org/sunscreen/    UV radiation  UVA radiation remains constant throughout the day and throughout the year. It is a longer wavelength than UVB and therefore penetrates deeper into the skin leading to immediate and delayed tanning, photoaging, and skin cancer. 70-80% of UVA and UVB radiation occurs between the hours of 10am-2pm.  UVB radiation  UVB radiation causes the most harmful effects and is more significant during the summer months. However, snow and ice can reflect UVB radiation leading to skin damage during the winter months as well. UVB radiation is responsible for tanning, burning, inflammation, delayed erythema (pinkness), pigmentation (brown spots), and skin cancer.     I recommend self monthly full body exams and yearly full body exams with a dermatology provider. If you develop a new or changing lesion please follow up for examination. Most skin cancers are pink and scaly or pink and pearly. However, we do see blue/brown/black skin cancers.  Consider the ABCDEs of melanoma when giving  yourself your monthly full body exam ( don't forget the groin, buttocks, feet, toes, etc). A-asymmetry, B-borders, C-color, D-diameter, E-elevation or evolving. If you see any of these changes please follow up in clinic. If you cannot see your back I recommend purchasing a hand held mirror to use with a larger wall mirror.       Checking for Skin Cancer  You can find cancer early by checking your skin each month. There are 3 kinds of skin cancer. They are melanoma, basal cell carcinoma, and squamous cell carcinoma. Doing monthly skin checks is the best way to find new marks or skin changes. Follow the instructions below for checking your skin.   The ABCDEs of checking moles for melanoma   Check your moles or growths for signs of melanoma using ABCDE:   Asymmetry: the sides of the mole or growth don t match  Border: the edges are ragged, notched, or blurred  Color: the color within the mole or growth varies  Diameter: the mole or growth is larger than 6 mm (size of a pencil eraser)  Evolving: the size, shape, or color of the mole or growth is changing (evolving is not shown in the images below)    Checking for other types of skin cancer  Basal cell carcinoma or squamous cell carcinoma have symptoms such as:     A spot or mole that looks different from all other marks on your skin  Changes in how an area feels, such as itching, tenderness, or pain  Changes in the skin's surface, such as oozing, bleeding, or scaliness  A sore that does not heal  New swelling or redness beyond the border of a mole    Who s at risk?  Anyone can get skin cancer. But you are at greater risk if you have:   Fair skin, light-colored hair, or light-colored eyes  Many moles or abnormal moles on your skin  A history of sunburns from sunlight or tanning beds  A family history of skin cancer  A history of exposure to radiation or chemicals  A weakened immune system  If you have had skin cancer in the past, you are at risk for recurring skin  cancer.   How to check your skin  Do your monthly skin checkups in front of a full-length mirror. Check all parts of your body, including your:   Head (ears, face, neck, and scalp)  Torso (front, back, and sides)  Arms (tops, undersides, upper, and lower armpits)  Hands (palms, backs, and fingers, including under the nails)  Buttocks and genitals  Legs (front, back, and sides)  Feet (tops, soles, toes, including under the nails, and between toes)  If you have a lot of moles, take digital photos of them each month. Make sure to take photos both up close and from a distance. These can help you see if any moles change over time.   Most skin changes are not cancer. But if you see any changes in your skin, call your doctor right away. Only he or she can diagnose a problem. If you have skin cancer, seeing your doctor can be the first step toward getting the treatment that could save your life.   [employer] last reviewed this educational content on 4/1/2019 2000-2020 [employer]. [address] [place] [address]. All rights reserved. This information is not intended as a substitute for professional medical care. Always follow your healthcare professional's instructions.       When should I call my doctor?  If you are worsening or not improving, please, contact us or seek urgent care as noted below.     Who should I call with questions (adults)?    [facility] [phone]  For urgent needs outside of business hours call the [facility] at [phone] and ask for the dermatology resident on call to be paged  If this is a medical emergency and you are unable to reach an ER, Call [phone]      If you need a prescription refill, please contact your pharmacy. Refills are approved or denied by our Physicians during normal business hours, Monday through Fridays  Per office policy, refills will not be granted if you have not been seen within the past year (or sooner  depending on your child's condition)

## 2025-01-09 SDOH — HEALTH STABILITY: PHYSICAL HEALTH: ON AVERAGE, HOW MANY MINUTES DO YOU ENGAGE IN EXERCISE AT THIS LEVEL?: 50 MIN

## 2025-01-09 SDOH — HEALTH STABILITY: PHYSICAL HEALTH: ON AVERAGE, HOW MANY DAYS PER WEEK DO YOU ENGAGE IN MODERATE TO STRENUOUS EXERCISE (LIKE A BRISK WALK)?: 4 DAYS

## 2025-01-09 ASSESSMENT — SOCIAL DETERMINANTS OF HEALTH (SDOH): HOW OFTEN DO YOU GET TOGETHER WITH FRIENDS OR RELATIVES?: ONCE A WEEK

## 2025-01-13 ENCOUNTER — TELEPHONE (OUTPATIENT)
Dept: CARDIOLOGY | Facility: CLINIC | Age: 75
End: 2025-01-13
Payer: MEDICARE

## 2025-01-13 DIAGNOSIS — E78.5 HYPERLIPIDEMIA LDL GOAL <70: Primary | ICD-10-CM

## 2025-01-13 DIAGNOSIS — I48.0 PAROXYSMAL ATRIAL FIBRILLATION (H): ICD-10-CM

## 2025-01-13 DIAGNOSIS — I77.810 ASCENDING AORTA DILATATION: ICD-10-CM

## 2025-01-13 DIAGNOSIS — Z95.2 S/P AVR: ICD-10-CM

## 2025-01-13 DIAGNOSIS — I25.10 CORONARY ARTERY DISEASE INVOLVING NATIVE CORONARY ARTERY OF NATIVE HEART WITHOUT ANGINA PECTORIS: ICD-10-CM

## 2025-01-13 ASSESSMENT — ANXIETY QUESTIONNAIRES
3. WORRYING TOO MUCH ABOUT DIFFERENT THINGS: NOT AT ALL
GAD7 TOTAL SCORE: 0
5. BEING SO RESTLESS THAT IT IS HARD TO SIT STILL: NOT AT ALL
8. IF YOU CHECKED OFF ANY PROBLEMS, HOW DIFFICULT HAVE THESE MADE IT FOR YOU TO DO YOUR WORK, TAKE CARE OF THINGS AT HOME, OR GET ALONG WITH OTHER PEOPLE?: NOT DIFFICULT AT ALL
6. BECOMING EASILY ANNOYED OR IRRITABLE: NOT AT ALL
2. NOT BEING ABLE TO STOP OR CONTROL WORRYING: NOT AT ALL
1. FEELING NERVOUS, ANXIOUS, OR ON EDGE: NOT AT ALL
7. FEELING AFRAID AS IF SOMETHING AWFUL MIGHT HAPPEN: NOT AT ALL
4. TROUBLE RELAXING: NOT AT ALL
7. FEELING AFRAID AS IF SOMETHING AWFUL MIGHT HAPPEN: NOT AT ALL
IF YOU CHECKED OFF ANY PROBLEMS ON THIS QUESTIONNAIRE, HOW DIFFICULT HAVE THESE PROBLEMS MADE IT FOR YOU TO DO YOUR WORK, TAKE CARE OF THINGS AT HOME, OR GET ALONG WITH OTHER PEOPLE: NOT DIFFICULT AT ALL
GAD7 TOTAL SCORE: 0
GAD7 TOTAL SCORE: 0

## 2025-01-13 ASSESSMENT — PATIENT HEALTH QUESTIONNAIRE - PHQ9
10. IF YOU CHECKED OFF ANY PROBLEMS, HOW DIFFICULT HAVE THESE PROBLEMS MADE IT FOR YOU TO DO YOUR WORK, TAKE CARE OF THINGS AT HOME, OR GET ALONG WITH OTHER PEOPLE: SOMEWHAT DIFFICULT
SUM OF ALL RESPONSES TO PHQ QUESTIONS 1-9: 2
SUM OF ALL RESPONSES TO PHQ QUESTIONS 1-9: 2

## 2025-01-13 NOTE — TELEPHONE ENCOUNTER
Repatha ordered  Please assist in PA    Thank you,   Martinez CARBAJAL RN Care Coordinator  [facility]

## 2025-01-13 NOTE — TELEPHONE ENCOUNTER
Central Prior Authorization Team   Phone: [phone]    PA Initiation    Medication: Repatha 140mg/ml  Insurance Company: [employer] - Phone [phone] Fax [phone]  Pharmacy Filling the Rx: [id number] [place] - [address]  Filling Pharmacy Phone: [phone]  Filling Pharmacy Fax:    Start Date: 1/13/2025

## 2025-01-13 NOTE — TELEPHONE ENCOUNTER
He has intolerance to several statins.  With such a high level of LDL recommend Repatha 140 mg subcutaneously every 2 weeks.  Recommend repeating lipid panel after about 2 months of starting Repatha    Thank you  Reggie Suttona order placed  Message sent to PA team     Patient notified of plan via juan m Balbuena RN on 1/13/2025 at 9:58 AM

## 2025-01-13 NOTE — TELEPHONE ENCOUNTER
Last OV 11/11/24: Dyslipidemia with intolerance to statins. Given she has coronary calcification and other vascular calcification would recommend PCSK9 inhibitor. Common side effects were discussed with patient. She is agreeable. Recommend starting Praluent 75 mg subcutaneously every 2 weeks. Recheck lipid panel with ALT in 2 months      Patient did not start Praulent due to cost. Has high co-pay. Did not qualify for octavio     Not surprising, lipid panel has worsened without treatment.        Recent Labs   Lab Test 01/10/25  0938 12/15/23  1100   CHOL 265* 257*   HDL 57 52   * 178*   TRIG 89 137         Are there any oral statins patient could try once again or would you like Repatha ordered and cost assessment done?     Thank you,   Martinez CARBAJAL RN

## 2025-01-14 ENCOUNTER — OFFICE VISIT (OUTPATIENT)
Dept: FAMILY MEDICINE | Facility: CLINIC | Age: 75
End: 2025-01-14
Payer: MEDICARE

## 2025-01-14 VITALS
DIASTOLIC BLOOD PRESSURE: 70 MMHG | HEART RATE: 60 BPM | OXYGEN SATURATION: 96 % | HEIGHT: 63 IN | WEIGHT: 144.9 LBS | SYSTOLIC BLOOD PRESSURE: 124 MMHG | TEMPERATURE: 97.5 F | BODY MASS INDEX: 25.68 KG/M2 | RESPIRATION RATE: 16 BRPM

## 2025-01-14 DIAGNOSIS — Z95.2 H/O AORTIC VALVE REPLACEMENT: ICD-10-CM

## 2025-01-14 DIAGNOSIS — I10 HYPERTENSION GOAL BP (BLOOD PRESSURE) < 130/80: ICD-10-CM

## 2025-01-14 DIAGNOSIS — I48.0 PAROXYSMAL ATRIAL FIBRILLATION (H): ICD-10-CM

## 2025-01-14 DIAGNOSIS — F51.01 PRIMARY INSOMNIA: ICD-10-CM

## 2025-01-14 DIAGNOSIS — I77.810 ASCENDING AORTA DILATATION: ICD-10-CM

## 2025-01-14 DIAGNOSIS — F33.41 RECURRENT MAJOR DEPRESSIVE DISORDER, IN PARTIAL REMISSION: ICD-10-CM

## 2025-01-14 DIAGNOSIS — R73.03 PREDIABETES: ICD-10-CM

## 2025-01-14 DIAGNOSIS — J41.0 SIMPLE CHRONIC BRONCHITIS (H): ICD-10-CM

## 2025-01-14 DIAGNOSIS — Z87.74 H/O BICUSPID AORTIC VALVE: ICD-10-CM

## 2025-01-14 DIAGNOSIS — Z00.00 ENCOUNTER FOR MEDICARE ANNUAL WELLNESS EXAM: Primary | ICD-10-CM

## 2025-01-14 DIAGNOSIS — J30.2 SEASONAL ALLERGIC RHINITIS, UNSPECIFIED TRIGGER: ICD-10-CM

## 2025-01-14 DIAGNOSIS — E03.9 HYPOTHYROIDISM, UNSPECIFIED TYPE: ICD-10-CM

## 2025-01-14 LAB
EST. AVERAGE GLUCOSE BLD GHB EST-MCNC: 126 MG/DL
HBA1C MFR BLD: 6 % (ref 0–5.6)

## 2025-01-14 PROCEDURE — 83036 HEMOGLOBIN GLYCOSYLATED A1C: CPT | Performed by: FAMILY MEDICINE

## 2025-01-14 PROCEDURE — 91320 SARSCV2 VAC 30MCG TRS-SUC IM: CPT | Performed by: FAMILY MEDICINE

## 2025-01-14 PROCEDURE — 82043 UR ALBUMIN QUANTITATIVE: CPT | Performed by: FAMILY MEDICINE

## 2025-01-14 PROCEDURE — 84443 ASSAY THYROID STIM HORMONE: CPT | Performed by: FAMILY MEDICINE

## 2025-01-14 PROCEDURE — 80053 COMPREHEN METABOLIC PANEL: CPT | Performed by: FAMILY MEDICINE

## 2025-01-14 PROCEDURE — 90480 ADMN SARSCOV2 VAC 1/ONLY CMP: CPT | Performed by: FAMILY MEDICINE

## 2025-01-14 PROCEDURE — 99214 OFFICE O/P EST MOD 30 MIN: CPT | Mod: 25 | Performed by: FAMILY MEDICINE

## 2025-01-14 PROCEDURE — G2211 COMPLEX E/M VISIT ADD ON: HCPCS | Performed by: FAMILY MEDICINE

## 2025-01-14 PROCEDURE — 36415 COLL VENOUS BLD VENIPUNCTURE: CPT | Performed by: FAMILY MEDICINE

## 2025-01-14 PROCEDURE — 82570 ASSAY OF URINE CREATININE: CPT | Performed by: FAMILY MEDICINE

## 2025-01-14 PROCEDURE — G0439 PPPS, SUBSEQ VISIT: HCPCS | Performed by: FAMILY MEDICINE

## 2025-01-14 RX ORDER — ESCITALOPRAM OXALATE 20 MG/1
20 TABLET ORAL DAILY
Qty: 90 TABLET | Refills: 3 | Status: SHIPPED | OUTPATIENT
Start: 2025-01-14

## 2025-01-14 RX ORDER — LOSARTAN POTASSIUM 25 MG/1
25 TABLET ORAL DAILY
Qty: 90 TABLET | Refills: 3 | Status: SHIPPED | OUTPATIENT
Start: 2025-01-14

## 2025-01-14 RX ORDER — METOPROLOL SUCCINATE 25 MG/1
25 TABLET, EXTENDED RELEASE ORAL 2 TIMES DAILY
Qty: 180 TABLET | Refills: 3 | Status: SHIPPED | OUTPATIENT
Start: 2025-01-14

## 2025-01-14 RX ORDER — LORAZEPAM 1 MG/1
1 TABLET ORAL
Qty: 20 TABLET | Refills: 5 | Status: SHIPPED | OUTPATIENT
Start: 2025-01-14 | End: 2025-01-15

## 2025-01-14 RX ORDER — ALBUTEROL SULFATE 90 UG/1
2 INHALANT RESPIRATORY (INHALATION) EVERY 6 HOURS
Qty: 8.5 G | Refills: 11 | Status: SHIPPED | OUTPATIENT
Start: 2025-01-14

## 2025-01-14 RX ORDER — LORAZEPAM 1 MG/1
1 TABLET ORAL
Qty: 20 TABLET | Refills: 5 | Status: SHIPPED | OUTPATIENT
Start: 2025-01-14 | End: 2025-01-14

## 2025-01-14 RX ORDER — FLUTICASONE PROPIONATE 50 MCG
2 SPRAY, SUSPENSION (ML) NASAL DAILY
Qty: 48 ML | Refills: 3 | Status: SHIPPED | OUTPATIENT
Start: 2025-01-14

## 2025-01-14 ASSESSMENT — PAIN SCALES - GENERAL: PAINLEVEL_OUTOF10: NO PAIN (0)

## 2025-01-14 NOTE — TELEPHONE ENCOUNTER
Patient sent following message:  I have talked to my drug company and their ,  Because of my deductible the first 84 days on the drug would cost me $825, the second 84 days would cost me $523, etc. until I reach the $2000 maximum. My question is this  can you get me any kind of coupon for it from your rep? Even a couple of $100 coupons would make a difference for me. If they could provide some additional help such as that I would be willing to try it for 4 months to see if it makes a difference. Thoughts on this?  Thanks, Nicolette QUINTEROS is submitted.   Above is the information patient sent via [employer]    Is the information patient received reliable or should we wait for PA to be completed?     I have sent them information on the Setred octavio and sent separate message to Percy Zamorano regarding octavio.    If there are other options you are aware of or coupons, please let us know     Thank you!  Martinez CARBAJAL RN

## 2025-01-14 NOTE — PATIENT INSTRUCTIONS
-Mammogram: Last done 6/2024 (impression:  benign ). Due 6/2026. Pt reports will continue annual screenings.    -DEXA: Last done 4/2022 (impression: osteopenia). Due 4/2025.     -PAP: no results upon chart review. Provider to review PAP recommendations.     -Colon Cancer Screening: Last done via colonoscopy on 11/2022. (Impression: 4 polyps). Due 11/2027. Pt reports interested in completing in the future due to family history of colon cancer.     -Dermatology: Pt verbalized they do meet with dermatology regularly.     -Immunizations: Patient is due/able to receive at clinic today: Covid - Pt requesting to complete this visit.      -Other Concerns: Pt requesting lorazepam refill. Pt reports skin cancer removal on back on 12/9/2024 and reports site in healing poorly - Pt requesting provider to assess. Pt verbalized plans to start evolocumab injections for hyperlipidemia. Pt reports right hip replacement 4/2024 at [place].         Patient Education   Preventive Care Advice   This is general advice given by our system to help you stay healthy. However, your care team may have specific advice just for you. Please talk to your care team about your preventive care needs.  Nutrition  Eat 5 or more servings of fruits and vegetables each day.  Try wheat bread, brown rice and whole grain pasta (instead of white bread, rice, and pasta).  Get enough calcium and vitamin D. Check the label on foods and aim for 100% of the RDA (recommended daily allowance).  Lifestyle  Exercise at least 150 minutes each week  (30 minutes a day, 5 days a week).  Do muscle strengthening activities 2 days a week. These help control your weight and prevent disease.  No smoking.  Wear sunscreen to prevent skin cancer.  Have a dental exam and cleaning every 6 months.  Yearly exams  See your health care team every year to talk about:  Any changes in your health.  Any medicines your care team has prescribed.  Preventive care, family planning, and ways to  prevent chronic diseases.  Shots (vaccines)   HPV shots (up to age 26), if you've never had them before.  Hepatitis B shots (up to age 59), if you've never had them before.  COVID-19 shot: Get this shot when it's due.  Flu shot: Get a flu shot every year.  Tetanus shot: Get a tetanus shot every 10 years.  Pneumococcal, hepatitis A, and RSV shots: Ask your care team if you need these based on your risk.  Shingles shot (for age 50 and up)  General health tests  Diabetes screening:  Starting at age 35, Get screened for diabetes at least every 3 years.  If you are younger than age 35, ask your care team if you should be screened for diabetes.  Cholesterol test: At age 39, start having a cholesterol test every 5 years, or more often if advised.  Bone density scan (DEXA): At age 50, ask your care team if you should have this scan for osteoporosis (brittle bones).  Hepatitis C: Get tested at least once in your life.  STIs (sexually transmitted infections)  Before age 24: Ask your care team if you should be screened for STIs.  After age 24: Get screened for STIs if you're at risk. You are at risk for STIs (including HIV) if:  You are sexually active with more than one person.  You don't use condoms every time.  You or a partner was diagnosed with a sexually transmitted infection.  If you are at risk for HIV, ask about PrEP medicine to prevent HIV.  Get tested for HIV at least once in your life, whether you are at risk for HIV or not.  Cancer screening tests  Cervical cancer screening: If you have a cervix, begin getting regular cervical cancer screening tests starting at age 21.  Breast cancer scan (mammogram): If you've ever had breasts, begin having regular mammograms starting at age 40. This is a scan to check for breast cancer.  Colon cancer screening: It is important to start screening for colon cancer at age 45.  Have a colonoscopy test every 10 years (or more often if you're at risk) Or, ask your provider about stool  tests like a FIT test every year or Cologuard test every 3 years.  To learn more about your testing options, visit:   .  For help making a decision, visit:   https://bit.ly/va85242.  Prostate cancer screening test: If you have a prostate, ask your care team if a prostate cancer screening test (PSA) at age 55 is right for you.  Lung cancer screening: If you are a current or former smoker ages 50 to 80, ask your care team if ongoing lung cancer screenings are right for you.  For informational purposes only. Not to replace the advice of your health care provider. Copyright   2023 [employer]. All rights reserved. Clinically reviewed by the [employer]. [employer] 437097 - REV 01/24.  Preventing Falls: Care Instructions  Injuries and health problems such as trouble walking or poor eyesight can increase your risk of falling. So can some medicines. But there are things you can do to help prevent falls. You can exercise to get stronger. You can also arrange your home to make it safer.    Talk to your doctor about the medicines you take. Ask if any of them increase the risk of falls and whether they can be changed or stopped.   Try to exercise regularly. It can help improve your strength and balance. This can help lower your risk of falling.         Practice fall safety and prevention.   Wear low-heeled shoes that fit well and give your feet good support. Talk to your doctor if you have foot problems that make this hard.  Carry a cellphone or wear a medical alert device that you can use to call for help.  Use stepladders instead of chairs to reach high objects. Don't climb if you're at risk for falls. Ask for help, if needed.  Wear the correct eyeglasses, if you need them.        Make your home safer.   Remove rugs, cords, clutter, and furniture from walkways.  Keep your house well lit. Use night-lights in hallways and bathrooms.  Install and use sturdy handrails on stairways.  Wear  "nonskid footwear, even inside. Don't walk barefoot or in socks without shoes.        Be safe outside.   Use handrails, curb cuts, and ramps whenever possible.  Keep your hands free by using a shoulder bag or backpack.  Try to walk in well-lit areas. Watch out for uneven ground, changes in pavement, and debris.  Be careful in the winter. Walk on the grass or gravel when sidewalks are slippery. Use de-icer on steps and walkways. Add non-slip devices to shoes.    Put grab bars and nonskid mats in your shower or tub and near the toilet. Try to use a shower chair or bath bench when bathing.   Get into a tub or shower by putting in your weaker leg first. Get out with your strong side first. Have a phone or medical alert device in the bathroom with you.   Where can you learn more?  Go to https://www.[employer].net/patiented  Enter G117 in the search box to learn more about \"Preventing Falls: Care Instructions.\"  Current as of: July 31, 2024  Content Version: 14.3    2024 [employer].   Care instructions adapted under license by your healthcare professional. If you have questions about a medical condition or this instruction, always ask your healthcare professional. [employer] disclaims any warranty or liability for your use of this information.       "

## 2025-01-14 NOTE — PROGRESS NOTES
Preventive Care Visit  [facility]  Joel Daniel Wegener, MD, Family Medicine  Jan 14, 2025  {Provider  Link to SmartSet :147266}    {PROVIDER CHARTING PREFERENCE:926666}    Subjective   Pat is a 74 year old, presenting for the following:  Physical (AWV)        1/14/2025    11:08 AM   Additional Questions   Roomed by STORMY Trevino   Accompanied by N/A     {ROOMER if patient is in their first year of Medicare a vision screen is required click here to document the Vison screen and then refresh the note to pull in results  :392020}    Wellness Visit Notes:   {Provider able to cut/paste text below, and include in Assessment/Plan documentation. Edit to reflect final plan. Delete this text.}  -Mammogram: Last done 6/2024 (impression:  benign ). Due 6/2026. Pt reports will continue annual screenings.  -DEXA: Last done 4/2022 (impression: osteopenia). Due 4/2025.   -PAP: no results upon chart review. Provider to review PAP recommendations.   -Colon Cancer Screening: Last done via colonoscopy on 11/2022. (Impression: 4 polyps). Due 11/2027. Pt reports interested in completing in the future due to family history of colon cancer.   -Dermatology: Pt verbalized they do meet with dermatology regularly.   -Immunizations: Patient is due/able to receive at clinic today: Covid - Pt requesting to complete this visit.    -Other Concerns: Pt requesting lorazepam refill. Pt reports skin cancer removal on back on 12/9/2024 and reports site in healing poorly - Pt requesting provider to assess. Pt verbalized plans to start evolocumab injections for hyperlipidemia. Pt reports right hip replacement 4/2024 at [place].   HPI  ***  {MA/LPN/RN Pre-Provider Visit Orders- hCG/UA/Strep (Optional):656976}  {SUPERLIST (Optional):921256}  {additonal problems for provider to add (Optional):841154}  Health Care Directive  Patient has a Health Care Directive on file  Advance care planning document is on file and is current.      1/9/2025    General Health   How would you rate your overall physical health? Good   Feel stress (tense, anxious, or unable to sleep) Only a little   (!) STRESS CONCERN      1/9/2025   Nutrition   Diet: Regular (no restrictions)         1/9/2025   Exercise   Days per week of moderate/strenous exercise 4 days   Average minutes spent exercising at this level 50 min         1/9/2025   Social Factors   Frequency of gathering with friends or relatives Once a week   Worry food won't last until get money to buy more No   Food not last or not have enough money for food? No   Do you have housing? (Housing is defined as stable permanent housing and does not include staying ouside in a car, in a tent, in an abandoned building, in an overnight shelter, or couch-surfing.) Yes   Are you worried about losing your housing? No   Lack of transportation? No   Unable to get utilities (heat,electricity)? No         1/14/2025   Fall Risk   Reason Gait Speed Test Not Completed Patient declines          1/9/2025   Activities of Daily Living- Home Safety   Needs help with the following daily activites None of the above   Safety concerns in the home None of the above         1/9/2025   Dental   Dentist two times every year? Yes         1/9/2025   Hearing Screening   Hearing concerns? None of the above         1/9/2025   Driving Risk Screening   Patient/family members have concerns about driving No         1/9/2025   General Alertness/Fatigue Screening   Have you been more tired than usual lately? No         1/9/2025   Urinary Incontinence Screening   Bothered by leaking urine in past 6 months No         1/9/2025   TB Screening   Were you born outside of the [place]? No       Today's PHQ-9 Score:       1/13/2025     3:51 PM   PHQ-9 SCORE   PHQ-9 Total Score MyChart 2 (Minimal depression)   PHQ-9 Total Score 2        Patient-reported         1/9/2025   Substance Use   Alcohol more than 3/day or more than 7/wk No   Do you have a current opioid prescription?  No   How severe/bad is pain from 1 to 10? 0/10 (No Pain)   Do you use any other substances recreationally? No     Social History     Tobacco Use    Smoking status: Former     Current packs/day: 0.00     Average packs/day: 1 pack/day for 10.4 years (10.4 ttl pk-yrs)     Types: Cigarettes     Start date: 1969     Quit date: 10/1/1979     Years since quittin.3    Smokeless tobacco: Never   Vaping Use    Vaping status: Never Used   Substance Use Topics    Alcohol use: Yes     Comment: occ    Drug use: Never     {Provider  If there are gaps in the social history shown above, please follow the link to update and then refresh the note Link to Social and Substance History :017241}      11/3/2022   LAST FHS-7 RESULTS   1st degree relative breast or ovarian cancer Yes   Any relative bilateral breast cancer No   Any male have breast cancer No   Any ONE woman have BOTH breast AND ovarian cancer No   Any woman with breast cancer before 50yrs No   2 or more relatives with breast AND/OR ovarian cancer No   2 or more relatives with breast AND/OR bowel cancer No     {If any of the questions to the FHS7 are answered yes, consider referral for genetic counseling.    Additional indications for genetic referral include personal history of breast or ovarian cancer, genetic mutation in 1st degree relative which increases risk of breast cancer including BRCA1, BRCA2, JOSE, PALB 2, TP53, CHEK2, PTEN, CDH1, STK11 (per ACS) and/or 1st degree relative with history of pancreatic or high-risk prostate cancer (per NCCN):817706}   {Mammogram Decision Support (Optional):881392}    ASCVD Risk   The 10-year ASCVD risk score (Saad PUENTE, et al., 2019) is: 12.9%    Values used to calculate the score:      Age: 74 years      Sex: Female      Is Non- : No      Diabetic: No      Tobacco smoker: No      Systolic Blood Pressure: 107 mmHg      Is BP treated: Yes      HDL Cholesterol: 57 mg/dL      Total Cholesterol:  265 mg/dL    {Link to Fracture Risk Assessment Tool (Optional):582611}    {Provider  REQUIRED FOR AWV Use the storyboard to review patient history, after sections have been marked as reviewed, refresh note to capture documentation:849614}  {Provider   REQUIRED AWV use this link to review and update sexual activity history  after section has been marked as reviewed, refresh note to capture documentation:193711}  Reviewed and updated as needed this visit by Provider                    {HISTORY OPTIONS (Optional):020258}  Current providers sharing in care for this patient include:  Patient Care Team:  Wegener, Joel Daniel Irwin, MD as PCP - General (Family Medicine)  Wegener, Joel Daniel Irwin, MD as Assigned PCP  Rohit Thao MD as MD (Critical Care)  Keo Lee MD as MD (Dermatology)  Judy Roberts PA-C as Physician Assistant  Milka Martinez PA-C as Physician Assistant (Dermatology)  Nikki Newman PA-C as Physician Assistant (Urology)  Nikki Newman PA-C as Assigned OBGYN Provider  Сергей Parry MD as Assigned Surgical Provider  Vinnie Patel MD as Assigned Musculoskeletal Provider  Reggie King MD as Assigned Heart and Vascular Provider    The following health maintenance items are reviewed in Epic and correct as of today:  Health Maintenance   Topic Date Due    COVID-19 Vaccine (5 - 2024-25 season) 09/01/2024    BMP  11/21/2024    CMP  11/21/2024    MICROALBUMIN  11/21/2024    TSH W/FREE T4 REFLEX  11/21/2024    ANNUAL REVIEW OF HM ORDERS  11/21/2024    DEXA  04/18/2025    PHQ-9  07/14/2025    LIPID  01/10/2026    MEDICARE ANNUAL WELLNESS VISIT  01/14/2026    FALL RISK ASSESSMENT  01/14/2026    MAMMO SCREENING  06/25/2026    GLUCOSE  11/21/2026    COLORECTAL CANCER SCREENING  11/10/2027    ADVANCE CARE PLANNING  11/29/2028    DTAP/TDAP/TD IMMUNIZATION (2 - Td or Tdap) 11/30/2031    SPIROMETRY  Completed    HEPATITIS C SCREENING  Completed    COPD ACTION PLAN  Completed     "DEPRESSION ACTION PLAN  Completed    INFLUENZA VACCINE  Completed    ZOSTER IMMUNIZATION  Completed    RSV VACCINE  Completed    HPV IMMUNIZATION  Aged Out    MENINGITIS IMMUNIZATION  Aged Out    RSV MONOCLONAL ANTIBODY  Aged Out    Pneumococcal Vaccine: 50+ Years  Discontinued       {ROS Picklists (Optional):047286}     Objective    Exam  LMP  (LMP Unknown)    Estimated body mass index is 26.05 kg/m  as calculated from the following:    Height as of 11/11/24: 1.575 m (5' 2\").    Weight as of 11/11/24: 64.6 kg (142 lb 6.4 oz).    Physical Exam  {Exam Choices (Optional):343570}         1/14/2025   Mini Cog   Clock Draw Score 2 Normal   3 Item Recall 3 objects recalled   Mini Cog Total Score 5     {A Mini-Cog total score of 0-2 suggests the possibility of dementia, score of 3-5 suggests no dementia:296105}         Signed Electronically by: Joel Daniel Wegener, MD  {Email feedback regarding this note to [email or web address]   :913405}  Answers submitted by the patient for this visit:  Patient Health Questionnaire (Submitted on 1/13/2025)  If you checked off any problems, how difficult have these problems made it for you to do your work, take care of things at home, or get along with other people?: Somewhat difficult  PHQ9 TOTAL SCORE: 2  Patient Health Questionnaire (G7) (Submitted on 1/13/2025)  CATARINA 7 TOTAL SCORE: 0    " "reviewed in Epic and correct as of today:  Health Maintenance   Topic Date Due    DEXA  04/18/2025    PHQ-9  07/14/2025    LIPID  01/10/2026    MEDICARE ANNUAL WELLNESS VISIT  01/14/2026    BMP  01/14/2026    CMP  01/14/2026    MICROALBUMIN  01/14/2026    TSH W/FREE T4 REFLEX  01/14/2026    ANNUAL REVIEW OF HM ORDERS  01/14/2026    FALL RISK ASSESSMENT  01/14/2026    MAMMO SCREENING  06/25/2026    COLORECTAL CANCER SCREENING  11/10/2027    GLUCOSE  01/14/2028    ADVANCE CARE PLANNING  01/14/2030    DTAP/TDAP/TD IMMUNIZATION (2 - Td or Tdap) 11/30/2031    SPIROMETRY  Completed    HEPATITIS C SCREENING  Completed    COPD ACTION PLAN  Completed    DEPRESSION ACTION PLAN  Completed    INFLUENZA VACCINE  Completed    ZOSTER IMMUNIZATION  Completed    RSV VACCINE  Completed    COVID-19 Vaccine  Completed    HPV IMMUNIZATION  Aged Out    MENINGITIS IMMUNIZATION  Aged Out    RSV MONOCLONAL ANTIBODY  Aged Out    Pneumococcal Vaccine: 50+ Years  Discontinued         Review of Systems  Constitutional, neuro, ENT, endocrine, pulmonary, cardiac, gastrointestinal, genitourinary, musculoskeletal, integument and psychiatric systems are negative, except as otherwise noted.     Objective    Exam  /70 (BP Location: Right arm, Patient Position: Sitting, Cuff Size: Adult Regular)   Pulse 60   Temp 97.5  F (36.4  C) (Temporal)   Resp 16   Ht 1.61 m (5' 3.4\")   Wt 65.7 kg (144 lb 14.4 oz)   LMP  (LMP Unknown)   SpO2 96%   BMI 25.35 kg/m     Estimated body mass index is 25.35 kg/m  as calculated from the following:    Height as of this encounter: 1.61 m (5' 3.4\").    Weight as of this encounter: 65.7 kg (144 lb 14.4 oz).    Physical Exam  GENERAL: alert and no distress  EYES: Eyes grossly normal to inspection, PERRL and conjunctivae and sclerae normal  HENT: ear canals and TM's normal, nose and mouth without ulcers or lesions  NECK: no adenopathy, no asymmetry, masses, or scars  RESP: lungs clear to auscultation - no rales, " rhonchi or wheezes  CV: regular rate and rhythm, normal S1 S2, no S3 or S4, no murmur, click or rub, no peripheral edema  ABDOMEN: soft, nontender, no hepatosplenomegaly, no masses and bowel sounds normal  MS: no gross musculoskeletal defects noted, no edema  SKIN: no suspicious lesions or rashes  NEURO: Normal strength and tone, mentation intact and speech normal  PSYCH: mentation appears normal, affect normal/bright        1/14/2025   Mini Cog   Clock Draw Score 2 Normal   3 Item Recall 3 objects recalled   Mini Cog Total Score 5              Signed Electronically by: Joel Daniel Wegener, MD    Answers submitted by the patient for this visit:  Patient Health Questionnaire (Submitted on 1/13/2025)  If you checked off any problems, how difficult have these problems made it for you to do your work, take care of things at home, or get along with other people?: Somewhat difficult  PHQ9 TOTAL SCORE: 2  Patient Health Questionnaire (G7) (Submitted on 1/13/2025)  CATARINA 7 TOTAL SCORE: 0

## 2025-01-14 NOTE — NURSING NOTE
"Pt received COVID vaccine per standing orders. Pt given VIS forms prior to immunization administration.   Prior to immunization administration, verified patients identity using patient s name and date of birth. Please see Immunization Activity for additional information.     Screening Questionnaire for Adult COVID Immunization  If the patient(or guardian) answers \"Yes\" to any of the following questions, do not administer the vaccine and consult with a provider.     Does the pt have a temperature greater than 100.5? No    Has the patient ever had a serious reaction to a COVID vaccine or something in a COVID vaccine, like polyethylene glycol (PEG) or polysorbate? No    Has the patient had myocarditis or pericarditis (inflamation of or around the the heart muscle) after getting a COVID-19 vaccine? No    Has the pt received a bone marrow transplant within the last 6 months? No    Has the patient had Multisystem Inflammatory Syndrome from COVID-19 in the past 90 days? No    Patient instructed to remain in clinic for 15 (or 30) minutes afterwards, and to report any adverse reactions? Yes    Updated by: Uriel Hernandez RN     Patient instructed to remain in clinic for 15 minutes afterwards, and to report any adverse reactions.     Performed by Uriel Hernandez RN on 1/14/2025.    "

## 2025-01-14 NOTE — TELEPHONE ENCOUNTER
Prior Authorization Approval    Authorization Effective Date: 1/14/2025  Authorization Expiration Date: 1/13/2025  Medication: Repatha 140mg/ml  Approved Dose/Quantity:   Reference #:     Insurance Company: [employer] [phone] Fax [phone]  Expected CoPay:       CoPay Card Available:      Foundation Assistance Needed:    Which Pharmacy is filling the prescription (Not needed for infusion/clinic administered): [id number] [place] [facility] [place] - [address]

## 2025-01-14 NOTE — TELEPHONE ENCOUNTER
Test claim 28 days supply   -$543.30 copay            Praluent is the preferred PCSK9 agent and has already been approved last year through her insurance.    Test claim shows copay is $480.48

## 2025-01-15 ENCOUNTER — TELEPHONE (OUTPATIENT)
Dept: CARDIOLOGY | Facility: CLINIC | Age: 75
End: 2025-01-15
Payer: MEDICARE

## 2025-01-15 LAB
ALBUMIN SERPL BCG-MCNC: 4.2 G/DL (ref 3.5–5.2)
ALP SERPL-CCNC: 69 U/L (ref 40–150)
ALT SERPL W P-5'-P-CCNC: 14 U/L (ref 0–50)
ANION GAP SERPL CALCULATED.3IONS-SCNC: 9 MMOL/L (ref 7–15)
AST SERPL W P-5'-P-CCNC: 22 U/L (ref 0–45)
BILIRUB SERPL-MCNC: 0.4 MG/DL
BUN SERPL-MCNC: 21.2 MG/DL (ref 8–23)
CALCIUM SERPL-MCNC: 9.4 MG/DL (ref 8.8–10.4)
CHLORIDE SERPL-SCNC: 105 MMOL/L (ref 98–107)
CREAT SERPL-MCNC: 0.92 MG/DL (ref 0.51–0.95)
CREAT UR-MCNC: 52.2 MG/DL
EGFRCR SERPLBLD CKD-EPI 2021: 65 ML/MIN/1.73M2
GLUCOSE SERPL-MCNC: 95 MG/DL (ref 70–99)
HCO3 SERPL-SCNC: 27 MMOL/L (ref 22–29)
MICROALBUMIN UR-MCNC: <12 MG/L
MICROALBUMIN/CREAT UR: NORMAL MG/G{CREAT}
POTASSIUM SERPL-SCNC: 4.7 MMOL/L (ref 3.4–5.3)
PROT SERPL-MCNC: 6.9 G/DL (ref 6.4–8.3)
SODIUM SERPL-SCNC: 141 MMOL/L (ref 135–145)
TSH SERPL DL<=0.005 MIU/L-ACNC: 1.58 UIU/ML (ref 0.3–4.2)

## 2025-01-15 RX ORDER — LORAZEPAM 1 MG/1
1 TABLET ORAL
Qty: 20 TABLET | Refills: 5 | Status: SHIPPED | OUTPATIENT
Start: 2025-01-15

## 2025-01-15 NOTE — TELEPHONE ENCOUNTER
Please see also MyChart string of messages with Pat, starting on 1\10\25.    Patient is now agreeable to proceed with the Repatha.  We have [facility] in Rx [facility] preferred [facility] already, writer called to confirm with pharmacy.   There has already been price checking and prior auth checking.   Verified lab orders are in place.    Verified with [facility] and they will get it ready for Pat to .    Lucia Mcdonnell RN on 1/15/2025 at 11:07 AM

## 2025-01-27 ENCOUNTER — TELEPHONE (OUTPATIENT)
Dept: CARDIOLOGY | Facility: CLINIC | Age: 75
End: 2025-01-27

## 2025-01-27 NOTE — TELEPHONE ENCOUNTER
"Team received MyChart message from Nicolette.  She has taken one dose of Repatha the week of January 13th.   She had no symptoms at all until today.    \"Woke up today and felt so many aches and pains so couldn t get out of bed.  We are visiting our grandchildren in [place] and everything was good until today. The only thing I can compare it to is how i felt on statins. Now what?? My cholesterol is so high I am really worried about it. '    Most recent labs on 1\10\25 show LDL of 190.   Routing to Dr. King for review\recommend.    Lucia Mcdonnell RN on 1/27/2025 at 4:19 PM    "

## 2025-01-28 NOTE — TELEPHONE ENCOUNTER
"The recent price check for PCSK9 included Praluent, which is the favored of her insurance plan.    \"Test claim shows copay is $480.48 \"    Known that patient does not qualify for the octavio when last attempted in early January.  Not sure how much has to change for them to become eligible.    Not assuming that patient will want to try another medication that might have same side effects, so we will wait to hear back from her.    Lucia Mcdonnell RN on 1/28/2025 at 2:17 PM      "

## 2025-01-29 NOTE — TELEPHONE ENCOUNTER
Patient has Medicare D through [employer].    Repatha is not covered.    Praluent (after prior auth):   --Patient is will pay 100% of the first $590 in drug costs (first month will be as much as $488)  --Subsequent fills will be $122/mo.    Nexletol (non-formulary, would need to fail or have contraindications to preferred agents to get PA approved):   --Patient is will pay 100% of the first $590 in drug costs (first month will be as much as $504)  --Subsequent fills will be $247/mo.    If/when total out of pocket drug costs exceed $2000, patient will pay $0 for all covered drugs for the remainder of the year.    Leqvio is a Clinic Administered Medication (CAM), which is billed under medical benefits.  As such, pricing on this can not be estimated by Pharmacy Liaison.  Typically patients such as this who have Medicare B + supplement and meet medical criteria pay as little as $0 for CAM meds but the only way to definitively determine this is to follow the steps below.     Coverage will be secured by the CAM and Infusion Finance teams when:  an outpatient order for this injection is placed in chart, AND  an appointment for administration at an [facility]/infusion center is scheduled.    If patient would like a price estimate prior to receiving the injection outpatient, patient may:  contact Member Services department of their insurance or   complete a cost of care estimate request at [email or web address], or by calling [phone]  This may also be completed by any member of the care team.  Turnaround time is 1-3 days.  Pricing given by this team is only valid at [facility] or infusion sites.  In both cases,  J-code  and CPT code 98134 should be referenced.    Tamia Squires  Pharmacy Technician/Liaison, Discharge Pharmacy   [phone] (voice or text)  [email or web address]  Pharmacy test claims are estimates and may not reflect final  costs.  Suggested alternatives aim to be cost-effective and may not be therapeutically equivalent as this consult is informational and does not constitute medical advice.  Clinical decisions should be made by qualified healthcare providers.

## 2025-01-29 NOTE — TELEPHONE ENCOUNTER
"Patient is requesting to know about the \"non-statin\" oral medications.   She is already taking Zetia.   Informed her that we will do a price check for Bempedoic Acid, just in case that might be an option for her.    Routing to pharmacy liaison for a katz check.    Lucia Mcdonnell RN on 1/29/2025 at 10:15 AM    "

## 2025-01-30 ENCOUNTER — MYC MEDICAL ADVICE (OUTPATIENT)
Dept: CARDIOLOGY | Facility: CLINIC | Age: 75
End: 2025-01-30
Payer: MEDICARE

## 2025-01-30 ENCOUNTER — TELEPHONE (OUTPATIENT)
Dept: CARDIOLOGY | Facility: CLINIC | Age: 75
End: 2025-01-30
Payer: MEDICARE

## 2025-01-30 DIAGNOSIS — I25.10 CORONARY ARTERY DISEASE INVOLVING NATIVE CORONARY ARTERY OF NATIVE HEART WITHOUT ANGINA PECTORIS: ICD-10-CM

## 2025-01-30 DIAGNOSIS — E78.5 HYPERLIPIDEMIA LDL GOAL <70: Primary | ICD-10-CM

## 2025-01-30 DIAGNOSIS — I77.810 ASCENDING AORTA DILATATION: ICD-10-CM

## 2025-01-30 DIAGNOSIS — Z95.2 H/O AORTIC VALVE REPLACEMENT: ICD-10-CM

## 2025-01-30 NOTE — TELEPHONE ENCOUNTER
"Patient requesting to try rosuvastatin lowest dose oral, after PCSK9 intolerance.    Patient sent [facility] messages that she started on Repatha.  No side effects for the first 12 days, then she had full body aching \"just like with the oral statin\".    She plans to take the 2nd injection when due, even though it may put her in bed again for one day.   She paid a \"lot of money\" for the injection, and knows it will help the LDL.  But going forward, she is not interested in spending a day in bed every other week due to the side effects of aching.    She is asking what else there is.   Education provided about Nexletol, which she says the cost is too much for her.       Writer is not clear if Leqvio injection would cause the same effects of aching as all the other meds have.    Patient is asking if she can try an oral statin that she has not been on before, just to see.    Writer informed Pat that the likelihood of the severe aching is pretty high.   Pat is very very concerned and anxious about her high cholesterol numbers.  Most recent LDL is 190.    Routing to Dr. King for recommendations.    Lucia Mcdonnell RN on 1/30/2025 at 1:30 PM        "

## 2025-01-31 RX ORDER — PRAVASTATIN SODIUM 10 MG
TABLET ORAL
Qty: 30 TABLET | Refills: 2 | Status: SHIPPED | OUTPATIENT
Start: 2025-01-31

## 2025-01-31 NOTE — TELEPHONE ENCOUNTER
See MyChart encounter.   Pat will wait until after next dose of Repatha.   Then she will call pharmacy to let them know.         Can try pravastatin if she has not tried before.  Recommend starting at 10 mg every other day and if she does well for about a week can change to 10 mg once a day

## 2025-02-03 ENCOUNTER — MYC MEDICAL ADVICE (OUTPATIENT)
Dept: FAMILY MEDICINE | Facility: CLINIC | Age: 75
End: 2025-02-03
Payer: MEDICARE

## 2025-02-03 DIAGNOSIS — I12.9 HYPERTENSIVE CHRONIC KIDNEY DISEASE WITH STAGE 1 THROUGH STAGE 4 CHRONIC KIDNEY DISEASE, OR UNSPECIFIED CHRONIC KIDNEY DISEASE: ICD-10-CM

## 2025-02-03 DIAGNOSIS — R73.03 PREDIABETES: ICD-10-CM

## 2025-02-03 DIAGNOSIS — E78.5 HYPERLIPIDEMIA LDL GOAL <100: Primary | ICD-10-CM

## 2025-02-03 NOTE — TELEPHONE ENCOUNTER
Dr. Wegener,    Please see below MyChart message and advise.   Nutrition Referral pended, if approved.    Or could advise Evisit for referral, if desired    Thanks,   Nery YEUNG RN

## 2025-02-05 DIAGNOSIS — E03.9 HYPOTHYROIDISM, UNSPECIFIED TYPE: ICD-10-CM

## 2025-02-05 RX ORDER — LEVOTHYROXINE SODIUM 50 UG/1
50 TABLET ORAL DAILY
Qty: 90 TABLET | Refills: 0 | Status: SHIPPED | OUTPATIENT
Start: 2025-02-05

## 2025-03-06 ENCOUNTER — TELEPHONE (OUTPATIENT)
Dept: CARDIOLOGY | Facility: CLINIC | Age: 75
End: 2025-03-06
Payer: MEDICARE

## 2025-03-06 DIAGNOSIS — I77.810 ASCENDING AORTA DILATATION: ICD-10-CM

## 2025-03-06 DIAGNOSIS — Z95.2 S/P AVR: ICD-10-CM

## 2025-03-06 DIAGNOSIS — I25.10 CORONARY ARTERY DISEASE INVOLVING NATIVE CORONARY ARTERY OF NATIVE HEART WITHOUT ANGINA PECTORIS: Primary | ICD-10-CM

## 2025-03-06 NOTE — TELEPHONE ENCOUNTER
Owen message received from Nicolette, concern that choking episodes might be related to her heart.    In February started taking the statins every other day for the first week and went to every day about 10-11 days ago.  On   3\3 choked on a vegetable I was eating and starting a coughing episode for about 5 minutes and ended up with extreme pain in my upper arms and shoulders.  I had    another episode with coughing almost like a allergic reaction to some almonds I was eating and after 5-8 minutes of coughing my arms started aching again and it was very painful.     I thought it must be the statins but looked on the internet and it said it could be related to the heart, since I have issues with my heart and have a aneurysm I thought I better check in with you just to be safe.   Right now my shoulders are still hurting but we are going out of town next Tuesday I wanted to know what your thoughts are?      Writer called Nicolette but had to LV.  Requested she call back further info (how long pain is lasting and does it completely go away).   myEDmatch or call Team 3 phone [phone].    Lucia Mcdonnell RN on 3/6/2025 at 8:33 AM

## 2025-03-06 NOTE — TELEPHONE ENCOUNTER
Call placed to Pat   Usually no food problems, never had a food reaction in the past.  Pain in shoulders completely resolved after a couple hours, both times it came on with coughing.  Able to to her workout at the gym, walk her normal amount.    Instructed Pat to hold the statin for 2 weeks, and we will ask Dr. King for recommendations for Pat.   She will be gone on vacation next week, so we mutually agreed to re-discuss after she returns from vacation.    Routing to Dr. King for review.    Lucia Mcdonnell RN on 3/6/2025 at 11:01 AM

## 2025-03-06 NOTE — TELEPHONE ENCOUNTER
Writer called to Pat to request she make apmnt for after vacation.   LVM.  Also sent MyChart asking her to come in and see ALFREDO after she returns from vacation.    Informed her that Dr. King is no sure it is the statin, but fine for a 2 week statin holiday to see if that does anything for her symptoms.    I left on her VM the  line to call.   Order added for ALFREDO visit.    Lucia Mcdonnell RN on 3/6/2025 at 1:39 PM

## 2025-03-10 ENCOUNTER — OFFICE VISIT (OUTPATIENT)
Dept: CARDIOLOGY | Facility: CLINIC | Age: 75
End: 2025-03-10
Payer: MEDICARE

## 2025-03-10 VITALS
OXYGEN SATURATION: 96 % | HEART RATE: 59 BPM | WEIGHT: 143 LBS | HEIGHT: 62 IN | SYSTOLIC BLOOD PRESSURE: 117 MMHG | BODY MASS INDEX: 26.31 KG/M2 | DIASTOLIC BLOOD PRESSURE: 75 MMHG

## 2025-03-10 DIAGNOSIS — E78.5 HYPERLIPIDEMIA LDL GOAL <100: Primary | ICD-10-CM

## 2025-03-10 PROCEDURE — 3078F DIAST BP <80 MM HG: CPT | Performed by: NURSE PRACTITIONER

## 2025-03-10 PROCEDURE — 99214 OFFICE O/P EST MOD 30 MIN: CPT | Performed by: NURSE PRACTITIONER

## 2025-03-10 PROCEDURE — 3074F SYST BP LT 130 MM HG: CPT | Performed by: NURSE PRACTITIONER

## 2025-03-10 RX ORDER — UBIDECARENONE 100 MG
100 CAPSULE ORAL DAILY
COMMUNITY

## 2025-03-10 RX ORDER — EZETIMIBE 10 MG/1
10 TABLET ORAL DAILY
Qty: 90 TABLET | Refills: 3 | Status: SHIPPED | OUTPATIENT
Start: 2025-03-10

## 2025-03-10 NOTE — PROGRESS NOTES
Cardiology Clinic Progress Note  Lauren Nick MRN# [id number]   YOB: 1950 Age: 74 year old     Primary cardiologist: Dr. King     History of presenting illness:    Lauren Nick is a pleasant 74 year old patient with past medical history significant for severe aortic stenosis in the setting of bicuspid aortic valve status post SAVR with 23 mm Inspira valve in March 2021.  Her history is also significant for paroxysmal atrial fibrillation for which she underwent left atrial appendage ligation at the time of her AVR.  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 This was done in [place].  She also has a history of hyperlipidemia with history of intolerance to several statins as well as PCSK9 inhibitors.    She was last seen by Dr. King on 11/11/2024 at which time she was doing well from a cardiac standpoint.      Today she is coming in for concerns of shoulder and upper back pain.  She tells me she has had 2 severe coughing episodes after choking on food with subsequent upper back and shoulder pain.  The discomfort was relieved with Tylenol and a warm bath.  She has had no recurrent episodes for the last few days and the pain is subsiding.  Nonetheless, she was instructed to trial a statin holiday for symptomatic relief.  She is active by walking and going to the gym daily without any exertional symptoms.  She denies chest pain, shortness of breath, syncope or near syncope, or palpitations.  No PND or orthopnea.    Her most recent echocardiogram from 2024 shows normal LV size and function with an EF of 55% with a mean gradient of 14 across the bioprosthetic aortic valve and severe left atrial enlargement. Recent MRA of the aorta showed ascending aorta to be 4.2 cm, previously 3.9 cm 2 years ago.  She is managed on metoprolol and losartan.  Most recent lipid panel from 1/10/2025 with LDL of 190.  She was taking pravastatin 10 mg daily up until few days ago.   Of note, she was previously on ezetimibe but apparently this was stopped by her PCP for unknown reasons.           Assessment and Plan:     ASSESSMENT: Pertinent issues addressed at this visit     Shoulder and upper back discomfort.  It appears the discomfort was likely triggered by her severe coughing episodes.  It was not associated with exertion.  The pain was relieved with a warm bath and Tylenol.  She has had no recent coughing episodes over the last few days and the pain has improved.  Familial dyslipidemia.  Intolerant to statins as well as PCSK9 inhibitor.  Was tolerating pravastatin 10 mg daily.  Status post bioprosthetic aortic valve with 23 mm Inspira valve.  Normal gradients on echocardiogram from 2024.  Status post pacemaker implantation.  Normal functioning device on device check.  Paroxysmal atrial fibrillation.  Status post left atrial appendage ligation at the time of AVR, follow-up CTA confirm left atrial appendage is well excluded.  She did not tolerate anticoagulation due to hematuria.  Currently on beta-blocker therapy.  Mildly dilated ascending aorta measuring 4.2 cm on MRA.  On beta-blocker and losartan.      PLAN:     I had a long discussion with Pat and her  today and gave reassurance that I have a very low suspicion that her upper back and shoulder pain are cardiac related.  I suspect the discomfort is likely musculoskeletal due to recent severe coughing episodes, additionally is reassuring that the pain was resolved with warm bath and Tylenol.  Additionally, I doubt it is myalgias from her statin as she has been tolerating this dose for a while now.  She would like to resume her pravastatin as a trial 10 mg daily which I think is reasonable.  I also recommend ezetimibe 10 mg daily as she tolerated this in the past.  Recommend repeat lipid panel in 3 months.  Follow-up with Dr. King in about 6 months as planned with repeat MRA and echocardiogram.         Lovely De Oliveira, ANNE, APRN,  "CNP  Page: [phone] (8a-5p M-F)    Orders this Visit:  No orders of the defined types were placed in this encounter.    Orders Placed This Encounter   Medications    co-enzyme Q-10 100 MG CAPS capsule     Sig: Take 100 mg by mouth daily.    ezetimibe (ZETIA) 10 MG tablet     Sig: Take 1 tablet (10 mg) by mouth daily.     Dispense:  90 tablet     Refill:  3     Medications Discontinued During This Encounter   Medication Reason    evolocumab (REPATHA) 140 MG/ML prefilled autoinjector        Today's clinic visit entailed:  Review of the result(s) of each unique test - echo, MRA, labs  Ordering of each unique test  Prescription drug management  35 minutes spent by me on the date of the encounter doing chart review, review of test results, interpretation of tests, patient visit, and documentation   Provider  Link to [facility] Help Grid     The level of medical decision making during this visit was of moderate complexity.           Review of Systems:     Review of Systems:  Skin:        Eyes:       ENT:       Respiratory:  Negative    Cardiovascular:  Negative, lightheadedness, dizziness, chest pain, palpitations    Gastroenterology:      Genitourinary:       Musculoskeletal:       Neurologic:       Psychiatric:       Heme/Lymph/Imm:       Endocrine:                 Physical Exam:   Vitals: /75   Pulse 59   Ht 1.575 m (5' 2\")   Wt 64.9 kg (143 lb)   LMP  (LMP Unknown)   SpO2 96%   BMI 26.16 kg/m    Constitutional:  cooperative        Skin:  warm and dry to the touch        Head:  normocephalic        Eyes:  pupils equal and round        ENT:  not assessed this visit        Neck:  JVP normal        Chest:  normal breath sounds, clear to auscultation, normal A-P diameter, normal symmetry, normal respiratory excursion, no use of accessory muscles        Cardiac: regular rhythm, normal S1 and S2, no murmurs, gallops or rubs detected                  Abdomen:  abdomen soft        Vascular: pulses full and equal      "                                 Extremities and Back:  no edema        Neurological:  no gross motor deficits, affect appropriate             Medications:     Current Outpatient Medications   Medication Sig Dispense Refill    albuterol (PROAIR HFA/PROVENTIL HFA/VENTOLIN HFA) 108 (90 Base) MCG/ACT inhaler Inhale 2 puffs into the lungs every 6 hours. 8.5 g 11    aspirin 81 MG EC tablet Take 81 mg by mouth daily      co-enzyme Q-10 100 MG CAPS capsule Take 100 mg by mouth daily.      escitalopram (LEXAPRO) 20 MG tablet Take 1 tablet (20 mg) by mouth daily. 90 tablet 3    ezetimibe (ZETIA) 10 MG tablet Take 1 tablet (10 mg) by mouth daily. 90 tablet 3    fluticasone (FLONASE) 50 MCG/ACT nasal spray Spray 2 sprays into both nostrils daily. 48 mL 3    levothyroxine (SYNTHROID/LEVOTHROID) 50 MCG tablet TAKE 1 TABLET BY MOUTH EVERY DAY 90 tablet 0    LORazepam (ATIVAN) 1 MG tablet Take 1 tablet (1 mg) by mouth nightly as needed for sleep. (Weaning to 20/month) 20 tablet 5    losartan (COZAAR) 25 MG tablet Take 1 tablet (25 mg) by mouth daily. 90 tablet 3    magnesium 250 MG tablet Take 1 tablet by mouth daily Unknown dose      metoprolol succinate ER (TOPROL XL) 25 MG 24 hr tablet Take 1 tablet (25 mg) by mouth 2 times daily. 180 tablet 3    pravastatin (PRAVACHOL) 10 MG tablet Start with 10mg every other day, then if tolerated after 1 week, increase to daily 30 tablet 2    psyllium (METAMUCIL/KONSYL) capsule Take 1 capsule by mouth daily      Turmeric 500 MG TABS Take by mouth daily      STATIN NOT PRESCRIBED (INTENTIONAL) Please choose reason not prescribed from choices below. (Patient not taking: Reported on 3/10/2025)         Family History   Problem Relation Age of Onset    Hypertension Mother     Cancer Mother     Other Cancer Mother         kidney cancer 85    Other Cancer Father          of stomach cancer age 43    Hypertension Sister     Hyperlipidemia Sister     Heart Disease Sister     Hypertension Sister      Hyperlipidemia Sister     Colon Cancer Sister     Diabetes Sister     Breast Cancer Sister     Skin Cancer Sister     Coronary Artery Disease Sister     Hyperlipidemia Sister     Thyroid Disease Sister     Asthma Sister     Basal cell carcinoma Sister     Skin Cancer Sister     Heart Disease Paternal Grandmother     Hypertension Paternal Grandmother     Obesity Paternal Grandmother     Heart Disease Daughter     Anesthesia Reaction Son     Hypertension Son     Other Cancer Other         Neuroblastoma age 5       Social History     Socioeconomic History    Marital status:      Spouse name: Not on file    Number of children: Not on file    Years of education: Not on file    Highest education level: Not on file   Occupational History    Not on file   Tobacco Use    Smoking status: Former     Current packs/day: 0.00     Average packs/day: 1 pack/day for 10.4 years (10.4 ttl pk-yrs)     Types: Cigarettes     Start date: 1969     Quit date: 10/1/1979     Years since quittin.4    Smokeless tobacco: Never   Vaping Use    Vaping status: Never Used   Substance and Sexual Activity    Alcohol use: Yes     Comment: occ    Drug use: Never    Sexual activity: Not Currently     Partners: Male     Birth control/protection: Post-menopausal   Other Topics Concern    Parent/sibling w/ CABG, MI or angioplasty before 65F 55M? No   Social History Narrative    Not on file     Social Drivers of Health     Financial Resource Strain: Low Risk  (2025)    Financial Resource Strain     Within the past 12 months, have you or your family members you live with been unable to get utilities (heat, electricity) when it was really needed?: No   Food Insecurity: Low Risk  (2025)    Food Insecurity     Within the past 12 months, did you worry that your food would run out before you got money to buy more?: No     Within the past 12 months, did the food you bought just not last and you didn t have money to get more?: No    Transportation Needs: Low Risk  (1/9/2025)    Transportation Needs     Within the past 12 months, has lack of transportation kept you from medical appointments, getting your medicines, non-medical meetings or appointments, work, or from getting things that you need?: No   Physical Activity: Sufficiently Active (1/9/2025)    Exercise Vital Sign     Days of Exercise per Week: 4 days     Minutes of Exercise per Session: 50 min   Stress: No Stress Concern Present (1/9/2025)    [employer] - Occupational Stress Questionnaire     Feeling of Stress : Only a little   Social Connections: Unknown (1/9/2025)    Social Connection and Isolation Panel [NHANES]     Frequency of Communication with Friends and Family: Not on file     Frequency of Social Gatherings with Friends and Family: Once a week     Attends Zoroastrian Services: Not on file     Active Member of Clubs or Organizations: Not on file     Attends Club or Organization Meetings: Not on file     Marital Status: Not on file   Interpersonal Safety: Low Risk  (1/14/2025)    Interpersonal Safety     Do you feel physically and emotionally safe where you currently live?: Yes     Within the past 12 months, have you been hit, slapped, kicked or otherwise physically hurt by someone?: No     Within the past 12 months, have you been humiliated or emotionally abused in other ways by your partner or ex-partner?: No   Housing Stability: Low Risk  (1/9/2025)    Housing Stability     Do you have housing? : Yes     Are you worried about losing your housing?: No            Past Medical History:     Past Medical History:   Diagnosis Date    Arthritis 11/16    Saw a doctor received a shot in [place].    Basal cell carcinoma     Cancer (H) 09/1989    breast cancer, surgery, and radiation    Heart disease 03/21/2021    Heart valve replacement    Thyroid disease 02/21 diagnosed    Uncomplicated asthma A few years ago    Uncontrolled hypertension 02/20/2023              " Past Surgical History:     Past Surgical History:   Procedure Laterality Date    ABDOMEN SURGERY  Gallbladder    removed in 2010    BIOPSY  10/2018    BREAST SURGERY  09/25/1989    CARDIAC SURGERY  03/22/2021    CHOLECYSTECTOMY  03/2010    COLONOSCOPY  03/2019    COLONOSCOPY N/A 11/10/2022    Procedure: COLONOSCOPY, WITH POLYPECTOMY AND BIOPSY;  Surgeon: Rafa Parks MD;  Location:  GI    ENT SURGERY  06/2011    EYE SURGERY  09/2015    THORACIC SURGERY  03/21    valve replacement              Allergies:   Penicillins, Ambien [zolpidem], and Statins [statins]       Data:   All laboratory data reviewed:    Recent Labs   Lab Test 01/14/25  1210 01/10/25  0938 12/15/23  1100 11/21/23  1155 11/21/22  1009 08/23/22  1440   LDL  --  190* 178* 154* 128*  --    HDL  --  57 52 53 48*  --    NHDL  --  208* 205* 173* 159*  --    CHOL  --  265* 257* 226* 207*  --    TRIG  --  89 137 97 153*  --    TSH 1.58  --   --  1.96 1.95 1.55   ANGELLA  --   --   --   --   --  59       Lab Results   Component Value Date    WBC 5.1 11/21/2023    RBC 4.59 11/21/2023    HGB 11.9 12/15/2023    HCT 41.4 11/21/2023    MCV 90 11/21/2023    MCH 27.0 11/21/2023    MCHC 30.0 (L) 11/21/2023    RDW 13.2 11/21/2023     11/21/2023       Lab Results   Component Value Date     01/14/2025    POTASSIUM 4.7 01/14/2025    POTASSIUM 4.3 08/23/2022    CHLORIDE 105 01/14/2025    CHLORIDE 107 08/23/2022    CO2 27 01/14/2025    CO2 24 08/23/2022    ANIONGAP 9 01/14/2025    ANIONGAP 8 08/23/2022    GLC 95 01/14/2025    GLC 90 08/23/2022    BUN 21.2 01/14/2025    BUN 24 08/23/2022    CR 0.92 01/14/2025    GFRESTIMATED 65 01/14/2025    GFRESTIMATED >60 06/24/2024    ARIA 9.4 01/14/2025      Lab Results   Component Value Date    AST 22 01/14/2025    ALT 14 01/14/2025       Lab Results   Component Value Date    A1C 6.0 (H) 01/14/2025       No results found for: \"INR\"      "

## 2025-03-10 NOTE — PATIENT INSTRUCTIONS
Today's Plan:     - Start ezetimibe 10 mg daily.   - Recheck cholesterol in 3 months.   - Continue pravastatin.   - Follow-up with Dr. King in November, as planned.      If you have questions or concerns please call my nurse team:  Andreina RN ([phone]) Rachel RN ([phone]) or Juhi RN ([phone])    After Hours: [phone], option #2, ask for cardiologist on-call    Scheduling phone number: [phone]    Reminder: Please bring in all current medications, over the counter supplements and vitamin bottles to your next appointment.-    It was a pleasure seeing you today!     Lovely De Oliveira, EWA  3/10/2025    -

## 2025-03-10 NOTE — LETTER
3/10/2025    Joel Daniel Wegener, MD  [address]  [facility] [address]    RE: Lauren Nick       Dear Colleague,     I had the pleasure of seeing Lauren Nick in the [facility].  Cardiology Clinic Progress Note  Lauren Nick MRN# [id number]   YOB: 1950 Age: 74 year old     Primary cardiologist: Dr. King     History of presenting illness:    Lauren Nick is a pleasant 74 year old patient with past medical history significant for severe aortic stenosis in the setting of bicuspid aortic valve status post SAVR with 23 mm Inspira valve in March 2021.  Her history is also significant for paroxysmal atrial fibrillation for which she underwent left atrial appendage ligation at the time of her AVR.  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 This was done in [place].  She also has a history of hyperlipidemia with history of intolerance to several statins as well as PCSK9 inhibitors.    She was last seen by Dr. King on 11/11/2024 at which time she was doing well from a cardiac standpoint.      Today she is coming in for concerns of shoulder and upper back pain.  She tells me she has had 2 severe coughing episodes after choking on food with subsequent upper back and shoulder pain.  The discomfort was relieved with Tylenol and a warm bath.  She has had no recurrent episodes for the last few days and the pain is subsiding.  Nonetheless, she was instructed to trial a statin holiday for symptomatic relief.  She is active by walking and going to the gym daily without any exertional symptoms.  She denies chest pain, shortness of breath, syncope or near syncope, or palpitations.  No PND or orthopnea.    Her most recent echocardiogram from 2024 shows normal LV size and function with an EF of 55% with a mean gradient of 14 across the bioprosthetic aortic valve and severe left atrial enlargement. Recent MRA of the aorta showed  ascending aorta to be 4.2 cm, previously 3.9 cm 2 years ago.  She is managed on metoprolol and losartan.  Most recent lipid panel from 1/10/2025 with LDL of 190.  She was taking pravastatin 10 mg daily up until few days ago.  Of note, she was previously on ezetimibe but apparently this was stopped by her PCP for unknown reasons.           Assessment and Plan:     ASSESSMENT: Pertinent issues addressed at this visit     Shoulder and upper back discomfort.  It appears the discomfort was likely triggered by her severe coughing episodes.  It was not associated with exertion.  The pain was relieved with a warm bath and Tylenol.  She has had no recent coughing episodes over the last few days and the pain has improved.  Familial dyslipidemia.  Intolerant to statins as well as PCSK9 inhibitor.  Was tolerating pravastatin 10 mg daily.  Status post bioprosthetic aortic valve with 23 mm Inspira valve.  Normal gradients on echocardiogram from 2024.  Status post pacemaker implantation.  Normal functioning device on device check.  Paroxysmal atrial fibrillation.  Status post left atrial appendage ligation at the time of AVR, follow-up CTA confirm left atrial appendage is well excluded.  She did not tolerate anticoagulation due to hematuria.  Currently on beta-blocker therapy.  Mildly dilated ascending aorta measuring 4.2 cm on MRA.  On beta-blocker and losartan.      PLAN:     I had a long discussion with Nicolette and her  today and gave reassurance that I have a very low suspicion that her upper back and shoulder pain are cardiac related.  I suspect the discomfort is likely musculoskeletal due to recent severe coughing episodes, additionally is reassuring that the pain was resolved with warm bath and Tylenol.  Additionally, I doubt it is myalgias from her statin as she has been tolerating this dose for a while now.  She would like to resume her pravastatin as a trial 10 mg daily which I think is reasonable.  I also recommend  "ezetimibe 10 mg daily as she tolerated this in the past.  Recommend repeat lipid panel in 3 months.  Follow-up with Dr. King in about 6 months as planned with repeat MRA and echocardiogram.         Lovely De Oliveira DNP, APRN, CNP  Page: [phone] (8a-5p M-F)    Orders this Visit:  No orders of the defined types were placed in this encounter.    Orders Placed This Encounter   Medications     co-enzyme Q-10 100 MG CAPS capsule     Sig: Take 100 mg by mouth daily.     ezetimibe (ZETIA) 10 MG tablet     Sig: Take 1 tablet (10 mg) by mouth daily.     Dispense:  90 tablet     Refill:  3     Medications Discontinued During This Encounter   Medication Reason     evolocumab (REPATHA) 140 MG/ML prefilled autoinjector        Today's clinic visit entailed:  Review of the result(s) of each unique test - echo, MRA, labs  Ordering of each unique test  Prescription drug management  35 minutes spent by me on the date of the encounter doing chart review, review of test results, interpretation of tests, patient visit, and documentation   Provider  Link to [facility] Help Grid     The level of medical decision making during this visit was of moderate complexity.           Review of Systems:     Review of Systems:  Skin:        Eyes:       ENT:       Respiratory:  Negative    Cardiovascular:  Negative, lightheadedness, dizziness, chest pain, palpitations    Gastroenterology:      Genitourinary:       Musculoskeletal:       Neurologic:       Psychiatric:       Heme/Lymph/Imm:       Endocrine:                 Physical Exam:   Vitals: /75   Pulse 59   Ht 1.575 m (5' 2\")   Wt 64.9 kg (143 lb)   LMP  (LMP Unknown)   SpO2 96%   BMI 26.16 kg/m    Constitutional:  cooperative        Skin:  warm and dry to the touch        Head:  normocephalic        Eyes:  pupils equal and round        ENT:  not assessed this visit        Neck:  JVP normal        Chest:  normal breath sounds, clear to auscultation, normal A-P diameter, normal symmetry, " normal respiratory excursion, no use of accessory muscles        Cardiac: regular rhythm, normal S1 and S2, no murmurs, gallops or rubs detected                  Abdomen:  abdomen soft        Vascular: pulses full and equal                                      Extremities and Back:  no edema        Neurological:  no gross motor deficits, affect appropriate             Medications:     Current Outpatient Medications   Medication Sig Dispense Refill     albuterol (PROAIR HFA/PROVENTIL HFA/VENTOLIN HFA) 108 (90 Base) MCG/ACT inhaler Inhale 2 puffs into the lungs every 6 hours. 8.5 g 11     aspirin 81 MG EC tablet Take 81 mg by mouth daily       co-enzyme Q-10 100 MG CAPS capsule Take 100 mg by mouth daily.       escitalopram (LEXAPRO) 20 MG tablet Take 1 tablet (20 mg) by mouth daily. 90 tablet 3     ezetimibe (ZETIA) 10 MG tablet Take 1 tablet (10 mg) by mouth daily. 90 tablet 3     fluticasone (FLONASE) 50 MCG/ACT nasal spray Spray 2 sprays into both nostrils daily. 48 mL 3     levothyroxine (SYNTHROID/LEVOTHROID) 50 MCG tablet TAKE 1 TABLET BY MOUTH EVERY DAY 90 tablet 0     LORazepam (ATIVAN) 1 MG tablet Take 1 tablet (1 mg) by mouth nightly as needed for sleep. (Weaning to 20/month) 20 tablet 5     losartan (COZAAR) 25 MG tablet Take 1 tablet (25 mg) by mouth daily. 90 tablet 3     magnesium 250 MG tablet Take 1 tablet by mouth daily Unknown dose       metoprolol succinate ER (TOPROL XL) 25 MG 24 hr tablet Take 1 tablet (25 mg) by mouth 2 times daily. 180 tablet 3     pravastatin (PRAVACHOL) 10 MG tablet Start with 10mg every other day, then if tolerated after 1 week, increase to daily 30 tablet 2     psyllium (METAMUCIL/KONSYL) capsule Take 1 capsule by mouth daily       Turmeric 500 MG TABS Take by mouth daily       STATIN NOT PRESCRIBED (INTENTIONAL) Please choose reason not prescribed from choices below. (Patient not taking: Reported on 3/10/2025)         Family History   Problem Relation Age of Onset      Hypertension Mother      Cancer Mother      Other Cancer Mother         kidney cancer 85     Other Cancer Father          of stomach cancer age 43     Hypertension Sister      Hyperlipidemia Sister      Heart Disease Sister      Hypertension Sister      Hyperlipidemia Sister      Colon Cancer Sister      Diabetes Sister      Breast Cancer Sister      Skin Cancer Sister      Coronary Artery Disease Sister      Hyperlipidemia Sister      Thyroid Disease Sister      Asthma Sister      Basal cell carcinoma Sister      Skin Cancer Sister      Heart Disease Paternal Grandmother      Hypertension Paternal Grandmother      Obesity Paternal Grandmother      Heart Disease Daughter      Anesthesia Reaction Son      Hypertension Son      Other Cancer Other         Neuroblastoma age 5       Social History     Socioeconomic History     Marital status:      Spouse name: Not on file     Number of children: Not on file     Years of education: Not on file     Highest education level: Not on file   Occupational History     Not on file   Tobacco Use     Smoking status: Former     Current packs/day: 0.00     Average packs/day: 1 pack/day for 10.4 years (10.4 ttl pk-yrs)     Types: Cigarettes     Start date: 1969     Quit date: 10/1/1979     Years since quittin.4     Smokeless tobacco: Never   Vaping Use     Vaping status: Never Used   Substance and Sexual Activity     Alcohol use: Yes     Comment: occ     Drug use: Never     Sexual activity: Not Currently     Partners: Male     Birth control/protection: Post-menopausal   Other Topics Concern     Parent/sibling w/ CABG, MI or angioplasty before 65F 55M? No   Social History Narrative     Not on file     Social Drivers of Health     Financial Resource Strain: Low Risk  (2025)    Financial Resource Strain      Within the past 12 months, have you or your family members you live with been unable to get utilities (heat, electricity) when it was really needed?: No   Food  Insecurity: Low Risk  (1/9/2025)    Food Insecurity      Within the past 12 months, did you worry that your food would run out before you got money to buy more?: No      Within the past 12 months, did the food you bought just not last and you didn t have money to get more?: No   Transportation Needs: Low Risk  (1/9/2025)    Transportation Needs      Within the past 12 months, has lack of transportation kept you from medical appointments, getting your medicines, non-medical meetings or appointments, work, or from getting things that you need?: No   Physical Activity: Sufficiently Active (1/9/2025)    Exercise Vital Sign      Days of Exercise per Week: 4 days      Minutes of Exercise per Session: 50 min   Stress: No Stress Concern Present (1/9/2025)    [employer] [place] [employer] - Occupational Stress Questionnaire      Feeling of Stress : Only a little   Social Connections: Unknown (1/9/2025)    Social Connection and Isolation Panel [NHANES]      Frequency of Communication with Friends and Family: Not on file      Frequency of Social Gatherings with Friends and Family: Once a week      Attends Hoahaoism Services: Not on file      Active Member of Clubs or Organizations: Not on file      Attends Club or Organization Meetings: Not on file      Marital Status: Not on file   Interpersonal Safety: Low Risk  (1/14/2025)    Interpersonal Safety      Do you feel physically and emotionally safe where you currently live?: Yes      Within the past 12 months, have you been hit, slapped, kicked or otherwise physically hurt by someone?: No      Within the past 12 months, have you been humiliated or emotionally abused in other ways by your partner or ex-partner?: No   Housing Stability: Low Risk  (1/9/2025)    Housing Stability      Do you have housing? : Yes      Are you worried about losing your housing?: No            Past Medical History:     Past Medical History:   Diagnosis Date     Arthritis 11/16    Saw a  doctor received a shot in [place].     Basal cell carcinoma      Cancer (H) 09/1989    breast cancer, surgery, and radiation     Heart disease 03/21/2021    Heart valve replacement     Thyroid disease 02/21 diagnosed     Uncomplicated asthma A few years ago     Uncontrolled hypertension 02/20/2023              Past Surgical History:     Past Surgical History:   Procedure Laterality Date     ABDOMEN SURGERY  Gallbladder    removed in 2010     BIOPSY  10/2018     BREAST SURGERY  09/25/1989     CARDIAC SURGERY  03/22/2021     CHOLECYSTECTOMY  03/2010     COLONOSCOPY  03/2019     COLONOSCOPY N/A 11/10/2022    Procedure: COLONOSCOPY, WITH POLYPECTOMY AND BIOPSY;  Surgeon: Rafa Parks MD;  Location:  GI     ENT SURGERY  06/2011     EYE SURGERY  09/2015     THORACIC SURGERY  03/21    valve replacement              Allergies:   Penicillins, Ambien [zolpidem], and Statins [statins]       Data:   All laboratory data reviewed:    Recent Labs   Lab Test 01/14/25  1210 01/10/25  0938 12/15/23  1100 11/21/23  1155 11/21/22  1009 08/23/22  1440   LDL  --  190* 178* 154* 128*  --    HDL  --  57 52 53 48*  --    NHDL  --  208* 205* 173* 159*  --    CHOL  --  265* 257* 226* 207*  --    TRIG  --  89 137 97 153*  --    TSH 1.58  --   --  1.96 1.95 1.55   ANGELLA  --   --   --   --   --  59       Lab Results   Component Value Date    WBC 5.1 11/21/2023    RBC 4.59 11/21/2023    HGB 11.9 12/15/2023    HCT 41.4 11/21/2023    MCV 90 11/21/2023    MCH 27.0 11/21/2023    MCHC 30.0 (L) 11/21/2023    RDW 13.2 11/21/2023     11/21/2023       Lab Results   Component Value Date     01/14/2025    POTASSIUM 4.7 01/14/2025    POTASSIUM 4.3 08/23/2022    CHLORIDE 105 01/14/2025    CHLORIDE 107 08/23/2022    CO2 27 01/14/2025    CO2 24 08/23/2022    ANIONGAP 9 01/14/2025    ANIONGAP 8 08/23/2022    GLC 95 01/14/2025    GLC 90 08/23/2022    BUN 21.2 01/14/2025    BUN 24 08/23/2022    CR 0.92 01/14/2025    GFRESTIMATED 65 01/14/2025     "GFRESTIMATED >60 06/24/2024    ARIA 9.4 01/14/2025      Lab Results   Component Value Date    AST 22 01/14/2025    ALT 14 01/14/2025       Lab Results   Component Value Date    A1C 6.0 (H) 01/14/2025       No results found for: \"INR\"        Thank you for allowing me to participate in the care of your patient.      Sincerely,     Lovely De Oliveira, [facility]  cc:   Reggie King MD  [address]  [place] [address]      "

## 2025-03-13 ENCOUNTER — TELEPHONE (OUTPATIENT)
Dept: FAMILY MEDICINE | Facility: CLINIC | Age: 75
End: 2025-03-13
Payer: MEDICARE

## 2025-03-13 NOTE — TELEPHONE ENCOUNTER
Patient pharmacy calling regarding lorazepam prescription  States they are getting warning regarding potential forged prescription  Nurse verified date sent, dosing, refills  Pharmacy able to dispense with RN verification    Hetal CULP RN

## 2025-03-26 ENCOUNTER — ANCILLARY PROCEDURE (OUTPATIENT)
Dept: CARDIOLOGY | Facility: CLINIC | Age: 75
End: 2025-03-26
Attending: INTERNAL MEDICINE
Payer: MEDICARE

## 2025-03-26 DIAGNOSIS — Z95.0 CARDIAC PACEMAKER IN SITU: ICD-10-CM

## 2025-03-26 DIAGNOSIS — I49.5 SSS (SICK SINUS SYNDROME) (H): ICD-10-CM

## 2025-03-26 LAB
MDC_IDC_LEAD_CONNECTION_STATUS: NORMAL
MDC_IDC_LEAD_CONNECTION_STATUS: NORMAL
MDC_IDC_LEAD_IMPLANT_DT: NORMAL
MDC_IDC_LEAD_IMPLANT_DT: NORMAL
MDC_IDC_LEAD_LOCATION: NORMAL
MDC_IDC_LEAD_LOCATION: NORMAL
MDC_IDC_LEAD_LOCATION_DETAIL_1: NORMAL
MDC_IDC_LEAD_LOCATION_DETAIL_1: NORMAL
MDC_IDC_LEAD_MFG: NORMAL
MDC_IDC_LEAD_MFG: NORMAL
MDC_IDC_LEAD_MODEL: NORMAL
MDC_IDC_LEAD_MODEL: NORMAL
MDC_IDC_LEAD_POLARITY_TYPE: NORMAL
MDC_IDC_LEAD_POLARITY_TYPE: NORMAL
MDC_IDC_LEAD_SERIAL: NORMAL
MDC_IDC_LEAD_SERIAL: NORMAL
MDC_IDC_MSMT_BATTERY_DTM: NORMAL
MDC_IDC_MSMT_BATTERY_REMAINING_LONGEVITY: 85 MO
MDC_IDC_MSMT_BATTERY_REMAINING_PERCENTAGE: 67 %
MDC_IDC_MSMT_BATTERY_RRT_TRIGGER: NORMAL
MDC_IDC_MSMT_BATTERY_STATUS: NORMAL
MDC_IDC_MSMT_BATTERY_VOLTAGE: 3.01 V
MDC_IDC_MSMT_LEADCHNL_RA_IMPEDANCE_VALUE: 390 OHM
MDC_IDC_MSMT_LEADCHNL_RA_LEAD_CHANNEL_STATUS: NORMAL
MDC_IDC_MSMT_LEADCHNL_RA_PACING_THRESHOLD_AMPLITUDE: 0.5 V
MDC_IDC_MSMT_LEADCHNL_RA_PACING_THRESHOLD_PULSEWIDTH: 0.4 MS
MDC_IDC_MSMT_LEADCHNL_RA_SENSING_INTR_AMPL: 3.6 MV
MDC_IDC_MSMT_LEADCHNL_RV_IMPEDANCE_VALUE: 530 OHM
MDC_IDC_MSMT_LEADCHNL_RV_LEAD_CHANNEL_STATUS: NORMAL
MDC_IDC_MSMT_LEADCHNL_RV_PACING_THRESHOLD_AMPLITUDE: 1 V
MDC_IDC_MSMT_LEADCHNL_RV_PACING_THRESHOLD_PULSEWIDTH: 0.4 MS
MDC_IDC_MSMT_LEADCHNL_RV_SENSING_INTR_AMPL: 5.5 MV
MDC_IDC_PG_IMPLANT_DTM: NORMAL
MDC_IDC_PG_MFG: NORMAL
MDC_IDC_PG_MODEL: NORMAL
MDC_IDC_PG_SERIAL: NORMAL
MDC_IDC_PG_TYPE: NORMAL
MDC_IDC_SESS_CLINIC_NAME: NORMAL
MDC_IDC_SESS_DTM: NORMAL
MDC_IDC_SESS_REPROGRAMMED: NO
MDC_IDC_SESS_TYPE: NORMAL
MDC_IDC_SET_BRADY_AT_MODE_SWITCH_MODE: NORMAL
MDC_IDC_SET_BRADY_AT_MODE_SWITCH_RATE: 180 {BEATS}/MIN
MDC_IDC_SET_BRADY_LOWRATE: 60 {BEATS}/MIN
MDC_IDC_SET_BRADY_MAX_SENSOR_RATE: 130 {BEATS}/MIN
MDC_IDC_SET_BRADY_MAX_TRACKING_RATE: 130 {BEATS}/MIN
MDC_IDC_SET_BRADY_MODE: NORMAL
MDC_IDC_SET_BRADY_PAV_DELAY_LOW: 200 MS
MDC_IDC_SET_BRADY_SAV_DELAY_LOW: 150 MS
MDC_IDC_SET_LEADCHNL_RA_PACING_AMPLITUDE: 2 V
MDC_IDC_SET_LEADCHNL_RA_PACING_ANODE_ELECTRODE_1: NORMAL
MDC_IDC_SET_LEADCHNL_RA_PACING_ANODE_LOCATION_1: NORMAL
MDC_IDC_SET_LEADCHNL_RA_PACING_CAPTURE_MODE: NORMAL
MDC_IDC_SET_LEADCHNL_RA_PACING_CATHODE_ELECTRODE_1: NORMAL
MDC_IDC_SET_LEADCHNL_RA_PACING_CATHODE_LOCATION_1: NORMAL
MDC_IDC_SET_LEADCHNL_RA_PACING_POLARITY: NORMAL
MDC_IDC_SET_LEADCHNL_RA_PACING_PULSEWIDTH: 0.4 MS
MDC_IDC_SET_LEADCHNL_RA_SENSING_ADAPTATION_MODE: NORMAL
MDC_IDC_SET_LEADCHNL_RA_SENSING_ANODE_ELECTRODE_1: NORMAL
MDC_IDC_SET_LEADCHNL_RA_SENSING_ANODE_LOCATION_1: NORMAL
MDC_IDC_SET_LEADCHNL_RA_SENSING_CATHODE_ELECTRODE_1: NORMAL
MDC_IDC_SET_LEADCHNL_RA_SENSING_CATHODE_LOCATION_1: NORMAL
MDC_IDC_SET_LEADCHNL_RA_SENSING_POLARITY: NORMAL
MDC_IDC_SET_LEADCHNL_RA_SENSING_SENSITIVITY: 0.5 MV
MDC_IDC_SET_LEADCHNL_RV_PACING_AMPLITUDE: 1.25 V
MDC_IDC_SET_LEADCHNL_RV_PACING_ANODE_ELECTRODE_1: NORMAL
MDC_IDC_SET_LEADCHNL_RV_PACING_ANODE_LOCATION_1: NORMAL
MDC_IDC_SET_LEADCHNL_RV_PACING_CAPTURE_MODE: NORMAL
MDC_IDC_SET_LEADCHNL_RV_PACING_CATHODE_ELECTRODE_1: NORMAL
MDC_IDC_SET_LEADCHNL_RV_PACING_CATHODE_LOCATION_1: NORMAL
MDC_IDC_SET_LEADCHNL_RV_PACING_POLARITY: NORMAL
MDC_IDC_SET_LEADCHNL_RV_PACING_PULSEWIDTH: 0.4 MS
MDC_IDC_SET_LEADCHNL_RV_SENSING_ADAPTATION_MODE: NORMAL
MDC_IDC_SET_LEADCHNL_RV_SENSING_ANODE_ELECTRODE_1: NORMAL
MDC_IDC_SET_LEADCHNL_RV_SENSING_ANODE_LOCATION_1: NORMAL
MDC_IDC_SET_LEADCHNL_RV_SENSING_CATHODE_ELECTRODE_1: NORMAL
MDC_IDC_SET_LEADCHNL_RV_SENSING_CATHODE_LOCATION_1: NORMAL
MDC_IDC_SET_LEADCHNL_RV_SENSING_POLARITY: NORMAL
MDC_IDC_SET_LEADCHNL_RV_SENSING_SENSITIVITY: 2 MV
MDC_IDC_STAT_AT_BURDEN_PERCENT: 0 %
MDC_IDC_STAT_AT_DTM_END: NORMAL
MDC_IDC_STAT_AT_DTM_START: NORMAL
MDC_IDC_STAT_AT_MODE_SW_COUNT: 0
MDC_IDC_STAT_AT_MODE_SW_COUNT_PER_DAY: 0
MDC_IDC_STAT_AT_MODE_SW_PERCENT_TIME: 0 %
MDC_IDC_STAT_BRADY_AP_VP_PERCENT: 1 %
MDC_IDC_STAT_BRADY_AP_VS_PERCENT: 29 %
MDC_IDC_STAT_BRADY_AS_VP_PERCENT: 1 %
MDC_IDC_STAT_BRADY_AS_VS_PERCENT: 71 %
MDC_IDC_STAT_BRADY_DTM_END: NORMAL
MDC_IDC_STAT_BRADY_DTM_START: NORMAL
MDC_IDC_STAT_BRADY_RA_PERCENT_PACED: 28 %
MDC_IDC_STAT_BRADY_RV_PERCENT_PACED: 1 %
MDC_IDC_STAT_CRT_DTM_END: NORMAL
MDC_IDC_STAT_CRT_DTM_START: NORMAL
MDC_IDC_STAT_HEART_RATE_ATRIAL_MAX: 260 {BEATS}/MIN
MDC_IDC_STAT_HEART_RATE_ATRIAL_MEAN: 70 {BEATS}/MIN
MDC_IDC_STAT_HEART_RATE_ATRIAL_MIN: 50 {BEATS}/MIN
MDC_IDC_STAT_HEART_RATE_DTM_END: NORMAL
MDC_IDC_STAT_HEART_RATE_DTM_START: NORMAL
MDC_IDC_STAT_HEART_RATE_VENTRICULAR_MAX: 220 {BEATS}/MIN
MDC_IDC_STAT_HEART_RATE_VENTRICULAR_MEAN: 70 {BEATS}/MIN
MDC_IDC_STAT_HEART_RATE_VENTRICULAR_MIN: 50 {BEATS}/MIN

## 2025-03-26 PROCEDURE — 93296 REM INTERROG EVL PM/IDS: CPT | Performed by: INTERNAL MEDICINE

## 2025-03-26 PROCEDURE — 93294 REM INTERROG EVL PM/LDLS PM: CPT | Performed by: INTERNAL MEDICINE

## 2025-04-14 ENCOUNTER — MYC REFILL (OUTPATIENT)
Dept: FAMILY MEDICINE | Facility: CLINIC | Age: 75
End: 2025-04-14
Payer: MEDICARE

## 2025-04-14 DIAGNOSIS — F51.01 PRIMARY INSOMNIA: ICD-10-CM

## 2025-04-14 RX ORDER — LORAZEPAM 1 MG/1
1 TABLET ORAL
Qty: 20 TABLET | Refills: 5 | Status: SHIPPED | OUTPATIENT
Start: 2025-04-14

## 2025-04-16 ENCOUNTER — OFFICE VISIT (OUTPATIENT)
Dept: URGENT CARE | Facility: URGENT CARE | Age: 75
End: 2025-04-16
Payer: MEDICARE

## 2025-04-16 ENCOUNTER — ANCILLARY PROCEDURE (OUTPATIENT)
Dept: GENERAL RADIOLOGY | Facility: CLINIC | Age: 75
End: 2025-04-16
Attending: FAMILY MEDICINE
Payer: MEDICARE

## 2025-04-16 VITALS
RESPIRATION RATE: 15 BRPM | DIASTOLIC BLOOD PRESSURE: 63 MMHG | TEMPERATURE: 98 F | SYSTOLIC BLOOD PRESSURE: 109 MMHG | WEIGHT: 139.9 LBS | HEIGHT: 62 IN | BODY MASS INDEX: 25.75 KG/M2 | OXYGEN SATURATION: 96 % | HEART RATE: 60 BPM

## 2025-04-16 DIAGNOSIS — R05.1 ACUTE COUGH: ICD-10-CM

## 2025-04-16 DIAGNOSIS — R07.0 THROAT PAIN: ICD-10-CM

## 2025-04-16 DIAGNOSIS — R05.1 ACUTE COUGH: Primary | ICD-10-CM

## 2025-04-16 LAB
DEPRECATED S PYO AG THROAT QL EIA: NEGATIVE
S PYO DNA THROAT QL NAA+PROBE: NOT DETECTED

## 2025-04-16 PROCEDURE — 3074F SYST BP LT 130 MM HG: CPT | Performed by: FAMILY MEDICINE

## 2025-04-16 PROCEDURE — 87651 STREP A DNA AMP PROBE: CPT | Performed by: FAMILY MEDICINE

## 2025-04-16 PROCEDURE — 3078F DIAST BP <80 MM HG: CPT | Performed by: FAMILY MEDICINE

## 2025-04-16 PROCEDURE — 71046 X-RAY EXAM CHEST 2 VIEWS: CPT | Mod: TC | Performed by: RADIOLOGY

## 2025-04-16 PROCEDURE — 87635 SARS-COV-2 COVID-19 AMP PRB: CPT | Performed by: FAMILY MEDICINE

## 2025-04-16 PROCEDURE — 99214 OFFICE O/P EST MOD 30 MIN: CPT | Performed by: FAMILY MEDICINE

## 2025-04-16 RX ORDER — BENZONATATE 100 MG/1
100-200 CAPSULE ORAL 3 TIMES DAILY PRN
Qty: 40 CAPSULE | Refills: 0 | Status: SHIPPED | OUTPATIENT
Start: 2025-04-16

## 2025-04-16 NOTE — PROGRESS NOTES
"Assessment & Plan      Acute cough  Likely viral. Symptomatic therapy and follow up discussed Use of OTC  meds. discussed   - COVID-19 Virus (Coronavirus) by PCR Nose  - XR Chest 2 Views  - benzonatate (TESSALON) 100 MG capsule  Dispense: 40 capsule; Refill: 0    Throat pain  Part of viral process.   - Streptococcus A Rapid Screen w/Reflex to PCR - Clinic Collect  - Group A Streptococcus PCR Throat Swab       [id number]}    Return in about 5 days (around 4/21/2025) for follow up with your regular clinic if needed.    Refugio Combs MD  [facility]    ------------------------------------------------------------------------  Subjective     Lauren Nick presents to clinic today for the following health issues:  chief complaint  HPI   3 days cough worse at night with mild shortness of breath. No fever. Some bad sore throat. Not much for runny nose.     No exp but on plane and traveled to [place] recently.       Review of Systems        Objective    /63   Pulse 60   Temp 98  F (36.7  C) (Tympanic)   Resp 15   Ht 1.586 m (5' 2.44\")   Wt 63.5 kg (139 lb 14.4 oz)   LMP  (LMP Unknown)   SpO2 96%   Breastfeeding No   BMI 25.23 kg/m    Physical Exam     GENERAL: alert and no distress  EYES: Eyes grossly normal to inspection  HENT: ear canals and TM's normal, nose and mouth without ulcers or lesions  NECK: no adenopathy, no asymmetry, masses, or scars  RESP: lungs clear to auscultation - no rales, rhonchi or wheezes  CV: regular rates and rhythm    Cxr Per my interpretation. Pacemaker in place and postsurgical clips related to past breast cancer.   "

## 2025-04-16 NOTE — PROGRESS NOTES
Urgent Care Clinic Visit    Chief Complaint   Patient presents with    Urgent Care    Cough     Recent travel from [place]  Started feel sick Monday night   Cough with light green phlegm, feeling crappy and throat pain   Denies fevers                1/14/2025    11:08 AM   Additional Questions   Roomed by STORMY Trevino   Accompanied by N/A     No  Does the patient have a sore throat and either history of fever >100.4 in the previous 24 hours without a cough or recent exposure to a known case of strep throat? Yes

## 2025-04-17 LAB — SARS-COV-2 RNA RESP QL NAA+PROBE: NEGATIVE

## 2025-04-29 NOTE — PROGRESS NOTES
Pulmonary Clinic Note    Date of Service: 4/30/2025     Chief Complaint   Patient presents with    RECHECK     Mild intermittent asthma without complication +1 more       A/P:  74F being seen for f/u asthma and VCD. Recent outpatient exacerbation, she is slowly recovering. I still do not believe she needs maintenance therapy.     - start SMART therapy w/ prn ICS-LABA (Symbicort)   - stop albuterol   - OK to substitute medications based on formulary     History:  74F being seen for f/u asthma and VCD. Last seen 12/2022. She is prescribed prn albuterol. She had been discharged to PCP. Seen in  for cough about 10 days ago. Recently given doxycycline. Doing OK. Still feels sinus and chest congestion. Does have a cough, productive of green/yellow sputum. Occasional nocturnal cough, more when she was actively sick. Occasional dyspnea, not necessarily activity related. Works out most days per week, able to do this. No chest pain or tightness. Occasional wheezing. No orthopnea or PND. No LE edema. Feels like she is improving from recent illness. Using albuterol ~2x/day, it is helpful.     Smoking: former smoker, quit 1977, 1-2PPD x 7 years                            Bird exposure: no             Animal exposure: no        Inhalation exposure: no    Occupation: former mortgage banking                       10 point review of systems negative, aside from that mentioned in HPI.    /72 (BP Location: Left arm, Patient Position: Sitting, Cuff Size: Adult Regular)   Pulse 60   Resp 16   Wt 64.4 kg (142 lb)   LMP  (LMP Unknown)   SpO2 96%   BMI 26.03 kg/m    Gen: well-appearing  HEENT: Mallampati IV  Card: RRR  Pulm: clear bilaterally   Abd: soft  MSK: no edema, no acute joint abnormality   Skin: no obvious rash  Psych: normal affect  Neuro: alert and oriented     Labs:  Personally reviewed    Imaging/Studies: Personally reviewed  PFTs (5/2022) - mild obstruction, normal lung volumes, and DLCO  CXR (3/2022) - elevated  R hemidiaphragm, no acute process      TTE (3/2022) - normal biventricular function, EF 60-65%    Past Medical History:   Diagnosis Date    Arthritis     Saw a doctor received a shot in [place].    Basal cell carcinoma     Cancer (H) 1989    breast cancer, surgery, and radiation    Heart disease 2021    Heart valve replacement    Thyroid disease  diagnosed    Uncomplicated asthma A few years ago    Uncontrolled hypertension 2023     Past Surgical History:   Procedure Laterality Date    ABDOMEN SURGERY  Gallbladder    removed in     BIOPSY  10/2018    BREAST SURGERY  1989    CARDIAC SURGERY  2021    CHOLECYSTECTOMY  2010    COLONOSCOPY  2019    COLONOSCOPY N/A 11/10/2022    Procedure: COLONOSCOPY, WITH POLYPECTOMY AND BIOPSY;  Surgeon: Rafa Parks MD;  Location:  GI    ENT SURGERY  2011    EYE SURGERY  2015    THORACIC SURGERY      valve replacement     Family History   Problem Relation Age of Onset    Hypertension Mother     Cancer Mother     Other Cancer Mother         kidney cancer 85    Other Cancer Father          of stomach cancer age 43    Hypertension Sister     Hyperlipidemia Sister     Heart Disease Sister     Hypertension Sister     Hyperlipidemia Sister     Colon Cancer Sister     Diabetes Sister     Breast Cancer Sister     Skin Cancer Sister     Coronary Artery Disease Sister     Hyperlipidemia Sister     Thyroid Disease Sister     Asthma Sister     Basal cell carcinoma Sister     Skin Cancer Sister     Heart Disease Paternal Grandmother     Hypertension Paternal Grandmother     Obesity Paternal Grandmother     Heart Disease Daughter     Anesthesia Reaction Son     Hypertension Son     Other Cancer Other         Neuroblastoma age 5     Social History     Socioeconomic History    Marital status:      Spouse name: Not on file    Number of children: Not on file    Years of education: Not on file    Highest education level: Not on file    Occupational History    Not on file   Tobacco Use    Smoking status: Former     Current packs/day: 0.00     Average packs/day: 1 pack/day for 10.4 years (10.4 ttl pk-yrs)     Types: Cigarettes     Start date: 1969     Quit date: 10/1/1979     Years since quittin.6     Passive exposure: Past    Smokeless tobacco: Never   Vaping Use    Vaping status: Never Used   Substance and Sexual Activity    Alcohol use: Yes     Comment: occ    Drug use: Never    Sexual activity: Not Currently     Partners: Male     Birth control/protection: Post-menopausal   Other Topics Concern    Parent/sibling w/ CABG, MI or angioplasty before 65F 55M? No   Social History Narrative    Not on file     Social Drivers of Health     Financial Resource Strain: Low Risk  (2025)    Financial Resource Strain     Within the past 12 months, have you or your family members you live with been unable to get utilities (heat, electricity) when it was really needed?: No   Food Insecurity: Low Risk  (2025)    Food Insecurity     Within the past 12 months, did you worry that your food would run out before you got money to buy more?: No     Within the past 12 months, did the food you bought just not last and you didn t have money to get more?: No   Transportation Needs: Low Risk  (2025)    Transportation Needs     Within the past 12 months, has lack of transportation kept you from medical appointments, getting your medicines, non-medical meetings or appointments, work, or from getting things that you need?: No   Physical Activity: Sufficiently Active (2025)    Exercise Vital Sign     Days of Exercise per Week: 4 days     Minutes of Exercise per Session: 50 min   Stress: No Stress Concern Present (2025)    [place] [employer] - Occupational Stress Questionnaire     Feeling of Stress : Only a little   Social Connections: Unknown (2025)    Social Connection and Isolation Panel [NHANES]     Frequency of  Communication with Friends and Family: Not on file     Frequency of Social Gatherings with Friends and Family: Once a week     Attends Pentecostalism Services: Not on file     Active Member of Clubs or Organizations: Not on file     Attends Club or Organization Meetings: Not on file     Marital Status: Not on file   Interpersonal Safety: Low Risk  (1/14/2025)    Interpersonal Safety     Do you feel physically and emotionally safe where you currently live?: Yes     Within the past 12 months, have you been hit, slapped, kicked or otherwise physically hurt by someone?: No     Within the past 12 months, have you been humiliated or emotionally abused in other ways by your partner or ex-partner?: No   Housing Stability: Low Risk  (1/9/2025)    Housing Stability     Do you have housing? : Yes     Are you worried about losing your housing?: No       35 minutes spent reviewing chart, reviewing test results, talking with and examining patient, formulating plan, and documentation on the day of the encounter.    Rohit Thao MD  Pulmonary and Critical Care Medicine  [facility]

## 2025-04-30 ENCOUNTER — TELEPHONE (OUTPATIENT)
Dept: PULMONOLOGY | Facility: CLINIC | Age: 75
End: 2025-04-30

## 2025-04-30 ENCOUNTER — OFFICE VISIT (OUTPATIENT)
Dept: PULMONOLOGY | Facility: CLINIC | Age: 75
End: 2025-04-30
Payer: MEDICARE

## 2025-04-30 VITALS
WEIGHT: 142 LBS | DIASTOLIC BLOOD PRESSURE: 72 MMHG | BODY MASS INDEX: 26.03 KG/M2 | RESPIRATION RATE: 16 BRPM | HEART RATE: 60 BPM | OXYGEN SATURATION: 96 % | SYSTOLIC BLOOD PRESSURE: 134 MMHG

## 2025-04-30 DIAGNOSIS — J45.20 MILD INTERMITTENT ASTHMA WITHOUT COMPLICATION: Primary | ICD-10-CM

## 2025-04-30 PROCEDURE — 3078F DIAST BP <80 MM HG: CPT | Performed by: STUDENT IN AN ORGANIZED HEALTH CARE EDUCATION/TRAINING PROGRAM

## 2025-04-30 PROCEDURE — 1126F AMNT PAIN NOTED NONE PRSNT: CPT | Performed by: STUDENT IN AN ORGANIZED HEALTH CARE EDUCATION/TRAINING PROGRAM

## 2025-04-30 PROCEDURE — 3075F SYST BP GE 130 - 139MM HG: CPT | Performed by: STUDENT IN AN ORGANIZED HEALTH CARE EDUCATION/TRAINING PROGRAM

## 2025-04-30 PROCEDURE — 99214 OFFICE O/P EST MOD 30 MIN: CPT | Performed by: STUDENT IN AN ORGANIZED HEALTH CARE EDUCATION/TRAINING PROGRAM

## 2025-04-30 RX ORDER — BUDESONIDE AND FORMOTEROL FUMARATE DIHYDRATE 160; 4.5 UG/1; UG/1
AEROSOL RESPIRATORY (INHALATION)
Qty: 20.4 G | Refills: 11 | Status: SHIPPED | OUTPATIENT
Start: 2025-04-30

## 2025-04-30 ASSESSMENT — PAIN SCALES - GENERAL: PAINLEVEL_OUTOF10: NO PAIN (0)

## 2025-04-30 NOTE — PATIENT INSTRUCTIONS
Use Symbicort as needed for worsening cough and shortness of breath. This replaced your albuterol.

## 2025-04-30 NOTE — LETTER
4/30/2025      Lauren Nick  [address]  [place] [address]      Dear Colleague,    Thank you for referring your patient, Lauren Nick, to the [facility] [place]. Please see a copy of my visit note below.    Pulmonary Clinic Note    Date of Service: 4/30/2025     Chief Complaint   Patient presents with     RECHECK     Mild intermittent asthma without complication +1 more       A/P:  74F being seen for f/u asthma and VCD. Recent outpatient exacerbation, she is slowly recovering. I still do not believe she needs maintenance therapy.     - start SMART therapy w/ prn ICS-LABA (Symbicort)   - stop albuterol   - OK to substitute medications based on formulary     History:  74F being seen for f/u asthma and VCD. Last seen 12/2022. She is prescribed prn albuterol. She had been discharged to PCP. Seen in  for cough about 10 days ago. Recently given doxycycline. Doing OK. Still feels sinus and chest congestion. Does have a cough, productive of green/yellow sputum. Occasional nocturnal cough, more when she was actively sick. Occasional dyspnea, not necessarily activity related. Works out most days per week, able to do this. No chest pain or tightness. Occasional wheezing. No orthopnea or PND. No LE edema. Feels like she is improving from recent illness. Using albuterol ~2x/day, it is helpful.     Smoking: former smoker, quit 1977, 1-2PPD x 7 years                            Bird exposure: no             Animal exposure: no        Inhalation exposure: no    Occupation: former mortgage banking                       10 point review of systems negative, aside from that mentioned in HPI.    /72 (BP Location: Left arm, Patient Position: Sitting, Cuff Size: Adult Regular)   Pulse 60   Resp 16   Wt 64.4 kg (142 lb)   LMP  (LMP Unknown)   SpO2 96%   BMI 26.03 kg/m    Gen: well-appearing  HEENT: Mallampati IV  Card: RRR  Pulm: clear bilaterally   Abd: soft  MSK: no edema, no acute joint  abnormality   Skin: no obvious rash  Psych: normal affect  Neuro: alert and oriented     Labs:  Personally reviewed    Imaging/Studies: Personally reviewed  PFTs (2022) - mild obstruction, normal lung volumes, and DLCO  CXR (3/2022) - elevated R hemidiaphragm, no acute process      TTE (3/2022) - normal biventricular function, EF 60-65%    Past Medical History:   Diagnosis Date     Arthritis     Saw a doctor received a shot in [place].     Basal cell carcinoma      Cancer (H) 1989    breast cancer, surgery, and radiation     Heart disease 2021    Heart valve replacement     Thyroid disease  diagnosed     Uncomplicated asthma A few years ago     Uncontrolled hypertension 2023     Past Surgical History:   Procedure Laterality Date     ABDOMEN SURGERY  Gallbladder    removed in      BIOPSY  10/2018     BREAST SURGERY  1989     CARDIAC SURGERY  2021     CHOLECYSTECTOMY  2010     COLONOSCOPY  2019     COLONOSCOPY N/A 11/10/2022    Procedure: COLONOSCOPY, WITH POLYPECTOMY AND BIOPSY;  Surgeon: Rafa Parks MD;  Location:  GI     ENT SURGERY  2011     EYE SURGERY  2015     THORACIC SURGERY      valve replacement     Family History   Problem Relation Age of Onset     Hypertension Mother      Cancer Mother      Other Cancer Mother         kidney cancer 85     Other Cancer Father          of stomach cancer age 43     Hypertension Sister      Hyperlipidemia Sister      Heart Disease Sister      Hypertension Sister      Hyperlipidemia Sister      Colon Cancer Sister      Diabetes Sister      Breast Cancer Sister      Skin Cancer Sister      Coronary Artery Disease Sister      Hyperlipidemia Sister      Thyroid Disease Sister      Asthma Sister      Basal cell carcinoma Sister      Skin Cancer Sister      Heart Disease Paternal Grandmother      Hypertension Paternal Grandmother      Obesity Paternal Grandmother      Heart Disease Daughter      Anesthesia Reaction Son       Hypertension Son      Other Cancer Other         Neuroblastoma age 5     Social History     Socioeconomic History     Marital status:      Spouse name: Not on file     Number of children: Not on file     Years of education: Not on file     Highest education level: Not on file   Occupational History     Not on file   Tobacco Use     Smoking status: Former     Current packs/day: 0.00     Average packs/day: 1 pack/day for 10.4 years (10.4 ttl pk-yrs)     Types: Cigarettes     Start date: 1969     Quit date: 10/1/1979     Years since quittin.6     Passive exposure: Past     Smokeless tobacco: Never   Vaping Use     Vaping status: Never Used   Substance and Sexual Activity     Alcohol use: Yes     Comment: occ     Drug use: Never     Sexual activity: Not Currently     Partners: Male     Birth control/protection: Post-menopausal   Other Topics Concern     Parent/sibling w/ CABG, MI or angioplasty before 65F 55M? No   Social History Narrative     Not on file     Social Drivers of Health     Financial Resource Strain: Low Risk  (2025)    Financial Resource Strain      Within the past 12 months, have you or your family members you live with been unable to get utilities (heat, electricity) when it was really needed?: No   Food Insecurity: Low Risk  (2025)    Food Insecurity      Within the past 12 months, did you worry that your food would run out before you got money to buy more?: No      Within the past 12 months, did the food you bought just not last and you didn t have money to get more?: No   Transportation Needs: Low Risk  (2025)    Transportation Needs      Within the past 12 months, has lack of transportation kept you from medical appointments, getting your medicines, non-medical meetings or appointments, work, or from getting things that you need?: No   Physical Activity: Sufficiently Active (2025)    Exercise Vital Sign      Days of Exercise per Week: 4 days      Minutes of  Exercise per Session: 50 min   Stress: No Stress Concern Present (1/9/2025)    [employer] - Occupational Stress Questionnaire      Feeling of Stress : Only a little   Social Connections: Unknown (1/9/2025)    Social Connection and Isolation Panel [NHANES]      Frequency of Communication with Friends and Family: Not on file      Frequency of Social Gatherings with Friends and Family: Once a week      Attends Faith Services: Not on file      Active Member of Clubs or Organizations: Not on file      Attends Club or Organization Meetings: Not on file      Marital Status: Not on file   Interpersonal Safety: Low Risk  (1/14/2025)    Interpersonal Safety      Do you feel physically and emotionally safe where you currently live?: Yes      Within the past 12 months, have you been hit, slapped, kicked or otherwise physically hurt by someone?: No      Within the past 12 months, have you been humiliated or emotionally abused in other ways by your partner or ex-partner?: No   Housing Stability: Low Risk  (1/9/2025)    Housing Stability      Do you have housing? : Yes      Are you worried about losing your housing?: No       35 minutes spent reviewing chart, reviewing test results, talking with and examining patient, formulating plan, and documentation on the day of the encounter.    Rohit Thao MD  Pulmonary and Critical Care Medicine  [facility]       Again, thank you for allowing me to participate in the care of your patient.        Sincerely,        Rohit Thao MD    Electronically signed

## 2025-04-30 NOTE — TELEPHONE ENCOUNTER
Prior Authorization Retail Medication Request    Medication/Dose: budesonide-formoterol (SYMBICORT/BREYNA) 160-4.5 MCG/ACT Inhaler  Diagnosis and ICD code (if different than what is on RX):  J45.20   New/renewal/insurance change PA/secondary ins. PA:  Previously Tried and Failed:    Rationale:      Insurance   Primary: Medicare   Insurance ID: [id number]     Secondary (if applicable): [employer] [place]   Insurance ID: [id number]     Pharmacy Information (if different than what is on RX)  Name:    Phone:   Fax:    Clinic Information  Preferred routing pool for dept communication: [facility]      Nestor Steve CMA       [phone]

## 2025-05-03 NOTE — TELEPHONE ENCOUNTER
Prior Authorization Not Needed per Insurance    Medication: BREYNA 160-4.5 MCG/ACT IN AERO  Insurance Company: [employer] - Phone [phone] Fax [phone]  Expected CoPay: $    Ph[facility]illing [id number] [place] [facility] [address]  Pharmacy Notified: YES  Patient Notified: **Instructed pharmacy to notify patient when script is ready to /ship.**    Please send a RADHAMES script for Lorenayna

## 2025-05-03 NOTE — TELEPHONE ENCOUNTER
PA Initiation    Medication: BREYNA 160-4.5 MCG/ACT IN AERO  Insurance Company: [employer] - Phone [phone] Fax [phone]  Pharm[facility]illing the [id number] [place] [facility] [place] - [address] [place] [address]  Filling Pharmacy Phone: [phone]  Filling Pharmacy Fax: [phone]  Start Date: 5/3/2025

## 2025-05-04 DIAGNOSIS — E03.9 HYPOTHYROIDISM, UNSPECIFIED TYPE: ICD-10-CM

## 2025-05-05 RX ORDER — LEVOTHYROXINE SODIUM 50 UG/1
50 TABLET ORAL DAILY
Qty: 90 TABLET | Refills: 1 | Status: SHIPPED | OUTPATIENT
Start: 2025-05-05

## 2025-05-05 NOTE — TELEPHONE ENCOUNTER
Spoke with pharmacy. No additional Rx is needed. Pharmacy able to use current Rx to fill Breyna. Pharmacy to update patient once inhaler is available for .    Neil Mak RN

## 2025-05-09 ENCOUNTER — ANCILLARY PROCEDURE (OUTPATIENT)
Dept: CARDIOLOGY | Facility: CLINIC | Age: 75
End: 2025-05-09
Attending: INTERNAL MEDICINE
Payer: MEDICARE

## 2025-05-09 DIAGNOSIS — Z95.0 CARDIAC PACEMAKER IN SITU: ICD-10-CM

## 2025-05-09 DIAGNOSIS — I49.5 SSS (SICK SINUS SYNDROME) (H): Primary | ICD-10-CM

## 2025-05-09 PROCEDURE — 93280 PM DEVICE PROGR EVAL DUAL: CPT | Performed by: INTERNAL MEDICINE

## 2025-05-11 LAB
MDC_IDC_LEAD_CONNECTION_STATUS: NORMAL
MDC_IDC_LEAD_CONNECTION_STATUS: NORMAL
MDC_IDC_LEAD_IMPLANT_DT: NORMAL
MDC_IDC_LEAD_IMPLANT_DT: NORMAL
MDC_IDC_LEAD_LOCATION: NORMAL
MDC_IDC_LEAD_LOCATION: NORMAL
MDC_IDC_LEAD_LOCATION_DETAIL_1: NORMAL
MDC_IDC_LEAD_LOCATION_DETAIL_1: NORMAL
MDC_IDC_LEAD_MFG: NORMAL
MDC_IDC_LEAD_MFG: NORMAL
MDC_IDC_LEAD_MODEL: NORMAL
MDC_IDC_LEAD_MODEL: NORMAL
MDC_IDC_LEAD_POLARITY_TYPE: NORMAL
MDC_IDC_LEAD_POLARITY_TYPE: NORMAL
MDC_IDC_LEAD_SERIAL: NORMAL
MDC_IDC_LEAD_SERIAL: NORMAL
MDC_IDC_MSMT_BATTERY_REMAINING_LONGEVITY: 88 MO
MDC_IDC_MSMT_BATTERY_STATUS: NORMAL
MDC_IDC_MSMT_BATTERY_VOLTAGE: 3.01 V
MDC_IDC_MSMT_LEADCHNL_RA_IMPEDANCE_VALUE: 387.5 OHM
MDC_IDC_MSMT_LEADCHNL_RA_PACING_THRESHOLD_AMPLITUDE: 0.5 V
MDC_IDC_MSMT_LEADCHNL_RA_PACING_THRESHOLD_AMPLITUDE: 0.5 V
MDC_IDC_MSMT_LEADCHNL_RA_PACING_THRESHOLD_PULSEWIDTH: 0.4 MS
MDC_IDC_MSMT_LEADCHNL_RA_PACING_THRESHOLD_PULSEWIDTH: 0.4 MS
MDC_IDC_MSMT_LEADCHNL_RA_SENSING_INTR_AMPL: 3.2 MV
MDC_IDC_MSMT_LEADCHNL_RV_IMPEDANCE_VALUE: 512.5 OHM
MDC_IDC_MSMT_LEADCHNL_RV_PACING_THRESHOLD_AMPLITUDE: 1 V
MDC_IDC_MSMT_LEADCHNL_RV_PACING_THRESHOLD_AMPLITUDE: 1 V
MDC_IDC_MSMT_LEADCHNL_RV_PACING_THRESHOLD_PULSEWIDTH: 0.4 MS
MDC_IDC_MSMT_LEADCHNL_RV_PACING_THRESHOLD_PULSEWIDTH: 0.4 MS
MDC_IDC_MSMT_LEADCHNL_RV_SENSING_INTR_AMPL: 6.4 MV
MDC_IDC_PG_IMPLANT_DTM: NORMAL
MDC_IDC_PG_MFG: NORMAL
MDC_IDC_PG_MODEL: NORMAL
MDC_IDC_PG_SERIAL: NORMAL
MDC_IDC_PG_TYPE: NORMAL
MDC_IDC_SESS_CLINIC_NAME: NORMAL
MDC_IDC_SESS_DTM: NORMAL
MDC_IDC_SESS_TYPE: NORMAL
MDC_IDC_SET_BRADY_AT_MODE_SWITCH_MODE: NORMAL
MDC_IDC_SET_BRADY_AT_MODE_SWITCH_RATE: 170 {BEATS}/MIN
MDC_IDC_SET_BRADY_HYSTRATE: NORMAL
MDC_IDC_SET_BRADY_LOWRATE: 60 {BEATS}/MIN
MDC_IDC_SET_BRADY_MAX_SENSOR_RATE: 130 {BEATS}/MIN
MDC_IDC_SET_BRADY_MAX_TRACKING_RATE: 130 {BEATS}/MIN
MDC_IDC_SET_BRADY_MODE: NORMAL
MDC_IDC_SET_BRADY_NIGHT_RATE: NORMAL
MDC_IDC_SET_BRADY_PAV_DELAY_LOW: 200 MS
MDC_IDC_SET_BRADY_SAV_DELAY_LOW: 150 MS
MDC_IDC_SET_LEADCHNL_RA_PACING_AMPLITUDE: 2 V
MDC_IDC_SET_LEADCHNL_RA_PACING_CAPTURE_MODE: NORMAL
MDC_IDC_SET_LEADCHNL_RA_PACING_POLARITY: NORMAL
MDC_IDC_SET_LEADCHNL_RA_PACING_PULSEWIDTH: 0.4 MS
MDC_IDC_SET_LEADCHNL_RA_SENSING_ADAPTATION_MODE: NORMAL
MDC_IDC_SET_LEADCHNL_RA_SENSING_POLARITY: NORMAL
MDC_IDC_SET_LEADCHNL_RV_PACING_AMPLITUDE: 1.12
MDC_IDC_SET_LEADCHNL_RV_PACING_CAPTURE_MODE: NORMAL
MDC_IDC_SET_LEADCHNL_RV_PACING_POLARITY: NORMAL
MDC_IDC_SET_LEADCHNL_RV_PACING_PULSEWIDTH: 0.4 MS
MDC_IDC_SET_LEADCHNL_RV_SENSING_POLARITY: NORMAL
MDC_IDC_SET_LEADCHNL_RV_SENSING_SENSITIVITY: 2 MV
MDC_IDC_STAT_AT_MODE_SW_COUNT: 0
MDC_IDC_STAT_BRADY_RA_PERCENT_PACED: 34 %
MDC_IDC_STAT_BRADY_RV_PERCENT_PACED: 0.12 %

## 2025-06-02 ENCOUNTER — TELEPHONE (OUTPATIENT)
Dept: CARDIOLOGY | Facility: CLINIC | Age: 75
End: 2025-06-02
Payer: MEDICARE

## 2025-06-02 ENCOUNTER — PATIENT OUTREACH (OUTPATIENT)
Dept: CARE COORDINATION | Facility: CLINIC | Age: 75
End: 2025-06-02
Payer: MEDICARE

## 2025-06-02 NOTE — TELEPHONE ENCOUNTER
[facility] Call Center    Phone Message    May a detailed message be left on voicemail: yes     Reason for Call: Other: pt received a lipids referral requesting Mile at [facility] or other lipid specialist. Is this something Fernando is comfortable with or would Fernando like pt to see Mile for lipids?      Action Taken: Other: cardiology     Travel Screening: Not Applicable    Thank you!  Specialty Access Center        Date of Service:

## 2025-06-03 ENCOUNTER — MYC MEDICAL ADVICE (OUTPATIENT)
Dept: FAMILY MEDICINE | Facility: CLINIC | Age: 75
End: 2025-06-03
Payer: MEDICARE

## 2025-06-03 NOTE — PROGRESS NOTES
OCCUPATIONAL THERAPY EVALUATION  Type of Visit: Evaluation       Fall Risk Screen:  Have you fallen 2 or more times in the past year?: No  Have you fallen and had an injury in the past year?: Yes  Is patient receiving Physical Therapy Services?: No  Fall screen comments: Has Seen PT for ankle in last year    Subjective        Presenting condition or subjective complaint: arthiritis of the thumb joint  Date of onset: 05/16/25    Relevant medical history:     Dates & types of surgery: lumpectomy 09/89 valve replacement 03/21    Prior diagnostic imaging/testing results: X-ray   severe degenerative changes of the STT joint, mild first CMC degenerative changes   Prior therapy history for the same diagnosis, illness or injury: No      Living Environment  Social support: With a significant other or spouse   Type of home: [facility]; 2-story   Stairs to enter the home: No       Ramp: No   Stairs inside the home: Yes 9 Is there a railing: Yes     Help at home:    Equipment owned: Four-point cane; Walker with wheels; Grab bars     Employment: No    Hobbies/Interests: I love to walk, swim and take durbin classes    Patient goals for therapy: lift weights,  heavy objects, clean my house without my right hand throbing    Pain assessment: See objective evaluation for additional pain details     Objective   Right hand dominant  Patient reports symptoms of pain and weakness/loss of strength    Pain Level (Scale 0-10)   6/5/2025   At Rest 2-4/10   With Use 2-5/10     Pain Description  Date 6/5/2025   Location R Thumb CMC   Pain Quality Sharp and low grade throb   Frequency intermittent, constant, or daily     Pain is worst  daytime   Exacerbated by  Holding weights, gripping, cleaning/wiping, opening jars   Relieved by Rest, splint, tylenol   Progression Gradually worsening     ROM  Thumb 6/5/2025 6/5/2025   AROM  (PROM) Left Right   MP -5/50 -15/62   IP +/55 +/60   RABD 50 50   PABD 50 55     Thumb Observation/Appearance   -  none  + mild    ++ moderate    +++ severe     6/5/2025   Shoulder deformity present over CMC R: +  L: +   Edema over the CMC joint R: +  L: +   Noted collapse of MP into hyperextension during pinch R: -  L: -      Provocative Tests  Pain Report:  - none    + mild    ++ moderate    +++ severe     6/5/2025   AP Joint Laxity of Trapezium R: -  L: -   Crepitus present R: -  L: -   CMC Adduction Stress Test R: NT  L: NT   CMC Extension Stress Test R: NT  L: NT   Finkelstein's R: NT  L: NT   WHAT Test R: NT  L: NT       Strength   (Measured in pounds)  Pain Report: - none  + mild    ++ moderate    +++ severe    6/5/2025 6/5/2025   Trials Left Right   1  2  3 44 36 to pain   Average 44 36     Lat Pinch 6/5/2025 6/5/2025   Trials Left Right   1  2  3 14 14-   Average 14 14     3 Pt Pinch 6/5/2025 6/5/2025   Trials Left Right   1  2  3 13 8+   Average 13 8     Palpation   Pain Report:  - none    + mild    ++ moderate    +++ severe    6/5/2025   CMC Joint Line R: +  L: -   Thenar Eminence R: - but tight  L: - but tight   Web Space R: - but tight  L: - but tight   1st DC R: -  L: -   Radial Styloid R: -  L: -   FCR R: +  L: -      Assessment & Plan   CLINICAL IMPRESSIONS  Medical Diagnosis: R Thumb CMC and STT OA    Treatment Diagnosis: R Thumb and Wrist Pain    Impression/Assessment: Pt is a 74 year old female presenting to Occupational Therapy due to R thumb pain secondary to OA.  The following significant findings have been identified: Impaired ROM, Impaired strength, Pain, and instability.  These identified deficits interfere with their ability to perform self care tasks, recreational activities, household chores, and meal planning and preparation as compared to previous level of function.     Clinical Decision Making (Complexity):  Assessment of Occupational Performance: 3-5 Performance Deficits  Occupational Performance Limitations: home establishment and management, meal preparation and cleanup, and leisure  activities  Clinical Decision Making (Complexity): Low complexity    PLAN OF CARE  Treatment Interventions:  Modalities:  Paraffin  Therapeutic Exercise:  Isometrics and Stabilization  Neuromuscular re-education:  Kinesiotaping, Isometrics, and Stabilization  Manual Techniques:  Joint mobilization and Myofascial release  Orthotic Fabrication:  Hand based  Self Care:  Self Care Tasks    Long Term Goals   OT Goal 1  Goal Identifier: Household Chores  Goal Description: Patient will be able to  and open a new jar, using AE as needed, with < 3/10 pain  Rationale: In order to maximize safety and independence with ADL/IADLs  Target Date: 08/05/25      Frequency of Treatment: 2 x Month  Duration of Treatment: 2 Months   Education Assessment: Learner/Method: Patient     Risks and benefits of evaluation/treatment have been explained.   Patient/Family/caregiver agrees with Plan of Care.     Evaluation Time:    OT Eval, Low Complexity Minutes (79715): 20    Signing Clinician: ELÍAS Houser [facility]                                                                                   OUTPATIENT OCCUPATIONAL THERAPY      PLAN OF TREATMENT FOR OUTPATIENT REHABILITATION   Patient's Last Name, First Name, M.IQamar  SandhyaLauren caruso YOB: 1950   Provider's Name   [facility]   Medical Record No.  [id number]     Onset Date: 05/16/25 Start of Care Date: 06/05/25     Medical Diagnosis:  R Thumb CMC and STT OA      OT Treatment Diagnosis:  R Thumb and Wrist Pain Plan of Treatment  Frequency/Duration:2 x Month/2 Months    Certification date from 06/05/25   To 08/05/25        See note for plan of treatment details and functional goals     Yahaira Duval OT                         I CERTIFY THE NEED FOR THESE SERVICES FURNISHED UNDER        THIS PLAN OF TREATMENT AND WHILE UNDER MY CARE     (Physician attestation of this document indicates  review and certification of the therapy plan).              Referring Provider:  Cynthia Ramirez    Initial Assessment  See Epic Evaluation- 06/05/25

## 2025-06-04 ENCOUNTER — PATIENT OUTREACH (OUTPATIENT)
Dept: CARE COORDINATION | Facility: CLINIC | Age: 75
End: 2025-06-04
Payer: MEDICARE

## 2025-06-05 ENCOUNTER — THERAPY VISIT (OUTPATIENT)
Dept: OCCUPATIONAL THERAPY | Facility: CLINIC | Age: 75
End: 2025-06-05
Attending: STUDENT IN AN ORGANIZED HEALTH CARE EDUCATION/TRAINING PROGRAM
Payer: MEDICARE

## 2025-06-05 DIAGNOSIS — M19.031 PRIMARY OSTEOARTHRITIS OF RIGHT WRIST: ICD-10-CM

## 2025-06-05 DIAGNOSIS — M18.11 ARTHRITIS OF CARPOMETACARPAL (CMC) JOINT OF RIGHT THUMB: ICD-10-CM

## 2025-06-05 NOTE — TELEPHONE ENCOUNTER
[facility] Call Center    Phone Message    May a detailed message be left on voicemail: yes     Reason for Call: Other: Pt called to confirm if  she should schedule with provider Mile  or if she should wait in regards to her concerns regarding her cholesterol. She is trying to get scheduled to be seen as soon as possible but she does not know where to start regarding  who to schedule with and she would like clarification.     Action Taken: Other: cardio    Travel Screening: Not Applicable     Date of Service:     Thank you!  Specialty Access Center

## 2025-06-05 NOTE — TELEPHONE ENCOUNTER
Attempted to call patient back to further discuss, no answer. Detailed VM left that appears during recent PCP visit on 5/30, Dr. Wegener had recommended follow up with Dr. Treadwell for lipid management given intolerance of statins. Included scheduling number ([phone]) to set up preventative Cardiology visit with Dr. Treadwell for lipid management.

## 2025-06-09 ENCOUNTER — THERAPY VISIT (OUTPATIENT)
Dept: OCCUPATIONAL THERAPY | Facility: CLINIC | Age: 75
End: 2025-06-09
Payer: MEDICARE

## 2025-06-09 DIAGNOSIS — M18.11 ARTHRITIS OF CARPOMETACARPAL (CMC) JOINT OF RIGHT THUMB: Primary | ICD-10-CM

## 2025-06-09 DIAGNOSIS — M19.031 PRIMARY OSTEOARTHRITIS OF RIGHT WRIST: ICD-10-CM

## 2025-06-09 PROCEDURE — 97018 PARAFFIN BATH THERAPY: CPT | Mod: GO | Performed by: OCCUPATIONAL THERAPIST

## 2025-06-09 PROCEDURE — 97140 MANUAL THERAPY 1/> REGIONS: CPT | Mod: GO | Performed by: OCCUPATIONAL THERAPIST

## 2025-06-09 PROCEDURE — 97110 THERAPEUTIC EXERCISES: CPT | Mod: GO | Performed by: OCCUPATIONAL THERAPIST

## 2025-06-24 ENCOUNTER — HOSPITAL ENCOUNTER (OUTPATIENT)
Dept: MAMMOGRAPHY | Facility: CLINIC | Age: 75
Discharge: HOME | End: 2025-06-24
Attending: FAMILY MEDICINE
Payer: MEDICARE

## 2025-06-24 DIAGNOSIS — Z12.31 VISIT FOR SCREENING MAMMOGRAM: ICD-10-CM

## 2025-06-24 PROCEDURE — 77067 SCR MAMMO BI INCL CAD: CPT

## 2025-06-27 ENCOUNTER — TELEPHONE (OUTPATIENT)
Dept: CARDIOLOGY | Facility: CLINIC | Age: 75
End: 2025-06-27
Payer: MEDICARE

## 2025-06-27 DIAGNOSIS — I25.10 CORONARY ARTERY DISEASE INVOLVING NATIVE CORONARY ARTERY OF NATIVE HEART WITHOUT ANGINA PECTORIS: Primary | ICD-10-CM

## 2025-06-27 DIAGNOSIS — R73.09 ELEVATED GLUCOSE: ICD-10-CM

## 2025-06-30 NOTE — TELEPHONE ENCOUNTER
[facility]    Joao Kurtz   [facility] [place]- Cardiology   [address] [place] [address]  \

## 2025-07-02 ENCOUNTER — LAB (OUTPATIENT)
Dept: LAB | Facility: CLINIC | Age: 75
End: 2025-07-02
Payer: MEDICARE

## 2025-07-02 DIAGNOSIS — R73.09 ELEVATED GLUCOSE: ICD-10-CM

## 2025-07-02 DIAGNOSIS — I25.10 CORONARY ARTERY DISEASE INVOLVING NATIVE CORONARY ARTERY OF NATIVE HEART WITHOUT ANGINA PECTORIS: ICD-10-CM

## 2025-07-02 LAB
ALBUMIN SERPL BCG-MCNC: 4.1 G/DL (ref 3.5–5.2)
ALP SERPL-CCNC: 54 U/L (ref 40–150)
ALT SERPL W P-5'-P-CCNC: 13 U/L (ref 0–50)
ANION GAP SERPL CALCULATED.3IONS-SCNC: 10 MMOL/L (ref 7–15)
APO A-I SERPL-MCNC: 42 MG/DL
AST SERPL W P-5'-P-CCNC: 21 U/L (ref 0–45)
BILIRUB SERPL-MCNC: 0.5 MG/DL
BUN SERPL-MCNC: 17.7 MG/DL (ref 8–23)
CALCIUM SERPL-MCNC: 9.2 MG/DL (ref 8.8–10.4)
CHLORIDE SERPL-SCNC: 106 MMOL/L (ref 98–107)
CHOLEST SERPL-MCNC: 236 MG/DL
CREAT SERPL-MCNC: 0.98 MG/DL (ref 0.51–0.95)
EGFRCR SERPLBLD CKD-EPI 2021: 61 ML/MIN/1.73M2
EST. AVERAGE GLUCOSE BLD GHB EST-MCNC: 123 MG/DL
FASTING STATUS PATIENT QL REPORTED: YES
FASTING STATUS PATIENT QL REPORTED: YES
GLUCOSE SERPL-MCNC: 105 MG/DL (ref 70–99)
HBA1C MFR BLD: 5.9 % (ref 0–5.6)
HCO3 SERPL-SCNC: 27 MMOL/L (ref 22–29)
HDLC SERPL-MCNC: 63 MG/DL
LDLC SERPL CALC-MCNC: 148 MG/DL
LDLC SERPL DIRECT ASSAY-MCNC: 156 MG/DL
NONHDLC SERPL-MCNC: 173 MG/DL
POTASSIUM SERPL-SCNC: 4.2 MMOL/L (ref 3.4–5.3)
PROT SERPL-MCNC: 6.4 G/DL (ref 6.4–8.3)
SODIUM SERPL-SCNC: 143 MMOL/L (ref 135–145)
TRIGL SERPL-MCNC: 126 MG/DL

## 2025-07-02 PROCEDURE — 80061 LIPID PANEL: CPT

## 2025-07-02 PROCEDURE — 80053 COMPREHEN METABOLIC PANEL: CPT

## 2025-07-02 PROCEDURE — 83695 ASSAY OF LIPOPROTEIN(A): CPT

## 2025-07-02 PROCEDURE — 36415 COLL VENOUS BLD VENIPUNCTURE: CPT

## 2025-07-02 PROCEDURE — 83036 HEMOGLOBIN GLYCOSYLATED A1C: CPT

## 2025-07-02 PROCEDURE — 83721 ASSAY OF BLOOD LIPOPROTEIN: CPT | Mod: 59

## 2025-07-03 ENCOUNTER — PRE VISIT (OUTPATIENT)
Dept: CARDIOLOGY | Facility: CLINIC | Age: 75
End: 2025-07-03
Payer: MEDICARE

## 2025-07-03 ENCOUNTER — OFFICE VISIT (OUTPATIENT)
Dept: CARDIOLOGY | Facility: CLINIC | Age: 75
End: 2025-07-03
Attending: FAMILY MEDICINE
Payer: MEDICARE

## 2025-07-03 ENCOUNTER — RESULTS FOLLOW-UP (OUTPATIENT)
Dept: CARDIOLOGY | Facility: CLINIC | Age: 75
End: 2025-07-03

## 2025-07-03 VITALS
OXYGEN SATURATION: 95 % | SYSTOLIC BLOOD PRESSURE: 138 MMHG | HEART RATE: 60 BPM | DIASTOLIC BLOOD PRESSURE: 81 MMHG | BODY MASS INDEX: 25.9 KG/M2 | WEIGHT: 141.3 LBS

## 2025-07-03 DIAGNOSIS — R41.3 MEMORY LOSS: ICD-10-CM

## 2025-07-03 DIAGNOSIS — Z78.9 PCSK9 ENZYME INHIBITOR INTOLERANCE: ICD-10-CM

## 2025-07-03 DIAGNOSIS — Z78.9 STATIN INTOLERANCE: ICD-10-CM

## 2025-07-03 DIAGNOSIS — Z95.2 AORTIC VALVE REPLACED: ICD-10-CM

## 2025-07-03 DIAGNOSIS — Z95.0 CARDIAC PACEMAKER IN SITU: ICD-10-CM

## 2025-07-03 DIAGNOSIS — T46.6X5A MYALGIA DUE TO STATIN: ICD-10-CM

## 2025-07-03 DIAGNOSIS — I25.10 CORONARY ARTERY DISEASE INVOLVING NATIVE CORONARY ARTERY OF NATIVE HEART WITHOUT ANGINA PECTORIS: Primary | ICD-10-CM

## 2025-07-03 DIAGNOSIS — E78.5 HYPERLIPIDEMIA LDL GOAL <100: ICD-10-CM

## 2025-07-03 DIAGNOSIS — M79.10 MYALGIA DUE TO STATIN: ICD-10-CM

## 2025-07-03 PROCEDURE — G0463 HOSPITAL OUTPT CLINIC VISIT: HCPCS | Performed by: INTERNAL MEDICINE

## 2025-07-03 PROCEDURE — 3079F DIAST BP 80-89 MM HG: CPT | Performed by: INTERNAL MEDICINE

## 2025-07-03 PROCEDURE — 3075F SYST BP GE 130 - 139MM HG: CPT | Performed by: INTERNAL MEDICINE

## 2025-07-03 PROCEDURE — 1126F AMNT PAIN NOTED NONE PRSNT: CPT | Performed by: INTERNAL MEDICINE

## 2025-07-03 PROCEDURE — 99214 OFFICE O/P EST MOD 30 MIN: CPT | Performed by: INTERNAL MEDICINE

## 2025-07-03 RX ORDER — KRILL/OM-3/DHA/EPA/PHOSPHO/AST 500MG-86MG
1 CAPSULE ORAL DAILY
COMMUNITY

## 2025-07-03 RX ORDER — UBIDECARENONE 100 MG
100 CAPSULE ORAL DAILY
COMMUNITY

## 2025-07-03 RX ORDER — LANOLIN ALCOHOL/MO/W.PET/CERES
1000 CREAM (GRAM) TOPICAL DAILY
COMMUNITY

## 2025-07-03 ASSESSMENT — PAIN SCALES - GENERAL: PAINLEVEL_OUTOF10: NO PAIN (0)

## 2025-07-03 NOTE — NURSING NOTE
July 3, 2025      Medication Changes:   -Start Leqvio if/when approved by [employer]      Follow Up:   -Fasting labs 3 months after first injection  -Follow up with cardiology in 1 year with fasting labs prior to visit      Patient verbalized understanding of all health information given and agreed to call with further questions or concerns.      Kelsey Obrien RN

## 2025-07-03 NOTE — PATIENT INSTRUCTIONS
July 3, 2025    Cardiology Provider You Saw During Your Visit: Dr. Treadwell      Medication Changes:   -Start Leqvio if/when approved by [employer]      Follow Up:   -Fasting labs 3 months after first injection  -Follow up with cardiology in 1 year with fasting labs prior to visit      Labs:    Latest Reference Range & Units 07/02/25 09:38   Sodium 135 - 145 mmol/L 143   Potassium 3.4 - 5.3 mmol/L 4.2   Chloride 98 - 107 mmol/L 106   Carbon Dioxide (CO2) 22 - 29 mmol/L 27   Urea Nitrogen 8.0 - 23.0 mg/dL 17.7   Creatinine 0.51 - 0.95 mg/dL 0.98 (H)   GFR Estimate >60 mL/min/1.73m2 61   Calcium 8.8 - 10.4 mg/dL 9.2   Anion Gap 7 - 15 mmol/L 10   Albumin 3.5 - 5.2 g/dL 4.1   Protein Total 6.4 - 8.3 g/dL 6.4   Alkaline Phosphatase 40 - 150 U/L 54   ALT 0 - 50 U/L 13   AST 0 - 45 U/L 21   Bilirubin Total <=1.2 mg/dL 0.5   Cholesterol <200 mg/dL 236 (H)   Patient Fasting?  Yes  Yes   Glucose 70 - 99 mg/dL 105 (H)   HDL Cholesterol >=50 mg/dL 63   Estimated Average Glucose <117 mg/dL 123 (H)   Hemoglobin A1C 0.0 - 5.6 % 5.9 (H)   LDL Cholesterol Calculated <100 mg/dL 148 (H)   LDL Cholesterol Direct <100 mg/dL 156 (H)   Lipoprotein (a) <30 mg/dL 42 (H)   Non HDL Cholesterol <130 mg/dL 173 (H)   Triglycerides <150 mg/dL 126   (H): Data is abnormally high        Follow the American Heart Association Diet and Lifestyle Recommendations:  -Limit saturated fat, trans fat, sodium, red meat, sweets and sugar-sweetened beverages. If you choose to eat red meat, compare labels and select the leanest cuts available.  -Aim for at least 150 minutes of moderate physical activity or 75 minutes of vigorous physical activity - or an equal combination of both - each week.      To Reach Us:  -During business hours: [phone], press option # 1 to schedule an appointment or to leave a message for your care team.     -After hours, weekends or holidays: [phone], press option #4 and ask to speak to the on-call cardiologist. Inform them you are  a patient at the [facility].        **If you have a cardiac device, please make sure you schedule an in-person device check just prior to your cardiology provider appointments**        Kelsey Obrien RN  Cardiology Care Coordinator - General Cardiology  [facility]

## 2025-07-03 NOTE — NURSING NOTE
Chief Complaint   Patient presents with    New Patient     Zenon Treadwell pt, referred for memory loss, myalgia due to statin, HLD, and lipid management     Vitals were taken and medications reconciled.    Ramo Lozano, EMT  4:04 PM

## 2025-07-03 NOTE — PROGRESS NOTES
HPI:       I had the privilege to evaluate and examine Mrs Lauren Nick, who is a  74 yr female with PMHx of SAVR for bicuspid valve in 2021, pAF with left atrial appendage ligation as well as permanent pacemaker placement, neoplasm of the L breast (underwent radiation), thoracic aortic aneurysm and dilation, hyperlipidemia, and mild intermittent asthma. She has heterozygous  familial hypercholesterolemia - however she is statin tolerant and is here for statin intolerance and intolerance of Repatha.     Her intolerance for statins were  severe myalgias for all statins tried as listed below and complains of memory issues with recent pravastatin dose.  She tried zetia 10 mg but still unable to reach LDL goal.  Patient started on Repatha 140 mg subcut. every 2 weeks  last November and  did not tolerate Repatha  due to severe pain reaction.    She is concerned and she will not be able to tolerate any medicine to lower her cholesterol. Patient states that all of her family currently takes other statins.     She currently denies any symptoms concerning for cardiac etiology including denying chest pain, shortness of breath, dyspnea on exertion, palpitations, lightheadedness, dizziness, or syncope.  In addition to this she is able to exercise 6x times a week at least 10k steps. Eating more sugar lately with graduation parties and family in town.    She has tried following statins, but they not tolerated any of these statins.  - pravastatin  - atorvastatin  - simvastatin  - lovastatin  - rosuvastatin    PAST MEDICAL HISTORY:  Past Medical History:   Diagnosis Date    Arthritis 11/16    Saw a doctor received a shot in [place].    Basal cell carcinoma     Cancer (H) 09/1989    breast cancer, surgery, and radiation    Chronic sinusitis for years    had surgery about 2002 and it still comes and goes    Coronary artery disease 09/01/1980    bicuspid aortic valve replaced in 03/21    Hearing problem 2015    Hearing Aids 11/2016     Heart disease 03/21/2021    Heart valve replacement    History of radiation therapy 04/1990    left breast, 6 weeks    Hoarseness February    it comes and goes but seem more frequent now    Hyperlipidemia 09/01/2000    can t yake statins    Hypothyroidism 03/21    noted in the hospital before my valve replacement    Mental health problem Put on meds 20 years ago for this.    Presence of functional implant 03/24/21    Pacemaker    Shortness of breath occassionaly    before valve replacement. symptom of valve disorder    Thyroid disease 02/21 diagnosed    Uncomplicated asthma A few years ago    Uncontrolled hypertension 02/20/2023       CURRENT MEDICATIONS:  Current Outpatient Medications   Medication Sig Dispense Refill    aspirin 81 MG EC tablet Take 81 mg by mouth daily      budesonide-formoterol (SYMBICORT/BREYNA) 160-4.5 MCG/ACT Inhaler Inhale 1 puff as needed. May use up to 12 puffs per day. 20.4 g 11    cholecalciferol (VITAMIN D3) 10 mcg (400 units) TABS tablet Take 1 tablet by mouth daily.      co-enzyme Q-10 100 MG CAPS capsule Take 100 mg by mouth daily.      co-enzyme Q-10 100 MG CAPS capsule Take 100 mg by mouth daily.      cyanocobalamin (VITAMIN B-12) 1000 MCG tablet Take 1,000 mcg by mouth daily.      dextromethorphan-guaiFENesin (MUCINEX DM)  MG 12 hr tablet Take 1 tablet by mouth daily. 30 tablet 0    escitalopram (LEXAPRO) 20 MG tablet Take 1 tablet (20 mg) by mouth daily. 90 tablet 3    ezetimibe (ZETIA) 10 MG tablet Take 1 tablet (10 mg) by mouth daily. 90 tablet 3    fluticasone (FLONASE) 50 MCG/ACT nasal spray Spray 2 sprays into both nostrils daily. 48 mL 3    Krill Oil 500 MG CAPS Take 1 capsule by mouth daily.      levothyroxine (SYNTHROID/LEVOTHROID) 50 MCG tablet TAKE 1 TABLET BY MOUTH EVERY DAY 90 tablet 1    LORazepam (ATIVAN) 1 MG tablet Take 1 tablet (1 mg) by mouth nightly as needed for sleep. (Weaning to 20/month) 20 tablet 5    losartan (COZAAR) 25 MG tablet Take 1 tablet (25  mg) by mouth daily. 90 tablet 3    magnesium 250 MG tablet Take 1 tablet by mouth daily Unknown dose      metFORMIN (GLUCOPHAGE XR) 500 MG 24 hr tablet Start one tablet daily for 1-2 weeks then increase to two tablets daily 180 tablet 1    metoprolol succinate ER (TOPROL XL) 25 MG 24 hr tablet Take 1 tablet (25 mg) by mouth 2 times daily. 180 tablet 3    psyllium (METAMUCIL/KONSYL) capsule Take 1 capsule by mouth daily      STATIN NOT PRESCRIBED (INTENTIONAL) Please choose reason not prescribed from choices below.      Turmeric 500 MG TABS Take by mouth daily         PAST SURGICAL HISTORY:  Past Surgical History:   Procedure Laterality Date    ABDOMEN SURGERY  Gallbladder    removed in     ADENOIDECTOMY      As a child in the early 60 s    BIOPSY  10/2018    BREAST SURGERY  1989    CARDIAC SURGERY  2021    CHOLECYSTECTOMY  2010    COLONOSCOPY  2019    COLONOSCOPY N/A 11/10/2022    Procedure: COLONOSCOPY, WITH POLYPECTOMY AND BIOPSY;  Surgeon: Rafa Parks MD;  Location:  GI    ENT SURGERY  2011    EYE SURGERY  2015    IMPLANT PACEMAKER  24    heart stopped after valve replacement put pacemaker in as a precauction    NASAL/SINUS POLYPECTOMY      sinus surgery    [place] CARDIAC SURG PROCEDURE UNLISTED  21    valve replacement    THORACIC SURGERY      valve replacement       ALLERGIES     Allergies   Allergen Reactions    Penicillins Hives    Ambien [Zolpidem]      Complex behaviors    Statins [Statins]      Myalgias to multiple statins       FAMILY HISTORY:  Family History   Problem Relation Age of Onset    Hypertension Mother     Cancer Mother         kidney cancer, age 85    Other Cancer Mother         kidney cancer 85    Other Cancer Father          of stomach cancer age 43    Hypertension Sister     Hyperlipidemia Sister     Heart Disease Sister     Hypertension Sister     Hyperlipidemia Sister     Diabetes Sister     Breast Cancer Sister     Skin Cancer Sister      Coronary Artery Disease Sister     Hyperlipidemia Sister     Thyroid Disease Sister     Asthma Sister     Basal cell carcinoma Sister     Skin Cancer Sister     Heart Disease Paternal Grandmother     Hypertension Paternal Grandmother     Obesity Paternal Grandmother     Heart Disease Daughter     Anesthesia Reaction Son     Hypertension Son     Other Cancer Other         Neuroblastoma age 5    Other Cancer Other         Neuroblastoma age 5    Colon Cancer No family hx of         Paternal Aunt       SOCIAL HISTORY:  Social History     Socioeconomic History    Marital status:      Spouse name: None    Number of children: None    Years of education: None    Highest education level: None   Tobacco Use    Smoking status: Former     Current packs/day: 0.00     Average packs/day: 1 pack/day for 10.4 years (10.4 ttl pk-yrs)     Types: Cigarettes     Start date: 1969     Quit date: 10/1/1979     Years since quittin.7     Passive exposure: Past    Smokeless tobacco: Never   Vaping Use    Vaping status: Never Used   Substance and Sexual Activity    Alcohol use: Yes     Comment: occ    Drug use: Never    Sexual activity: Not Currently     Partners: Male     Birth control/protection: Post-menopausal   Other Topics Concern    Parent/sibling w/ CABG, MI or angioplasty before 65F 55M? No     Social Drivers of Health     Financial Resource Strain: Low Risk  (2025)    Financial Resource Strain     Within the past 12 months, have you or your family members you live with been unable to get utilities (heat, electricity) when it was really needed?: No   Food Insecurity: Low Risk  (2025)    Food Insecurity     Within the past 12 months, did you worry that your food would run out before you got money to buy more?: No     Within the past 12 months, did the food you bought just not last and you didn t have money to get more?: No   Transportation Needs: Low Risk  (2025)    Transportation Needs     Within the  past 12 months, has lack of transportation kept you from medical appointments, getting your medicines, non-medical meetings or appointments, work, or from getting things that you need?: No   Physical Activity: Sufficiently Active (1/9/2025)    Exercise Vital Sign     Days of Exercise per Week: 4 days     Minutes of Exercise per Session: 50 min   Stress: No Stress Concern Present (1/9/2025)    [employer] - Occupational Stress Questionnaire     Feeling of Stress : Only a little   Social Connections: Unknown (1/9/2025)    Social Connection and Isolation Panel [NHANES]     Frequency of Social Gatherings with Friends and Family: Once a week   Interpersonal Safety: Low Risk  (1/14/2025)    Interpersonal Safety     Do you feel physically and emotionally safe where you currently live?: Yes     Within the past 12 months, have you been hit, slapped, kicked or otherwise physically hurt by someone?: No     Within the past 12 months, have you been humiliated or emotionally abused in other ways by your partner or ex-partner?: No   Housing Stability: Low Risk  (1/9/2025)    Housing Stability     Do you have housing? : Yes     Are you worried about losing your housing?: No       ROS:   Constitutional: No fever, chills, or sweats. No weight gain/loss   ENT: No visual disturbance, ear ache, epistaxis, sore throat  Allergies/Immunologic: Negative.   Respiratory: No cough, hemoptysia  Cardiovascular: As per HPI  GI: No nausea, vomiting, hematemesis, melena, or hematochezia  : No urinary frequency, dysuria, or hematuria  Integument: Negative  Psychiatric: Negative  Neuro: Negative  Endocrinology: Negative   Musculoskeletal: Negative    EXAM:  /81 (BP Location: Right arm, Patient Position: Chair, Cuff Size: Adult Regular)   Pulse 60   Wt 64.1 kg (141 lb 4.8 oz)   LMP  (LMP Unknown)   SpO2 95%   BMI 25.90 kg/m      In general, the patient is a pleasant female in no apparent distress.    HEENT:  NC/AT.  PERRLA.  EOMI.  Sclerae white, not injected.  Nares clear.  Pharynx without erythema or exudate.  Dentition intact.    Neck: No adenopathy.  No thyromegaly. Carotids +4/4 bilaterally without bruits.  No jugular venous distension.   Heart: RRR. Normal S1, S2 splits physiologically and is loud. No murmur, rub, click, or gallop. The PMI is in the 5th ICS in the midclavicular line. There is no heave.    Lungs: CTA.  No ronchi, wheezes, rales.  No dullness to percussion.   Abdomen: Soft, nontender, nondistended. No organomegaly.  No bruits.   Extremities: No clubbing, cyanosis, or edema.  The pulses are +4/4 at the radial, brachial, femoral, popliteal, DP, and PT sites bilaterally.  No bruits are noted.  Neurologic: Alert and oriented to person/place/time, normal speech, gait and affect  Skin: No petechiae, purpura or rash.    Labs:  LIPID RESULTS:  Lab Results   Component Value Date    CHOL 236 (H) 07/02/2025    HDL 63 07/02/2025     (H) 07/02/2025     (H) 07/02/2025    TRIG 126 07/02/2025    NHDL 173 (H) 07/02/2025     Lipoprotein a 42 mg/dl (normal is < 30 mg/dl)- pelacarsen and olaparsen are 2 inject medication under study to lower Lpa.    LIVER ENZYME RESULTS:  Lab Results   Component Value Date    AST 21 07/02/2025    ALT 13 07/02/2025       CBC RESULTS:  Lab Results   Component Value Date    WBC 5.1 11/21/2023    RBC 4.59 11/21/2023    HGB 11.9 12/15/2023    HCT 41.4 11/21/2023    MCV 90 11/21/2023    MCH 27.0 11/21/2023    MCHC 30.0 (L) 11/21/2023    RDW 13.2 11/21/2023     11/21/2023       BMP RESULTS:  Lab Results   Component Value Date     07/02/2025    POTASSIUM 4.2 07/02/2025    POTASSIUM 4.3 08/23/2022    CHLORIDE 106 07/02/2025    CHLORIDE 107 08/23/2022    CO2 27 07/02/2025    CO2 24 08/23/2022    ANIONGAP 10 07/02/2025    ANIONGAP 8 08/23/2022     (H) 07/02/2025    GLC 90 08/23/2022    BUN 17.7 07/02/2025    BUN 24 08/23/2022    CR 0.98 (H) 07/02/2025     GFRESTIMATED 61 07/02/2025    GFRESTIMATED >60 06/24/2024    ARIA 9.2 07/02/2025        A1C RESULTS:  Lab Results   Component Value Date    A1C 5.9 (H) 07/02/2025       Procedures:  MRA Chest 9/2024:    1. The patient is status post aortic valve replacement with a 23 mm Inspiris Resilia bioprosthesis as well  as left atrial appendage ligation with a 35mm AtriClip in 03/2021.     2.  The aortic root is normal in size. There is mild dilatation of the ascending thoracic aorta, which  measures 4.2 cm x 4.0 cm at the level of the main pulmonary artery. The transverse arch and descending  thoracic aorta are normal in size without an aneurysm or dissection.     Aortic Size Index: 2.6 cm/m2     Aortic Height Index: 2.6 cm/m     2. The aortic arch is left sided. There is normal branching of the arch vessels. There is no coarctation.     3. The main and proximal branch pulmonary arteries are normal in size.      4. The systemic venous connections are normal.     PM Interrogation 5/2025:  [employer] (D) Pacemaker Device Check  Patient seen in clinic for device evaluation and iterative programming.      Mode: DDDR    Underlying Rhythm: SBrady at 51-52bpm (AS-VS: 191ms, AP-VS: 238ms)     Pacing/Histogram Data Since: 3/26/25  AP: 34%  : <1%  Heart Rate: Stable with good variability.  HRs per histogram range primarily from 60-90bpm.  Patient feels good with activity.      Sensing: WNL   Pacing Threshold: Stable   Impedance: Stable   Battery Status: Estimated at 6.4-7.4 years remaining   Device Site: Well healed, right sided      Arrhythmia Data Since: 3/26/25  Atrial Arrhythmia: 0  Ventricular Arrhythmia: 0     Setting Change: PMT storage turned on with low priority and a detection rate of 120bpm.  Changed AMS episode storage to AT/AF episode storage at 170bpm.       Care Plan: Remote device check scheduled for 8/25/25.  Due to see Dr. King in September or October 2025; directed to scheduling to schedule.   STORMY Hester      Assessment and Plan:     I discussed the assessment and plan with patient:    - a lipid-lowering diet    74 YOF with PMHx of SAVR for bicuspid valve in 2021, pAF with left atrial appendage ligation, neoplasm of the L breast (underwent radiation), thoracic aortic aneurysm and dilation, hyperlipidemia, and mild intermittent asthma here for statin intolerance and intolerance of repatha.    #Primary hyperlipidemia  Given her failure of all statins she is attempting to take as well as Repatha but is completely intolerant  and will now have a therapy with Leqvio (incliseran) injection baseline, 3 months and then every 6 months.would recommend step up to inclisiran  She will have labs for lipids - 3 months after first Leqvio subcut.  -Lipid labs 3 month after initial injection    Follow up: 3 months for inclisiran  The she will inject Leqvio every 6 months.    Marciano Larson  Cardiovascular Disease Fellow    I saw and evaluated patient with CV fellow. I examined patient with CV fellow. I discussed the results with patient and CV fellow. I discussed our plan with patient and CV fellow.  I agree with CV fellow's note and I edited the CV fellow 's note to make it a more comprehensive document.    Rashi Treadwell MD, PhD  Professor of Medicine  Division of Cardiology    CC  Patient Care Team:  Wegener, Joel Daniel Irwin, MD as PCP - General (Family Medicine)  Wegener, Joel Daniel Irwin, MD as Assigned PCP  Rohit Thao MD as MD (Critical Care)  eKo Lee MD as MD (Dermatology)  Judy Roberts PA-C as Physician Assistant  Milka Martinez PA-C as Physician Assistant (Dermatology)  Nikki Newman PA-C as Physician Assistant (Urology)  Vinnie Patel MD as Assigned Musculoskeletal Provider  Megan Todd RD as Diabetes Educator (Dietitian, Registered)  Lovely De Oliveira CNP as Nurse Practitioner (Cardiovascular Disease)  Сергей Parry MD as Assigned Dermatology Provider  Yennifer  Lovely DONOVAN CNP as Assigned Heart and Vascular Provider  Cynthia Ramirez DO as Assigned Neuroscience Provider  Rohit Thao MD as Assigned Pulmonology Provider  WEGENER, JOEL DANIEL IRWIN

## 2025-07-03 NOTE — LETTER
7/3/2025      RE: Lauren Nick  [address]  [place] [address]       Dear Colleague,    Thank you for the opportunity to participate in the care of your patient, Lauren Nick, at the [facility] [place] [facility]. Please see a copy of my visit note below.    HPI:       I had the privilege to evaluate and examine Mrs Lauren Nick, who is a  74 yr female with PMHx of SAVR for bicuspid valve in 2021, pAF with left atrial appendage ligation as well as permanent pacemaker placement, neoplasm of the L breast (underwent radiation), thoracic aortic aneurysm and dilation, hyperlipidemia, and mild intermittent asthma. She has heterozygous  familial hypercholesterolemia - however she is statin tolerant and is here for statin intolerance and intolerance of Repatha.     Her intolerance for statins were  severe myalgias for all statins tried as listed below and complains of memory issues with recent pravastatin dose.  She tried zetia 10 mg but still unable to reach LDL goal.  Patient started on Repatha 140 mg subcut. every 2 weeks  last November and  did not tolerate Repatha  due to severe pain reaction.    She is concerned and she will not be able to tolerate any medicine to lower her cholesterol. Patient states that all of her family currently takes other statins.     She currently denies any symptoms concerning for cardiac etiology including denying chest pain, shortness of breath, dyspnea on exertion, palpitations, lightheadedness, dizziness, or syncope.  In addition to this she is able to exercise 6x times a week at least 10k steps. Eating more sugar lately with graduation parties and family in town.    She has tried following statins, but they not tolerated any of these statins.  - pravastatin  - atorvastatin  - simvastatin  - lovastatin  - rosuvastatin    PAST MEDICAL HISTORY:  Past Medical History:   Diagnosis Date     Arthritis 11/16    Saw  a doctor received a shot in [place].     Basal cell carcinoma      Cancer (H) 09/1989    breast cancer, surgery, and radiation     Chronic sinusitis for years    had surgery about 2002 and it still comes and goes     Coronary artery disease 09/01/1980    bicuspid aortic valve replaced in 03/21     Hearing problem 2015    Hearing Aids 11/2016     Heart disease 03/21/2021    Heart valve replacement     History of radiation therapy 04/1990    left breast, 6 weeks     Hoarseness February    it comes and goes but seem more frequent now     Hyperlipidemia 09/01/2000    can t yake statins     Hypothyroidism 03/21    noted in the hospital before my valve replacement     Mental health problem Put on meds 20 years ago for this.     Presence of functional implant 03/24/21    Pacemaker     Shortness of breath occassionaly    before valve replacement. symptom of valve disorder     Thyroid disease 02/21 diagnosed     Uncomplicated asthma A few years ago     Uncontrolled hypertension 02/20/2023       CURRENT MEDICATIONS:  Current Outpatient Medications   Medication Sig Dispense Refill     aspirin 81 MG EC tablet Take 81 mg by mouth daily       budesonide-formoterol (SYMBICORT/BREYNA) 160-4.5 MCG/ACT Inhaler Inhale 1 puff as needed. May use up to 12 puffs per day. 20.4 g 11     cholecalciferol (VITAMIN D3) 10 mcg (400 units) TABS tablet Take 1 tablet by mouth daily.       co-enzyme Q-10 100 MG CAPS capsule Take 100 mg by mouth daily.       co-enzyme Q-10 100 MG CAPS capsule Take 100 mg by mouth daily.       cyanocobalamin (VITAMIN B-12) 1000 MCG tablet Take 1,000 mcg by mouth daily.       dextromethorphan-guaiFENesin (MUCINEX DM)  MG 12 hr tablet Take 1 tablet by mouth daily. 30 tablet 0     escitalopram (LEXAPRO) 20 MG tablet Take 1 tablet (20 mg) by mouth daily. 90 tablet 3     ezetimibe (ZETIA) 10 MG tablet Take 1 tablet (10 mg) by mouth daily. 90 tablet 3     fluticasone (FLONASE) 50 MCG/ACT nasal spray Spray 2 sprays into  both nostrils daily. 48 mL 3     Krill Oil 500 MG CAPS Take 1 capsule by mouth daily.       levothyroxine (SYNTHROID/LEVOTHROID) 50 MCG tablet TAKE 1 TABLET BY MOUTH EVERY DAY 90 tablet 1     LORazepam (ATIVAN) 1 MG tablet Take 1 tablet (1 mg) by mouth nightly as needed for sleep. (Weaning to 20/month) 20 tablet 5     losartan (COZAAR) 25 MG tablet Take 1 tablet (25 mg) by mouth daily. 90 tablet 3     magnesium 250 MG tablet Take 1 tablet by mouth daily Unknown dose       metFORMIN (GLUCOPHAGE XR) 500 MG 24 hr tablet Start one tablet daily for 1-2 weeks then increase to two tablets daily 180 tablet 1     metoprolol succinate ER (TOPROL XL) 25 MG 24 hr tablet Take 1 tablet (25 mg) by mouth 2 times daily. 180 tablet 3     psyllium (METAMUCIL/KONSYL) capsule Take 1 capsule by mouth daily       STATIN NOT PRESCRIBED (INTENTIONAL) Please choose reason not prescribed from choices below.       Turmeric 500 MG TABS Take by mouth daily         PAST SURGICAL HISTORY:  Past Surgical History:   Procedure Laterality Date     ABDOMEN SURGERY  Gallbladder    removed in 2010     ADENOIDECTOMY      As a child in the early 60 s     BIOPSY  10/2018     BREAST SURGERY  09/25/1989     CARDIAC SURGERY  03/22/2021     CHOLECYSTECTOMY  03/2010     COLONOSCOPY  03/2019     COLONOSCOPY N/A 11/10/2022    Procedure: COLONOSCOPY, WITH POLYPECTOMY AND BIOPSY;  Surgeon: Rafa Parks MD;  Location:  GI     ENT SURGERY  06/2011     EYE SURGERY  09/2015     IMPLANT PACEMAKER  03/25/23    heart stopped after valve replacement put pacemaker in as a precauction     NASAL/SINUS POLYPECTOMY  2002    sinus surgery     DE CARDIAC SURG PROCEDURE UNLISTED  03/21/21    valve replacement     THORACIC SURGERY  03/21    valve replacement       ALLERGIES     Allergies   Allergen Reactions     Penicillins Hives     Ambien [Zolpidem]      Complex behaviors     Statins [Statins]      Myalgias to multiple statins       FAMILY HISTORY:  Family History   Problem  Relation Age of Onset     Hypertension Mother      Cancer Mother         kidney cancer, age 85     Other Cancer Mother         kidney cancer 85     Other Cancer Father          of stomach cancer age 43     Hypertension Sister      Hyperlipidemia Sister      Heart Disease Sister      Hypertension Sister      Hyperlipidemia Sister      Diabetes Sister      Breast Cancer Sister      Skin Cancer Sister      Coronary Artery Disease Sister      Hyperlipidemia Sister      Thyroid Disease Sister      Asthma Sister      Basal cell carcinoma Sister      Skin Cancer Sister      Heart Disease Paternal Grandmother      Hypertension Paternal Grandmother      Obesity Paternal Grandmother      Heart Disease Daughter      Anesthesia Reaction Son      Hypertension Son      Other Cancer Other         Neuroblastoma age 5     Other Cancer Other         Neuroblastoma age 6     Colon Cancer No family hx of         Paternal Aunt       SOCIAL HISTORY:  Social History     Socioeconomic History     Marital status:      Spouse name: None     Number of children: None     Years of education: None     Highest education level: None   Tobacco Use     Smoking status: Former     Current packs/day: 0.00     Average packs/day: 1 pack/day for 10.4 years (10.4 ttl pk-yrs)     Types: Cigarettes     Start date: 1969     Quit date: 10/1/1979     Years since quittin.7     Passive exposure: Past     Smokeless tobacco: Never   Vaping Use     Vaping status: Never Used   Substance and Sexual Activity     Alcohol use: Yes     Comment: occ     Drug use: Never     Sexual activity: Not Currently     Partners: Male     Birth control/protection: Post-menopausal   Other Topics Concern     Parent/sibling w/ CABG, MI or angioplasty before 65F 55M? No     Social Drivers of Health     Financial Resource Strain: Low Risk  (2025)    Financial Resource Strain      Within the past 12 months, have you or your family members you live with been unable to  get utilities (heat, electricity) when it was really needed?: No   Food Insecurity: Low Risk  (1/9/2025)    Food Insecurity      Within the past 12 months, did you worry that your food would run out before you got money to buy more?: No      Within the past 12 months, did the food you bought just not last and you didn t have money to get more?: No   Transportation Needs: Low Risk  (1/9/2025)    Transportation Needs      Within the past 12 months, has lack of transportation kept you from medical appointments, getting your medicines, non-medical meetings or appointments, work, or from getting things that you need?: No   Physical Activity: Sufficiently Active (1/9/2025)    Exercise Vital Sign      Days of Exercise per Week: 4 days      Minutes of Exercise per Session: 50 min   Stress: No Stress Concern Present (1/9/2025)    [employer] [place] [employer] - Occupational Stress Questionnaire      Feeling of Stress : Only a little   Social Connections: Unknown (1/9/2025)    Social Connection and Isolation Panel [NHANES]      Frequency of Social Gatherings with Friends and Family: Once a week   Interpersonal Safety: Low Risk  (1/14/2025)    Interpersonal Safety      Do you feel physically and emotionally safe where you currently live?: Yes      Within the past 12 months, have you been hit, slapped, kicked or otherwise physically hurt by someone?: No      Within the past 12 months, have you been humiliated or emotionally abused in other ways by your partner or ex-partner?: No   Housing Stability: Low Risk  (1/9/2025)    Housing Stability      Do you have housing? : Yes      Are you worried about losing your housing?: No       ROS:   Constitutional: No fever, chills, or sweats. No weight gain/loss   ENT: No visual disturbance, ear ache, epistaxis, sore throat  Allergies/Immunologic: Negative.   Respiratory: No cough, hemoptysia  Cardiovascular: As per HPI  GI: No nausea, vomiting, hematemesis, melena, or  hematochezia  : No urinary frequency, dysuria, or hematuria  Integument: Negative  Psychiatric: Negative  Neuro: Negative  Endocrinology: Negative   Musculoskeletal: Negative    EXAM:  /81 (BP Location: Right arm, Patient Position: Chair, Cuff Size: Adult Regular)   Pulse 60   Wt 64.1 kg (141 lb 4.8 oz)   LMP  (LMP Unknown)   SpO2 95%   BMI 25.90 kg/m      In general, the patient is a pleasant female in no apparent distress.    HEENT: NC/AT.  PERRLA.  EOMI.  Sclerae white, not injected.  Nares clear.  Pharynx without erythema or exudate.  Dentition intact.    Neck: No adenopathy.  No thyromegaly. Carotids +4/4 bilaterally without bruits.  No jugular venous distension.   Heart: RRR. Normal S1, S2 splits physiologically and is loud. No murmur, rub, click, or gallop. The PMI is in the 5th ICS in the midclavicular line. There is no heave.    Lungs: CTA.  No ronchi, wheezes, rales.  No dullness to percussion.   Abdomen: Soft, nontender, nondistended. No organomegaly.  No bruits.   Extremities: No clubbing, cyanosis, or edema.  The pulses are +4/4 at the radial, brachial, femoral, popliteal, DP, and PT sites bilaterally.  No bruits are noted.  Neurologic: Alert and oriented to person/place/time, normal speech, gait and affect  Skin: No petechiae, purpura or rash.    Labs:  LIPID RESULTS:  Lab Results   Component Value Date    CHOL 236 (H) 07/02/2025    HDL 63 07/02/2025     (H) 07/02/2025     (H) 07/02/2025    TRIG 126 07/02/2025    NHDL 173 (H) 07/02/2025     Lipoprotein a 42 mg/dl (normal is < 30 mg/dl)- pelacarsen and olaparsen are 2 inject medication under study to lower Lpa.    LIVER ENZYME RESULTS:  Lab Results   Component Value Date    AST 21 07/02/2025    ALT 13 07/02/2025       CBC RESULTS:  Lab Results   Component Value Date    WBC 5.1 11/21/2023    RBC 4.59 11/21/2023    HGB 11.9 12/15/2023    HCT 41.4 11/21/2023    MCV 90 11/21/2023    MCH 27.0 11/21/2023    MCHC 30.0 (L) 11/21/2023     RDW 13.2 11/21/2023     11/21/2023       BMP RESULTS:  Lab Results   Component Value Date     07/02/2025    POTASSIUM 4.2 07/02/2025    POTASSIUM 4.3 08/23/2022    CHLORIDE 106 07/02/2025    CHLORIDE 107 08/23/2022    CO2 27 07/02/2025    CO2 24 08/23/2022    ANIONGAP 10 07/02/2025    ANIONGAP 8 08/23/2022     (H) 07/02/2025    GLC 90 08/23/2022    BUN 17.7 07/02/2025    BUN 24 08/23/2022    CR 0.98 (H) 07/02/2025    GFRESTIMATED 61 07/02/2025    GFRESTIMATED >60 06/24/2024    ARIA 9.2 07/02/2025        A1C RESULTS:  Lab Results   Component Value Date    A1C 5.9 (H) 07/02/2025       Procedures:  MRA Chest 9/2024:    1. The patient is status post aortic valve replacement with a 23 mm Inspiris Resilia bioprosthesis as well  as left atrial appendage ligation with a 35mm AtriClip in 03/2021.     2.  The aortic root is normal in size. There is mild dilatation of the ascending thoracic aorta, which  measures 4.2 cm x 4.0 cm at the level of the main pulmonary artery. The transverse arch and descending  thoracic aorta are normal in size without an aneurysm or dissection.     Aortic Size Index: 2.6 cm/m2     Aortic Height Index: 2.6 cm/m     2. The aortic arch is left sided. There is normal branching of the arch vessels. There is no coarctation.     3. The main and proximal branch pulmonary arteries are normal in size.      4. The systemic venous connections are normal.     PM Interrogation 5/2025:  [employer] (D) Pacemaker Device Check  Patient seen in clinic for device evaluation and iterative programming.      Mode: DDDR    Underlying Rhythm: SBrady at 51-52bpm (AS-VS: 191ms, AP-VS: 238ms)     Pacing/Histogram Data Since: 3/26/25  AP: 34%  : <1%  Heart Rate: Stable with good variability.  HRs per histogram range primarily from 60-90bpm.  Patient feels good with activity.      Sensing: WNL   Pacing Threshold: Stable   Impedance: Stable   Battery Status: Estimated at 6.4-7.4  years remaining   Device Site: Well healed, right sided      Arrhythmia Data Since: 3/26/25  Atrial Arrhythmia: 0  Ventricular Arrhythmia: 0     Setting Change: PMT storage turned on with low priority and a detection rate of 120bpm.  Changed AMS episode storage to AT/AF episode storage at 170bpm.       Care Plan: Remote device check scheduled for 8/25/25.  Due to see Dr. King in September or October 2025; directed to scheduling to schedule.  STORMY Hester      Assessment and Plan:     I discussed the assessment and plan with patient:    - a lipid-lowering diet    74 YOF with PMHx of SAVR for bicuspid valve in 2021, pAF with left atrial appendage ligation, neoplasm of the L breast (underwent radiation), thoracic aortic aneurysm and dilation, hyperlipidemia, and mild intermittent asthma here for statin intolerance and intolerance of repatha.    #Primary hyperlipidemia  Given her failure of all statins she is attempting to take as well as Repatha but is completely intolerant  and will now have a therapy with Leqvio (incliseran) injection baseline, 3 months and then every 6 months.would recommend step up to inclisiran  She will have labs for lipids - 3 months after first Leqvio subcut.  -Lipid labs 3 month after initial injection    Follow up: 3 months for inclisiran  The she will inject Leqvio every 6 months.    Marciano Larson  Cardiovascular Disease Fellow    I saw and evaluated patient with CV fellow. I examined patient with CV fellow. I discussed the results with patient and CV fellow. I discussed our plan with patient and CV fellow.  I agree with CV fellow's note and I edited the CV fellow 's note to make it a more comprehensive document.    Rashi Treadwell MD, PhD  Professor of Medicine  Division of Cardiology    CC  Patient Care Team:  Wegener, Joel Daniel Irwin, MD as PCP - General (Family Medicine)  Wegener, Joel Daniel Irwin, MD as Assigned PCP  Rohit Thao MD as MD (Critical Care)  Keo Lee MD  as MD (Dermatology)  Judy Roberts PA-C as Physician Assistant  Milka Martinez PA-C as Physician Assistant (Dermatology)  Nikki Newman PA-C as Physician Assistant (Urology)  Vinnie Patel MD as Assigned Musculoskeletal Provider  Meagn Todd RD as Diabetes Educator (Dietitian, Registered)  Lovely De Oliveira CNP as Nurse Practitioner (Cardiovascular Disease)  Сергей Parry MD as Assigned Dermatology Provider  Lovely De Oliveira CNP as Assigned Heart and Vascular Provider  Cynthia Ramirez DO as Assigned Neuroscience Provider  Rohit Thao MD as Assigned Pulmonology Provider  WEGENER, JOEL DANIEL IRWIN      Please do not hesitate to contact me if you have any questions/concerns.     Sincerely,     Rashi Treadwell MD

## 2025-07-07 ENCOUNTER — MYC MEDICAL ADVICE (OUTPATIENT)
Dept: CARDIOLOGY | Facility: CLINIC | Age: 75
End: 2025-07-07
Payer: MEDICARE

## 2025-07-08 ENCOUNTER — MYC MEDICAL ADVICE (OUTPATIENT)
Dept: CARDIOLOGY | Facility: CLINIC | Age: 75
End: 2025-07-08
Payer: MEDICARE

## 2025-07-08 DIAGNOSIS — Z78.9 PCSK9 ENZYME INHIBITOR INTOLERANCE: ICD-10-CM

## 2025-07-08 DIAGNOSIS — T46.6X5A MYALGIA DUE TO STATIN: Primary | ICD-10-CM

## 2025-07-08 DIAGNOSIS — I25.10 CORONARY ARTERY DISEASE INVOLVING NATIVE CORONARY ARTERY OF NATIVE HEART WITHOUT ANGINA PECTORIS: ICD-10-CM

## 2025-07-08 DIAGNOSIS — E78.5 HYPERLIPIDEMIA LDL GOAL <100: ICD-10-CM

## 2025-07-08 DIAGNOSIS — M79.10 MYALGIA DUE TO STATIN: Primary | ICD-10-CM

## 2025-07-08 DIAGNOSIS — I65.29 CAROTID STENOSIS: ICD-10-CM

## 2025-07-08 NOTE — TELEPHONE ENCOUNTER
Called and left message to schedule nurse visit on 7/10 per Kelsey between 11:30 and 4pm. Mychart message also sent.    Joao Kurtz   [facility]- Cardiology   [address] [place] [id number]

## 2025-07-08 NOTE — TELEPHONE ENCOUNTER
Spoke with patient and scheduled nurse visit for 11:30am on July 10th.    Joao Kurtz   [facility] [[email or web address]] [facility] [place]- Cardiology   [address] [place] [id number]

## 2025-07-10 ENCOUNTER — ALLIED HEALTH/NURSE VISIT (OUTPATIENT)
Dept: CARDIOLOGY | Facility: CLINIC | Age: 75
End: 2025-07-10
Payer: MEDICARE

## 2025-07-10 DIAGNOSIS — E78.5 HYPERLIPIDEMIA LDL GOAL <100: Primary | ICD-10-CM

## 2025-07-10 DIAGNOSIS — I65.29 CAROTID STENOSIS: ICD-10-CM

## 2025-07-10 DIAGNOSIS — I25.10 CORONARY ARTERY DISEASE INVOLVING NATIVE CORONARY ARTERY OF NATIVE HEART WITHOUT ANGINA PECTORIS: ICD-10-CM

## 2025-07-10 DIAGNOSIS — T46.6X5A MYALGIA DUE TO STATIN: ICD-10-CM

## 2025-07-10 DIAGNOSIS — Z78.9 PCSK9 ENZYME INHIBITOR INTOLERANCE: ICD-10-CM

## 2025-07-10 DIAGNOSIS — M79.10 MYALGIA DUE TO STATIN: ICD-10-CM

## 2025-07-10 PROCEDURE — 96372 THER/PROPH/DIAG INJ SC/IM: CPT | Performed by: INTERNAL MEDICINE

## 2025-07-10 PROCEDURE — 250N000011 HC RX IP 250 OP 636: Mod: JZ | Performed by: INTERNAL MEDICINE

## 2025-07-10 RX ADMIN — INCLISIRAN 284 MG: 284 INJECTION, SOLUTION SUBCUTANEOUS at 12:06

## 2025-07-10 NOTE — PROGRESS NOTES
Patient received 1st Leqvio injection today, July 10, 2025, and tolerated well. Pt taken out to PODs following injection to schedule 2nd injection with fasting labs prior.

## 2025-07-30 ENCOUNTER — MYC MEDICAL ADVICE (OUTPATIENT)
Dept: FAMILY MEDICINE | Facility: CLINIC | Age: 75
End: 2025-07-30
Payer: MEDICARE

## 2025-07-30 DIAGNOSIS — R41.3 MEMORY LOSS: Primary | ICD-10-CM

## 2025-07-30 NOTE — TELEPHONE ENCOUNTER
JW,  Please see below [employer] message and advise.  Regarding OV 5/30/25  Referral option pended  Thanks,  Ivy ABDI RN

## 2025-07-31 NOTE — TELEPHONE ENCOUNTER
JW,    Patient calling back regarding below  States kids are noticing a few memory concerns and is hoping to see neurology sooner rather than later    Hetal CULP RN

## 2025-08-25 ENCOUNTER — ANCILLARY PROCEDURE (OUTPATIENT)
Dept: CARDIOLOGY | Facility: CLINIC | Age: 75
End: 2025-08-25
Attending: INTERNAL MEDICINE
Payer: MEDICARE

## 2025-08-25 DIAGNOSIS — I49.5 SSS (SICK SINUS SYNDROME) (H): ICD-10-CM

## 2025-08-25 DIAGNOSIS — Z95.0 CARDIAC PACEMAKER IN SITU: ICD-10-CM

## 2025-08-25 LAB
MDC_IDC_LEAD_CONNECTION_STATUS: NORMAL
MDC_IDC_LEAD_CONNECTION_STATUS: NORMAL
MDC_IDC_LEAD_IMPLANT_DT: NORMAL
MDC_IDC_LEAD_IMPLANT_DT: NORMAL
MDC_IDC_LEAD_LOCATION: NORMAL
MDC_IDC_LEAD_LOCATION: NORMAL
MDC_IDC_LEAD_LOCATION_DETAIL_1: NORMAL
MDC_IDC_LEAD_LOCATION_DETAIL_1: NORMAL
MDC_IDC_LEAD_MFG: NORMAL
MDC_IDC_LEAD_MFG: NORMAL
MDC_IDC_LEAD_MODEL: NORMAL
MDC_IDC_LEAD_MODEL: NORMAL
MDC_IDC_LEAD_POLARITY_TYPE: NORMAL
MDC_IDC_LEAD_POLARITY_TYPE: NORMAL
MDC_IDC_LEAD_SERIAL: NORMAL
MDC_IDC_LEAD_SERIAL: NORMAL
MDC_IDC_MSMT_BATTERY_DTM: NORMAL
MDC_IDC_MSMT_BATTERY_REMAINING_LONGEVITY: 80 MO
MDC_IDC_MSMT_BATTERY_REMAINING_PERCENTAGE: 63 %
MDC_IDC_MSMT_BATTERY_RRT_TRIGGER: NORMAL
MDC_IDC_MSMT_BATTERY_STATUS: NORMAL
MDC_IDC_MSMT_BATTERY_VOLTAGE: 3.01 V
MDC_IDC_MSMT_LEADCHNL_RA_IMPEDANCE_VALUE: 360 OHM
MDC_IDC_MSMT_LEADCHNL_RA_LEAD_CHANNEL_STATUS: NORMAL
MDC_IDC_MSMT_LEADCHNL_RA_PACING_THRESHOLD_AMPLITUDE: 0.5 V
MDC_IDC_MSMT_LEADCHNL_RA_PACING_THRESHOLD_PULSEWIDTH: 0.4 MS
MDC_IDC_MSMT_LEADCHNL_RA_SENSING_INTR_AMPL: 3.6 MV
MDC_IDC_MSMT_LEADCHNL_RV_IMPEDANCE_VALUE: 480 OHM
MDC_IDC_MSMT_LEADCHNL_RV_LEAD_CHANNEL_STATUS: NORMAL
MDC_IDC_MSMT_LEADCHNL_RV_PACING_THRESHOLD_AMPLITUDE: 1.12 V
MDC_IDC_MSMT_LEADCHNL_RV_PACING_THRESHOLD_PULSEWIDTH: 0.4 MS
MDC_IDC_MSMT_LEADCHNL_RV_SENSING_INTR_AMPL: 5.8 MV
MDC_IDC_PG_IMPLANT_DTM: NORMAL
MDC_IDC_PG_MFG: NORMAL
MDC_IDC_PG_MODEL: NORMAL
MDC_IDC_PG_SERIAL: NORMAL
MDC_IDC_PG_TYPE: NORMAL
MDC_IDC_SESS_CLINIC_NAME: NORMAL
MDC_IDC_SESS_DTM: NORMAL
MDC_IDC_SESS_REPROGRAMMED: NO
MDC_IDC_SESS_TYPE: NORMAL
MDC_IDC_SET_BRADY_AT_MODE_SWITCH_MODE: NORMAL
MDC_IDC_SET_BRADY_AT_MODE_SWITCH_RATE: 170 {BEATS}/MIN
MDC_IDC_SET_BRADY_LOWRATE: 60 {BEATS}/MIN
MDC_IDC_SET_BRADY_MAX_SENSOR_RATE: 130 {BEATS}/MIN
MDC_IDC_SET_BRADY_MAX_TRACKING_RATE: 130 {BEATS}/MIN
MDC_IDC_SET_BRADY_MODE: NORMAL
MDC_IDC_SET_BRADY_PAV_DELAY_LOW: 200 MS
MDC_IDC_SET_BRADY_SAV_DELAY_LOW: 150 MS
MDC_IDC_SET_LEADCHNL_RA_PACING_AMPLITUDE: 2 V
MDC_IDC_SET_LEADCHNL_RA_PACING_ANODE_ELECTRODE_1: NORMAL
MDC_IDC_SET_LEADCHNL_RA_PACING_ANODE_LOCATION_1: NORMAL
MDC_IDC_SET_LEADCHNL_RA_PACING_CAPTURE_MODE: NORMAL
MDC_IDC_SET_LEADCHNL_RA_PACING_CATHODE_ELECTRODE_1: NORMAL
MDC_IDC_SET_LEADCHNL_RA_PACING_CATHODE_LOCATION_1: NORMAL
MDC_IDC_SET_LEADCHNL_RA_PACING_POLARITY: NORMAL
MDC_IDC_SET_LEADCHNL_RA_PACING_PULSEWIDTH: 0.4 MS
MDC_IDC_SET_LEADCHNL_RA_SENSING_ADAPTATION_MODE: NORMAL
MDC_IDC_SET_LEADCHNL_RA_SENSING_ANODE_ELECTRODE_1: NORMAL
MDC_IDC_SET_LEADCHNL_RA_SENSING_ANODE_LOCATION_1: NORMAL
MDC_IDC_SET_LEADCHNL_RA_SENSING_CATHODE_ELECTRODE_1: NORMAL
MDC_IDC_SET_LEADCHNL_RA_SENSING_CATHODE_LOCATION_1: NORMAL
MDC_IDC_SET_LEADCHNL_RA_SENSING_POLARITY: NORMAL
MDC_IDC_SET_LEADCHNL_RA_SENSING_SENSITIVITY: 0.5 MV
MDC_IDC_SET_LEADCHNL_RV_PACING_AMPLITUDE: 1.38
MDC_IDC_SET_LEADCHNL_RV_PACING_ANODE_ELECTRODE_1: NORMAL
MDC_IDC_SET_LEADCHNL_RV_PACING_ANODE_LOCATION_1: NORMAL
MDC_IDC_SET_LEADCHNL_RV_PACING_CAPTURE_MODE: NORMAL
MDC_IDC_SET_LEADCHNL_RV_PACING_CATHODE_ELECTRODE_1: NORMAL
MDC_IDC_SET_LEADCHNL_RV_PACING_CATHODE_LOCATION_1: NORMAL
MDC_IDC_SET_LEADCHNL_RV_PACING_POLARITY: NORMAL
MDC_IDC_SET_LEADCHNL_RV_PACING_PULSEWIDTH: 0.4 MS
MDC_IDC_SET_LEADCHNL_RV_SENSING_ADAPTATION_MODE: NORMAL
MDC_IDC_SET_LEADCHNL_RV_SENSING_ANODE_ELECTRODE_1: NORMAL
MDC_IDC_SET_LEADCHNL_RV_SENSING_ANODE_LOCATION_1: NORMAL
MDC_IDC_SET_LEADCHNL_RV_SENSING_CATHODE_ELECTRODE_1: NORMAL
MDC_IDC_SET_LEADCHNL_RV_SENSING_CATHODE_LOCATION_1: NORMAL
MDC_IDC_SET_LEADCHNL_RV_SENSING_POLARITY: NORMAL
MDC_IDC_SET_LEADCHNL_RV_SENSING_SENSITIVITY: 2 MV
MDC_IDC_STAT_AT_BURDEN_PERCENT: 0 %
MDC_IDC_STAT_AT_DTM_END: NORMAL
MDC_IDC_STAT_AT_DTM_START: NORMAL
MDC_IDC_STAT_AT_MODE_SW_COUNT: 0
MDC_IDC_STAT_AT_MODE_SW_COUNT_PER_DAY: 0
MDC_IDC_STAT_AT_MODE_SW_PERCENT_TIME: 0 %
MDC_IDC_STAT_BRADY_AP_VP_PERCENT: 1 %
MDC_IDC_STAT_BRADY_AP_VS_PERCENT: 32 %
MDC_IDC_STAT_BRADY_AS_VP_PERCENT: 1 %
MDC_IDC_STAT_BRADY_AS_VS_PERCENT: 68 %
MDC_IDC_STAT_BRADY_DTM_END: NORMAL
MDC_IDC_STAT_BRADY_DTM_START: NORMAL
MDC_IDC_STAT_BRADY_RA_PERCENT_PACED: 32 %
MDC_IDC_STAT_BRADY_RV_PERCENT_PACED: 1 %
MDC_IDC_STAT_CRT_DTM_END: NORMAL
MDC_IDC_STAT_CRT_DTM_START: NORMAL
MDC_IDC_STAT_HEART_RATE_ATRIAL_MAX: 260 {BEATS}/MIN
MDC_IDC_STAT_HEART_RATE_ATRIAL_MEAN: 70 {BEATS}/MIN
MDC_IDC_STAT_HEART_RATE_ATRIAL_MIN: 50 {BEATS}/MIN
MDC_IDC_STAT_HEART_RATE_DTM_END: NORMAL
MDC_IDC_STAT_HEART_RATE_DTM_START: NORMAL
MDC_IDC_STAT_HEART_RATE_VENTRICULAR_MAX: 230 {BEATS}/MIN
MDC_IDC_STAT_HEART_RATE_VENTRICULAR_MEAN: 70 {BEATS}/MIN
MDC_IDC_STAT_HEART_RATE_VENTRICULAR_MIN: 50 {BEATS}/MIN

## 2025-08-25 PROCEDURE — 93294 REM INTERROG EVL PM/LDLS PM: CPT | Performed by: INTERNAL MEDICINE

## 2025-08-25 PROCEDURE — 93296 REM INTERROG EVL PM/IDS: CPT | Performed by: INTERNAL MEDICINE

## (undated) RX ORDER — FENTANYL CITRATE 50 UG/ML
INJECTION, SOLUTION INTRAMUSCULAR; INTRAVENOUS
Status: DISPENSED
Start: 2022-11-10

## (undated) RX ORDER — LIDOCAINE HYDROCHLORIDE 10 MG/ML
INJECTION, SOLUTION INFILTRATION; PERINEURAL
Status: DISPENSED
Start: 2022-03-17

## (undated) RX ORDER — TRIAMCINOLONE ACETONIDE 40 MG/ML
INJECTION, SUSPENSION INTRA-ARTICULAR; INTRAMUSCULAR
Status: DISPENSED
Start: 2022-03-17